# Patient Record
Sex: MALE | Race: BLACK OR AFRICAN AMERICAN | NOT HISPANIC OR LATINO | Employment: OTHER | URBAN - METROPOLITAN AREA
[De-identification: names, ages, dates, MRNs, and addresses within clinical notes are randomized per-mention and may not be internally consistent; named-entity substitution may affect disease eponyms.]

---

## 2013-09-02 LAB — HEP C VIRUS AB: 11

## 2018-04-16 ENCOUNTER — HOSPITAL ENCOUNTER (EMERGENCY)
Facility: HOSPITAL | Age: 58
Discharge: HOME | End: 2018-04-16
Attending: EMERGENCY MEDICINE
Payer: MEDICARE

## 2018-04-16 VITALS
TEMPERATURE: 99 F | RESPIRATION RATE: 14 BRPM | HEART RATE: 65 BPM | WEIGHT: 145 LBS | DIASTOLIC BLOOD PRESSURE: 105 MMHG | SYSTOLIC BLOOD PRESSURE: 152 MMHG | HEIGHT: 69 IN | OXYGEN SATURATION: 98 % | BODY MASS INDEX: 21.48 KG/M2

## 2018-04-16 DIAGNOSIS — L50.9 URTICARIA: ICD-10-CM

## 2018-04-16 DIAGNOSIS — R21 RASH AND NONSPECIFIC SKIN ERUPTION: Primary | ICD-10-CM

## 2018-04-16 PROCEDURE — 99283 EMERGENCY DEPT VISIT LOW MDM: CPT | Mod: 25

## 2018-04-16 PROCEDURE — 25000003 PHARM REV CODE 250: Performed by: NURSE PRACTITIONER

## 2018-04-16 PROCEDURE — 63600175 PHARM REV CODE 636 W HCPCS: Performed by: NURSE PRACTITIONER

## 2018-04-16 PROCEDURE — 96372 THER/PROPH/DIAG INJ SC/IM: CPT

## 2018-04-16 RX ORDER — PREDNISONE 20 MG/1
40 TABLET ORAL DAILY
Qty: 10 TABLET | Refills: 0 | Status: SHIPPED | OUTPATIENT
Start: 2018-04-16 | End: 2018-04-21

## 2018-04-16 RX ORDER — DIPHENHYDRAMINE HYDROCHLORIDE 50 MG/ML
25 INJECTION INTRAMUSCULAR; INTRAVENOUS
Status: COMPLETED | OUTPATIENT
Start: 2018-04-16 | End: 2018-04-16

## 2018-04-16 RX ORDER — BETAMETHASONE SODIUM PHOSPHATE AND BETAMETHASONE ACETATE 3; 3 MG/ML; MG/ML
12 INJECTION, SUSPENSION INTRA-ARTICULAR; INTRALESIONAL; INTRAMUSCULAR; SOFT TISSUE
Status: COMPLETED | OUTPATIENT
Start: 2018-04-16 | End: 2018-04-16

## 2018-04-16 RX ORDER — ONDANSETRON 4 MG/1
4 TABLET, ORALLY DISINTEGRATING ORAL
Status: COMPLETED | OUTPATIENT
Start: 2018-04-16 | End: 2018-04-16

## 2018-04-16 RX ADMIN — Medication 12 MG: at 05:04

## 2018-04-16 RX ADMIN — DIPHENHYDRAMINE HYDROCHLORIDE 25 MG: 50 INJECTION, SOLUTION INTRAMUSCULAR; INTRAVENOUS at 05:04

## 2018-04-16 RX ADMIN — ONDANSETRON 4 MG: 4 TABLET, ORALLY DISINTEGRATING ORAL at 05:04

## 2018-04-16 NOTE — ED TRIAGE NOTES
C/o abd pain x for past month. Pt thinks pauin related to swallowing tooth . C/o nausea and vomited x 1 today.

## 2018-04-16 NOTE — ED PROVIDER NOTES
Encounter Date: 4/16/2018       History     Chief Complaint   Patient presents with    Abdominal Pain    Rash     Patient with rash x 1 month      The history is limited by a language barrier. A  was used.   Illness    The current episode started several weeks ago. The problem occurs continuously. The problem has been gradually worsening. The symptoms are relieved by one or more OTC medications. Nothing aggravates the symptoms. Pertinent negatives include no fever, no nausea, no sore throat, no shortness of breath and no rash.     Review of patient's allergies indicates:  No Known Allergies  Past Medical History:   Diagnosis Date    Coronary artery disease     MI (myocardial infarction)     Ulcer      Past Surgical History:   Procedure Laterality Date    ABDOMINAL SURGERY      CARDIAC SURGERY      EXTENSOR TENDON OF FOREARM / WRIST REPAIR       No family history on file.  Social History   Substance Use Topics    Smoking status: Current Every Day Smoker     Packs/day: 1.00    Smokeless tobacco: Never Used    Alcohol use 0.6 oz/week     1 Cans of beer per week     Review of Systems   Constitutional: Negative for fever.   HENT: Negative for sore throat.    Respiratory: Negative for shortness of breath.    Cardiovascular: Negative for chest pain.   Gastrointestinal: Negative for nausea.   Genitourinary: Negative for dysuria.   Musculoskeletal: Negative for back pain.   Skin: Negative for rash.   Neurological: Negative for weakness.   Hematological: Does not bruise/bleed easily.       Physical Exam     Initial Vitals [04/16/18 1334]   BP Pulse Resp Temp SpO2   (!) 146/92 77 18 99 °F (37.2 °C) 98 %      MAP       110         Physical Exam    Nursing note and vitals reviewed.  Constitutional: He appears well-developed and well-nourished. He is not diaphoretic. No distress.   HENT:   Head: Normocephalic.   Eyes: Pupils are equal, round, and reactive to light.   Neck: Normal range of motion.  "  Cardiovascular: Normal rate, regular rhythm, normal heart sounds and intact distal pulses.   Pulmonary/Chest: Breath sounds normal. No respiratory distress.   Abdominal: Soft. Bowel sounds are normal. He exhibits no distension. There is no tenderness. There is no rebound and no guarding.   Musculoskeletal: Normal range of motion.   Neurological: He is alert and oriented to person, place, and time.   Skin: Skin is warm and dry. Rash (Rash to neck, chest and legs) noted.   Psychiatric: He has a normal mood and affect. His behavior is normal. Judgment and thought content normal.         ED Course   Procedures  Labs Reviewed - No data to display          Medical Decision Making:   ED Management:  Meds given  Take medications as prescribed  Benadryl as needed    Chart is signed / Attested as attending physician only as primary provider was RNP.                     ED Course as of Apr 16 1719   Mon Apr 16, 2018   1623 Awaiting   [JL]   1719 Patient denies abdominal pain. Patient states "Im just hungry"  [JL]      ED Course User Index  [JL] Selina Rothman NP     Clinical Impression:   The primary encounter diagnosis was Rash and nonspecific skin eruption. A diagnosis of Urticaria was also pertinent to this visit.                           Daren Wray MD  04/17/18 0812    "

## 2018-08-08 ENCOUNTER — HOSPITAL ENCOUNTER (EMERGENCY)
Facility: HOSPITAL | Age: 58
Discharge: HOME | End: 2018-08-08
Attending: INTERNAL MEDICINE
Payer: MEDICARE

## 2018-08-08 VITALS
OXYGEN SATURATION: 99 % | RESPIRATION RATE: 20 BRPM | BODY MASS INDEX: 21.48 KG/M2 | WEIGHT: 145 LBS | DIASTOLIC BLOOD PRESSURE: 81 MMHG | HEART RATE: 95 BPM | SYSTOLIC BLOOD PRESSURE: 119 MMHG | TEMPERATURE: 98 F | HEIGHT: 69 IN

## 2018-08-08 DIAGNOSIS — R21 RASH: Primary | ICD-10-CM

## 2018-08-08 PROCEDURE — 99283 EMERGENCY DEPT VISIT LOW MDM: CPT

## 2018-08-08 RX ORDER — CETIRIZINE HYDROCHLORIDE 10 MG/1
10 TABLET ORAL DAILY PRN
Qty: 25 TABLET | Refills: 0 | COMMUNITY
Start: 2018-08-08 | End: 2018-08-08

## 2018-08-08 RX ORDER — BETAMETHASONE VALERATE 1.2 MG/G
OINTMENT TOPICAL 2 TIMES DAILY PRN
Qty: 45 G | Refills: 0 | Status: SHIPPED | OUTPATIENT
Start: 2018-08-08 | End: 2018-08-08

## 2018-08-08 RX ORDER — CETIRIZINE HYDROCHLORIDE 10 MG/1
10 TABLET ORAL DAILY PRN
Qty: 25 TABLET | Refills: 0 | COMMUNITY
Start: 2018-08-08 | End: 2018-10-30

## 2018-08-08 RX ORDER — BETAMETHASONE VALERATE 1.2 MG/G
OINTMENT TOPICAL 2 TIMES DAILY PRN
Qty: 45 G | Refills: 0 | Status: SHIPPED | OUTPATIENT
Start: 2018-08-08 | End: 2019-04-11 | Stop reason: SDUPTHER

## 2018-08-09 NOTE — ED PROVIDER NOTES
Encounter Date: 8/8/2018       History     Chief Complaint   Patient presents with    Rash     right arm     57 y.o male with rash to right arm          Review of patient's allergies indicates:  No Known Allergies  Past Medical History:   Diagnosis Date    Coronary artery disease     MI (myocardial infarction)     Ulcer      Past Surgical History:   Procedure Laterality Date    ABDOMINAL SURGERY      CARDIAC SURGERY      EXTENSOR TENDON OF FOREARM / WRIST REPAIR       History reviewed. No pertinent family history.  Social History   Substance Use Topics    Smoking status: Current Every Day Smoker     Packs/day: 1.00    Smokeless tobacco: Never Used    Alcohol use 0.6 oz/week     1 Cans of beer per week     Review of Systems   Constitutional: Negative for fever.   HENT: Negative for sore throat.    Respiratory: Negative for shortness of breath.    Cardiovascular: Negative for chest pain.   Gastrointestinal: Negative for nausea.   Genitourinary: Negative for dysuria.   Musculoskeletal: Negative for back pain.   Skin: Positive for rash.   Neurological: Negative for weakness.   Hematological: Does not bruise/bleed easily.   All other systems reviewed and are negative.      Physical Exam     Initial Vitals [08/08/18 1921]   BP Pulse Resp Temp SpO2   119/81 95 20 98.1 °F (36.7 °C) 99 %      MAP       --         Physical Exam    Nursing note and vitals reviewed.  Constitutional: He appears well-developed and well-nourished.   HENT:   Head: Normocephalic.   Eyes: Pupils are equal, round, and reactive to light.   Neck: Normal range of motion.   Cardiovascular: Normal rate and regular rhythm.   Pulmonary/Chest: Breath sounds normal.   Neurological: He is alert and oriented to person, place, and time.   Skin: Skin is warm and dry. Rash noted. Rash is urticarial.   Psychiatric: He has a normal mood and affect. His behavior is normal. Judgment and thought content normal.         ED Course   Procedures  Labs Reviewed -  No data to display       Imaging Results    None          Medical Decision Making:   Initial Assessment:   Rash  ED Management:  Discussed physical exam findings with patient  No acute emergent medication condition identified at this time to warrant further testing/diagnostics.  Plan to discharge patient home with instructions to follow-up with PCP in 2-5 days or return to ED if symptoms worsen.  Patient verbalized agreement to discharge treatment plan.                        Clinical Impression:   The encounter diagnosis was Rash.                             Selina Rothman NP  08/08/18 2795

## 2018-10-30 ENCOUNTER — HOSPITAL ENCOUNTER (EMERGENCY)
Facility: HOSPITAL | Age: 58
Discharge: HOME | End: 2018-10-30
Payer: MEDICARE

## 2018-10-30 VITALS
HEIGHT: 69 IN | OXYGEN SATURATION: 98 % | RESPIRATION RATE: 18 BRPM | BODY MASS INDEX: 19.99 KG/M2 | HEART RATE: 90 BPM | SYSTOLIC BLOOD PRESSURE: 157 MMHG | DIASTOLIC BLOOD PRESSURE: 105 MMHG | TEMPERATURE: 98 F | WEIGHT: 135 LBS

## 2018-10-30 DIAGNOSIS — L50.9 URTICARIA: Primary | ICD-10-CM

## 2018-10-30 PROCEDURE — 63600175 PHARM REV CODE 636 W HCPCS: Performed by: EMERGENCY MEDICINE

## 2018-10-30 PROCEDURE — 99284 EMERGENCY DEPT VISIT MOD MDM: CPT | Mod: 25

## 2018-10-30 PROCEDURE — 96372 THER/PROPH/DIAG INJ SC/IM: CPT

## 2018-10-30 RX ORDER — DEXAMETHASONE SODIUM PHOSPHATE 100 MG/10ML
8 INJECTION INTRAMUSCULAR; INTRAVENOUS
Status: COMPLETED | OUTPATIENT
Start: 2018-10-30 | End: 2018-10-30

## 2018-10-30 RX ADMIN — DEXAMETHASONE SODIUM PHOSPHATE 8 MG: 10 INJECTION INTRAMUSCULAR; INTRAVENOUS at 09:10

## 2018-10-30 NOTE — ED PROVIDER NOTES
Encounter Date: 10/30/2018       History     Chief Complaint   Patient presents with    Rash     Pt reports having bites to legs and arms. Pt reports pain and itching.      Patient presents to the ER for evaluation of a rash. History is limited due to the fact that the patient is mute.  He cannot write very well.  He cannot read side language.  Patient complains of a rash and indicates that the rash is itching him.  No difficulty with cough or shortness of breath. No fever.  Review of the medical record reveals 2 prior visits for similar symptoms.          Review of patient's allergies indicates:  No Known Allergies  Past Medical History:   Diagnosis Date    Coronary artery disease     Language barrier     MI (myocardial infarction)     Ulcer      Past Surgical History:   Procedure Laterality Date    ABDOMINAL SURGERY      CARDIAC SURGERY      EXTENSOR TENDON OF FOREARM / WRIST REPAIR       History reviewed. No pertinent family history.  Social History     Tobacco Use    Smoking status: Current Every Day Smoker     Packs/day: 1.00    Smokeless tobacco: Never Used   Substance Use Topics    Alcohol use: Yes     Alcohol/week: 0.6 oz     Types: 1 Cans of beer per week    Drug use: No     Review of Systems   Constitutional: Negative for fever.   Respiratory: Negative for cough and shortness of breath.    Cardiovascular: Negative for chest pain.   Skin: Positive for rash.       Physical Exam     Initial Vitals [10/30/18 0849]   BP Pulse Resp Temp SpO2   (!) 157/105 90 18 98.3 °F (36.8 °C) 98 %      MAP       --         Physical Exam    Nursing note and vitals reviewed.  Constitutional: He appears well-developed and well-nourished.   HENT:   Head: Normocephalic and atraumatic.   Right Ear: External ear normal.   Left Ear: External ear normal.   Nose: Nose normal.   Mouth/Throat: Oropharynx is clear and moist.   Eyes: Conjunctivae and EOM are normal. Pupils are equal, round, and reactive to light.   Neck: Normal  range of motion. Neck supple.   Cardiovascular: Normal rate, regular rhythm and normal heart sounds.   Pulmonary/Chest: Breath sounds normal.   Neurological: He is alert and oriented to person, place, and time.   Skin: Skin is warm.   Patient has evidence of an urticarial rash vertically across his back.         ED Course   Procedures  Labs Reviewed - No data to display       Imaging Results    None          Medical Decision Making:   Initial Assessment:   Patient is here with recurrence rash. He is requesting a shot.  Review of the record shows that he has received steroid the past which I would expect to be effective.  I will give him an injection of steroid.  He can use Benadryl over-the-counter.  Return to the ER for worsening symptoms.                      Clinical Impression:   The encounter diagnosis was Urticaria.                             Will Cruz MD  10/30/18 6229

## 2018-10-30 NOTE — ED NOTES
Pt is unable to sign. No family at bedside. Attempt to call emergency contact x 3. No answer. Pt able to read and write on paper. Pt reporting rash/itch/pain to legs and shoulders.

## 2018-11-16 ENCOUNTER — HOSPITAL ENCOUNTER (EMERGENCY)
Facility: HOSPITAL | Age: 58
Discharge: HOME | End: 2018-11-16
Attending: FAMILY MEDICINE
Payer: MEDICARE

## 2018-11-16 VITALS
TEMPERATURE: 98 F | SYSTOLIC BLOOD PRESSURE: 113 MMHG | WEIGHT: 140 LBS | DIASTOLIC BLOOD PRESSURE: 53 MMHG | HEIGHT: 69 IN | HEART RATE: 92 BPM | BODY MASS INDEX: 20.73 KG/M2 | OXYGEN SATURATION: 98 % | RESPIRATION RATE: 18 BRPM

## 2018-11-16 DIAGNOSIS — K02.9 DENTAL CARIES: ICD-10-CM

## 2018-11-16 DIAGNOSIS — M54.31 SCIATICA OF RIGHT SIDE: Primary | ICD-10-CM

## 2018-11-16 PROCEDURE — 96372 THER/PROPH/DIAG INJ SC/IM: CPT

## 2018-11-16 PROCEDURE — 63600175 PHARM REV CODE 636 W HCPCS: Mod: JG | Performed by: FAMILY MEDICINE

## 2018-11-16 PROCEDURE — 99284 EMERGENCY DEPT VISIT MOD MDM: CPT | Mod: 25

## 2018-11-16 RX ORDER — DEXAMETHASONE SODIUM PHOSPHATE 4 MG/ML
8 INJECTION, SOLUTION INTRA-ARTICULAR; INTRALESIONAL; INTRAMUSCULAR; INTRAVENOUS; SOFT TISSUE
Status: COMPLETED | OUTPATIENT
Start: 2018-11-16 | End: 2018-11-16

## 2018-11-16 RX ORDER — KETOROLAC TROMETHAMINE 30 MG/ML
60 INJECTION, SOLUTION INTRAMUSCULAR; INTRAVENOUS
Status: COMPLETED | OUTPATIENT
Start: 2018-11-16 | End: 2018-11-16

## 2018-11-16 RX ORDER — NAPROXEN 500 MG/1
500 TABLET ORAL 2 TIMES DAILY WITH MEALS
Qty: 60 TABLET | Refills: 0 | Status: SHIPPED | OUTPATIENT
Start: 2018-11-16 | End: 2018-11-16 | Stop reason: SDUPTHER

## 2018-11-16 RX ORDER — NAPROXEN 500 MG/1
500 TABLET ORAL 2 TIMES DAILY WITH MEALS
Qty: 60 TABLET | Refills: 0 | OUTPATIENT
Start: 2018-11-16 | End: 2019-06-05

## 2018-11-16 RX ORDER — DEXAMETHASONE SODIUM PHOSPHATE 100 MG/10ML
8 INJECTION INTRAMUSCULAR; INTRAVENOUS
Status: DISCONTINUED | OUTPATIENT
Start: 2018-11-16 | End: 2018-11-16

## 2018-11-16 RX ADMIN — DEXAMETHASONE SODIUM PHOSPHATE 8 MG: 4 INJECTION, SOLUTION INTRAMUSCULAR; INTRAVENOUS at 02:11

## 2018-11-16 RX ADMIN — PENICILLIN G BENZATHINE 1.2 MILLION UNITS: 1200000 INJECTION, SUSPENSION INTRAMUSCULAR at 02:11

## 2018-11-16 RX ADMIN — KETOROLAC TROMETHAMINE 60 MG: 30 INJECTION INTRAMUSCULAR; INTRAVENOUS at 02:11

## 2018-11-16 NOTE — ED PROVIDER NOTES
Encounter Date: 11/16/2018       History     Chief Complaint   Patient presents with    Dental Pain    Leg Pain    Testicle Pain     Pt is a deaf gentleman who has a broken infected tooth and sciatica pain for the past week. He states his tooth hurts more when he eats or drinks. His hip pain is constant but is worse when he walks for sits. He has had both of these problems previously.           Review of patient's allergies indicates:  No Known Allergies  Past Medical History:   Diagnosis Date    Coronary artery disease     Language barrier     MI (myocardial infarction)     Ulcer      Past Surgical History:   Procedure Laterality Date    ABDOMINAL SURGERY      CARDIAC SURGERY      EXTENSOR TENDON OF FOREARM / WRIST REPAIR       No family history on file.  Social History     Tobacco Use    Smoking status: Current Every Day Smoker     Packs/day: 1.00    Smokeless tobacco: Never Used   Substance Use Topics    Alcohol use: Yes     Alcohol/week: 0.6 oz     Types: 1 Cans of beer per week    Drug use: No     Review of Systems   Constitutional: Negative for chills, fatigue and fever.   HENT: Positive for dental problem. Negative for congestion, ear pain, rhinorrhea, sinus pressure, sinus pain and sore throat.    Eyes: Negative for photophobia and redness.   Respiratory: Negative for cough, shortness of breath and wheezing.    Cardiovascular: Negative for chest pain, palpitations and leg swelling.   Gastrointestinal: Negative for abdominal pain, constipation, diarrhea and nausea.   Endocrine: Negative for polydipsia, polyphagia and polyuria.   Genitourinary: Negative for difficulty urinating, dysuria, flank pain, hematuria, scrotal swelling, testicular pain and urgency.   Musculoskeletal: Positive for back pain. Negative for arthralgias and joint swelling.   Skin: Negative for color change.   Neurological: Negative for dizziness, seizures, syncope, weakness and headaches.   Hematological: Negative for  adenopathy.   Psychiatric/Behavioral: Negative for sleep disturbance and suicidal ideas.   All other systems reviewed and are negative.      Physical Exam     Initial Vitals [11/16/18 1350]   BP Pulse Resp Temp SpO2   (!) 113/53 92 18 98.1 °F (36.7 °C) 98 %      MAP       --         Physical Exam    Nursing note and vitals reviewed.  Constitutional: He appears well-developed.   HENT:   Head: Normocephalic and atraumatic.   L front tooth is fractured and infected.    Eyes: Conjunctivae and EOM are normal. Pupils are equal, round, and reactive to light.   Neck: Normal range of motion. Neck supple.   Cardiovascular: Normal rate, regular rhythm, normal heart sounds and intact distal pulses.   Pulmonary/Chest: Breath sounds normal.   Abdominal: Soft. Bowel sounds are normal.   Musculoskeletal: Normal range of motion.   Neurological: He is alert and oriented to person, place, and time. He has normal strength and normal reflexes.   Skin: Skin is warm and dry. Capillary refill takes less than 2 seconds.   Psychiatric: He has a normal mood and affect. His behavior is normal.         ED Course   Procedures  Labs Reviewed - No data to display       Imaging Results    None                               Clinical Impression:   The primary encounter diagnosis was Sciatica of right side. A diagnosis of Dental caries was also pertinent to this visit.      Disposition:   Disposition: Discharged  Condition: Stable                        Joelle Mckinney MD  11/16/18 9666

## 2018-11-16 NOTE — ED NOTES
SELINA used for translation of American Sign Language at the bedside with myself and Dr. Mckinney. Assessment completed and plan of care discussed with pt. Pt denies any questions at this time.

## 2019-04-11 ENCOUNTER — HOSPITAL ENCOUNTER (EMERGENCY)
Facility: HOSPITAL | Age: 59
Discharge: HOME | End: 2019-04-11
Payer: MEDICARE

## 2019-04-11 VITALS
BODY MASS INDEX: 18.51 KG/M2 | OXYGEN SATURATION: 96 % | SYSTOLIC BLOOD PRESSURE: 149 MMHG | WEIGHT: 125 LBS | RESPIRATION RATE: 18 BRPM | HEART RATE: 89 BPM | HEIGHT: 69 IN | DIASTOLIC BLOOD PRESSURE: 94 MMHG

## 2019-04-11 DIAGNOSIS — L30.9 DERMATITIS: Primary | ICD-10-CM

## 2019-04-11 DIAGNOSIS — K02.9 DENTAL CARIES: ICD-10-CM

## 2019-04-11 PROCEDURE — 99284 EMERGENCY DEPT VISIT MOD MDM: CPT | Mod: 25

## 2019-04-11 PROCEDURE — 63600175 PHARM REV CODE 636 W HCPCS: Performed by: NURSE PRACTITIONER

## 2019-04-11 PROCEDURE — 96372 THER/PROPH/DIAG INJ SC/IM: CPT

## 2019-04-11 RX ORDER — BETAMETHASONE VALERATE 1.2 MG/G
OINTMENT TOPICAL 2 TIMES DAILY PRN
Qty: 45 G | Refills: 0 | Status: SHIPPED | OUTPATIENT
Start: 2019-04-11 | End: 2019-06-05

## 2019-04-11 RX ORDER — AMOXICILLIN 500 MG/1
500 CAPSULE ORAL 3 TIMES DAILY
Qty: 21 CAPSULE | Refills: 0 | Status: SHIPPED | OUTPATIENT
Start: 2019-04-11 | End: 2019-04-18

## 2019-04-11 RX ORDER — DEXAMETHASONE SODIUM PHOSPHATE 100 MG/10ML
8 INJECTION INTRAMUSCULAR; INTRAVENOUS
Status: COMPLETED | OUTPATIENT
Start: 2019-04-11 | End: 2019-04-11

## 2019-04-11 RX ADMIN — DEXAMETHASONE SODIUM PHOSPHATE 8 MG: 10 INJECTION INTRAMUSCULAR; INTRAVENOUS at 01:04

## 2019-04-11 NOTE — ED PROVIDER NOTES
"Encounter Date: 4/11/2019       History     Chief Complaint   Patient presents with    Rash     RASH TO BACK OF NECK, FEVER, "FEEL LIKE SOMETHING HAS BEEN BITING ME"    Dental Pain     BROKE TOOTH 1 YEAR AGO, "I JUST WANT A SHOT TO MAKE ME FEEL BETTER"     C/o itchy rash to neck on/off x 1 year. Requests an "shot" for rash and also medication for dental pain (chronic dental fracture/dental caries to left upper front tooth.         Review of patient's allergies indicates:  No Known Allergies  Past Medical History:   Diagnosis Date    Coronary artery disease     Language barrier     MI (myocardial infarction)     Ulcer      Past Surgical History:   Procedure Laterality Date    ABDOMINAL SURGERY      CARDIAC SURGERY      EXTENSOR TENDON OF FOREARM / WRIST REPAIR       No family history on file.  Social History     Tobacco Use    Smoking status: Current Every Day Smoker     Packs/day: 1.00    Smokeless tobacco: Never Used   Substance Use Topics    Alcohol use: Yes     Alcohol/week: 0.6 oz     Types: 1 Cans of beer per week    Drug use: No     Review of Systems   Constitutional: Negative for chills and fever.   HENT: Positive for dental problem. Negative for congestion.    Respiratory: Negative for cough and shortness of breath.    Gastrointestinal: Negative for diarrhea, nausea and vomiting.   Skin: Positive for rash.   All other systems reviewed and are negative.      Physical Exam     Initial Vitals   BP Pulse Resp Temp SpO2   04/11/19 1245 04/11/19 1241 04/11/19 1241 -- 04/11/19 1241   (!) 149/94 89 18  96 %      MAP       --                Physical Exam    Constitutional: He appears well-developed and well-nourished.   HENT:   Head: Normocephalic and atraumatic.   Mouth/Throat: Oropharynx is clear and moist.       Eyes: Right eye exhibits no discharge. Left eye exhibits no discharge. No scleral icterus.   Neck: Normal range of motion. Neck supple. No JVD present.   Cardiovascular: Normal rate, regular " rhythm, normal heart sounds and intact distal pulses.   No murmur heard.  Pulmonary/Chest: Breath sounds normal. No respiratory distress.   Musculoskeletal: Normal range of motion. He exhibits no edema.   Lymphadenopathy:     He has no cervical adenopathy.   Neurological: He is alert and oriented to person, place, and time. GCS score is 15. GCS eye subscore is 4. GCS verbal subscore is 5. GCS motor subscore is 6.   Skin: Skin is warm and dry. Capillary refill takes less than 2 seconds.   Hypertrophic papular skin rash to posterior neck and bilateral lower chest (chronic appearing). No redness, no evidence of cellulitis   Psychiatric: He has a normal mood and affect.         ED Course   Procedures  Labs Reviewed - No data to display       Imaging Results    None          Medical Decision Making:   Differential Diagnosis:   Contact dermatitis, eczema, scabies; dental caries                      Clinical Impression:       ICD-10-CM ICD-9-CM   1. Dermatitis L30.9 692.9   2. Dental caries K02.9 521.00         Disposition:   Disposition: Discharged  Condition: Stable                        Aparna Sánchez NP  04/11/19 7217

## 2019-06-05 ENCOUNTER — HOSPITAL ENCOUNTER (EMERGENCY)
Facility: HOSPITAL | Age: 59
Discharge: HOME | End: 2019-06-05
Payer: MEDICARE

## 2019-06-05 VITALS
DIASTOLIC BLOOD PRESSURE: 81 MMHG | BODY MASS INDEX: 21.82 KG/M2 | WEIGHT: 144 LBS | HEART RATE: 74 BPM | SYSTOLIC BLOOD PRESSURE: 112 MMHG | TEMPERATURE: 98 F | RESPIRATION RATE: 18 BRPM | HEIGHT: 68 IN | OXYGEN SATURATION: 98 %

## 2019-06-05 DIAGNOSIS — K02.9 DENTAL CARIES: Primary | ICD-10-CM

## 2019-06-05 PROCEDURE — 99284 EMERGENCY DEPT VISIT MOD MDM: CPT | Mod: 25

## 2019-06-05 PROCEDURE — 63600175 PHARM REV CODE 636 W HCPCS: Performed by: NURSE PRACTITIONER

## 2019-06-05 PROCEDURE — 96372 THER/PROPH/DIAG INJ SC/IM: CPT

## 2019-06-05 RX ORDER — KETOROLAC TROMETHAMINE 30 MG/ML
60 INJECTION, SOLUTION INTRAMUSCULAR; INTRAVENOUS
Status: COMPLETED | OUTPATIENT
Start: 2019-06-05 | End: 2019-06-05

## 2019-06-05 RX ORDER — HYDROCODONE BITARTRATE AND ACETAMINOPHEN 5; 325 MG/1; MG/1
1 TABLET ORAL EVERY 8 HOURS PRN
Qty: 10 TABLET | Refills: 0 | Status: ON HOLD | OUTPATIENT
Start: 2019-06-05 | End: 2019-10-23 | Stop reason: HOSPADM

## 2019-06-05 RX ORDER — AMOXICILLIN 500 MG/1
500 CAPSULE ORAL 3 TIMES DAILY
Qty: 21 CAPSULE | Refills: 0 | Status: SHIPPED | OUTPATIENT
Start: 2019-06-05 | End: 2019-06-12

## 2019-06-05 RX ADMIN — KETOROLAC TROMETHAMINE 60 MG: 30 INJECTION, SOLUTION INTRAMUSCULAR; INTRAVENOUS at 02:06

## 2019-06-05 NOTE — DISCHARGE INSTRUCTIONS
You must follow up with a dentist to have tooth pulled. We do not/can not pull teeth in the emergency department.

## 2019-06-05 NOTE — ED PROVIDER NOTES
Encounter Date: 6/5/2019       History   No chief complaint on file.    C/o pain to left upper incisor. Tooth has been broken for a long time and has caused him pain for several months. He was seen here in April for same complaint. He is requesting the tooth be pulled.          Review of patient's allergies indicates:  No Known Allergies  Past Medical History:   Diagnosis Date    Coronary artery disease     Language barrier     MI (myocardial infarction)     Ulcer      Past Surgical History:   Procedure Laterality Date    ABDOMINAL SURGERY      CARDIAC SURGERY      EXTENSOR TENDON OF FOREARM / WRIST REPAIR       No family history on file.  Social History     Tobacco Use    Smoking status: Current Every Day Smoker     Packs/day: 1.00    Smokeless tobacco: Never Used   Substance Use Topics    Alcohol use: Yes     Alcohol/week: 0.6 oz     Types: 1 Cans of beer per week    Drug use: No     Review of Systems   Constitutional: Negative for chills and fever.   HENT: Positive for dental problem. Negative for congestion.    Respiratory: Negative for cough.    Cardiovascular: Negative for chest pain.   Gastrointestinal: Negative for nausea and vomiting.   Neurological: Positive for headaches.   All other systems reviewed and are negative.      Physical Exam     Initial Vitals   BP Pulse Resp Temp SpO2   -- -- -- -- --      MAP       --         Physical Exam    Nursing note and vitals reviewed.  Constitutional: He appears well-developed and well-nourished. He is not diaphoretic. No distress.   Smell of etoh on breath   HENT:   Head: Normocephalic and atraumatic.   Nose: Nose normal.   Mouth/Throat: Oropharynx is clear and moist. No oropharyngeal exudate.   Eyes: Right eye exhibits no discharge. Left eye exhibits no discharge.   Neck: Normal range of motion. Neck supple.   Cardiovascular: Normal rate and regular rhythm.   Pulmonary/Chest: Breath sounds normal. No respiratory distress.   Musculoskeletal: Normal range  of motion.   Lymphadenopathy:     He has no cervical adenopathy.   Neurological: He is alert and oriented to person, place, and time. GCS score is 15. GCS eye subscore is 4. GCS verbal subscore is 5. GCS motor subscore is 6.   He is deaf, communicates with sign language and by writing on paper.    Skin: Skin is warm and dry. Capillary refill takes less than 2 seconds.   Psychiatric: He has a normal mood and affect. His behavior is normal.         ED Course   Procedures  Labs Reviewed - No data to display       Imaging Results    None          Medical Decision Making:   Differential Diagnosis:   Dental caries, dental abscess, dental fracture  ED Management:  Pt has light odor of etoh on breath. Has documented hx of PUD. Appears to be in pain. I am reluctant to give NSAID rx due to hx PUD. Will give rx norco 5mg x 10 tablets only.   Patient has been informed that he must make appointment to see a dentist.                       Clinical Impression:       ICD-10-CM ICD-9-CM   1. Dental caries K02.9 521.00                                Aparna Sánchez NP  06/05/19 2193

## 2019-10-19 ENCOUNTER — HOSPITAL ENCOUNTER (EMERGENCY)
Facility: HOSPITAL | Age: 59
Discharge: OTHER INSTITUTION | End: 2019-10-19
Attending: EMERGENCY MEDICINE
Payer: MEDICARE

## 2019-10-19 ENCOUNTER — HOSPITAL ENCOUNTER (INPATIENT)
Facility: HOSPITAL | Age: 59
LOS: 3 days | Discharge: NURSING, REHAB OR LONG-TERM CARE | DRG: 478 | End: 2019-10-23
Attending: HOSPITALIST | Admitting: HOSPITALIST
Payer: MEDICARE

## 2019-10-19 VITALS
BODY MASS INDEX: 22.6 KG/M2 | HEART RATE: 101 BPM | RESPIRATION RATE: 20 BRPM | DIASTOLIC BLOOD PRESSURE: 79 MMHG | TEMPERATURE: 98 F | SYSTOLIC BLOOD PRESSURE: 131 MMHG | OXYGEN SATURATION: 96 % | HEIGHT: 67 IN | WEIGHT: 144 LBS

## 2019-10-19 DIAGNOSIS — S42.212A CLOSED DISPLACED FRACTURE OF SURGICAL NECK OF LEFT HUMERUS, UNSPECIFIED FRACTURE MORPHOLOGY, INITIAL ENCOUNTER: Primary | ICD-10-CM

## 2019-10-19 DIAGNOSIS — S42.212A FX HUMERAL NECK, LEFT, CLOSED, INITIAL ENCOUNTER: ICD-10-CM

## 2019-10-19 DIAGNOSIS — R52 PAIN: ICD-10-CM
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
## 2019-10-19 PROBLEM — I25.709 CORONARY ARTERY DISEASE INVOLVING CORONARY BYPASS GRAFT WITH ANGINA PECTORIS: Status: ACTIVE | Noted: 2019-10-19

## 2019-10-19 PROBLEM — I10 HTN (HYPERTENSION): Status: ACTIVE | Noted: 2019-10-19

## 2019-10-19 LAB
ALBUMIN SERPL BCP-MCNC: 3.6 G/DL (ref 3.5–5.2)
ALP SERPL-CCNC: 54 U/L (ref 55–135)
ALT SERPL W/O P-5'-P-CCNC: 77 U/L (ref 10–44)
ANION GAP SERPL CALC-SCNC: 14 MMOL/L (ref 8–16)
APTT BLDCRRT: 28.9 SEC (ref 21–32)
AST SERPL-CCNC: 95 U/L (ref 10–40)
BASOPHILS # BLD AUTO: 0.07 K/UL (ref 0–0.2)
BASOPHILS NFR BLD: 1.1 % (ref 0–1.9)
BILIRUB SERPL-MCNC: 0.6 MG/DL (ref 0.1–1)
BUN SERPL-MCNC: 6 MG/DL (ref 6–20)
CALCIUM SERPL-MCNC: 9 MG/DL (ref 8.7–10.5)
CHLORIDE SERPL-SCNC: 98 MMOL/L (ref 95–110)
CO2 SERPL-SCNC: 23 MMOL/L (ref 23–29)
CREAT SERPL-MCNC: 0.7 MG/DL (ref 0.5–1.4)
DIFFERENTIAL METHOD: ABNORMAL
EOSINOPHIL # BLD AUTO: 0.1 K/UL (ref 0–0.5)
EOSINOPHIL NFR BLD: 1.1 % (ref 0–8)
ERYTHROCYTE [DISTWIDTH] IN BLOOD BY AUTOMATED COUNT: 15.2 % (ref 11.5–14.5)
EST. GFR  (AFRICAN AMERICAN): >60 ML/MIN/1.73 M^2
EST. GFR  (NON AFRICAN AMERICAN): >60 ML/MIN/1.73 M^2
GLUCOSE SERPL-MCNC: 98 MG/DL (ref 70–110)
HCT VFR BLD AUTO: 41.9 % (ref 40–54)
HGB BLD-MCNC: 13.2 G/DL (ref 14–18)
IMM GRANULOCYTES # BLD AUTO: 0.02 K/UL (ref 0–0.04)
IMM GRANULOCYTES NFR BLD AUTO: 0.3 % (ref 0–0.5)
INR PPP: 1.1 (ref 0.8–1.2)
LYMPHOCYTES # BLD AUTO: 2.5 K/UL (ref 1–4.8)
LYMPHOCYTES NFR BLD: 40.3 % (ref 18–48)
MCH RBC QN AUTO: 29.7 PG (ref 27–31)
MCHC RBC AUTO-ENTMCNC: 31.5 G/DL (ref 32–36)
MCV RBC AUTO: 94 FL (ref 82–98)
MONOCYTES # BLD AUTO: 0.8 K/UL (ref 0.3–1)
MONOCYTES NFR BLD: 12.5 % (ref 4–15)
NEUTROPHILS # BLD AUTO: 2.7 K/UL (ref 1.8–7.7)
NEUTROPHILS NFR BLD: 44.7 % (ref 38–73)
NRBC BLD-RTO: 0 /100 WBC
PLATELET # BLD AUTO: 139 K/UL (ref 150–350)
PMV BLD AUTO: 10.3 FL (ref 9.2–12.9)
POTASSIUM SERPL-SCNC: 3.8 MMOL/L (ref 3.5–5.1)
PROT SERPL-MCNC: 7.9 G/DL (ref 6–8.4)
PROTHROMBIN TIME: 12 SEC (ref 9–12.5)
RBC # BLD AUTO: 4.45 M/UL (ref 4.6–6.2)
SODIUM SERPL-SCNC: 135 MMOL/L (ref 136–145)
TROPONIN I SERPL DL<=0.01 NG/ML-MCNC: 0.04 NG/ML (ref 0.02–0.5)
WBC # BLD AUTO: 6.15 K/UL (ref 3.9–12.7)

## 2019-10-19 PROCEDURE — 73030 X-RAY EXAM OF SHOULDER: CPT | Mod: 26,LT,, | Performed by: RADIOLOGY

## 2019-10-19 PROCEDURE — 71045 XR CHEST AP PORTABLE: ICD-10-PCS | Mod: 26,,, | Performed by: RADIOLOGY

## 2019-10-19 PROCEDURE — 72125 CT CERVICAL SPINE WITHOUT CONTRAST: ICD-10-PCS | Mod: 26,,, | Performed by: RADIOLOGY

## 2019-10-19 PROCEDURE — 70450 CT HEAD WITHOUT CONTRAST: ICD-10-PCS | Mod: 26,,, | Performed by: RADIOLOGY

## 2019-10-19 PROCEDURE — 85025 COMPLETE CBC W/AUTO DIFF WBC: CPT

## 2019-10-19 PROCEDURE — G0378 HOSPITAL OBSERVATION PER HR: HCPCS

## 2019-10-19 PROCEDURE — 73030 XR SHOULDER TRAUMA 3 VIEW LEFT: ICD-10-PCS | Mod: 26,LT,, | Performed by: RADIOLOGY

## 2019-10-19 PROCEDURE — 71045 X-RAY EXAM CHEST 1 VIEW: CPT | Mod: 26,,, | Performed by: RADIOLOGY

## 2019-10-19 PROCEDURE — 84484 ASSAY OF TROPONIN QUANT: CPT

## 2019-10-19 PROCEDURE — 72125 CT NECK SPINE W/O DYE: CPT | Mod: 26,,, | Performed by: RADIOLOGY

## 2019-10-19 PROCEDURE — 73030 X-RAY EXAM OF SHOULDER: CPT | Mod: TC,FY,LT

## 2019-10-19 PROCEDURE — 70450 CT HEAD/BRAIN W/O DYE: CPT | Mod: 26,,, | Performed by: RADIOLOGY

## 2019-10-19 PROCEDURE — 80053 COMPREHEN METABOLIC PANEL: CPT

## 2019-10-19 PROCEDURE — 63600175 PHARM REV CODE 636 W HCPCS: Performed by: EMERGENCY MEDICINE

## 2019-10-19 PROCEDURE — 93005 ELECTROCARDIOGRAM TRACING: CPT

## 2019-10-19 PROCEDURE — 71045 X-RAY EXAM CHEST 1 VIEW: CPT | Mod: TC,FY

## 2019-10-19 PROCEDURE — 85730 THROMBOPLASTIN TIME PARTIAL: CPT

## 2019-10-19 PROCEDURE — 96374 THER/PROPH/DIAG INJ IV PUSH: CPT

## 2019-10-19 PROCEDURE — 25000003 PHARM REV CODE 250: Performed by: INTERNAL MEDICINE

## 2019-10-19 PROCEDURE — 85610 PROTHROMBIN TIME: CPT

## 2019-10-19 PROCEDURE — 99285 EMERGENCY DEPT VISIT HI MDM: CPT | Mod: 25

## 2019-10-19 PROCEDURE — 72125 CT NECK SPINE W/O DYE: CPT | Mod: TC

## 2019-10-19 PROCEDURE — G0379 DIRECT REFER HOSPITAL OBSERV: HCPCS

## 2019-10-19 PROCEDURE — 70450 CT HEAD/BRAIN W/O DYE: CPT | Mod: TC

## 2019-10-19 PROCEDURE — 96376 TX/PRO/DX INJ SAME DRUG ADON: CPT

## 2019-10-19 RX ORDER — ACETAMINOPHEN 325 MG/1
650 TABLET ORAL EVERY 8 HOURS PRN
Status: DISCONTINUED | OUTPATIENT
Start: 2019-10-19 | End: 2019-10-23 | Stop reason: HOSPADM

## 2019-10-19 RX ORDER — GLUCAGON 1 MG
1 KIT INJECTION
Status: DISCONTINUED | OUTPATIENT
Start: 2019-10-19 | End: 2019-10-23 | Stop reason: HOSPADM

## 2019-10-19 RX ORDER — MORPHINE SULFATE 4 MG/ML
4 INJECTION, SOLUTION INTRAMUSCULAR; INTRAVENOUS EVERY 4 HOURS PRN
Status: DISCONTINUED | OUTPATIENT
Start: 2019-10-19 | End: 2019-10-23 | Stop reason: HOSPADM

## 2019-10-19 RX ORDER — ONDANSETRON 2 MG/ML
4 INJECTION INTRAMUSCULAR; INTRAVENOUS EVERY 8 HOURS PRN
Status: DISCONTINUED | OUTPATIENT
Start: 2019-10-19 | End: 2019-10-23 | Stop reason: HOSPADM

## 2019-10-19 RX ORDER — POTASSIUM CHLORIDE 20 MEQ/15ML
40 SOLUTION ORAL
Status: DISCONTINUED | OUTPATIENT
Start: 2019-10-19 | End: 2019-10-23 | Stop reason: HOSPADM

## 2019-10-19 RX ORDER — ACETAMINOPHEN 325 MG/1
650 TABLET ORAL EVERY 4 HOURS PRN
Status: DISCONTINUED | OUTPATIENT
Start: 2019-10-19 | End: 2019-10-23 | Stop reason: HOSPADM

## 2019-10-19 RX ORDER — SODIUM CHLORIDE 0.9 % (FLUSH) 0.9 %
10 SYRINGE (ML) INJECTION
Status: DISCONTINUED | OUTPATIENT
Start: 2019-10-19 | End: 2019-10-20 | Stop reason: SDUPTHER

## 2019-10-19 RX ORDER — HYDROMORPHONE HYDROCHLORIDE 2 MG/ML
1 INJECTION, SOLUTION INTRAMUSCULAR; INTRAVENOUS; SUBCUTANEOUS
Status: COMPLETED | OUTPATIENT
Start: 2019-10-19 | End: 2019-10-19

## 2019-10-19 RX ORDER — HYDROCODONE BITARTRATE AND ACETAMINOPHEN 5; 325 MG/1; MG/1
1 TABLET ORAL EVERY 6 HOURS PRN
Status: DISCONTINUED | OUTPATIENT
Start: 2019-10-19 | End: 2019-10-20 | Stop reason: SDUPTHER

## 2019-10-19 RX ORDER — IBUPROFEN 200 MG
24 TABLET ORAL
Status: DISCONTINUED | OUTPATIENT
Start: 2019-10-19 | End: 2019-10-23 | Stop reason: HOSPADM

## 2019-10-19 RX ORDER — IBUPROFEN 200 MG
16 TABLET ORAL
Status: DISCONTINUED | OUTPATIENT
Start: 2019-10-19 | End: 2019-10-23 | Stop reason: HOSPADM

## 2019-10-19 RX ORDER — LANOLIN ALCOHOL/MO/W.PET/CERES
800 CREAM (GRAM) TOPICAL
Status: DISCONTINUED | OUTPATIENT
Start: 2019-10-19 | End: 2019-10-21

## 2019-10-19 RX ADMIN — HYDROMORPHONE HYDROCHLORIDE 1 MG: 2 INJECTION, SOLUTION INTRAMUSCULAR; INTRAVENOUS; SUBCUTANEOUS at 04:10

## 2019-10-19 RX ADMIN — HYDROCODONE BITARTRATE AND ACETAMINOPHEN 1 TABLET: 5; 325 TABLET ORAL at 09:10

## 2019-10-19 RX ADMIN — HYDROMORPHONE HYDROCHLORIDE 1 MG: 2 INJECTION, SOLUTION INTRAMUSCULAR; INTRAVENOUS; SUBCUTANEOUS at 01:10

## 2019-10-19 NOTE — PLAN OF CARE
(Physician in Lead of Transfers)   Outside Transfer Acceptance Note / [facility]    Transferring Physician: Yelena Salmeron MD (ED)    Accepting Physician: Catracho Terrazas MD    Date of Acceptance: 10/19/19    Transferring Facility: [place] [facility]    Destination Facility and Admitting Physician: [facility], Dr. Rhodes    Reason for Transfer: HLOC, needs orthopedic surgery    Report from Transferring Physician/Hospital course:     Mr. Mccabe is a 58-year-old man with CAD who presented to the [place] [facility] with a fall.     Obtaining a history from him is difficult as he is deaf and mute. Dr. Salmeron suspects he has more chronic medical issues, noting right-sided contractures on exam. From her report he suffered a mechanical fall, prompting his arrival to the ED today.    Workup there was significant for mild thrombocytopenia, normal INR 1.1, mild AST and ALT elevations at 95 and 77 respectively. CXR shows destructive/lytic process involving the left 8th rib, left shoulder 3 view radiograph showing displaced comminuted fracture of the proximal shaft/left humeral neck. CT head was non-acute, just chronic microvascular changes, and CT C-spine showing degenerative changes with no fracture. He was hypertensive to 174/113 on arrival which improved with pain control with dilaudid 1 mg IV x1, the only medication he has received so far. Dr. Salmeron is concerned about his ability to follow-up, and he has no family support.     The case was discussed with Dr. Funes with orthopedic surgery who agreed to consult with plans NPO at [facility] in preparation for surgical intervention tomorrow.    VS:     See Epic    Labs:     See Epic    Radiographs:     See Epic    To Do List: Admit to     Upon patient arrival to floor, please contact Hospital Medicine on call.     Catracho Terrazas M.D.   (Physician in Lead of Transfers)  [facility]  [phone] (pager)

## 2019-10-19 NOTE — ED PROVIDER NOTES
Encounter Date: 10/19/2019       History     Chief Complaint   Patient presents with    Shoulder Pain     Left shoulder pain.    Fall     History obtained using [employer].     59yo male with pmh CAD with MI, hearing impaired, and contracted right upper extremity presents to ED via EMS for evaluation of left shoulder pain after a mechanical trip and fall near a local [place] station. Reports severe pain to left shoulder - sharp, constant. Denies LOC but complains of generalized, aching headache and posterior bilateral neck pain.         Review of patient's allergies indicates:  No Known Allergies  Past Medical History:   Diagnosis Date    Coronary artery disease     Language barrier     MI (myocardial infarction)     Ulcer      Past Surgical History:   Procedure Laterality Date    ABDOMINAL SURGERY      CARDIAC SURGERY      EXTENSOR TENDON OF FOREARM / WRIST REPAIR       No family history on file.  Social History     Tobacco Use    Smoking status: Current Every Day Smoker     Packs/day: 1.00    Smokeless tobacco: Never Used   Substance Use Topics    Alcohol use: Yes     Alcohol/week: 1.0 standard drinks     Types: 1 Cans of beer per week    Drug use: No     Review of Systems   Constitutional: Negative for appetite change, chills, diaphoresis, fatigue and fever.   HENT: Negative for congestion, ear pain, rhinorrhea, sinus pressure, sinus pain, sore throat and tinnitus.    Eyes: Negative for photophobia and visual disturbance.   Respiratory: Negative for cough, chest tightness, shortness of breath and wheezing.    Cardiovascular: Negative for chest pain, palpitations and leg swelling.   Gastrointestinal: Negative for abdominal pain, constipation, diarrhea, nausea and vomiting.   Endocrine: Negative for cold intolerance, heat intolerance, polydipsia, polyphagia and polyuria.   Genitourinary: Negative for decreased urine volume, difficulty urinating, dysuria, flank pain, frequency, hematuria and urgency.    Musculoskeletal: Positive for arthralgias, neck pain and neck stiffness. Negative for back pain, gait problem, joint swelling and myalgias.   Skin: Negative for color change, pallor, rash and wound.   Allergic/Immunologic: Negative for immunocompromised state.   Neurological: Positive for headaches. Negative for dizziness, syncope, weakness, light-headedness and numbness.   Hematological: Negative for adenopathy. Does not bruise/bleed easily.   Psychiatric/Behavioral: Negative for decreased concentration, dysphoric mood and sleep disturbance. The patient is not nervous/anxious.    All other systems reviewed and are negative.      Physical Exam     Initial Vitals [10/19/19 1231]   BP Pulse Resp Temp SpO2   (!) 174/113 76 20 98 °F (36.7 °C) 95 %      MAP       --         Physical Exam    Nursing note and vitals reviewed.  Constitutional: He appears well-developed and well-nourished. He is not diaphoretic. No distress.   HENT:   Head: Normocephalic and atraumatic.   Right Ear: External ear normal.   Left Ear: External ear normal.   Nose: Nose normal.   Mouth/Throat: Oropharynx is clear and moist.   Eyes: Conjunctivae are normal. Pupils are equal, round, and reactive to light. No scleral icterus.   Neck: Normal range of motion. Neck supple. No JVD present.   Cardiovascular: Normal rate, regular rhythm, normal heart sounds and intact distal pulses.   Pulmonary/Chest: Breath sounds normal. No respiratory distress. He has no wheezes. He has no rhonchi. He has no rales. He exhibits no tenderness.   Abdominal: Soft. Bowel sounds are normal. He exhibits no distension. There is no tenderness. There is no rebound and no guarding.   Musculoskeletal: He exhibits tenderness. He exhibits no edema.        Left shoulder: He exhibits decreased range of motion, tenderness, deformity and pain. He exhibits normal pulse.   Lymphadenopathy:     He has no cervical adenopathy.   Neurological: He is alert and oriented to person, place, and  time. He displays normal reflexes. No cranial nerve deficit.   Skin: Skin is warm and dry. Capillary refill takes less than 2 seconds. No rash and no abscess noted. No erythema. No pallor.   Psychiatric: He has a normal mood and affect.         ED Course   Procedures  Labs Reviewed   CBC W/ AUTO DIFFERENTIAL - Abnormal; Notable for the following components:       Result Value    RBC 4.45 (*)     Hemoglobin 13.2 (*)     Mean Corpuscular Hemoglobin Conc 31.5 (*)     RDW 15.2 (*)     Platelets 139 (*)     All other components within normal limits   COMPREHENSIVE METABOLIC PANEL - Abnormal; Notable for the following components:    Sodium 135 (*)     Alkaline Phosphatase 54 (*)     AST 95 (*)     ALT 77 (*)     All other components within normal limits   APTT   PROTIME-INR   TROPONIN I     EKG Readings: (Independently Interpreted)   Initial Reading: No STEMI. Rhythm: Normal Sinus Rhythm. Heart Rate: 76. Ectopy: No Ectopy. Conduction: Normal. ST Segments: Normal ST Segments. T Waves: Normal. Axis: Normal. Clinical Impression: Normal Sinus Rhythm       Imaging Results          CT Cervical Spine Without Contrast (Final result)  Result time 10/19/19 14:55:15    Final result by Jocelynn Anthony MD (10/19/19 14:55:15)                 Impression:      Degenerative changes with no acute fracture, compression or subluxation.  Findings as detailed above.      Electronically signed by: Jocelynn Anthony MD  Date:    10/19/2019  Time:    14:55             Narrative:    EXAMINATION:  CT CERVICAL SPINE WITHOUT CONTRAST    CLINICAL HISTORY:  C-spine trauma, NEXUS/CCR positive, +risk factor(s);    TECHNIQUE:  Low dose axial images, sagittal and coronal reformations were performed though the cervical spine.  Contrast was not administered.    COMPARISON:  None    FINDINGS:  There is soft tissue density external auditory canals more so left suggesting cermun    Vertebral body heights and alignment are maintained without acute compression or  subluxation there is no acute fracture.  There is diffuse degenerative change.  There is moderate severe disc space narrowing C5-C6, C6-C7, disc desiccation C6-C7, moderate disc space narrowing C4-C5.  Evaluation of spinal canal contents is limited without intrathecal or IV contrast but with no epidural hematoma or prevertebral soft tissue swelling seen.  With the degenerative changes including uncovertebral spurring and facet arthropathy and with probable accompanying disc bulges/disc osteophyte complexes there does appear to be bony neural foraminal narrowing mild bilaterally C2-C3, moderate bilaterally C3-C4, severe bilaterally C4-C5, severe bilaterally C5-C6, severe bilaterally C6-C7 and with mild spinal canal narrowing C3-C4, C4-C5, C5-C6, C6-C7    Visualized lung apices appear expanded.  Emphysematous changes noted.                               CT Head Without Contrast (Final result)  Result time 10/19/19 14:42:36    Final result by Jocelynn Anthony MD (10/19/19 14:42:36)                 Impression:      Mild involutional changes and findings suggesting microvascular ischemic change with no acute intracranial findings.      Electronically signed by: Jocelynn Anthony MD  Date:    10/19/2019  Time:    14:42             Narrative:    EXAMINATION:  CT HEAD WITHOUT CONTRAST    CLINICAL HISTORY:  Headache, post trauma;    TECHNIQUE:  Low dose axial images were obtained through the head.  Coronal and sagittal reformations were also performed. Contrast was not administered.    COMPARISON:  None.    FINDINGS:  Mild dilatation of ventricles, sulci, fissures.  There is mild decreased density in white matter suggesting microvascular ischemic change.  No acute intracranial hemorrhage.  No intracranial mass effect.  No acute major vascular territory infarct.  Note is made that MRI is typically more sensitive than CT particular for detection of early or small nonhemorrhagic infarcts.  The calvarium appears intact, mastoids  well pneumatized, visualized paranasal sinuses essentially clear.  The involutional changes appear mildly more prominent compared to the prior study of 03/16/2014                               X-Ray Shoulder Trauma Left (Final result)  Result time 10/19/19 15:00:27    Final result by Jocelynn Anthony MD (10/19/19 15:00:27)                 Impression:      Displaced, comminuted fracture proximal shaft/lower neck left humerus      Electronically signed by: Jocelynn Anthony MD  Date:    10/19/2019  Time:    15:00             Narrative:    EXAMINATION:  XR SHOULDER TRAUMA 3 VIEW LEFT    CLINICAL HISTORY:  Pain, unspecified    TECHNIQUE:  Three views of the left shoulder were performed.    COMPARISON  05/10/2008    FINDINGS:  There is acute, mildly comminuted, displaced fracture proximal shaft of the left humerus with major distal component displaced medially by approximately 1 cm.  Both images are in AP projection.  Attempts at lateral view might aid in evaluation if indicated clinically.                                X-Ray Chest AP Portable (Final result)  Result time 10/19/19 14:58:22    Final result by Jocelynn Anthony MD (10/19/19 14:58:22)                 Impression:      No acute cardiopulmonary disease.  Findings suspicious for destructive/lytic process involving the left 8th rib posteriorly which could be further evaluated with specific rib films or CT    This report was flagged in Epic as abnormal.      Electronically signed by: Jocelynn Anthony MD  Date:    10/19/2019  Time:    14:58             Narrative:    EXAMINATION:  XR CHEST AP PORTABLE    CLINICAL HISTORY:  fall;    TECHNIQUE:  Single frontal view of the chest was performed.    COMPARISON:  9/5/2014    FINDINGS:  There are median sternotomy sutures.  The heart appears upper limits of normal in size for the projection.  Prominent left hilum appears vascular similar in appearance to the prior exam.  Minimal atelectatic left basilar stranding.  No  confluent infiltrate    Findings are suspicious or destructive process involving the left 8th rib posteriorly.    There is old fracture of a lower right rib anteriorly.                              X-Rays:   Independently Interpreted Readings:   Head CT: No hemorrhage.  No skull fracture.  No acute stroke.     Medical Decision Making:   Differential Diagnosis:   Acute humeral neck fracture, anterior dislocation  ED Management:  [facility] contacted to arrange transfer for higher level of care - Ortho.   Accepted by Dr. Butcher and Dr. Dixon to [facility].                         Clinical Impression:       ICD-10-CM ICD-9-CM   1. Closed displaced fracture of surgical neck of left humerus, unspecified fracture morphology, initial encounter S42.212A 812.01   2. Pain R52 780.96   3. Fall W19.XXXA E888.9         Disposition:   Disposition: Transferred  Condition: Stable                        Yelena Salmeron MD  10/21/19 0224

## 2019-10-19 NOTE — ED NOTES
Received report and assumed care of pt. Pt asleep. resp e/u. nad noted. Awaiting [facility] arrival. sr up x2. Will continue to monitor.

## 2019-10-20 ENCOUNTER — ANESTHESIA (OUTPATIENT)
Dept: SURGERY | Facility: HOSPITAL | Age: 59
DRG: 478 | End: 2019-10-20
Payer: MEDICARE

## 2019-10-20 ENCOUNTER — ANESTHESIA EVENT (OUTPATIENT)
Dept: SURGERY | Facility: HOSPITAL | Age: 59
DRG: 478 | End: 2019-10-20
Payer: MEDICARE

## 2019-10-20 PROBLEM — Z78.9 LANGUAGE BARRIER TO COMMUNICATION: Status: ACTIVE | Noted: 2019-10-20

## 2019-10-20 PROBLEM — Q76.6 ABNORMALITY OF RIB DETERMINED BY X-RAY: Status: ACTIVE | Noted: 2019-10-20

## 2019-10-20 PROBLEM — R47.01 MUTE: Status: ACTIVE | Noted: 2019-10-20

## 2019-10-20 PROBLEM — R74.8 ELEVATED LIVER ENZYMES: Status: ACTIVE | Noted: 2019-10-20

## 2019-10-20 PROBLEM — H91.90 DEAF: Status: ACTIVE | Noted: 2019-10-20

## 2019-10-20 PROBLEM — F17.200 NICOTINE DEPENDENCE: Status: ACTIVE | Noted: 2019-10-20

## 2019-10-20 LAB
ALBUMIN SERPL BCP-MCNC: 3.4 G/DL (ref 3.5–5.2)
ALP SERPL-CCNC: 60 U/L (ref 55–135)
ALT SERPL W/O P-5'-P-CCNC: 64 U/L (ref 10–44)
ANION GAP SERPL CALC-SCNC: 11 MMOL/L (ref 8–16)
AST SERPL-CCNC: 56 U/L (ref 10–40)
BILIRUB SERPL-MCNC: 0.9 MG/DL (ref 0.1–1)
BUN SERPL-MCNC: 6 MG/DL (ref 6–20)
CALCIUM SERPL-MCNC: 9.5 MG/DL (ref 8.7–10.5)
CHLORIDE SERPL-SCNC: 98 MMOL/L (ref 95–110)
CO2 SERPL-SCNC: 27 MMOL/L (ref 23–29)
CREAT SERPL-MCNC: 0.8 MG/DL (ref 0.5–1.4)
EST. GFR  (AFRICAN AMERICAN): >60 ML/MIN/1.73 M^2
EST. GFR  (NON AFRICAN AMERICAN): >60 ML/MIN/1.73 M^2
GLUCOSE SERPL-MCNC: 105 MG/DL (ref 70–110)
LIPASE SERPL-CCNC: 15 U/L (ref 4–60)
POTASSIUM SERPL-SCNC: 3.8 MMOL/L (ref 3.5–5.1)
PROT SERPL-MCNC: 7.9 G/DL (ref 6–8.4)
SODIUM SERPL-SCNC: 136 MMOL/L (ref 136–145)

## 2019-10-20 PROCEDURE — C1769 GUIDE WIRE: HCPCS | Performed by: ORTHOPAEDIC SURGERY

## 2019-10-20 PROCEDURE — 99223 PR INITIAL HOSPITAL CARE,LEVL III: ICD-10-PCS | Mod: 57,ICN,, | Performed by: ORTHOPAEDIC SURGERY

## 2019-10-20 PROCEDURE — 63600175 PHARM REV CODE 636 W HCPCS: Performed by: ORTHOPAEDIC SURGERY

## 2019-10-20 PROCEDURE — 37000008 HC ANESTHESIA 1ST 15 MINUTES: Performed by: ORTHOPAEDIC SURGERY

## 2019-10-20 PROCEDURE — 25000003 PHARM REV CODE 250: Performed by: INTERNAL MEDICINE

## 2019-10-20 PROCEDURE — 88342 IMHCHEM/IMCYTCHM 1ST ANTB: CPT | Mod: 26,,, | Performed by: PATHOLOGY

## 2019-10-20 PROCEDURE — D9220A PRA ANESTHESIA: ICD-10-PCS | Mod: ANES,,, | Performed by: ANESTHESIOLOGY

## 2019-10-20 PROCEDURE — 83690 ASSAY OF LIPASE: CPT

## 2019-10-20 PROCEDURE — 80053 COMPREHEN METABOLIC PANEL: CPT

## 2019-10-20 PROCEDURE — 23615 OPTX PROX HUMRL FX W/INT FIX: CPT | Mod: LT,,, | Performed by: ORTHOPAEDIC SURGERY

## 2019-10-20 PROCEDURE — 25000003 PHARM REV CODE 250: Performed by: ORTHOPAEDIC SURGERY

## 2019-10-20 PROCEDURE — 88305 TISSUE SPECIMEN TO PATHOLOGY - SURGERY: ICD-10-PCS | Mod: 26,,, | Performed by: PATHOLOGY

## 2019-10-20 PROCEDURE — 20245 BONE BIOPSY OPEN DEEP: CPT | Mod: 51,,, | Performed by: ORTHOPAEDIC SURGERY

## 2019-10-20 PROCEDURE — 88305 TISSUE EXAM BY PATHOLOGIST: CPT | Performed by: PATHOLOGY

## 2019-10-20 PROCEDURE — 96374 THER/PROPH/DIAG INJ IV PUSH: CPT | Performed by: INTERNAL MEDICINE

## 2019-10-20 PROCEDURE — 71000033 HC RECOVERY, INTIAL HOUR: Performed by: ORTHOPAEDIC SURGERY

## 2019-10-20 PROCEDURE — 36000711: Performed by: ORTHOPAEDIC SURGERY

## 2019-10-20 PROCEDURE — C9290 INJ, BUPIVACAINE LIPOSOME: HCPCS | Performed by: ANESTHESIOLOGY

## 2019-10-20 PROCEDURE — 20245 PR BIOPSY, BONE, OPEN, EXC; DEEP: ICD-10-PCS | Mod: 51,,, | Performed by: ORTHOPAEDIC SURGERY

## 2019-10-20 PROCEDURE — 99223 1ST HOSP IP/OBS HIGH 75: CPT | Mod: 57,ICN,, | Performed by: ORTHOPAEDIC SURGERY

## 2019-10-20 PROCEDURE — 23615 PR OPEN TREATMENT PROX HUMERAL FRACTURE: ICD-10-PCS | Mod: LT,,, | Performed by: ORTHOPAEDIC SURGERY

## 2019-10-20 PROCEDURE — 25000003 PHARM REV CODE 250: Performed by: NURSE PRACTITIONER

## 2019-10-20 PROCEDURE — 12000002 HC ACUTE/MED SURGE SEMI-PRIVATE ROOM

## 2019-10-20 PROCEDURE — 36415 COLL VENOUS BLD VENIPUNCTURE: CPT

## 2019-10-20 PROCEDURE — 84153 ASSAY OF PSA TOTAL: CPT

## 2019-10-20 PROCEDURE — 25000003 PHARM REV CODE 250: Performed by: NURSE ANESTHETIST, CERTIFIED REGISTERED

## 2019-10-20 PROCEDURE — 25000003 PHARM REV CODE 250: Performed by: ANESTHESIOLOGY

## 2019-10-20 PROCEDURE — D9220A PRA ANESTHESIA: Mod: ANES,,, | Performed by: ANESTHESIOLOGY

## 2019-10-20 PROCEDURE — 71000039 HC RECOVERY, EACH ADD'L HOUR: Performed by: ORTHOPAEDIC SURGERY

## 2019-10-20 PROCEDURE — 37000009 HC ANESTHESIA EA ADD 15 MINS: Performed by: ORTHOPAEDIC SURGERY

## 2019-10-20 PROCEDURE — C1713 ANCHOR/SCREW BN/BN,TIS/BN: HCPCS | Performed by: ORTHOPAEDIC SURGERY

## 2019-10-20 PROCEDURE — 63600175 PHARM REV CODE 636 W HCPCS: Performed by: ANESTHESIOLOGY

## 2019-10-20 PROCEDURE — D9220A PRA ANESTHESIA: Mod: CRNA,,, | Performed by: NURSE ANESTHETIST, CERTIFIED REGISTERED

## 2019-10-20 PROCEDURE — 88342 TISSUE SPECIMEN TO PATHOLOGY - SURGERY: ICD-10-PCS | Mod: 26,,, | Performed by: PATHOLOGY

## 2019-10-20 PROCEDURE — 36000710: Performed by: ORTHOPAEDIC SURGERY

## 2019-10-20 PROCEDURE — 63600175 PHARM REV CODE 636 W HCPCS: Performed by: INTERNAL MEDICINE

## 2019-10-20 PROCEDURE — 88305 TISSUE EXAM BY PATHOLOGIST: CPT | Mod: 26,,, | Performed by: PATHOLOGY

## 2019-10-20 PROCEDURE — 27201423 OPTIME MED/SURG SUP & DEVICES STERILE SUPPLY: Performed by: ORTHOPAEDIC SURGERY

## 2019-10-20 PROCEDURE — S5010 5% DEXTROSE AND 0.45% SALINE: HCPCS | Performed by: NURSE PRACTITIONER

## 2019-10-20 PROCEDURE — S4991 NICOTINE PATCH NONLEGEND: HCPCS | Performed by: ORTHOPAEDIC SURGERY

## 2019-10-20 PROCEDURE — 27200750 HC INSULATED NEEDLE/ STIMUPLEX: Performed by: ANESTHESIOLOGY

## 2019-10-20 PROCEDURE — 88342 IMHCHEM/IMCYTCHM 1ST ANTB: CPT | Performed by: PATHOLOGY

## 2019-10-20 PROCEDURE — 96376 TX/PRO/DX INJ SAME DRUG ADON: CPT | Performed by: INTERNAL MEDICINE

## 2019-10-20 PROCEDURE — D9220A PRA ANESTHESIA: ICD-10-PCS | Mod: CRNA,,, | Performed by: NURSE ANESTHETIST, CERTIFIED REGISTERED

## 2019-10-20 PROCEDURE — S0020 INJECTION, BUPIVICAINE HYDRO: HCPCS | Performed by: ANESTHESIOLOGY

## 2019-10-20 PROCEDURE — 64415 PR NERVE BLOCK INJ, ANES/STEROID, BRACHIAL PLEXUS, INCL IMAG GUIDANCE: ICD-10-PCS | Mod: 59,LT,, | Performed by: ANESTHESIOLOGY

## 2019-10-20 PROCEDURE — 63600175 PHARM REV CODE 636 W HCPCS: Performed by: NURSE ANESTHETIST, CERTIFIED REGISTERED

## 2019-10-20 PROCEDURE — 64415 NJX AA&/STRD BRCH PLXS IMG: CPT | Mod: 59,LT,, | Performed by: ANESTHESIOLOGY

## 2019-10-20 DEVICE — SCREW LOCKING T2 F/T 5X47.5MM: Type: IMPLANTABLE DEVICE | Site: HUMERUS | Status: FUNCTIONAL

## 2019-10-20 DEVICE — SCREW LOCKING 5.0 X 45MM: Type: IMPLANTABLE DEVICE | Site: HUMERUS | Status: FUNCTIONAL

## 2019-10-20 DEVICE — SCREW LOCKING 5.0 X 35MM: Type: IMPLANTABLE DEVICE | Site: HUMERUS | Status: FUNCTIONAL

## 2019-10-20 DEVICE — IMPLANTABLE DEVICE: Type: IMPLANTABLE DEVICE | Site: HUMERUS | Status: FUNCTIONAL

## 2019-10-20 RX ORDER — IBUPROFEN 200 MG
1 TABLET ORAL DAILY
Status: DISCONTINUED | OUTPATIENT
Start: 2019-10-20 | End: 2019-10-23 | Stop reason: HOSPADM

## 2019-10-20 RX ORDER — DEXTROSE MONOHYDRATE AND SODIUM CHLORIDE 5; .45 G/100ML; G/100ML
INJECTION, SOLUTION INTRAVENOUS CONTINUOUS
Status: DISCONTINUED | OUTPATIENT
Start: 2019-10-20 | End: 2019-10-22

## 2019-10-20 RX ORDER — FENTANYL CITRATE 50 UG/ML
INJECTION, SOLUTION INTRAMUSCULAR; INTRAVENOUS
Status: DISCONTINUED | OUTPATIENT
Start: 2019-10-20 | End: 2019-10-20

## 2019-10-20 RX ORDER — LIDOCAINE HCL/PF 100 MG/5ML
SYRINGE (ML) INTRAVENOUS
Status: DISCONTINUED | OUTPATIENT
Start: 2019-10-20 | End: 2019-10-20

## 2019-10-20 RX ORDER — CEFAZOLIN SODIUM 2 G/50ML
2 SOLUTION INTRAVENOUS
Status: COMPLETED | OUTPATIENT
Start: 2019-10-21 | End: 2019-10-21

## 2019-10-20 RX ORDER — ONDANSETRON HYDROCHLORIDE 2 MG/ML
INJECTION, SOLUTION INTRAMUSCULAR; INTRAVENOUS
Status: DISCONTINUED | OUTPATIENT
Start: 2019-10-20 | End: 2019-10-20

## 2019-10-20 RX ORDER — OXYCODONE HYDROCHLORIDE 5 MG/1
5 TABLET ORAL ONCE AS NEEDED
Status: COMPLETED | OUTPATIENT
Start: 2019-10-20 | End: 2019-10-22

## 2019-10-20 RX ORDER — AMOXICILLIN 250 MG
1 CAPSULE ORAL 2 TIMES DAILY
Status: DISCONTINUED | OUTPATIENT
Start: 2019-10-20 | End: 2019-10-21

## 2019-10-20 RX ORDER — EPHEDRINE SULFATE 50 MG/ML
INJECTION, SOLUTION INTRAVENOUS
Status: DISCONTINUED | OUTPATIENT
Start: 2019-10-20 | End: 2019-10-20

## 2019-10-20 RX ORDER — MIDAZOLAM HYDROCHLORIDE 1 MG/ML
INJECTION, SOLUTION INTRAMUSCULAR; INTRAVENOUS
Status: DISCONTINUED | OUTPATIENT
Start: 2019-10-20 | End: 2019-10-20

## 2019-10-20 RX ORDER — PROPOFOL 10 MG/ML
VIAL (ML) INTRAVENOUS
Status: DISCONTINUED | OUTPATIENT
Start: 2019-10-20 | End: 2019-10-20

## 2019-10-20 RX ORDER — DEXAMETHASONE SODIUM PHOSPHATE 4 MG/ML
INJECTION, SOLUTION INTRA-ARTICULAR; INTRALESIONAL; INTRAMUSCULAR; INTRAVENOUS; SOFT TISSUE
Status: DISCONTINUED | OUTPATIENT
Start: 2019-10-20 | End: 2019-10-20

## 2019-10-20 RX ORDER — ROCURONIUM BROMIDE 10 MG/ML
INJECTION, SOLUTION INTRAVENOUS
Status: DISCONTINUED | OUTPATIENT
Start: 2019-10-20 | End: 2019-10-20

## 2019-10-20 RX ORDER — ONDANSETRON 2 MG/ML
4 INJECTION INTRAMUSCULAR; INTRAVENOUS ONCE AS NEEDED
Status: DISCONTINUED | OUTPATIENT
Start: 2019-10-20 | End: 2019-10-23 | Stop reason: HOSPADM

## 2019-10-20 RX ORDER — BUPIVACAINE HYDROCHLORIDE 5 MG/ML
INJECTION, SOLUTION EPIDURAL; INTRACAUDAL
Status: DISCONTINUED | OUTPATIENT
Start: 2019-10-20 | End: 2019-10-20

## 2019-10-20 RX ORDER — PANTOPRAZOLE SODIUM 40 MG/1
40 TABLET, DELAYED RELEASE ORAL DAILY
Status: DISCONTINUED | OUTPATIENT
Start: 2019-10-20 | End: 2019-10-23 | Stop reason: HOSPADM

## 2019-10-20 RX ORDER — PHENYLEPHRINE HYDROCHLORIDE 10 MG/ML
INJECTION INTRAVENOUS
Status: DISCONTINUED | OUTPATIENT
Start: 2019-10-20 | End: 2019-10-20

## 2019-10-20 RX ORDER — SUCCINYLCHOLINE CHLORIDE 20 MG/ML
INJECTION INTRAMUSCULAR; INTRAVENOUS
Status: DISCONTINUED | OUTPATIENT
Start: 2019-10-20 | End: 2019-10-20

## 2019-10-20 RX ORDER — SODIUM CHLORIDE, SODIUM LACTATE, POTASSIUM CHLORIDE, CALCIUM CHLORIDE 600; 310; 30; 20 MG/100ML; MG/100ML; MG/100ML; MG/100ML
INJECTION, SOLUTION INTRAVENOUS CONTINUOUS PRN
Status: DISCONTINUED | OUTPATIENT
Start: 2019-10-20 | End: 2019-10-20

## 2019-10-20 RX ORDER — HYDROCODONE BITARTRATE AND ACETAMINOPHEN 5; 325 MG/1; MG/1
1 TABLET ORAL EVERY 4 HOURS PRN
Status: DISCONTINUED | OUTPATIENT
Start: 2019-10-20 | End: 2019-10-23 | Stop reason: HOSPADM

## 2019-10-20 RX ORDER — FENTANYL CITRATE 50 UG/ML
25 INJECTION, SOLUTION INTRAMUSCULAR; INTRAVENOUS EVERY 5 MIN PRN
Status: DISCONTINUED | OUTPATIENT
Start: 2019-10-20 | End: 2019-10-23 | Stop reason: HOSPADM

## 2019-10-20 RX ORDER — LOPERAMIDE HYDROCHLORIDE 2 MG/1
2 CAPSULE ORAL CONTINUOUS PRN
Status: DISCONTINUED | OUTPATIENT
Start: 2019-10-20 | End: 2019-10-23 | Stop reason: HOSPADM

## 2019-10-20 RX ORDER — SODIUM CHLORIDE 0.9 % (FLUSH) 0.9 %
10 SYRINGE (ML) INJECTION
Status: DISCONTINUED | OUTPATIENT
Start: 2019-10-20 | End: 2019-10-23 | Stop reason: HOSPADM

## 2019-10-20 RX ORDER — ACETAMINOPHEN 10 MG/ML
INJECTION, SOLUTION INTRAVENOUS
Status: DISCONTINUED | OUTPATIENT
Start: 2019-10-20 | End: 2019-10-20

## 2019-10-20 RX ADMIN — NICOTINE 1 PATCH: 21 PATCH TRANSDERMAL at 09:10

## 2019-10-20 RX ADMIN — DEXTROSE AND SODIUM CHLORIDE: 5; .45 INJECTION, SOLUTION INTRAVENOUS at 12:10

## 2019-10-20 RX ADMIN — SODIUM CHLORIDE, SODIUM LACTATE, POTASSIUM CHLORIDE, AND CALCIUM CHLORIDE: .6; .31; .03; .02 INJECTION, SOLUTION INTRAVENOUS at 04:10

## 2019-10-20 RX ADMIN — ROCURONIUM BROMIDE 5 MG: 10 INJECTION, SOLUTION INTRAVENOUS at 04:10

## 2019-10-20 RX ADMIN — MORPHINE SULFATE 4 MG: 4 INJECTION, SOLUTION INTRAMUSCULAR; INTRAVENOUS at 11:10

## 2019-10-20 RX ADMIN — ACETAMINOPHEN 1000 MG: 10 INJECTION, SOLUTION INTRAVENOUS at 04:10

## 2019-10-20 RX ADMIN — CEFAZOLIN 2 G: 1 INJECTION, POWDER, FOR SOLUTION INTRAVENOUS at 04:10

## 2019-10-20 RX ADMIN — SUCCINYLCHOLINE CHLORIDE 120 MG: 20 INJECTION, SOLUTION INTRAMUSCULAR; INTRAVENOUS at 04:10

## 2019-10-20 RX ADMIN — MORPHINE SULFATE 4 MG: 4 INJECTION, SOLUTION INTRAMUSCULAR; INTRAVENOUS at 12:10

## 2019-10-20 RX ADMIN — FENTANYL CITRATE 100 MCG: 50 INJECTION, SOLUTION INTRAMUSCULAR; INTRAVENOUS at 04:10

## 2019-10-20 RX ADMIN — PHENYLEPHRINE HYDROCHLORIDE 200 MCG: 10 INJECTION INTRAVENOUS at 04:10

## 2019-10-20 RX ADMIN — BUPIVACAINE 10 ML: 13.3 INJECTION, SUSPENSION, LIPOSOMAL INFILTRATION at 05:10

## 2019-10-20 RX ADMIN — MORPHINE SULFATE 4 MG: 4 INJECTION, SOLUTION INTRAMUSCULAR; INTRAVENOUS at 09:10

## 2019-10-20 RX ADMIN — MIDAZOLAM 2 MG: 1 INJECTION INTRAMUSCULAR; INTRAVENOUS at 04:10

## 2019-10-20 RX ADMIN — PANTOPRAZOLE SODIUM 40 MG: 40 TABLET, DELAYED RELEASE ORAL at 09:10

## 2019-10-20 RX ADMIN — DEXAMETHASONE SODIUM PHOSPHATE 4 MG: 4 INJECTION, SOLUTION INTRAMUSCULAR; INTRAVENOUS at 04:10

## 2019-10-20 RX ADMIN — ONDANSETRON 4 MG: 2 INJECTION, SOLUTION INTRAMUSCULAR; INTRAVENOUS at 04:10

## 2019-10-20 RX ADMIN — MORPHINE SULFATE 4 MG: 4 INJECTION, SOLUTION INTRAMUSCULAR; INTRAVENOUS at 07:10

## 2019-10-20 RX ADMIN — SODIUM CHLORIDE, SODIUM LACTATE, POTASSIUM CHLORIDE, AND CALCIUM CHLORIDE: .6; .31; .03; .02 INJECTION, SOLUTION INTRAVENOUS at 05:10

## 2019-10-20 RX ADMIN — LIDOCAINE HYDROCHLORIDE 75 MG: 20 INJECTION, SOLUTION INTRAVENOUS at 04:10

## 2019-10-20 RX ADMIN — EPHEDRINE SULFATE 25 MG: 50 INJECTION, SOLUTION INTRAMUSCULAR; INTRAVENOUS; SUBCUTANEOUS at 04:10

## 2019-10-20 RX ADMIN — PROPOFOL 150 MG: 10 INJECTION, EMULSION INTRAVENOUS at 04:10

## 2019-10-20 RX ADMIN — HYDROCODONE BITARTRATE AND ACETAMINOPHEN 1 TABLET: 5; 325 TABLET ORAL at 03:10

## 2019-10-20 RX ADMIN — SENNOSIDES AND DOCUSATE SODIUM 1 TABLET: 8.6; 5 TABLET ORAL at 09:10

## 2019-10-20 RX ADMIN — BUPIVACAINE HYDROCHLORIDE 15 ML: 5 INJECTION, SOLUTION EPIDURAL; INTRACAUDAL; PERINEURAL at 06:10

## 2019-10-20 NOTE — CONSULTS
[facility]  Orthopedics  Consult Note    Patient Name: Jesus Mccabe  MRN: [id number]  Admission Date: 10/19/2019  Hospital Length of Stay: 0 days   Attending Provider: Brooklyn Eagle MD  Primary Care Provider: Primary Doctor No    Patient information was obtained from past medical records and ER records.     Consults  Subjective:     Principal Problem:Fx humeral neck, left, closed, initial encounter    Chief Complaint: No chief complaint on file.       HPI: 58-year-old gentleman with past medical history significant for coronary artery disease/sternotomy and hypertension who presents as transfer from outside hospital for humeral fracture.  Patient is deaf/mute so history is obtained primarily from medical record as no family is available for interview.  Apparently patient suffered from a mechanical fall on day of presentation to the emergency department.  In the ED patient was found to have a displaced comminuted fracture of the proximal shaft/left humeral neck.  Incidentally was noted to have mild elevation in transaminases as well as destructive/lytic process involving his left 8th rib. The case was discussed with Dr. Funse with orthopedic surgery who agreed to consult with plans NPO at [facility] in preparation for surgical intervention tomorrow.  His pain was controlled with Dilaudid prior to transfer    Past Medical History:   Diagnosis Date    Coronary artery disease     Language barrier     MI (myocardial infarction)     Ulcer        Past Surgical History:   Procedure Laterality Date    ABDOMINAL SURGERY      CARDIAC SURGERY      EXTENSOR TENDON OF FOREARM / WRIST REPAIR         Review of patient's allergies indicates:  No Known Allergies    Current Facility-Administered Medications   Medication    acetaminophen tablet 650 mg    acetaminophen tablet 650 mg    dextrose 5 % and 0.45 % NaCl infusion    dextrose 50% injection 12.5 g    dextrose 50% injection 25 g    glucagon (human  recombinant) injection 1 mg    glucose chewable tablet 16 g    glucose chewable tablet 24 g    HYDROcodone-acetaminophen 5-325 mg per tablet 1 tablet    magnesium oxide tablet 800 mg    magnesium oxide tablet 800 mg    morphine injection 4 mg    nicotine 21 mg/24 hr 1 patch    ondansetron injection 4 mg    pantoprazole EC tablet 40 mg    potassium chloride 10% oral solution 40 mEq    potassium chloride 10% oral solution 40 mEq    promethazine (PHENERGAN) 6.25 mg in dextrose 5 % 50 mL IVPB    sodium chloride 0.9% flush 10 mL     Family History     None        Tobacco Use    Smoking status: Current Every Day Smoker     Packs/day: 1.00    Smokeless tobacco: Never Used   Substance and Sexual Activity    Alcohol use: Yes     Alcohol/week: 1.0 standard drinks     Types: 1 Cans of beer per week    Drug use: No    Sexual activity: Never     Review of Systems   Unable to perform ROS: patient nonverbal     Objective:     Vital Signs (Most Recent):  Temp: 98.5 °F (36.9 °C) (10/20/19 1226)  Pulse: 63 (10/20/19 1226)  Resp: 18 (10/20/19 1226)  BP: (!) 170/83 (10/20/19 1226)  SpO2: 98 % (10/20/19 1226) Vital Signs (24h Range):  Temp:  [98.2 °F (36.8 °C)-99.6 °F (37.6 °C)] 98.5 °F (36.9 °C)  Pulse:  [] 63  Resp:  [16-20] 18  SpO2:  [94 %-98 %] 98 %  BP: (112-170)/(74-85) 170/83           There is no height or weight on file to calculate BMI.      Intake/Output Summary (Last 24 hours) at 10/20/2019 1619  Last data filed at 10/20/2019 0600  Gross per 24 hour   Intake 0 ml   Output 525 ml   Net -525 ml       General    Nursing note and vitals reviewed.  Constitutional: He is oriented to person, place, and time. He appears well-developed and well-nourished. No distress.   HENT:   Nose: Nose normal.   Eyes: EOM are normal.   Cardiovascular: Regular rhythm.    Pulmonary/Chest: Effort normal.   Abdominal: Soft.   Neurological: He is alert and oriented to person, place, and time.   Psychiatric: His behavior is  normal.         Right Shoulder Exam     Comments:  R arm is contracted    Left Shoulder Exam     Comments:  L shoulder with pain over humerus. Wiggles fingers up and down. 2+RP  Intact LT        Significant Labs:   CBC:   Recent Labs   Lab 10/19/19  1344   WBC 6.15   HGB 13.2*   HCT 41.9   *     CMP:   Recent Labs   Lab 10/19/19  1344 10/20/19  1511   * 136   K 3.8 3.8   CL 98 98   CO2 23 27   GLU 98 105   BUN 6 6   CREATININE 0.7 0.8   CALCIUM 9.0 9.5   PROT 7.9 7.9   ALBUMIN 3.6 3.4*   BILITOT 0.6 0.9   ALKPHOS 54* 60   AST 95* 56*   ALT 77* 64*   ANIONGAP 14 11   EGFRNONAA >60.0 >60     Coagulation:   Recent Labs   Lab 10/19/19  1344   LABPROT 12.0   INR 1.1   APTT 28.9     All pertinent labs within the past 24 hours have been reviewed.    Significant Imaging: X-Ray: I have reviewed all pertinent results/findings and my personal findings are:  L proximal 1/3 humerus fracture. Possible suspicion for lytic precursor.    Assessment/Plan:     * Fx humeral neck, left, closed, initial encounter  To OR for IMN and biopsy.        Thank you for your consult. I will follow-up with patient. Please contact us if you have any additional questions.    Jose Alejandro Funes MD  Orthopedics  [facility]

## 2019-10-20 NOTE — SUBJECTIVE & OBJECTIVE
Past Medical History:   Diagnosis Date    Coronary artery disease     Language barrier     MI (myocardial infarction)     Ulcer        Past Surgical History:   Procedure Laterality Date    ABDOMINAL SURGERY      CARDIAC SURGERY      EXTENSOR TENDON OF FOREARM / WRIST REPAIR         Review of patient's allergies indicates:  No Known Allergies    Current Facility-Administered Medications   Medication    acetaminophen tablet 650 mg    acetaminophen tablet 650 mg    dextrose 5 % and 0.45 % NaCl infusion    dextrose 50% injection 12.5 g    dextrose 50% injection 25 g    glucagon (human recombinant) injection 1 mg    glucose chewable tablet 16 g    glucose chewable tablet 24 g    HYDROcodone-acetaminophen 5-325 mg per tablet 1 tablet    magnesium oxide tablet 800 mg    magnesium oxide tablet 800 mg    morphine injection 4 mg    nicotine 21 mg/24 hr 1 patch    ondansetron injection 4 mg    pantoprazole EC tablet 40 mg    potassium chloride 10% oral solution 40 mEq    potassium chloride 10% oral solution 40 mEq    promethazine (PHENERGAN) 6.25 mg in dextrose 5 % 50 mL IVPB    sodium chloride 0.9% flush 10 mL     Family History     None        Tobacco Use    Smoking status: Current Every Day Smoker     Packs/day: 1.00    Smokeless tobacco: Never Used   Substance and Sexual Activity    Alcohol use: Yes     Alcohol/week: 1.0 standard drinks     Types: 1 Cans of beer per week    Drug use: No    Sexual activity: Never     Review of Systems   Unable to perform ROS: patient nonverbal     Objective:     Vital Signs (Most Recent):  Temp: 98.5 °F (36.9 °C) (10/20/19 1226)  Pulse: 63 (10/20/19 1226)  Resp: 18 (10/20/19 1226)  BP: (!) 170/83 (10/20/19 1226)  SpO2: 98 % (10/20/19 1226) Vital Signs (24h Range):  Temp:  [98.2 °F (36.8 °C)-99.6 °F (37.6 °C)] 98.5 °F (36.9 °C)  Pulse:  [] 63  Resp:  [16-20] 18  SpO2:  [94 %-98 %] 98 %  BP: (112-170)/(74-85) 170/83           There is no height or weight  on file to calculate BMI.      Intake/Output Summary (Last 24 hours) at 10/20/2019 1619  Last data filed at 10/20/2019 0600  Gross per 24 hour   Intake 0 ml   Output 525 ml   Net -525 ml       General    Nursing note and vitals reviewed.  Constitutional: He is oriented to person, place, and time. He appears well-developed and well-nourished. No distress.   HENT:   Nose: Nose normal.   Eyes: EOM are normal.   Cardiovascular: Regular rhythm.    Pulmonary/Chest: Effort normal.   Abdominal: Soft.   Neurological: He is alert and oriented to person, place, and time.   Psychiatric: His behavior is normal.         Right Shoulder Exam     Comments:  R arm is contracted    Left Shoulder Exam     Comments:  L shoulder with pain over humerus. Wiggles fingers up and down. 2+RP  Intact LT        Significant Labs:   CBC:   Recent Labs   Lab 10/19/19  1344   WBC 6.15   HGB 13.2*   HCT 41.9   *     CMP:   Recent Labs   Lab 10/19/19  1344 10/20/19  1511   * 136   K 3.8 3.8   CL 98 98   CO2 23 27   GLU 98 105   BUN 6 6   CREATININE 0.7 0.8   CALCIUM 9.0 9.5   PROT 7.9 7.9   ALBUMIN 3.6 3.4*   BILITOT 0.6 0.9   ALKPHOS 54* 60   AST 95* 56*   ALT 77* 64*   ANIONGAP 14 11   EGFRNONAA >60.0 >60     Coagulation:   Recent Labs   Lab 10/19/19  1344   LABPROT 12.0   INR 1.1   APTT 28.9     All pertinent labs within the past 24 hours have been reviewed.    Significant Imaging: X-Ray: I have reviewed all pertinent results/findings and my personal findings are:  L proximal 1/3 humerus fracture. Possible suspicion for lytic precursor.

## 2019-10-20 NOTE — ED NOTES
Pt sitting up on stretcher, awake and alert. resp e/u. PIV to right ankle w/o s/s of IV related complications. nad noted. [facility] called and notified of pt's pending arrival

## 2019-10-20 NOTE — NURSING
Attempted again to get in touch with Sada Thomas @ [phone] with no answer. Patient is Mute/Deaf and is not able to answer admit questions.

## 2019-10-20 NOTE — SUBJECTIVE & OBJECTIVE
Interval History:  Orthopedic consult currently pending.  Patient remains NPO.  At IV fluids for hydration.       Review of Systems   Constitutional: Positive for activity change. Negative for fever.   HENT: Negative for ear discharge, ear pain and facial swelling.    Eyes: Negative for pain and redness.   Respiratory: Negative for cough and shortness of breath.    Cardiovascular: Negative for chest pain, palpitations and leg swelling.   Gastrointestinal: Negative for abdominal pain, diarrhea, nausea and vomiting.   Endocrine: Negative for polydipsia and polyphagia.   Genitourinary: Negative for difficulty urinating, flank pain and hematuria.   Musculoskeletal: Positive for arthralgias and gait problem. Negative for neck pain and neck stiffness.        Left upper extremity pain    Left upper extremity currently in sling   Skin: Negative for color change.   Allergic/Immunologic: Negative for food allergies.   Neurological: Positive for speech difficulty. Negative for seizures, facial asymmetry and weakness.        Patient is deaf and mute but able to read and communicate through writing and body language   Hematological: Does not bruise/bleed easily.   Psychiatric/Behavioral: Negative for agitation, behavioral problems, confusion, hallucinations and suicidal ideas. The patient is not nervous/anxious.      Objective:     Vital Signs (Most Recent):  Temp: 98.5 °F (36.9 °C) (10/20/19 1226)  Pulse: 63 (10/20/19 1226)  Resp: 18 (10/20/19 1226)  BP: (!) 170/83 (10/20/19 1226)  SpO2: 98 % (10/20/19 1226) Vital Signs (24h Range):  Temp:  [98.2 °F (36.8 °C)-99.6 °F (37.6 °C)] 98.5 °F (36.9 °C)  Pulse:  [] 63  Resp:  [16-20] 18  SpO2:  [94 %-98 %] 98 %  BP: (112-170)/(74-96) 170/83        There is no height or weight on file to calculate BMI.    Physical Exam   Constitutional: He is oriented to person, place, and time. He appears well-developed and well-nourished. No distress.   Thin   HENT:   Head: Normocephalic and  atraumatic.   Eyes: Pupils are equal, round, and reactive to light. Conjunctivae and EOM are normal. Right eye exhibits no discharge. Left eye exhibits no discharge.   Neck: Normal range of motion. Neck supple. No JVD present.   Cardiovascular: Normal rate, regular rhythm, normal heart sounds and intact distal pulses.   No murmur heard.  Pulmonary/Chest: Effort normal and breath sounds normal. No respiratory distress. He has no wheezes.   Abdominal: Soft. Bowel sounds are normal. He exhibits no distension. There is no tenderness. There is no guarding.   Genitourinary:   Genitourinary Comments: Not examined   Musculoskeletal: Normal range of motion.   Left upper extremity range of motion not tested and currently located in [facility]   Neurological: He is alert and oriented to person, place, and time. A cranial nerve deficit is present.   Patient is deaf and mute    Patient is awake, alert, oriented and responds appropriately to simple commands    He can read and attempts to communicate by body language    Skin: Skin is warm. Capillary refill takes less than 2 seconds. No rash noted. He is diaphoretic. No erythema.   Psychiatric: He has a normal mood and affect. His behavior is normal. Judgment and thought content normal.   Nursing note and vitals reviewed.        CRANIAL NERVES     CN III, IV, VI   Pupils are equal, round, and reactive to light.  Extraocular motions are normal.     Labs reviewed

## 2019-10-20 NOTE — OP NOTE
[facility]  Orthopedic Surgery  Operative Note    SUMMARY     Date of Procedure: 10/20/2019     Procedure: Procedure(s) (LRB):  INSERTION, INTRAMEDULLARY SISSY, HUMERUS (Left)     Biopsy of left humeral lesion    Surgeon(s) and Role:     * Jose Alejandro Funes MD - Primary    Assistant: Tin Eddy    Pre-Operative Diagnosis: Fx humeral neck, left, closed, initial encounter [S42.212A]    Post-Operative Diagnosis: Post-Op Diagnosis Codes:     * Fx humeral neck, left, closed, initial encounter [S42.212A]  ?Lytic lesion proximal humerus    Anesthesia: General      Description of the Findings of the Procedure:   fracture.  left proximal humerus fracture with possible lytic lesion.  Possible pathologic humerus      Complications: No    Estimated Blood Loss (EBL): 20 mL           Implants:   Implant Name Type Inv. Item Serial No.  Lot No. LRB No. Used   WIRE GERARD T2 [id number] SS - [id number]  WIRE GERARD T2 [id number]  [employer].  Left 1   GUIDE WIRE SMOOTH TIP 38L360PM - [id number]  GUIDE WIRE SMOOTH TIP [id number]  [employer].  Left 1   SCREW LOCKING T2 F/T 5X47.5MM - [id number]  SCREW LOCKING T2 F/T 5X47.5MM  [employer]. [id number] Left 1   4X28MM LOCKING SCREW    ERIK [id number] Left 1   SCREW LOCKING 5.0 X 35MM - [id number]  SCREW LOCKING 5.0 X 35MM  [employer]. [id number] Left 1   4X26MM LOCKING SCREW    ERIK [id number] Left 1   [id number] PROXIMAL HUMERAL NAIL, CANNULATED, LEFT    ERIK [id number] Left 1   SCREW LOCKING 5.0 X 45MM - [id number]  SCREW LOCKING 5.0 X 45MM  [employer]. [id number] Left 1       Specimens:   Specimen (12h ago, onward)     Start     Ordered    10/20/19 1716  Specimen to Pathology - Surgery  Once     Comments:  Pre-op Diagnosis: Fx humeral neck, left, closed, initial encounter [S42.212A]Post-op Diagnosis: SAME Procedure(s):ORIF, FRACTURE, HUMERUS Number of specimens: 2Name of  specimens:1)LEFT SHOULDER MASS BIOPSY 2) LEFT INTRA-HUMERUS BIOPSY      10/20/19 7851                        Condition: Good    Disposition: PACU - hemodynamically stable.    Attestation: I was present and scrubbed for the entire procedure.      Indications for the procedure:A 58 year old male with a history of Left shoulder pain after a fall.  Patient was seen at [facility] and diagnosed with a proximal humerus fracture. Patient was transferred for definitive fixation of the humerus fracture.    PROCEDURE IN DETAIL: Risks, benefits and alternatives of the procedure were   explained to the patient including, but not limited to damage to nerves,   arteries and blood vessels. Also explained risk of re-rupture, nonhealing, infection, DVT,   PE as well as anesthetic complications including seizure, stroke, heart attack   and death. They understood this, signed informed consent. The patient's Left  shoulder was marked prior to coming to the Operating Room. Once there a formal   timeout was done in which correct patient, procedure and operative site were all   correctly identified and confirmed by the entire operating team. Ancef 2 g was   given prior to surgical incision. General anesthesia was induced. He was placed in the beach chair position with his left arm free.  Fluoroscopy was brought in to make sure that we could properly image the shoulder.  Left upper extremity was prepped and draped in normal sterile fashion. The started off with our core biopsy.  A jamshidi needle was used to enter the fracture site and puncture the cortex.  We then aspirated some blood and removed some bone and bone marrow.  This was placed in a specimen cup and sent to pathology. We then proceeded with the fixation portion of the case. We then made a incision across the acromion at the anterolateral portion of the shoulder.  This went through skin and soft tissue.  Fascia of the deltoid was incised.  Deltoid was then split and the subacromial  bursa was seen.  Some bursitis was removed.  The guidewire was then used to identify our starting place. Once this was appropriate under fluoroscopy we then incised the rotator cuff longitudinally and inserted our guide pin.  We then used the awl to make our hole.  We then used the reduction tool to reduce the fracture and advance our guidewire from proximal to distal.  We then held reduction while we reamed.  We then inserted the 8 x 150 millimeter proximal humeral short nail by [employer] down until it was about 5 millimeters buried under the bony surface. Seeing good reduction and good nail position.  We then checked our rotation.  Liking or rotation we then began to fix it proximally. We used the 3 proximal locking screws.  This was done using the guide the drill and a depth gauge.  All were assured to be short of the subchondral bone.  We then placed 2 distal locking screws to complete our fixation. We then took the  off and took the arm through live fluoroscopy making sure that none of the screws entered the joint. Fracture was anatomically reduced. We then proceeded with closing.  Shoulder was irrigated with saline. Rotator cuff was repaired using 0 Ethibond deltoid fascia was closed using 0 Vicryl. Subcutaneous was closed using 2 0 Vicryl and skin closing staples.        Postoperative rehab course will be for left proximal Humerus fracture

## 2019-10-20 NOTE — PROGRESS NOTES
[facility] Medicine  Progress Note    Patient Name: Jesus Mccabe  MRN: [id number]  Patient Class: IP- Inpatient   Admission Date: 10/19/2019  Length of Stay: 0 days  Attending Physician: Brooklyn Eagle MD  Primary Care Provider: Primary Doctor No      Subjective:     Principal Problem:Fx humeral neck, left, closed, initial encounter, communication barrier    HPI:  This is 58-year-old gentleman with past medical history significant for coronary artery disease/sternotomy and hypertension who presents as transfer from outside hospital for humeral fracture.  Patient is deaf/mute so history is obtained primarily from medical record as no family is available for interview.  Apparently patient suffered from a mechanical fall on day of presentation to the emergency department.  In the ED patient was found to have a displaced comminuted fracture of the proximal shaft/left humeral neck.  Incidentally was noted to have mild elevation in transaminases as well as destructive/lytic process involving his left 8th rib. The case was discussed with Dr. Funes with orthopedic surgery who agreed to consult with plans NPO at midnight in preparation for surgical intervention tomorrow.  His pain was controlled with Dilaudid prior to transfer.  Patient has been placed in the hospital for further evaluation treatment including orthopedic consult.        Overview/Hospital Course:  The patient was monitored closely during his stay.  He was initially kept NPO.  Patient was given IV fluids for hydration.  Orthopedic Surgery was consulted.    Interval History:  Orthopedic consult currently pending.  Patient remains NPO.  At IV fluids for hydration.       Review of Systems   Constitutional: Positive for activity change. Negative for fever.   HENT: Negative for ear discharge, ear pain and facial swelling.    Eyes: Negative for pain and redness.   Respiratory: Negative for cough and shortness of breath.     Cardiovascular: Negative for chest pain, palpitations and leg swelling.   Gastrointestinal: Negative for abdominal pain, diarrhea, nausea and vomiting.   Endocrine: Negative for polydipsia and polyphagia.   Genitourinary: Negative for difficulty urinating, flank pain and hematuria.   Musculoskeletal: Positive for arthralgias and gait problem. Negative for neck pain and neck stiffness.        Left upper extremity pain    Left upper extremity currently in sling   Skin: Negative for color change.   Allergic/Immunologic: Negative for food allergies.   Neurological: Positive for speech difficulty. Negative for seizures, facial asymmetry and weakness.        Patient is deaf and mute but able to read and communicate through writing and body language   Hematological: Does not bruise/bleed easily.   Psychiatric/Behavioral: Negative for agitation, behavioral problems, confusion, hallucinations and suicidal ideas. The patient is not nervous/anxious.      Objective:     Vital Signs (Most Recent):  Temp: 98.5 °F (36.9 °C) (10/20/19 1226)  Pulse: 63 (10/20/19 1226)  Resp: 18 (10/20/19 1226)  BP: (!) 170/83 (10/20/19 1226)  SpO2: 98 % (10/20/19 1226) Vital Signs (24h Range):  Temp:  [98.2 °F (36.8 °C)-99.6 °F (37.6 °C)] 98.5 °F (36.9 °C)  Pulse:  [] 63  Resp:  [16-20] 18  SpO2:  [94 %-98 %] 98 %  BP: (112-170)/(74-96) 170/83        There is no height or weight on file to calculate BMI.    Physical Exam   Constitutional: He is oriented to person, place, and time. He appears well-developed and well-nourished. No distress.   Thin   HENT:   Head: Normocephalic and atraumatic.   Eyes: Pupils are equal, round, and reactive to light. Conjunctivae and EOM are normal. Right eye exhibits no discharge. Left eye exhibits no discharge.   Neck: Normal range of motion. Neck supple. No JVD present.   Cardiovascular: Normal rate, regular rhythm, normal heart sounds and intact distal pulses.   No murmur heard.  Pulmonary/Chest: Effort  normal and breath sounds normal. No respiratory distress. He has no wheezes.   Abdominal: Soft. Bowel sounds are normal. He exhibits no distension. There is no tenderness. There is no guarding.   Genitourinary:   Genitourinary Comments: Not examined   Musculoskeletal: Normal range of motion.   Left upper extremity range of motion not tested and currently located in [facility]   Neurological: He is alert and oriented to person, place, and time. A cranial nerve deficit is present.   Patient is deaf and mute    Patient is awake, alert, oriented and responds appropriately to simple commands    He can read and attempts to communicate by body language    Skin: Skin is warm. Capillary refill takes less than 2 seconds. No rash noted. He is diaphoretic. No erythema.   Psychiatric: He has a normal mood and affect. His behavior is normal. Judgment and thought content normal.   Nursing note and vitals reviewed.        CRANIAL NERVES     CN III, IV, VI   Pupils are equal, round, and reactive to light.  Extraocular motions are normal.     Labs reviewed      Assessment/Plan:      * Fx humeral neck, left, closed, initial encounter  Suspected secondary to mechanical fall  Orthopedic consult pending  Patient currently remains NPO  Continue IV fluids  Pain control  Patient will need physical therapy and occupational therapy after surgery      Nicotine dependence  Smoking cessation education  Add nicotine patch      Elevated liver enzymes  Monitor  Check lipase level      Abnormality of rib determined by X-ray  Suspicious for destructive/lytic process involving the left 8th rib posteriorly  Patient will need outpatient workup      HTN (hypertension)  Monitor  Home medication list currently not available  Uncertain if patient with history of hypertension versus elevated blood pressure secondary to pain      Coronary artery disease involving coronary bypass graft with angina pectoris  Telemetry  No signs of acute cardiac issues noted at this  time  Home medication list currently pending- no visitors currently available.  Will have staff see if they verify list through patient's pharmacy  Aspirin was not added at this time due to reported history of peptic ulcer disease        VTE Risk Mitigation (From admission, onward)         Ordered     Place KATHIE hose  Until discontinued      10/20/19 1435     Place sequential compression device  Until discontinued      10/19/19 2027     IP VTE LOW RISK PATIENT  Once      10/19/19 2027                Time spent seeing patient( greater than 1/2 spent in direct contact) : 36 minutes        SANDRA Thompson  Department of Hospital Medicine   [facility]

## 2019-10-20 NOTE — SUBJECTIVE & OBJECTIVE
Past Medical History:   Diagnosis Date    Coronary artery disease     Language barrier     MI (myocardial infarction)     Ulcer        Past Surgical History:   Procedure Laterality Date    ABDOMINAL SURGERY      CARDIAC SURGERY      EXTENSOR TENDON OF FOREARM / WRIST REPAIR         Review of patient's allergies indicates:  No Known Allergies    Current Facility-Administered Medications on File Prior to Encounter   Medication    [COMPLETED] hydromorphone (PF) injection 1 mg    [COMPLETED] hydromorphone (PF) injection 1 mg     Current Outpatient Medications on File Prior to Encounter   Medication Sig    HYDROcodone-acetaminophen (NORCO) 5-325 mg per tablet Take 1 tablet by mouth every 8 (eight) hours as needed for Pain.     Family History     None        Tobacco Use    Smoking status: Current Every Day Smoker     Packs/day: 1.00    Smokeless tobacco: Never Used   Substance and Sexual Activity    Alcohol use: Yes     Alcohol/week: 1.0 standard drinks     Types: 1 Cans of beer per week    Drug use: No    Sexual activity: Never     Review of Systems   Unable to perform ROS: Other     Objective:     Vital Signs (Most Recent):  Temp: 98.3 °F (36.8 °C) (10/19/19 2029)  Pulse: 101 (10/19/19 2029)  Resp: 16 (10/19/19 2029)  BP: (!) 153/79 (10/19/19 2029)  SpO2: 95 % (10/19/19 2029) Vital Signs (24h Range):  Temp:  [98 °F (36.7 °C)-98.3 °F (36.8 °C)] 98.3 °F (36.8 °C)  Pulse:  [] 101  Resp:  [16-20] 16  SpO2:  [94 %-98 %] 95 %  BP: (112-174)/() 153/79        There is no height or weight on file to calculate BMI.    Physical Exam   Constitutional: He is oriented to person, place, and time. He appears well-developed and well-nourished.   HENT:   Head: Normocephalic and atraumatic.   Eyes: Pupils are equal, round, and reactive to light. EOM are normal. No scleral icterus.   Neck: Normal range of motion. Neck supple.   Cardiovascular: Normal rate, regular rhythm, normal heart sounds and intact distal  pulses.   Pulmonary/Chest: Effort normal and breath sounds normal. No respiratory distress.   Abdominal: Soft. Bowel sounds are normal. He exhibits no distension. There is no tenderness.   Musculoskeletal: Normal range of motion. He exhibits no edema.   A contracted right upper extremity.  Left upper extremity in sling   Neurological: He is alert and oriented to person, place, and time.   Skin: Skin is warm and dry. No rash noted. No erythema.   Psychiatric: He has a normal mood and affect. His behavior is normal.   Nursing note and vitals reviewed.        CRANIAL NERVES     CN III, IV, VI   Pupils are equal, round, and reactive to light.  Extraocular motions are normal.        Significant Labs: All pertinent labs within the past 24 hours have been reviewed.    Significant Imaging: I have reviewed and interpreted all pertinent imaging results/findings within the past 24 hours.

## 2019-10-20 NOTE — HPI
This is 58-year-old gentleman with past medical history significant for coronary artery disease/sternotomy and hypertension who presents as transfer from outside hospital for humeral fracture.  Patient is deaf/mute so history is obtained primarily from medical record as no family is available for interview.  Apparently patient suffered from a mechanical fall on day of presentation to the emergency department.  In the ED patient was found to have a displaced comminuted fracture of the proximal shaft/left humeral neck.  Incidentally was noted to have mild elevation in transaminases as well as destructive/lytic process involving his left 8th rib. The case was discussed with Dr. Funes with orthopedic surgery who agreed to consult with plans NPO at midnight in preparation for surgical intervention tomorrow.  His pain was controlled with Dilaudid prior to transfer.  Patient has been placed in the hospital for further evaluation treatment including orthopedic consult.

## 2019-10-20 NOTE — H&P
[facility] Medicine  History & Physical    Patient Name: Jesus Mccabe  MRN: [id number]  Admission Date: 10/19/2019  Attending Physician: Carlos Broderick MD   Primary Care Provider: Primary Doctor No         Patient information was obtained from EMS personnel, past medical records and ER records.     Subjective:     Principal Problem:Fx humeral neck, left, closed, initial encounter    Chief Complaint: No chief complaint on file.       HPI: 58-year-old gentleman with past medical history significant for coronary artery disease/sternotomy and hypertension who presents as transfer from outside hospital for humeral fracture.  Patient is deaf/mute so history is obtained primarily from medical record as no family is available for interview.  Apparently patient suffered from a mechanical fall on day of presentation to the emergency department.  In the ED patient was found to have a displaced comminuted fracture of the proximal shaft/left humeral neck.  Incidentally was noted to have mild elevation in transaminases as well as destructive/lytic process involving his left 8th rib. The case was discussed with Dr. Funes with orthopedic surgery who agreed to consult with plans NPO at [facility] in preparation for surgical intervention tomorrow.  His pain was controlled with Dilaudid prior to transfer        Past Medical History:   Diagnosis Date    Coronary artery disease     Language barrier     MI (myocardial infarction)     Ulcer        Past Surgical History:   Procedure Laterality Date    ABDOMINAL SURGERY      CARDIAC SURGERY      EXTENSOR TENDON OF FOREARM / WRIST REPAIR         Review of patient's allergies indicates:  No Known Allergies    Current Facility-Administered Medications on File Prior to Encounter   Medication    [COMPLETED] hydromorphone (PF) injection 1 mg    [COMPLETED] hydromorphone (PF) injection 1 mg     Current Outpatient Medications on File Prior to Encounter   Medication  Sig    HYDROcodone-acetaminophen (NORCO) 5-325 mg per tablet Take 1 tablet by mouth every 8 (eight) hours as needed for Pain.     Family History     None        Tobacco Use    Smoking status: Current Every Day Smoker     Packs/day: 1.00    Smokeless tobacco: Never Used   Substance and Sexual Activity    Alcohol use: Yes     Alcohol/week: 1.0 standard drinks     Types: 1 Cans of beer per week    Drug use: No    Sexual activity: Never     Review of Systems   Unable to perform ROS: Other     Objective:     Vital Signs (Most Recent):  Temp: 98.3 °F (36.8 °C) (10/19/19 2029)  Pulse: 101 (10/19/19 2029)  Resp: 16 (10/19/19 2029)  BP: (!) 153/79 (10/19/19 2029)  SpO2: 95 % (10/19/19 2029) Vital Signs (24h Range):  Temp:  [98 °F (36.7 °C)-98.3 °F (36.8 °C)] 98.3 °F (36.8 °C)  Pulse:  [] 101  Resp:  [16-20] 16  SpO2:  [94 %-98 %] 95 %  BP: (112-174)/() 153/79        There is no height or weight on file to calculate BMI.    Physical Exam   Constitutional: He is oriented to person, place, and time. He appears well-developed and well-nourished.   HENT:   Head: Normocephalic and atraumatic.   Eyes: Pupils are equal, round, and reactive to light. EOM are normal. No scleral icterus.   Neck: Normal range of motion. Neck supple.   Cardiovascular: Normal rate, regular rhythm, normal heart sounds and intact distal pulses.   Pulmonary/Chest: Effort normal and breath sounds normal. No respiratory distress.   Abdominal: Soft. Bowel sounds are normal. He exhibits no distension. There is no tenderness.   Musculoskeletal: Normal range of motion. He exhibits no edema.   A contracted right upper extremity.  Left upper extremity in sling   Neurological: He is alert and oriented to person, place, and time.   Skin: Skin is warm and dry. No rash noted. No erythema.   Psychiatric: He has a normal mood and affect. His behavior is normal.   Nursing note and vitals reviewed.        CRANIAL NERVES     CN III, IV, VI   Pupils are  equal, round, and reactive to light.  Extraocular motions are normal.        Significant Labs: All pertinent labs within the past 24 hours have been reviewed.    Significant Imaging: I have reviewed and interpreted all pertinent imaging results/findings within the past 24 hours.    Assessment/Plan:     * Fx humeral neck, left, closed, initial encounter  Likely secondary to mechanical fall  Dr. Funes has plan for surgical intervention tomorrow  NPO at midnight  Pain control      HTN (hypertension)  Patient found to be hypertensive in the emergency department put  Uncertain if this secondary to pain versus chronic illness  Patient uncertain of home meds      Coronary artery disease involving coronary bypass graft with angina pectoris  Specifics of disease uncertain  He does have sternotomy wires on x-ray and incision on exam however given communication difficulties his history is uncertain  When asked if patient was on blood thinners like Plavix he reported being uncertain  We attempted to reach his emergency contact by phone however she did not answer.  Will attempt again        VTE Risk Mitigation (From admission, onward)         Ordered     Place sequential compression device  Until discontinued      10/19/19 2027     IP VTE LOW RISK PATIENT  Once      10/19/19 2027                   Brody Carlson MD  Department of Hospital Medicine   [facility]

## 2019-10-20 NOTE — ASSESSMENT & PLAN NOTE
Suspected secondary to mechanical fall  Orthopedic consult pending  Patient currently remains NPO  Continue IV fluids  Pain control  Patient will need physical therapy and occupational therapy after surgery

## 2019-10-20 NOTE — HOSPITAL COURSE
L proximal humeral fracture s/p IM nailing with biopsy of fracture.    Work up of suspicious rib fractures appears to be negative. Rib fractures appear to be just that, rib fractures without actual lytic changes.  CT of chest abdomen and pelvis showed no signs of cancer.

## 2019-10-20 NOTE — PLAN OF CARE
Pt deaf and mute, able to make needs understand by sign language. NPO. Right elbow in sling.  Pain was manageable by PRN pian medication.Hourly and Q2h rounds completed on this pt throughout shift. Call light kept within reach, bed in locked and lowest position, side rails up x2.

## 2019-10-20 NOTE — UM SECONDARY REVIEW
Physician Advisor External    Level of Care Issue    Approved Inpatient for admit 10/19/19 per ehr md review.

## 2019-10-20 NOTE — HOSPITAL COURSE
The patient was monitored closely during his stay.  He was initially kept NPO.  Patient was given IV fluids for hydration.  Orthopedic Surgery was consulted.  On 10/20/2019, patient underwent a left humeral ORIF.  Patient tolerated procedure well.  Patient was however noted to have a possible lytic lesion of the posterior humerus.  A biopsy was taken.  Patient was also noted to have an area suspicious for destructive/lytic process involving the left 8th rib posteriorly.  Oncology was consulted.  Physical therapy and occupational therapy were consulted for debility.  Case management was consulted to assist with discharge planning needs. SNF was consulted. Patient accepted at SNF and is stable for DC.     Patient examined. Surgical dressing to left shoulder in place. He states pain controlled with meds. Used  during evaluation- American sign language.

## 2019-10-20 NOTE — ASSESSMENT & PLAN NOTE
Monitor  Home medication list currently not available  Uncertain if patient with history of hypertension versus elevated blood pressure secondary to pain

## 2019-10-20 NOTE — ASSESSMENT & PLAN NOTE
Telemetry  No signs of acute cardiac issues noted at this time  Home medication list currently pending- no visitors currently available.  Will have staff see if they verify list through patient's pharmacy  Aspirin was not added at this time due to reported history of peptic ulcer disease

## 2019-10-20 NOTE — UM SECONDARY REVIEW
Physician Advisor External    Level of Care Issue    Approved Inpatient for 10/19/19 per ehr md review.

## 2019-10-20 NOTE — ASSESSMENT & PLAN NOTE
Suspicious for destructive/lytic process involving the left 8th rib posteriorly  Patient will need outpatient workup

## 2019-10-20 NOTE — ANESTHESIA PREPROCEDURE EVALUATION
10/20/2019  Jeuss Mccabe is a 58 y.o., male.    Anesthesia Evaluation    I have reviewed the Patient Summary Reports.    I have reviewed the Nursing Notes.   I have reviewed the Medications.     Review of Systems  Anesthesia Hx:  No problems with previous Anesthesia    Social:  Smoker    Hematology/Oncology:  Hematology Normal   Oncology Normal     EENT/Dental:   Deaf and Mute   Cardiovascular:   Hypertension Past MI CAD  CABG/stent  Angina    Pulmonary:  Pulmonary Normal    Renal/:  Renal/ Normal     Hepatic/GI:  Hepatic/GI Normal    Musculoskeletal:   Left humerus fracture    Neurological:  Neurology Normal    Dermatological:  Skin Normal    Psych:  Psychiatric Normal           Physical Exam  General:  Well nourished    Airway/Jaw/Neck:  Airway Findings: Mouth Opening: Normal Tongue: Normal  General Airway Assessment: Adult  Improves to II with phonation.        Eyes/Ears/Nose:  EYES/EARS/NOSE FINDINGS: Normal   Dental:  Dental Findings: Periodontal disease, Severe    Chest/Lungs:  Chest/Lungs Findings: Clear to auscultation, Normal Respiratory Rate     Heart/Vascular:  Heart Findings: Rate: Normal  Rhythm: Regular Rhythm        Mental Status:  Mental Status Findings:  Cooperative, Alert and Oriented         Anesthesia Plan  Type of Anesthesia, risks & benefits discussed:  Anesthesia Type:  general  Patient's Preference:   Intra-op Monitoring Plan: standard ASA monitors  Intra-op Monitoring Plan Comments:   Post Op Pain Control Plan: IV/PO Opioids PRN, multimodal analgesia and peripheral nerve block  Post Op Pain Control Plan Comments:   Induction:   IV  Beta Blocker:  Patient is not currently on a Beta-Blocker (No further documentation required).       Informed Consent: Patient understands risks and agrees with Anesthesia plan.  Questions answered. Anesthesia consent signed with patient.  ASA Score: 3      Day of Surgery Review of History & Physical:    H&P update referred to the surgeon.         Ready For Surgery From Anesthesia Perspective.

## 2019-10-20 NOTE — HPI
58-year-old gentleman with past medical history significant for coronary artery disease/sternotomy and hypertension who presents as transfer from outside hospital for humeral fracture.  Patient is deaf/mute so history is obtained primarily from medical record as no family is available for interview.  Apparently patient suffered from a mechanical fall on day of presentation to the emergency department.  In the ED patient was found to have a displaced comminuted fracture of the proximal shaft/left humeral neck.  Incidentally was noted to have mild elevation in transaminases as well as destructive/lytic process involving his left 8th rib. The case was discussed with Dr. Funes with orthopedic surgery who agreed to consult with plans NPO at [facility] in preparation for surgical intervention tomorrow.  His pain was controlled with Dilaudid prior to transfer

## 2019-10-20 NOTE — ANESTHESIA PROCEDURE NOTES
Peripheral Block    Patient location during procedure: OR   Block not for primary anesthetic.  Reason for block: at surgeon's request and post-op pain management   Post-op Pain Location: left arm   Start time: 10/20/2019 5:45 PM  Timeout: 10/20/2019 5:45 PM   End time: 10/20/2019 5:50 PM    Staffing  Authorizing Provider: Louis Dietz MD  Performing Provider: Louis Dietz MD    Preanesthetic Checklist  Completed: patient identified, site marked, surgical consent, pre-op evaluation, timeout performed, IV checked, risks and benefits discussed and monitors and equipment checked  Peripheral Block  Patient position: sitting  Prep: ChloraPrep  Patient monitoring: heart rate, cardiac monitor, continuous pulse ox, continuous capnometry and frequent blood pressure checks  Block type: supraclavicular  Laterality: left  Injection technique: single shot  Needle  Needle type: Stimuplex   Needle gauge: 22 G  Needle length: 2 in  Needle localization: anatomical landmarks and ultrasound guidance   -ultrasound image captured on disc.  Assessment  Injection assessment: negative aspiration, negative parasthesia and local visualized surrounding nerve  Paresthesia pain: none  Heart rate change: no  Slow fractionated injection: yes  Additional Notes  VSS.  +Exparel 1.3% 10cc.  [facility] RN monitoring vitals throughout procedure.  Patient tolerated procedure well.

## 2019-10-20 NOTE — PLAN OF CARE
S/P ORIF Left Shoulder with Bx. Bulky dsg to Left shoulder with arm sling in place. NAD noted. VSS. Denies Pain. Okay to transfer to floor.per anesthesia.

## 2019-10-20 NOTE — TRANSFER OF CARE
Anesthesia Transfer of Care Note    Patient: Jesus Mccabe    Procedure(s) Performed: Procedure(s) (LRB):  INSERTION, INTRAMEDULLARY SISSY, HUMERUS (Left)    Patient location: PACU    Anesthesia Type: general    Transport from OR: Transported from OR on 2-3 L/min O2 by NC with adequate spontaneous ventilation    Post pain: adequate analgesia    Post assessment: no apparent anesthetic complications and tolerated procedure well    Post vital signs: stable    Level of consciousness: awake, alert and oriented    Nausea/Vomiting: no nausea/vomiting    Complications: none    Transfer of care protocol was followed      Last vitals:   Visit Vitals  BP (!) 170/83   Pulse 63   Temp 36.9 °C (98.5 °F)   Resp 18   SpO2 98%

## 2019-10-20 NOTE — ASSESSMENT & PLAN NOTE
Specifics of disease uncertain  He does have sternotomy wires on x-ray and incision on exam however given communication difficulties his history is uncertain  When asked if patient was on blood thinners like Plavix he reported being uncertain  We attempted to reach his emergency contact by phone however she did not answer.  Will attempt again

## 2019-10-20 NOTE — ASSESSMENT & PLAN NOTE
Likely secondary to mechanical fall  Dr. Funes has plan for surgical intervention tomorrow  NPO at midnight  Pain control

## 2019-10-20 NOTE — PLAN OF CARE
Pt not in room for assessment.  In surgery.       10/20/19 2218   Discharge Assessment   Assessment Type Discharge Planning Assessment

## 2019-10-21 PROBLEM — D69.6 THROMBOCYTOPENIA: Status: ACTIVE | Noted: 2019-10-21

## 2019-10-21 PROBLEM — K59.00 CONSTIPATION: Status: ACTIVE | Noted: 2019-10-21

## 2019-10-21 PROBLEM — E44.0 MALNUTRITION OF MODERATE DEGREE: Status: ACTIVE | Noted: 2019-10-21

## 2019-10-21 PROBLEM — E83.42 HYPOMAGNESEMIA: Status: ACTIVE | Noted: 2019-10-21

## 2019-10-21 PROBLEM — D64.9 POSTOPERATIVE ANEMIA: Status: ACTIVE | Noted: 2019-10-21

## 2019-10-21 LAB
ALBUMIN SERPL BCP-MCNC: 2.9 G/DL (ref 3.5–5.2)
ALP SERPL-CCNC: 45 U/L (ref 55–135)
ALT SERPL W/O P-5'-P-CCNC: 46 U/L (ref 10–44)
ANION GAP SERPL CALC-SCNC: 10 MMOL/L (ref 8–16)
ANION GAP SERPL CALC-SCNC: 10 MMOL/L (ref 8–16)
AST SERPL-CCNC: 37 U/L (ref 10–40)
BASOPHILS # BLD AUTO: 0.01 K/UL (ref 0–0.2)
BASOPHILS # BLD AUTO: 0.01 K/UL (ref 0–0.2)
BASOPHILS NFR BLD: 0.1 % (ref 0–1.9)
BASOPHILS NFR BLD: 0.1 % (ref 0–1.9)
BILIRUB SERPL-MCNC: 0.5 MG/DL (ref 0.1–1)
BUN SERPL-MCNC: 8 MG/DL (ref 6–20)
BUN SERPL-MCNC: 8 MG/DL (ref 6–20)
CALCIUM SERPL-MCNC: 8.6 MG/DL (ref 8.7–10.5)
CALCIUM SERPL-MCNC: 8.6 MG/DL (ref 8.7–10.5)
CANCER AG19-9 SERPL-ACNC: <2 U/ML (ref 2–40)
CEA SERPL-MCNC: 25.7 NG/ML (ref 0–5)
CHLORIDE SERPL-SCNC: 98 MMOL/L (ref 95–110)
CHLORIDE SERPL-SCNC: 98 MMOL/L (ref 95–110)
CO2 SERPL-SCNC: 26 MMOL/L (ref 23–29)
CO2 SERPL-SCNC: 26 MMOL/L (ref 23–29)
COMPLEXED PSA SERPL-MCNC: 0.6 NG/ML (ref 0–4)
CREAT SERPL-MCNC: 0.8 MG/DL (ref 0.5–1.4)
CREAT SERPL-MCNC: 0.8 MG/DL (ref 0.5–1.4)
DIFFERENTIAL METHOD: ABNORMAL
DIFFERENTIAL METHOD: ABNORMAL
EOSINOPHIL # BLD AUTO: 0 K/UL (ref 0–0.5)
EOSINOPHIL # BLD AUTO: 0 K/UL (ref 0–0.5)
EOSINOPHIL NFR BLD: 0 % (ref 0–8)
EOSINOPHIL NFR BLD: 0 % (ref 0–8)
ERYTHROCYTE [DISTWIDTH] IN BLOOD BY AUTOMATED COUNT: 15 % (ref 11.5–14.5)
ERYTHROCYTE [DISTWIDTH] IN BLOOD BY AUTOMATED COUNT: 15 % (ref 11.5–14.5)
EST. GFR  (AFRICAN AMERICAN): >60 ML/MIN/1.73 M^2
EST. GFR  (AFRICAN AMERICAN): >60 ML/MIN/1.73 M^2
EST. GFR  (NON AFRICAN AMERICAN): >60 ML/MIN/1.73 M^2
EST. GFR  (NON AFRICAN AMERICAN): >60 ML/MIN/1.73 M^2
GLUCOSE SERPL-MCNC: 139 MG/DL (ref 70–110)
GLUCOSE SERPL-MCNC: 139 MG/DL (ref 70–110)
HCT VFR BLD AUTO: 35.5 % (ref 40–54)
HCT VFR BLD AUTO: 35.5 % (ref 40–54)
HGB BLD-MCNC: 11.1 G/DL (ref 14–18)
HGB BLD-MCNC: 11.1 G/DL (ref 14–18)
IMM GRANULOCYTES # BLD AUTO: 0.03 K/UL (ref 0–0.04)
IMM GRANULOCYTES # BLD AUTO: 0.03 K/UL (ref 0–0.04)
LYMPHOCYTES # BLD AUTO: 1 K/UL (ref 1–4.8)
LYMPHOCYTES # BLD AUTO: 1 K/UL (ref 1–4.8)
LYMPHOCYTES NFR BLD: 13 % (ref 18–48)
LYMPHOCYTES NFR BLD: 13 % (ref 18–48)
MAGNESIUM SERPL-MCNC: 1.4 MG/DL (ref 1.6–2.6)
MCH RBC QN AUTO: 29.7 PG (ref 27–31)
MCH RBC QN AUTO: 29.7 PG (ref 27–31)
MCHC RBC AUTO-ENTMCNC: 31.3 G/DL (ref 32–36)
MCHC RBC AUTO-ENTMCNC: 31.3 G/DL (ref 32–36)
MCV RBC AUTO: 95 FL (ref 82–98)
MCV RBC AUTO: 95 FL (ref 82–98)
MONOCYTES # BLD AUTO: 0.9 K/UL (ref 0.3–1)
MONOCYTES # BLD AUTO: 0.9 K/UL (ref 0.3–1)
MONOCYTES NFR BLD: 11.8 % (ref 4–15)
MONOCYTES NFR BLD: 11.8 % (ref 4–15)
NEUTROPHILS # BLD AUTO: 5.8 K/UL (ref 1.8–7.7)
NEUTROPHILS # BLD AUTO: 5.8 K/UL (ref 1.8–7.7)
NEUTROPHILS NFR BLD: 74.7 % (ref 38–73)
NEUTROPHILS NFR BLD: 74.7 % (ref 38–73)
NRBC BLD-RTO: 0 /100 WBC
NRBC BLD-RTO: 0 /100 WBC
PLATELET # BLD AUTO: 121 K/UL (ref 150–350)
PLATELET # BLD AUTO: 121 K/UL (ref 150–350)
PMV BLD AUTO: 10.7 FL (ref 9.2–12.9)
PMV BLD AUTO: 10.7 FL (ref 9.2–12.9)
POTASSIUM SERPL-SCNC: 4.2 MMOL/L (ref 3.5–5.1)
POTASSIUM SERPL-SCNC: 4.2 MMOL/L (ref 3.5–5.1)
PROT SERPL-MCNC: 6.9 G/DL (ref 6–8.4)
RBC # BLD AUTO: 3.74 M/UL (ref 4.6–6.2)
RBC # BLD AUTO: 3.74 M/UL (ref 4.6–6.2)
SODIUM SERPL-SCNC: 134 MMOL/L (ref 136–145)
SODIUM SERPL-SCNC: 134 MMOL/L (ref 136–145)
WBC # BLD AUTO: 7.7 K/UL (ref 3.9–12.7)
WBC # BLD AUTO: 7.7 K/UL (ref 3.9–12.7)

## 2019-10-21 PROCEDURE — G8987 SELF CARE CURRENT STATUS: HCPCS | Mod: CK

## 2019-10-21 PROCEDURE — 82378 CARCINOEMBRYONIC ANTIGEN: CPT

## 2019-10-21 PROCEDURE — S5010 5% DEXTROSE AND 0.45% SALINE: HCPCS | Performed by: ORTHOPAEDIC SURGERY

## 2019-10-21 PROCEDURE — 63600175 PHARM REV CODE 636 W HCPCS: Performed by: ORTHOPAEDIC SURGERY

## 2019-10-21 PROCEDURE — 86301 IMMUNOASSAY TUMOR CA 19-9: CPT

## 2019-10-21 PROCEDURE — 63600175 PHARM REV CODE 636 W HCPCS: Performed by: HOSPITALIST

## 2019-10-21 PROCEDURE — 25000003 PHARM REV CODE 250: Performed by: ORTHOPAEDIC SURGERY

## 2019-10-21 PROCEDURE — 97535 SELF CARE MNGMENT TRAINING: CPT

## 2019-10-21 PROCEDURE — 85025 COMPLETE CBC W/AUTO DIFF WBC: CPT

## 2019-10-21 PROCEDURE — 97161 PT EVAL LOW COMPLEX 20 MIN: CPT

## 2019-10-21 PROCEDURE — 80053 COMPREHEN METABOLIC PANEL: CPT

## 2019-10-21 PROCEDURE — 97166 OT EVAL MOD COMPLEX 45 MIN: CPT

## 2019-10-21 PROCEDURE — 83735 ASSAY OF MAGNESIUM: CPT

## 2019-10-21 PROCEDURE — G8988 SELF CARE GOAL STATUS: HCPCS | Mod: CK

## 2019-10-21 PROCEDURE — 25000003 PHARM REV CODE 250: Performed by: NURSE PRACTITIONER

## 2019-10-21 PROCEDURE — 12000002 HC ACUTE/MED SURGE SEMI-PRIVATE ROOM

## 2019-10-21 PROCEDURE — 36415 COLL VENOUS BLD VENIPUNCTURE: CPT

## 2019-10-21 PROCEDURE — S4991 NICOTINE PATCH NONLEGEND: HCPCS | Performed by: ORTHOPAEDIC SURGERY

## 2019-10-21 PROCEDURE — 99223 PR INITIAL HOSPITAL CARE,LEVL III: ICD-10-PCS | Mod: ,,, | Performed by: INTERNAL MEDICINE

## 2019-10-21 PROCEDURE — 99223 1ST HOSP IP/OBS HIGH 75: CPT | Mod: ,,, | Performed by: INTERNAL MEDICINE

## 2019-10-21 PROCEDURE — 97110 THERAPEUTIC EXERCISES: CPT

## 2019-10-21 PROCEDURE — 94761 N-INVAS EAR/PLS OXIMETRY MLT: CPT

## 2019-10-21 RX ORDER — LANOLIN ALCOHOL/MO/W.PET/CERES
400 CREAM (GRAM) TOPICAL 2 TIMES DAILY
Status: DISCONTINUED | OUTPATIENT
Start: 2019-10-21 | End: 2019-10-23 | Stop reason: HOSPADM

## 2019-10-21 RX ORDER — DOCUSATE SODIUM 100 MG/1
100 CAPSULE, LIQUID FILLED ORAL 2 TIMES DAILY
Status: DISCONTINUED | OUTPATIENT
Start: 2019-10-21 | End: 2019-10-23 | Stop reason: HOSPADM

## 2019-10-21 RX ORDER — MAGNESIUM SULFATE HEPTAHYDRATE 40 MG/ML
2 INJECTION, SOLUTION INTRAVENOUS ONCE
Status: COMPLETED | OUTPATIENT
Start: 2019-10-21 | End: 2019-10-21

## 2019-10-21 RX ORDER — ADHESIVE BANDAGE
30 BANDAGE TOPICAL ONCE
Status: COMPLETED | OUTPATIENT
Start: 2019-10-21 | End: 2019-10-21

## 2019-10-21 RX ORDER — MAGNESIUM SULFATE 1 G/100ML
1 INJECTION INTRAVENOUS ONCE
Status: COMPLETED | OUTPATIENT
Start: 2019-10-21 | End: 2019-10-21

## 2019-10-21 RX ADMIN — MORPHINE SULFATE 4 MG: 4 INJECTION, SOLUTION INTRAMUSCULAR; INTRAVENOUS at 05:10

## 2019-10-21 RX ADMIN — MAGNESIUM OXIDE 400 MG (241.3 MG MAGNESIUM) TABLET 400 MG: TABLET at 11:10

## 2019-10-21 RX ADMIN — HYDROCODONE BITARTRATE AND ACETAMINOPHEN 1 TABLET: 5; 325 TABLET ORAL at 03:10

## 2019-10-21 RX ADMIN — NICOTINE 1 PATCH: 21 PATCH TRANSDERMAL at 09:10

## 2019-10-21 RX ADMIN — DEXTROSE AND SODIUM CHLORIDE: 5; .45 INJECTION, SOLUTION INTRAVENOUS at 08:10

## 2019-10-21 RX ADMIN — MAGNESIUM SULFATE 2 G: 2 INJECTION INTRAVENOUS at 11:10

## 2019-10-21 RX ADMIN — SENNOSIDES AND DOCUSATE SODIUM 1 TABLET: 8.6; 5 TABLET ORAL at 09:10

## 2019-10-21 RX ADMIN — HYDROCODONE BITARTRATE AND ACETAMINOPHEN 1 TABLET: 5; 325 TABLET ORAL at 12:10

## 2019-10-21 RX ADMIN — MAGNESIUM HYDROXIDE 2400 MG: 400 SUSPENSION ORAL at 02:10

## 2019-10-21 RX ADMIN — PANTOPRAZOLE SODIUM 40 MG: 40 TABLET, DELAYED RELEASE ORAL at 09:10

## 2019-10-21 RX ADMIN — THERA TABS 1 TABLET: TAB at 11:10

## 2019-10-21 RX ADMIN — CEFAZOLIN SODIUM 2 G: 2 SOLUTION INTRAVENOUS at 12:10

## 2019-10-21 RX ADMIN — CEFAZOLIN SODIUM 2 G: 2 SOLUTION INTRAVENOUS at 09:10

## 2019-10-21 RX ADMIN — HYDROCODONE BITARTRATE AND ACETAMINOPHEN 1 TABLET: 5; 325 TABLET ORAL at 08:10

## 2019-10-21 RX ADMIN — MORPHINE SULFATE 4 MG: 4 INJECTION, SOLUTION INTRAMUSCULAR; INTRAVENOUS at 10:10

## 2019-10-21 RX ADMIN — MAGNESIUM SULFATE HEPTAHYDRATE 1 G: 1 INJECTION, SOLUTION INTRAVENOUS at 02:10

## 2019-10-21 RX ADMIN — HYDROCODONE BITARTRATE AND ACETAMINOPHEN 1 TABLET: 5; 325 TABLET ORAL at 09:10

## 2019-10-21 RX ADMIN — MAGNESIUM OXIDE 400 MG (241.3 MG MAGNESIUM) TABLET 400 MG: TABLET at 08:10

## 2019-10-21 RX ADMIN — DOCUSATE SODIUM 100 MG: 100 CAPSULE, LIQUID FILLED ORAL at 08:10

## 2019-10-21 RX ADMIN — DEXTROSE AND SODIUM CHLORIDE: 5; .45 INJECTION, SOLUTION INTRAVENOUS at 01:10

## 2019-10-21 NOTE — PLAN OF CARE
Patient educated on smoking cessation program patient not interested at this time information left with patient about program

## 2019-10-21 NOTE — CARE UPDATE
10/21/19 0745   Patient Assessment/Suction   Level of Consciousness (AVPU) alert   Respiratory Effort Normal;Unlabored   PRE-TX-O2   O2 Device (Oxygen Therapy) room air   SpO2 98 %   Pulse Oximetry Type Intermittent   $ Pulse Oximetry - Multiple Charge Pulse Oximetry - Multiple   Pulse 61   Resp 18

## 2019-10-21 NOTE — SUBJECTIVE & OBJECTIVE
Principal Problem:Fx humeral neck, left, closed, initial encounter      Interval History: Comfortable. Eating lunch    Review of patient's allergies indicates:  No Known Allergies    Current Facility-Administered Medications   Medication    acetaminophen tablet 650 mg    acetaminophen tablet 650 mg    dextrose 5 % and 0.45 % NaCl infusion    dextrose 50% injection 12.5 g    dextrose 50% injection 25 g    docusate sodium capsule 100 mg    fentaNYL injection 25 mcg    glucagon (human recombinant) injection 1 mg    glucose chewable tablet 16 g    glucose chewable tablet 24 g    HYDROcodone-acetaminophen 5-325 mg per tablet 1 tablet    loperamide capsule 2 mg    magnesium hydroxide 400 mg/5 ml suspension 2,400 mg    magnesium oxide tablet 400 mg    magnesium sulfate 2g in water 50mL IVPB (premix)    Followed by    magnesium sulfate in dextrose IVPB (premix) 1 g    morphine injection 4 mg    multivitamin tablet    nicotine 21 mg/24 hr 1 patch    ondansetron injection 4 mg    ondansetron injection 4 mg    oxyCODONE immediate release tablet 5 mg    pantoprazole EC tablet 40 mg    potassium chloride 10% oral solution 40 mEq    potassium chloride 10% oral solution 40 mEq    promethazine (PHENERGAN) 6.25 mg in dextrose 5 % 50 mL IVPB    sodium chloride 0.9% flush 10 mL     Objective:     Vital Signs (Most Recent):  Temp: 98.6 °F (37 °C) (10/21/19 1150)  Pulse: 72 (10/21/19 1150)  Resp: 20 (10/21/19 1150)  BP: 130/67 (10/21/19 1150)  SpO2: 100 % (10/21/19 1150) Vital Signs (24h Range):  Temp:  [96.8 °F (36 °C)-98.6 °F (37 °C)] 98.6 °F (37 °C)  Pulse:  [61-84] 72  Resp:  [14-20] 20  SpO2:  [97 %-100 %] 100 %  BP: (101-153)/(53-86) 130/67           There is no height or weight on file to calculate BMI.      Intake/Output Summary (Last 24 hours) at 10/21/2019 1252  Last data filed at 10/21/2019 0600  Gross per 24 hour   Intake 1821.67 ml   Output 1495 ml   Net 326.67 ml       General    Nursing note and  vitals reviewed.  Constitutional: He is oriented to person, place, and time. He appears well-developed and well-nourished. No distress.   HENT:   Nose: Nose normal.   Eyes: EOM are normal.   Cardiovascular: Regular rhythm.    Pulmonary/Chest: Effort normal.   Abdominal: Soft.   Neurological: He is alert and oriented to person, place, and time.   Psychiatric: His behavior is normal.             Left Shoulder Exam     Comments:  Dsg c/d/i  In sling  NVI        Significant Labs:   CBC:   Recent Labs   Lab 10/19/19  1344 10/21/19  0457   WBC 6.15 7.70  7.70   HGB 13.2* 11.1*  11.1*   HCT 41.9 35.5*  35.5*   * 121*  121*     CMP:   Recent Labs   Lab 10/19/19  1344 10/20/19  1511 10/21/19  0457   * 136 134*  134*   K 3.8 3.8 4.2  4.2   CL 98 98 98  98   CO2 23 27 26  26   GLU 98 105 139*  139*   BUN 6 6 8  8   CREATININE 0.7 0.8 0.8  0.8   CALCIUM 9.0 9.5 8.6*  8.6*   PROT 7.9 7.9 6.9   ALBUMIN 3.6 3.4* 2.9*   BILITOT 0.6 0.9 0.5   ALKPHOS 54* 60 45*   AST 95* 56* 37   ALT 77* 64* 46*   ANIONGAP 14 11 10  10   EGFRNONAA >60.0 >60 >60  >60     All pertinent labs within the past 24 hours have been reviewed.    Significant Imaging: None

## 2019-10-21 NOTE — PT/OT/SLP EVAL
Occupational Therapy   Evaluation    Name: Jesus Mccabe  MRN: [id number]  Admitting Diagnosis:  Fx humeral neck, left, closed, initial encounter 1 Day Post-Op    Recommendations:     Discharge Recommendations: home health OT, home with home health(Patient will need assistance with bathing and dressing.)  Discharge Equipment Recommendations:  none  Barriers to discharge:  Decreased caregiver support    Assessment:     Jesus Mccabe is a 58 y.o. male with a medical diagnosis of Fx humeral neck, left, closed, initial encounter.  Qeexo was used to complete this evaluation and treatment session, using  ID #[id number]. Paper and pen was also used to communicate. Due to right hand arthritic deformity and pt unable to use L hand at this time,  stated it was difficult to understand everything the pt was signing to her.   He presents with impaired L UE function due to humeral fracture and ORIF surgery on 10/20/19. He indicated that he was in 4/10 L: shoulder pain.   Shoulder immobilizer was positioned properly although pt displayed moderate edema in L hand and wrist. He performed bed mobility with standby assistance and can use the urinal bottle with modified independence seated at EOB.   He has had a decline in functional status due to the listed impairments, impacting ADLs and functional mobility. He indicated that he lives alone and was independent with IADL's and has some family in the area.   Recommend OT treatment to maximize endurance, safety & independence with ADL's & functional mobility.  Performance deficits affecting function: weakness, impaired joint extensibility, impaired self care skills, impaired functional mobilty, impaired endurance, gait instability, pain, decreased upper extremity function, orthopedic precautions, impaired fine motor, other (comment), decreased coordination, edema, decreased ROM(impaired communication).    Pt will benefit from strong family support and a home  health aid to assist pt with bathing and dressing and HH OT to increase ADL independence and safety in pt's home environment. If possible, recommend that pt stay with family while he recovers.      Rehab Prognosis: Good; patient would benefit from acute skilled OT services to address these deficits and reach maximum level of function.       Plan:     Patient to be seen 4 x/week to address the above listed problems via self-care/home management, therapeutic activities, therapeutic exercises  · Plan of Care Expires: 11/04/19  · Plan of Care Reviewed with: patient    Subjective     Chief Complaint: L shoulder pain  Patient/Family Comments/goals: I plan to stay in bed and watch TV.    Occupational Profile:  Living Environment: Pt lives alone in a [facility] and has family who live nearby.  Previous level of function: Pt reported that he was independent with IADL's, mostly eating micro meals. He walks to the grocery store and does not drive.   Roles and Routines: Disabled  Equipment Used at Home:  none  Assistance upon Discharge: Recommend strong family support. He will need assistance for self care.    Pain/Comfort:  · Pain Rating 1: 4/10  · Location - Side 1: Left  · Location 1: shoulder  · Pain Addressed 1: Nurse notified, Reposition, Cessation of Activity  · Pain Rating Post-Intervention 1: 4/10    Patients cultural, spiritual, Confucianist conflicts given the current situation:      Objective:     Communicated with: nurse Karlo prior to session.  Patient found HOB elevated with peripheral IV, SCD upon OT entry to room.    General Precautions: Standard, fall, deaf(mute)   Orthopedic Precautions:LUE non weight bearing   Braces: Shoulder abduction brace     Occupational Performance:    Bed Mobility:    · Patient completed Scooting/Bridging with stand by assistance  · Patient completed Supine to Sit with stand by assistance  · Patient completed Sit to Supine with stand by assistance    Functional Mobility/Transfers:  · Patient  completed Sit <> Stand Transfer with contact guard assistance  with  no assistive device   · Functional Mobility: A few sidesteps at bedside with CGA    Activities of Daily Living:  · Feeding:  set-up to open containers and cut up food with HOB raised  · Toileting: modified independence to use urinal seated EOB    Cognitive/Visual Perceptual:  Cognitive/Psychosocial Skills:     -       Follows Commands/attention:Follows one-step commands  -       Communication: deaf and mute; uses sign language and writing and reading  -       Mood/Affect/Coping skills/emotional control: Appropriate to situation, Cooperative and Pleasant  Visual/Perceptual:      -Intact  acuity      Physical Exam:  Postural examination/scapula alignment:    -       Rounded shoulders  -       Forward head  -       L UE in shoulder abduction brace  Skin integrity: Visible skin intact and clean, dry dressing on L shoulder incision  Edema:  Moderate L hand  Dominant hand:    -       right  Upper Extremity Range of Motion:     -       Right Upper Extremity: WFL  -       Left Upper Extremity: wrist and fingers mildly impaired by edema  Upper Extremity Strength:    -       Right Upper Extremity: WFL   Strength:    -       Right Upper Extremity: 4/5 with arthritic deformity noted in hand at all joints  -       Left Upper Extremity: unable to form full grasp due to edema  Fine Motor Coordination:    -       Impaired  Left hand, finger to nose  , Left hand thumb/finger opposition skills  , Left hand, manipulation of objects   and Left hand, graphomotor skills    Gross motor coordination:   WFL    AMPAC 6 Click ADL:  AMPAC Total Score: 15    Treatment & Education:  All education provided through .  OT ed pt on OT role & discharge recommendations.  OT educated pt on L hand & wrist AROM exercises in pain free ROM including fist pumps, tip to tip opposition, wrist flexion and extension and ulnar & radial deviation x 3 sets of 5 reps each  movement, stressing to complete exercises in pain free range only and not to move the upper arm. Handout provided and pt completed all exercises & verbalized understanding. OT educated pt on post-op activity and NWB restrictions.   OT ed pt on use of R hand only for an assist with very lightweight tasks such as holding toothbrush to apply toothpaste with other hand. OT ed pt on adapted UB bathing and dressing techniques with assistance from caregiver.  OT ed pt on proper technique for shoulder brace removal and replacement and on UE dressing and bathing/hygiene techniques while keeping shoulder immobilized. OT educated pt on keeping hand at level of elbow or higher to minimize edema. Pt verbalized understanding.    Education:    Patient left HOB elevated with all lines intact, call button in reach and Karlo, nurse notified    GOALS:   Multidisciplinary Problems     Occupational Therapy Goals        Problem: Occupational Therapy Goal    Goal Priority Disciplines Outcome Interventions   Occupational Therapy Goal     OT, PT/OT Ongoing, Progressing    Description:  Goals to be met by: 11/3/19     Patient will increase functional independence with ADLs by performing:    Feeding with Set-up Assistance and Assistive Devices as needed.  Grooming while standing at sink with Stand-by Assistance and Assistive Devices as needed.  Toileting from toilet with Stand-by Assistance and Assistive Devices as needed for hygiene and clothing management.   Toilet transfer to toilet with Stand-by Assistance.  Upper extremity exercise program x3 sets of 5 reps per handout, with supervision.                      History:     Past Medical History:   Diagnosis Date    Coronary artery disease     Language barrier     MI (myocardial infarction)     Ulcer        Past Surgical History:   Procedure Laterality Date    ABDOMINAL SURGERY      CARDIAC SURGERY      EXTENSOR TENDON OF FOREARM / WRIST REPAIR         Time Tracking:     OT Date of  Treatment: 10/21/19  OT Start Time: 1406  OT Stop Time: 1505  OT Total Time (min): 59 min    Billable Minutes:Evaluation 25  Self Care/Home Management 20  Therapeutic Exercise 14    JOLYNN Albert  10/21/2019

## 2019-10-21 NOTE — CONSULTS
[facility]  Hematology/Oncology  Consult Note    Patient Name: Jesus Mccabe  MRN: [id number]  Admission Date: 10/19/2019  Hospital Length of Stay: 1 days  Code Status: Full Code   Attending Provider: Brooklyn Eagle MD  Consulting Provider: Nanette Adams MD  Primary Care Physician: Primary Doctor No  Principal Problem:Fx humeral neck, left, closed, initial encounter    Consults  Subjective:     HPI:     This is a 58 year old gentleman who is homeless according to the nurse and deaf. I was able to communicate via a . He is post fall and Fracture of legy humeral neck . Imaging revealed a possible lytic precursor and heme onc was consulted for further recs.  Pt was taken to the OR yesterday and the left shoulder mass was biopsied with an intra humeral biopsy and ORIF was performed . Pathology is pending    Pt does have a sister in [facility] phone number [phone]    Medications:  Continuous Infusions:   dextrose 5 % and 0.45 % NaCl 100 mL/hr at 10/21/19 0135    loperamide       Scheduled Meds:   ceFAZolin (ANCEF) IVPB  2 g Intravenous Q8H    nicotine  1 patch Transdermal Daily    pantoprazole  40 mg Oral Daily    senna-docusate 8.6-50 mg  1 tablet Oral BID     PRN Meds:acetaminophen, acetaminophen, dextrose 50%, dextrose 50%, fentaNYL, glucagon (human recombinant), glucose, glucose, HYDROcodone-acetaminophen, loperamide, magnesium oxide, magnesium oxide, morphine, ondansetron, ondansetron, oxyCODONE, potassium chloride 10%, potassium chloride 10%, promethazine (PHENERGAN) IVPB, sodium chloride 0.9%     Review of patient's allergies indicates:  No Known Allergies     Past Medical History:   Diagnosis Date    Coronary artery disease     Language barrier     MI (myocardial infarction)     Ulcer      Past Surgical History:   Procedure Laterality Date    ABDOMINAL SURGERY      CARDIAC SURGERY      EXTENSOR TENDON OF FOREARM / WRIST REPAIR       Family History     None         Tobacco Use    Smoking status: Current Every Day Smoker     Packs/day: 1.00    Smokeless tobacco: Never Used   Substance and Sexual Activity    Alcohol use: Yes     Alcohol/week: 1.0 standard drinks     Types: 1 Cans of beer per week    Drug use: No    Sexual activity: Never       Review of Systems   Constitutional: Positive for activity change and fatigue. Negative for fever and unexpected weight change.   HENT: Negative for rhinorrhea and sore throat.    Eyes: Negative for photophobia.   Respiratory: Negative for cough and shortness of breath.    Cardiovascular: Negative for chest pain and leg swelling.   Gastrointestinal: Negative for abdominal pain, constipation, diarrhea, nausea and vomiting.   Endocrine: Negative for cold intolerance and heat intolerance.   Genitourinary: Negative for dysuria, frequency, hematuria and urgency.   Musculoskeletal: Positive for arthralgias and joint swelling. Negative for back pain and neck pain.   Skin: Negative for pallor and rash.   Neurological: Negative for weakness, numbness and headaches.   Hematological: Negative for adenopathy. Does not bruise/bleed easily.   Psychiatric/Behavioral: Negative for agitation.   All other systems reviewed and are negative.    Objective:     Vital Signs (Most Recent):  Temp: 96.8 °F (36 °C) (10/21/19 0535)  Pulse: 61 (10/21/19 0535)  Resp: 18 (10/21/19 0535)  BP: (!) 153/81 (10/21/19 0535)  SpO2: 100 % (10/21/19 0535) Vital Signs (24h Range):  Temp:  [96.8 °F (36 °C)-99.3 °F (37.4 °C)] 96.8 °F (36 °C)  Pulse:  [61-84] 61  Resp:  [14-18] 18  SpO2:  [94 %-100 %] 100 %  BP: (101-170)/(53-86) 153/81        There is no height or weight on file to calculate BMI.  There is no height or weight on file to calculate BSA.      Intake/Output Summary (Last 24 hours) at 10/21/2019 0734  Last data filed at 10/21/2019 0600  Gross per 24 hour   Intake 1821.67 ml   Output 1495 ml   Net 326.67 ml       Physical Exam   Constitutional: He is oriented to  person, place, and time.   HENT:   Head: Normocephalic and atraumatic.   Mouth/Throat: Oropharynx is clear and moist. No oropharyngeal exudate.   Eyes: Conjunctivae and EOM are normal. No scleral icterus.   Neck: Normal range of motion. Neck supple. No JVD present. No tracheal deviation present.   Cardiovascular: Normal rate, regular rhythm and normal heart sounds.   No murmur (2= capillary refill) heard.  Pulmonary/Chest: Effort normal and breath sounds normal. No respiratory distress. He has no wheezes.   Abdominal: Soft. Bowel sounds are normal. He exhibits no distension.   Musculoskeletal: He exhibits deformity. He exhibits no edema.   Neurological: He is alert and oriented to person, place, and time. No cranial nerve deficit (  .lastcbc).   Skin: Skin is warm and dry. No rash noted. No erythema.   Psychiatric: He has a normal mood and affect. Thought content normal.   Nursing note and vitals reviewed.    Right hand with severe arthritic changes   Significant Labs:     Lab Results   Component Value Date    WBC 7.70 10/21/2019    WBC 7.70 10/21/2019    RBC 3.74 (L) 10/21/2019    RBC 3.74 (L) 10/21/2019    HGB 11.1 (L) 10/21/2019    HGB 11.1 (L) 10/21/2019    HCT 35.5 (L) 10/21/2019    HCT 35.5 (L) 10/21/2019    MCV 95 10/21/2019    MCV 95 10/21/2019    MCH 29.7 10/21/2019    MCH 29.7 10/21/2019    MCHC 31.3 (L) 10/21/2019    MCHC 31.3 (L) 10/21/2019    RDW 15.0 (H) 10/21/2019    RDW 15.0 (H) 10/21/2019     (L) 10/21/2019     (L) 10/21/2019    MPV 10.7 10/21/2019    MPV 10.7 10/21/2019    GRAN 5.8 10/21/2019    GRAN 74.7 (H) 10/21/2019    GRAN 5.8 10/21/2019    GRAN 74.7 (H) 10/21/2019    LYMPH 1.0 10/21/2019    LYMPH 13.0 (L) 10/21/2019    LYMPH 1.0 10/21/2019    LYMPH 13.0 (L) 10/21/2019    MONO 0.9 10/21/2019    MONO 11.8 10/21/2019    MONO 0.9 10/21/2019    MONO 11.8 10/21/2019    EOS 0.0 10/21/2019    EOS 0.0 10/21/2019    BASO 0.01 10/21/2019    BASO 0.01 10/21/2019    EOSINOPHIL 0.0 10/21/2019     EOSINOPHIL 0.0 10/21/2019    BASOPHIL 0.1 10/21/2019    BASOPHIL 0.1 10/21/2019     CMP  Sodium   Date Value Ref Range Status   10/21/2019 134 (L) 136 - 145 mmol/L Final   10/21/2019 134 (L) 136 - 145 mmol/L Final     Potassium   Date Value Ref Range Status   10/21/2019 4.2 3.5 - 5.1 mmol/L Final   10/21/2019 4.2 3.5 - 5.1 mmol/L Final     Chloride   Date Value Ref Range Status   10/21/2019 98 95 - 110 mmol/L Final   10/21/2019 98 95 - 110 mmol/L Final     CO2   Date Value Ref Range Status   10/21/2019 26 23 - 29 mmol/L Final   10/21/2019 26 23 - 29 mmol/L Final     Glucose   Date Value Ref Range Status   10/21/2019 139 (H) 70 - 110 mg/dL Final   10/21/2019 139 (H) 70 - 110 mg/dL Final     BUN, Bld   Date Value Ref Range Status   10/21/2019 8 6 - 20 mg/dL Final   10/21/2019 8 6 - 20 mg/dL Final     Creatinine   Date Value Ref Range Status   10/21/2019 0.8 0.5 - 1.4 mg/dL Final   10/21/2019 0.8 0.5 - 1.4 mg/dL Final     Calcium   Date Value Ref Range Status   10/21/2019 8.6 (L) 8.7 - 10.5 mg/dL Final   10/21/2019 8.6 (L) 8.7 - 10.5 mg/dL Final     Total Protein   Date Value Ref Range Status   10/21/2019 6.9 6.0 - 8.4 g/dL Final     Albumin   Date Value Ref Range Status   10/21/2019 2.9 (L) 3.5 - 5.2 g/dL Final     Total Bilirubin   Date Value Ref Range Status   10/21/2019 0.5 0.1 - 1.0 mg/dL Final     Comment:     For infants and newborns, interpretation of results should be based  on gestational age, weight and in agreement with clinical  observations.  Premature Infant recommended reference ranges:  Up to 24 hours.............<8.0 mg/dL  Up to 48 hours............<12.0 mg/dL  3-5 days..................<15.0 mg/dL  6-29 days.................<15.0 mg/dL       Alkaline Phosphatase   Date Value Ref Range Status   10/21/2019 45 (L) 55 - 135 U/L Final     AST   Date Value Ref Range Status   10/21/2019 37 10 - 40 U/L Final     ALT   Date Value Ref Range Status   10/21/2019 46 (H) 10 - 44 U/L Final     Anion Gap   Date  Value Ref Range Status   10/21/2019 10 8 - 16 mmol/L Final   10/21/2019 10 8 - 16 mmol/L Final     eGFR if    Date Value Ref Range Status   10/21/2019 >60 >60 mL/min/1.73 m^2 Final   10/21/2019 >60 >60 mL/min/1.73 m^2 Final     eGFR if non    Date Value Ref Range Status   10/21/2019 >60 >60 mL/min/1.73 m^2 Final     Comment:     Calculation used to obtain the estimated glomerular filtration  rate (eGFR) is the CKD-EPI equation.      10/21/2019 >60 >60 mL/min/1.73 m^2 Final     Comment:     Calculation used to obtain the estimated glomerular filtration  rate (eGFR) is the CKD-EPI equation.        Reading Physician Reading Date Result Priority   Jocelynn Anthony MD 10/19/2019 STAT      Narrative     EXAMINATION:  XR CHEST AP PORTABLE    CLINICAL HISTORY:  fall;    TECHNIQUE:  Single frontal view of the chest was performed.    COMPARISON:  9/5/2014    FINDINGS:  There are median sternotomy sutures.  The heart appears upper limits of normal in size for the projection.  Prominent left hilum appears vascular similar in appearance to the prior exam.  Minimal atelectatic left basilar stranding.  No confluent infiltrate    Findings are suspicious or destructive process involving the left 8th rib posteriorly.    There is old fracture of a lower right rib anteriorly.      Impression       No acute cardiopulmonary disease.  Findings suspicious for destructive/lytic process involving the left 8th rib posteriorly which could be further evaluated with specific rib films or CT    This report was flagged in Epic as abnormal.      Electronically signed by: Jocelynn Anthony MD  Date: 10/19/2019  Time: 14:58         Assessment/Plan:     Active Diagnoses:    Diagnosis Date Noted POA    PRINCIPAL PROBLEM:  Fx humeral neck, left, closed, initial encounter [S42.212A] 10/19/2019 Yes    Abnormality of rib determined by X-ray [Q76.6] 10/20/2019 Not Applicable    Language barrier to communication [Z78.9]  10/20/2019 Yes    Deaf [H91.90] 10/20/2019 Yes    Mute [R47.01] 10/20/2019 Yes    Elevated liver enzymes [R74.8] 10/20/2019 Yes    Nicotine dependence [F17.200] 10/20/2019 Yes    Coronary artery disease involving coronary bypass graft with angina pectoris [I25.709] 10/19/2019 Yes    HTN (hypertension) [I10] 10/19/2019 Yes      Problems Resolved During this Admission:     Fracture of lower right rib yet suspicious destructive process involving the left 8th rib  To better evaluate , radiology recommends rib series imaging or a CT  Will start with plain xrays  He has anemia with ? Lytic lesions  I will go ahead and screen him with tumor markers and evaluate for underlying myelomatous process although his calcium is normal   to help him with living arrangements once discharged    Nanette Adams MD  Hematology/Oncology  [facility]

## 2019-10-21 NOTE — PLAN OF CARE
Pt remains free from injury. Ambulated with therapy. Repositions self. Pain controlled. Dsg cdi with sling in place. Right hand contracted. Deaf/mute but able to read/write + Martii was used for sign language. Tolerating meals. Scd in place. Xray of ribs done this shift. Mag replaced. IVF infusing. Abx given. Afebrile through shift. Bed locked and low. Pt aware how to use call light. Nurse hourly rounding to ensure pt safety.

## 2019-10-21 NOTE — NURSING
Pt concerned with heaviness in L arm and tingling/numbness. Educated pt on exparel block. Pt with +2 pulses, warm to touch, < 30 sec cap refill. Unable to wiggle fingers at this time. Pt still concerned and requested . Noy  provided, pt stated he was worried about the numbness and a ride home. Reassured pt and updated primary nurse.

## 2019-10-21 NOTE — PROGRESS NOTES
[facility] Medicine  Progress Note    Patient Name: Jesus Mccabe  MRN: [id number]  Patient Class: IP- Inpatient   Admission Date: 10/19/2019  Length of Stay: 1 days  Attending Physician: Carlos Broderick MD  Primary Care Provider: Primary Doctor No      Subjective:     Principal Problem:Fx humeral neck, left, closed, initial encounter, status post ORIF, suspicious lesion noted on the posterioral humerus and also on the left 8 rib    HPI:  This is 58-year-old gentleman with past medical history significant for coronary artery disease/sternotomy and hypertension who presents as transfer from outside hospital for humeral fracture.  Patient is deaf/mute so history is obtained primarily from medical record as no family is available for interview.  Apparently patient suffered from a mechanical fall on day of presentation to the emergency department.  In the ED patient was found to have a displaced comminuted fracture of the proximal shaft/left humeral neck.  Incidentally was noted to have mild elevation in transaminases as well as destructive/lytic process involving his left 8th rib. The case was discussed with Dr. Funes with orthopedic surgery who agreed to consult with plans NPO at midnight in preparation for surgical intervention tomorrow.  His pain was controlled with Dilaudid prior to transfer.  Patient has been placed in the hospital for further evaluation treatment including orthopedic consult.      Overview/Hospital Course:  The patient was monitored closely during his stay.  He was initially kept NPO.  Patient was given IV fluids for hydration.  Orthopedic Surgery was consulted.  On 10/20/2019, patient underwent a left humeral ORIF.  Patient tolerated procedure well.  Patient was however noted to have a possible lytic lesion of the posterior humerus.  A biopsy was taken.  Patient was also noted to have an area suspicious for destructive/lytic process involving the left 8th rib  posteriorly.  Oncology was consulted.  Physical therapy and occupational therapy were consulted for debility.  Case management was consulted to assist with discharge planning needs.    Interval History:  Physical therapy and Occupational therapy were consulted.  Oncology consulted for suspicious area of possible lytic lesion on the posterior old humerus.  Patient also noted to have suspicious area on the left 8th rib.       Review of Systems   Constitutional: Positive for activity change. Negative for fever.   HENT: Negative for ear discharge, ear pain and facial swelling.    Eyes: Negative for pain and redness.   Respiratory: Negative for cough and shortness of breath.    Cardiovascular: Negative for chest pain, palpitations and leg swelling.   Gastrointestinal: Positive for constipation. Negative for abdominal pain, diarrhea, nausea and vomiting.   Endocrine: Negative for polydipsia and polyphagia.   Genitourinary: Negative for difficulty urinating, flank pain and hematuria.   Musculoskeletal: Positive for arthralgias and gait problem. Negative for neck pain and neck stiffness.        Left upper extremity pain    Left upper extremity currently in sling   Skin: Negative for color change.   Allergic/Immunologic: Negative for food allergies.   Neurological: Positive for speech difficulty. Negative for seizures, facial asymmetry and weakness.        Patient is deaf and mute but able to read and communicate through writing and body language   Hematological: Does not bruise/bleed easily.   Psychiatric/Behavioral: Negative for agitation, behavioral problems, confusion, hallucinations and suicidal ideas. The patient is not nervous/anxious.      Objective:     Vital Signs (Most Recent):  Temp: 98.6 °F (37 °C) (10/21/19 1150)  Pulse: 72 (10/21/19 1150)  Resp: 20 (10/21/19 1150)  BP: 130/67 (10/21/19 1150)  SpO2: 100 % (10/21/19 1150) Vital Signs (24h Range):  Temp:  [96.8 °F (36 °C)-98.6 °F (37 °C)] 98.6 °F (37 °C)  Pulse:   [61-84] 72  Resp:  [14-20] 20  SpO2:  [97 %-100 %] 100 %  BP: (101-153)/(53-86) 130/67        There is no height or weight on file to calculate BMI.    Physical Exam   Constitutional: He is oriented to person, place, and time. He appears well-developed and well-nourished. No distress.   Thin   HENT:   Head: Normocephalic and atraumatic.   Eyes: Pupils are equal, round, and reactive to light. Conjunctivae and EOM are normal. Right eye exhibits no discharge. Left eye exhibits no discharge.   Neck: Normal range of motion. Neck supple. No JVD present.   Cardiovascular: Normal rate, regular rhythm, normal heart sounds and intact distal pulses.   No murmur heard.  Pulmonary/Chest: Effort normal and breath sounds normal. No respiratory distress. He has no wheezes.   Abdominal: Soft. Bowel sounds are normal. He exhibits no distension. There is no tenderness. There is no guarding.   Genitourinary:   Genitourinary Comments: Not examined   Musculoskeletal: Normal range of motion. He exhibits edema.   Left upper extremity range of motion not tested and currently located in ing    Patient has edema of the left hand   Neurological: He is alert and oriented to person, place, and time. A cranial nerve deficit is present.   Patient is deaf and mute    Patient is awake, alert, oriented and responds appropriately to simple commands    He can read and attempts to communicate by body language    Skin: Skin is warm. Capillary refill takes less than 2 seconds. No rash noted. He is not diaphoretic. No erythema.   Psychiatric: He has a normal mood and affect. His behavior is normal. Judgment and thought content normal.   Nursing note and vitals reviewed.        CRANIAL NERVES     CN III, IV, VI   Pupils are equal, round, and reactive to light.  Extraocular motions are normal.     Labs reviewed      Assessment/Plan:      * Fx humeral neck, left, closed, initial encounter  Suspected secondary to mechanical fall/ 10/20/2019 status post ORIF  of the left humeral neck with questionable Lytic lesion of the proximal humerus/ status post biopsy 10/20/2019  Orthopedic consult- Dr. Butcher  Continue IV fluids  Pain control  Physical therapy and occupational therapy consulted    Oncology was consulted to evaluate patient for possible lytic lesion of the proximal humerus.  Patient also with lesion on the left eight rib.          Constipation  Add milk own bag x1 dose  Discontinue Senokot and try Colace b.i.d.      Thrombocytopenia  Monitor  Hematology/oncology following  Anticoagulant as per Hematology and surgery if needed  Continue KATHIE hose and SCDs      Hypomagnesemia  Monitor  Supplement as needed      Malnutrition of moderate degree  Continue multivitamin and p.o. supplement      Postoperative anemia  Continue multivitamin    Nicotine dependence  Smoking cessation education  Continue nicotine patch      Elevated liver enzymes  Monitor- trending downward  Lipase level within normal limits      Deaf  And mute      Language barrier to communication  Patient able to understand sign language and also can read and write  Also use Riddhi as communication aide      Abnormality of rib determined by X-ray  Suspicious for destructive/lytic process involving the left 8th rib posteriorly  Oncology consulted-Dr. Adams      HTN (hypertension)  Monitor  Home medication list currently not available  Uncertain if patient with history of hypertension versus elevated blood pressure secondary to pain      Coronary artery disease involving coronary bypass graft with angina pectoris  Telemetry  No signs of acute cardiac issues noted at this time  Home medication list currently pending- no visitors currently available.  Will have staff see if they verify list through patient's pharmacy  Aspirin was not added at this time due to reported history of peptic ulcer disease        VTE Risk Mitigation (From admission, onward)         Ordered     Place KATHIE hose  Until discontinued       10/20/19 1435     Place sequential compression device  Until discontinued      10/19/19 2027     IP VTE LOW RISK PATIENT  Once      10/19/19 2027                Time spent seeing patient( greater than 1/2 spent in direct contact) : 34 minutes      SANDRA Thompson  Department of Hospital Medicine   [facility]

## 2019-10-21 NOTE — PLAN OF CARE
Problem: Physical Therapy Goal  Goal: Physical Therapy Goal  Description  Goals to be met by: 19    Patient will increase functional independence with mobility by performin. Supine to sit with Stand-by Assistance  2. Sit to supine with Stand-by Assistance  3. Sit to stand transfer with Stand-by Assistance  4. Bed to chair transfer with Stand-by Assistance using no AD.  5. Gait  x 250 feet with Stand-by Assistance using no AD.      Outcome: Ongoing, Progressing

## 2019-10-21 NOTE — ANESTHESIA POSTPROCEDURE EVALUATION
Anesthesia Post Evaluation    Patient: Jesus Mccabe    Procedure(s) Performed: Procedure(s) (LRB):  INSERTION, INTRAMEDULLARY SISSY, HUMERUS (Left)    Final Anesthesia Type: general  Patient location during evaluation: PACU  Patient participation: Yes- Able to Participate  Level of consciousness: awake and alert  Post-procedure vital signs: reviewed and stable  Pain management: adequate  Airway patency: patent  PONV status at discharge: No PONV  Anesthetic complications: no      Cardiovascular status: hemodynamically stable  Respiratory status: unassisted and room air  Hydration status: euvolemic  Follow-up not needed.          Vitals Value Taken Time   /75 10/20/2019  7:07 PM   Temp 36.5 °C (97.7 °F) 10/20/2019  7:07 PM   Pulse 67 10/20/2019  7:07 PM   Resp 15 10/20/2019  7:07 PM   SpO2 99 % 10/20/2019  7:07 PM         Event Time     Out of Recovery 10/20/2019 19:14:00          Pain/Natalie Score: Pain Rating Prior to Med Admin: 8 (L shoulder) (10/20/2019 11:09 AM)  Pain Rating Post Med Admin: 4 (10/20/2019 12:00 PM)  Natalie Score: 10 (10/20/2019  6:50 PM)

## 2019-10-21 NOTE — ASSESSMENT & PLAN NOTE
Monitor  Hematology/oncology following  Anticoagulant as per Hematology and surgery if needed  Continue KATHIE hose and MELLYs

## 2019-10-21 NOTE — SUBJECTIVE & OBJECTIVE
Interval History:  Physical therapy Occupational therapy were consulted.  Oncology consulted for suspicious area of possible lytic lesion on the posterior old humerus.  Patient also noted to have suspicious area on the left 8th rib.       Review of Systems   Constitutional: Positive for activity change. Negative for fever.   HENT: Negative for ear discharge, ear pain and facial swelling.    Eyes: Negative for pain and redness.   Respiratory: Negative for cough and shortness of breath.    Cardiovascular: Negative for chest pain, palpitations and leg swelling.   Gastrointestinal: Positive for constipation. Negative for abdominal pain, diarrhea, nausea and vomiting.   Endocrine: Negative for polydipsia and polyphagia.   Genitourinary: Negative for difficulty urinating, flank pain and hematuria.   Musculoskeletal: Positive for arthralgias and gait problem. Negative for neck pain and neck stiffness.        Left upper extremity pain    Left upper extremity currently in sling   Skin: Negative for color change.   Allergic/Immunologic: Negative for food allergies.   Neurological: Positive for speech difficulty. Negative for seizures, facial asymmetry and weakness.        Patient is deaf and mute but able to read and communicate through writing and body language   Hematological: Does not bruise/bleed easily.   Psychiatric/Behavioral: Negative for agitation, behavioral problems, confusion, hallucinations and suicidal ideas. The patient is not nervous/anxious.      Objective:     Vital Signs (Most Recent):  Temp: 98.6 °F (37 °C) (10/21/19 1150)  Pulse: 72 (10/21/19 1150)  Resp: 20 (10/21/19 1150)  BP: 130/67 (10/21/19 1150)  SpO2: 100 % (10/21/19 1150) Vital Signs (24h Range):  Temp:  [96.8 °F (36 °C)-98.6 °F (37 °C)] 98.6 °F (37 °C)  Pulse:  [61-84] 72  Resp:  [14-20] 20  SpO2:  [97 %-100 %] 100 %  BP: (101-153)/(53-86) 130/67        There is no height or weight on file to calculate BMI.    Physical Exam   Constitutional: He  is oriented to person, place, and time. He appears well-developed and well-nourished. No distress.   Thin   HENT:   Head: Normocephalic and atraumatic.   Eyes: Pupils are equal, round, and reactive to light. Conjunctivae and EOM are normal. Right eye exhibits no discharge. Left eye exhibits no discharge.   Neck: Normal range of motion. Neck supple. No JVD present.   Cardiovascular: Normal rate, regular rhythm, normal heart sounds and intact distal pulses.   No murmur heard.  Pulmonary/Chest: Effort normal and breath sounds normal. No respiratory distress. He has no wheezes.   Abdominal: Soft. Bowel sounds are normal. He exhibits no distension. There is no tenderness. There is no guarding.   Genitourinary:   Genitourinary Comments: Not examined   Musculoskeletal: Normal range of motion. He exhibits edema.   Left upper extremity range of motion not tested and currently located in sling    Patient has edema of the left hand   Neurological: He is alert and oriented to person, place, and time. A cranial nerve deficit is present.   Patient is deaf and mute    Patient is awake, alert, oriented and responds appropriately to simple commands    He can read and attempts to communicate by body language    Skin: Skin is warm. Capillary refill takes less than 2 seconds. No rash noted. He is not diaphoretic. No erythema.   Psychiatric: He has a normal mood and affect. His behavior is normal. Judgment and thought content normal.   Nursing note and vitals reviewed.        CRANIAL NERVES     CN III, IV, VI   Pupils are equal, round, and reactive to light.  Extraocular motions are normal.     Labs reviewed

## 2019-10-21 NOTE — ASSESSMENT & PLAN NOTE
Suspicious for destructive/lytic process involving the left 8th rib posteriorly  Oncology consulted-Dr. Adams

## 2019-10-21 NOTE — PROGRESS NOTES
[facility]  Orthopedics  Progress Note    Patient Name: Jesus Mccabe  MRN: [id number]  Admission Date: 10/19/2019  Hospital Length of Stay: 1 days  Attending Provider: Carlos Broderick MD  Primary Care Provider: Primary Doctor No  Follow-up For: Procedure(s) (LRB):  INSERTION, INTRAMEDULLARY SISSY, HUMERUS (Left)    Post-Operative Day: 1 Day Post-Op  Subjective:     Principal Problem:Fx humeral neck, left, closed, initial encounter      Interval History: Comfortable. Eating lunch    Review of patient's allergies indicates:  No Known Allergies    Current Facility-Administered Medications   Medication    acetaminophen tablet 650 mg    acetaminophen tablet 650 mg    dextrose 5 % and 0.45 % NaCl infusion    dextrose 50% injection 12.5 g    dextrose 50% injection 25 g    docusate sodium capsule 100 mg    fentaNYL injection 25 mcg    glucagon (human recombinant) injection 1 mg    glucose chewable tablet 16 g    glucose chewable tablet 24 g    HYDROcodone-acetaminophen 5-325 mg per tablet 1 tablet    loperamide capsule 2 mg    magnesium hydroxide 400 mg/5 ml suspension 2,400 mg    magnesium oxide tablet 400 mg    magnesium sulfate 2g in water 50mL IVPB (premix)    Followed by    magnesium sulfate in dextrose IVPB (premix) 1 g    morphine injection 4 mg    multivitamin tablet    nicotine 21 mg/24 hr 1 patch    ondansetron injection 4 mg    ondansetron injection 4 mg    oxyCODONE immediate release tablet 5 mg    pantoprazole EC tablet 40 mg    potassium chloride 10% oral solution 40 mEq    potassium chloride 10% oral solution 40 mEq    promethazine (PHENERGAN) 6.25 mg in dextrose 5 % 50 mL IVPB    sodium chloride 0.9% flush 10 mL     Objective:     Vital Signs (Most Recent):  Temp: 98.6 °F (37 °C) (10/21/19 1150)  Pulse: 72 (10/21/19 1150)  Resp: 20 (10/21/19 1150)  BP: 130/67 (10/21/19 1150)  SpO2: 100 % (10/21/19 1150) Vital Signs (24h Range):  Temp:  [96.8 °F (36 °C)-98.6 °F (37  °C)] 98.6 °F (37 °C)  Pulse:  [61-84] 72  Resp:  [14-20] 20  SpO2:  [97 %-100 %] 100 %  BP: (101-153)/(53-86) 130/67           There is no height or weight on file to calculate BMI.      Intake/Output Summary (Last 24 hours) at 10/21/2019 1252  Last data filed at 10/21/2019 0600  Gross per 24 hour   Intake 1821.67 ml   Output 1495 ml   Net 326.67 ml       General    Nursing note and vitals reviewed.  Constitutional: He is oriented to person, place, and time. He appears well-developed and well-nourished. No distress.   HENT:   Nose: Nose normal.   Eyes: EOM are normal.   Cardiovascular: Regular rhythm.    Pulmonary/Chest: Effort normal.   Abdominal: Soft.   Neurological: He is alert and oriented to person, place, and time.   Psychiatric: His behavior is normal.             Left Shoulder Exam     Comments:  Dsg c/d/i  In sling  NVI        Significant Labs:   CBC:   Recent Labs   Lab 10/19/19  1344 10/21/19  0457   WBC 6.15 7.70  7.70   HGB 13.2* 11.1*  11.1*   HCT 41.9 35.5*  35.5*   * 121*  121*     CMP:   Recent Labs   Lab 10/19/19  1344 10/20/19  1511 10/21/19  0457   * 136 134*  134*   K 3.8 3.8 4.2  4.2   CL 98 98 98  98   CO2 23 27 26  26   GLU 98 105 139*  139*   BUN 6 6 8  8   CREATININE 0.7 0.8 0.8  0.8   CALCIUM 9.0 9.5 8.6*  8.6*   PROT 7.9 7.9 6.9   ALBUMIN 3.6 3.4* 2.9*   BILITOT 0.6 0.9 0.5   ALKPHOS 54* 60 45*   AST 95* 56* 37   ALT 77* 64* 46*   ANIONGAP 14 11 10  10   EGFRNONAA >60.0 >60 >60  >60     All pertinent labs within the past 24 hours have been reviewed.    Significant Imaging: None    Assessment/Plan:     * Fx humeral neck, left, closed, initial encounter  Pain control.  Will change dressing tomorrow          Jose Alejandro Funes MD  Orthopedics  [facility]

## 2019-10-21 NOTE — ASSESSMENT & PLAN NOTE
Patient able to understand sign language and also can read and write  Also use Riddhi as communication aide

## 2019-10-21 NOTE — PLAN OF CARE
Plan of care reviewed with pt, verbalized understanding. IV antibiotic administered, no adverse reaction was noted. Left hand still swollen, radial pulse normal. Left arm in sling. Pain was manageable by PRN pain medications.  Call light kept within reach, bed in locked and lowest position, side rails up x2.  Needs attended to, will continue to monitor and update as needed.

## 2019-10-21 NOTE — PT/OT/SLP EVAL
Physical Therapy Evaluation    Patient Name:  Jesus Mccabe   MRN:  [id number]    Recommendations:     Discharge Recommendations:  home   Discharge Equipment Recommendations: none   Barriers to discharge: Unclear because of communication difficulty even using Martii because patient has left UE in sling and right hand deformity.    Assessment:     Jesus Mccabe is a 58 y.o. male admitted with a medical diagnosis of Fx humeral neck, left, closed, initial encounter.  He presents with the following impairments/functional limitations:  weakness, impaired endurance, impaired balance, impaired functional mobilty, gait instability, pain .  Patient appeared agreeable to PT eval and gait. Patient was evaluated using Martii for communication but it was still difficult due to patient having left UE in sling and right hand deformity.  Patient was able to transfer out of bed with min assist and ambulated 150 feet with out AD with CGA for safety.  Patient indicated the only problems he had was he felt a little unsteady walking.    Rehab Prognosis: Good; patient would benefit from acute skilled PT services to address these deficits and reach maximum level of function.    Recent Surgery: Procedure(s) (LRB):  INSERTION, INTRAMEDULLARY SISSY, HUMERUS (Left) 1 Day Post-Op    Plan:     During this hospitalization, patient to be seen daily to address the identified rehab impairments via gait training, therapeutic activities, therapeutic exercises and progress toward the following goals:    · Plan of Care Expires:  11/18/19    Subjective     Chief Complaint: none given  Patient/Family Comments/goals: go home  Pain/Comfort:  · Pain Rating 1: 6/10  · Location - Side 1: Left  · Location - Orientation 1: upper  · Location 1: shoulder  · Pain Addressed 1: Cessation of Activity, Reposition  · Pain Rating Post-Intervention 1: 6/10    Patients cultural, spiritual, Yarsani conflicts given the current situation:      Living Environment:  Currently  unclear.  Prior to admission, patients level of function was modified independent.  Equipment used at home: none.  DME owned (not currently used): .  Upon discharge, patient will have assistance from family.    Objective:     Communicated with nurse Dietrich prior to session.  Patient found supine with peripheral IV, SCD  upon PT entry to room.    General Precautions: Standard, fall, deaf   Orthopedic Precautions:    Braces:       Exams:  · RLE ROM: WFL  · RLE Strength: Deficits: 4/5 overall  · LLE ROM: WFL  · LLE Strength: Deficits: 4/5 overall    Functional Mobility:  · Bed Mobility:     · Supine to Sit: minimum assistance  · Sit to Supine: minimum assistance  · Transfers:     · Sit to Stand:  contact guard assistance with no AD  · Gait: 150 feet with no AD with CGA      Therapeutic Activities and Exercises:   none given today    AM-PAC 6 CLICK MOBILITY  Total Score:18     Patient left supine with call button in reach.    GOALS:   Multidisciplinary Problems     Physical Therapy Goals        Problem: Physical Therapy Goal    Goal Priority Disciplines Outcome Goal Variances Interventions   Physical Therapy Goal     PT, PT/OT Ongoing, Progressing     Description:  Goals to be met by: 19    Patient will increase functional independence with mobility by performin. Supine to sit with Stand-by Assistance  2. Sit to supine with Stand-by Assistance  3. Sit to stand transfer with Stand-by Assistance  4. Bed to chair transfer with Stand-by Assistance using no AD.  5. Gait  x 250 feet with Stand-by Assistance using no AD.                       History:     Past Medical History:   Diagnosis Date    Coronary artery disease     Language barrier     MI (myocardial infarction)     Ulcer        Past Surgical History:   Procedure Laterality Date    ABDOMINAL SURGERY      CARDIAC SURGERY      EXTENSOR TENDON OF FOREARM / WRIST REPAIR         Time Tracking:     PT Received On: 10/21/19  PT Start Time: 1300     PT  Stop Time: 1326  PT Total Time (min): 26 min     Billable Minutes: Evaluation 26      Chris Megilligan, PT  10/21/2019

## 2019-10-21 NOTE — ASSESSMENT & PLAN NOTE
Suspected secondary to mechanical fall/ 10/20/2019 status post ORIF of the left humeral neck with questionable Lytic lesion of the proximal humerus/ status post biopsy 10/20/2019  Orthopedic consult- Dr. Butcher  Continue IV fluids  Pain control  Physical therapy and occupational therapy consulted    Oncology was consulted to evaluate patient for possible lytic lesion of the proximal humerus.  Patient also with lesion on the left eight rib.

## 2019-10-21 NOTE — PT/OT/SLP PROGRESS
Occupational Therapy      Patient Name:  Jesus Mccabe   MRN:  [id number]    Attempted OT eval this AM. Pt going for X-ray per pt's nurse. [place] up when pt is back in room.    Jose Miller, OT  10/21/2019

## 2019-10-22 PROBLEM — S22.39XA RIB FRACTURE: Status: ACTIVE | Noted: 2019-10-22

## 2019-10-22 PROBLEM — M87.00 AVASCULAR NECROSIS: Status: ACTIVE | Noted: 2019-10-22

## 2019-10-22 PROBLEM — K80.20 GALLSTONES: Status: ACTIVE | Noted: 2019-10-22

## 2019-10-22 LAB
ALBUMIN SERPL BCP-MCNC: 2.9 G/DL (ref 3.5–5.2)
ALP SERPL-CCNC: 45 U/L (ref 55–135)
ALT SERPL W/O P-5'-P-CCNC: 33 U/L (ref 10–44)
ANION GAP SERPL CALC-SCNC: 9 MMOL/L (ref 8–16)
AST SERPL-CCNC: 32 U/L (ref 10–40)
BASOPHILS # BLD AUTO: 0.03 K/UL (ref 0–0.2)
BASOPHILS NFR BLD: 0.5 % (ref 0–1.9)
BILIRUB SERPL-MCNC: 0.8 MG/DL (ref 0.1–1)
BUN SERPL-MCNC: 7 MG/DL (ref 6–20)
CALCIUM SERPL-MCNC: 8.5 MG/DL (ref 8.7–10.5)
CHLORIDE SERPL-SCNC: 99 MMOL/L (ref 95–110)
CO2 SERPL-SCNC: 28 MMOL/L (ref 23–29)
CREAT SERPL-MCNC: 0.7 MG/DL (ref 0.5–1.4)
DIFFERENTIAL METHOD: ABNORMAL
EOSINOPHIL # BLD AUTO: 0 K/UL (ref 0–0.5)
EOSINOPHIL NFR BLD: 0.7 % (ref 0–8)
ERYTHROCYTE [DISTWIDTH] IN BLOOD BY AUTOMATED COUNT: 14.9 % (ref 11.5–14.5)
EST. GFR  (AFRICAN AMERICAN): >60 ML/MIN/1.73 M^2
EST. GFR  (NON AFRICAN AMERICAN): >60 ML/MIN/1.73 M^2
GIANT PLATELETS BLD QL SMEAR: PRESENT
GLUCOSE SERPL-MCNC: 137 MG/DL (ref 70–110)
HCT VFR BLD AUTO: 34.8 % (ref 40–54)
HGB BLD-MCNC: 11 G/DL (ref 14–18)
IMM GRANULOCYTES # BLD AUTO: 0.04 K/UL (ref 0–0.04)
LYMPHOCYTES # BLD AUTO: 1.5 K/UL (ref 1–4.8)
LYMPHOCYTES NFR BLD: 25.7 % (ref 18–48)
MAGNESIUM SERPL-MCNC: 2 MG/DL (ref 1.6–2.6)
MCH RBC QN AUTO: 30 PG (ref 27–31)
MCHC RBC AUTO-ENTMCNC: 31.6 G/DL (ref 32–36)
MCV RBC AUTO: 95 FL (ref 82–98)
MONOCYTES # BLD AUTO: 0.7 K/UL (ref 0.3–1)
MONOCYTES NFR BLD: 11.7 % (ref 4–15)
NEUTROPHILS # BLD AUTO: 3.5 K/UL (ref 1.8–7.7)
NEUTROPHILS NFR BLD: 60.7 % (ref 38–73)
NRBC BLD-RTO: 0 /100 WBC
PLATELET # BLD AUTO: 103 K/UL (ref 150–350)
PLATELET BLD QL SMEAR: ABNORMAL
PMV BLD AUTO: 11 FL (ref 9.2–12.9)
POTASSIUM SERPL-SCNC: 4 MMOL/L (ref 3.5–5.1)
PROT SERPL-MCNC: 6.9 G/DL (ref 6–8.4)
RBC # BLD AUTO: 3.67 M/UL (ref 4.6–6.2)
SODIUM SERPL-SCNC: 136 MMOL/L (ref 136–145)
WBC # BLD AUTO: 5.8 K/UL (ref 3.9–12.7)

## 2019-10-22 PROCEDURE — S5010 5% DEXTROSE AND 0.45% SALINE: HCPCS | Performed by: ORTHOPAEDIC SURGERY

## 2019-10-22 PROCEDURE — 25000003 PHARM REV CODE 250: Performed by: ANESTHESIOLOGY

## 2019-10-22 PROCEDURE — 97530 THERAPEUTIC ACTIVITIES: CPT

## 2019-10-22 PROCEDURE — 12000002 HC ACUTE/MED SURGE SEMI-PRIVATE ROOM

## 2019-10-22 PROCEDURE — 63600175 PHARM REV CODE 636 W HCPCS: Performed by: ORTHOPAEDIC SURGERY

## 2019-10-22 PROCEDURE — 99231 PR SUBSEQUENT HOSPITAL CARE,LEVL I: ICD-10-PCS | Mod: ,,, | Performed by: INTERNAL MEDICINE

## 2019-10-22 PROCEDURE — 99231 SBSQ HOSP IP/OBS SF/LOW 25: CPT | Mod: ,,, | Performed by: INTERNAL MEDICINE

## 2019-10-22 PROCEDURE — 25000003 PHARM REV CODE 250: Performed by: NURSE PRACTITIONER

## 2019-10-22 PROCEDURE — 80053 COMPREHEN METABOLIC PANEL: CPT

## 2019-10-22 PROCEDURE — 25500020 PHARM REV CODE 255: Performed by: HOSPITALIST

## 2019-10-22 PROCEDURE — 94761 N-INVAS EAR/PLS OXIMETRY MLT: CPT

## 2019-10-22 PROCEDURE — 85025 COMPLETE CBC W/AUTO DIFF WBC: CPT

## 2019-10-22 PROCEDURE — 25000003 PHARM REV CODE 250: Performed by: ORTHOPAEDIC SURGERY

## 2019-10-22 PROCEDURE — 97116 GAIT TRAINING THERAPY: CPT

## 2019-10-22 PROCEDURE — S4991 NICOTINE PATCH NONLEGEND: HCPCS | Performed by: ORTHOPAEDIC SURGERY

## 2019-10-22 PROCEDURE — 36415 COLL VENOUS BLD VENIPUNCTURE: CPT

## 2019-10-22 PROCEDURE — 83735 ASSAY OF MAGNESIUM: CPT

## 2019-10-22 RX ORDER — LISINOPRIL 2.5 MG/1
5 TABLET ORAL DAILY
Status: DISCONTINUED | OUTPATIENT
Start: 2019-10-22 | End: 2019-10-23 | Stop reason: HOSPADM

## 2019-10-22 RX ORDER — SYRING-NEEDL,DISP,INSUL,0.3 ML 29 G X1/2"
296 SYRINGE, EMPTY DISPOSABLE MISCELLANEOUS ONCE
Status: COMPLETED | OUTPATIENT
Start: 2019-10-22 | End: 2019-10-22

## 2019-10-22 RX ORDER — BISACODYL 10 MG
10 SUPPOSITORY, RECTAL RECTAL DAILY PRN
Status: DISCONTINUED | OUTPATIENT
Start: 2019-10-22 | End: 2019-10-23 | Stop reason: HOSPADM

## 2019-10-22 RX ADMIN — THERA TABS 1 TABLET: TAB at 09:10

## 2019-10-22 RX ADMIN — DEXTROSE AND SODIUM CHLORIDE: 5; .45 INJECTION, SOLUTION INTRAVENOUS at 06:10

## 2019-10-22 RX ADMIN — IOHEXOL 30 ML: 350 INJECTION, SOLUTION INTRAVENOUS at 10:10

## 2019-10-22 RX ADMIN — MORPHINE SULFATE 4 MG: 4 INJECTION, SOLUTION INTRAMUSCULAR; INTRAVENOUS at 06:10

## 2019-10-22 RX ADMIN — HYDROCODONE BITARTRATE AND ACETAMINOPHEN 1 TABLET: 5; 325 TABLET ORAL at 08:10

## 2019-10-22 RX ADMIN — DOCUSATE SODIUM 100 MG: 100 CAPSULE, LIQUID FILLED ORAL at 09:10

## 2019-10-22 RX ADMIN — OXYCODONE HYDROCHLORIDE 5 MG: 5 TABLET ORAL at 04:10

## 2019-10-22 RX ADMIN — MORPHINE SULFATE 4 MG: 4 INJECTION, SOLUTION INTRAMUSCULAR; INTRAVENOUS at 02:10

## 2019-10-22 RX ADMIN — MAGNESIUM CITRATE 296 ML: 1.75 LIQUID ORAL at 03:10

## 2019-10-22 RX ADMIN — MAGNESIUM OXIDE 400 MG (241.3 MG MAGNESIUM) TABLET 400 MG: TABLET at 08:10

## 2019-10-22 RX ADMIN — LISINOPRIL 5 MG: 2.5 TABLET ORAL at 04:10

## 2019-10-22 RX ADMIN — HYDROCODONE BITARTRATE AND ACETAMINOPHEN 1 TABLET: 5; 325 TABLET ORAL at 09:10

## 2019-10-22 RX ADMIN — MAGNESIUM OXIDE 400 MG (241.3 MG MAGNESIUM) TABLET 400 MG: TABLET at 09:10

## 2019-10-22 RX ADMIN — HYDROCODONE BITARTRATE AND ACETAMINOPHEN 1 TABLET: 5; 325 TABLET ORAL at 04:10

## 2019-10-22 RX ADMIN — PANTOPRAZOLE SODIUM 40 MG: 40 TABLET, DELAYED RELEASE ORAL at 09:10

## 2019-10-22 RX ADMIN — NICOTINE 1 PATCH: 21 PATCH TRANSDERMAL at 09:10

## 2019-10-22 RX ADMIN — IOHEXOL 100 ML: 350 INJECTION, SOLUTION INTRAVENOUS at 10:10

## 2019-10-22 NOTE — SUBJECTIVE & OBJECTIVE
Interval History:  Continue current treatment.  Patient would benefit from further PT and OT.  Skilled has been consulted.       Review of Systems   Constitutional: Positive for activity change. Negative for fever.   HENT: Negative for ear discharge, ear pain and facial swelling.    Eyes: Negative for pain and redness.   Respiratory: Negative for cough and shortness of breath.    Cardiovascular: Negative for chest pain, palpitations and leg swelling.   Gastrointestinal: Positive for constipation. Negative for abdominal pain, diarrhea, nausea and vomiting.   Endocrine: Negative for polydipsia and polyphagia.   Genitourinary: Negative for difficulty urinating, flank pain and hematuria.   Musculoskeletal: Positive for arthralgias and gait problem. Negative for neck pain and neck stiffness.        Left upper extremity pain    Left upper extremity currently in sling    Arthritic hand   Skin: Negative for color change.   Allergic/Immunologic: Negative for food allergies.   Neurological: Positive for speech difficulty. Negative for seizures, facial asymmetry and weakness.        Patient is deaf and mute but able to read and communicate through writing and body language   Hematological: Does not bruise/bleed easily.   Psychiatric/Behavioral: Negative for agitation, behavioral problems, confusion, hallucinations and suicidal ideas. The patient is not nervous/anxious.      Objective:     Vital Signs (Most Recent):  Temp: 98 °F (36.7 °C) (10/22/19 1156)  Pulse: 90 (10/22/19 1156)  Resp: 20 (10/22/19 1156)  BP: 134/71 (10/22/19 1156)  SpO2: 98 % (10/22/19 1156) Vital Signs (24h Range):  Temp:  [98 °F (36.7 °C)-98.9 °F (37.2 °C)] 98 °F (36.7 °C)  Pulse:  [] 90  Resp:  [18-20] 20  SpO2:  [97 %-98 %] 98 %  BP: (134-174)/(71-96) 134/71        There is no height or weight on file to calculate BMI.    Physical Exam   Constitutional: He is oriented to person, place, and time. He appears well-developed and well-nourished. No  distress.   Thin   HENT:   Head: Normocephalic and atraumatic.   Eyes: Pupils are equal, round, and reactive to light. Conjunctivae and EOM are normal. Right eye exhibits no discharge. Left eye exhibits no discharge.   Neck: Normal range of motion. Neck supple. No JVD present.   Cardiovascular: Normal rate, regular rhythm, normal heart sounds and intact distal pulses.   No murmur heard.  Pulmonary/Chest: Effort normal and breath sounds normal. No respiratory distress. He has no wheezes.   Abdominal: Soft. Bowel sounds are normal. He exhibits no distension. There is no tenderness. There is no guarding.   Genitourinary:   Genitourinary Comments: Not examined   Musculoskeletal: Normal range of motion. He exhibits edema.   Left upper extremity range of motion not tested and currently located in ing    Patient has edema of the left hand   Neurological: He is alert and oriented to person, place, and time. A cranial nerve deficit is present.   Patient is deaf and mute    Patient is awake, alert, oriented and responds appropriately to simple commands    He can read and attempts to communicate by body language    Skin: Skin is warm. Capillary refill takes less than 2 seconds. No rash noted. He is not diaphoretic. No erythema.   Psychiatric: He has a normal mood and affect. His behavior is normal. Judgment and thought content normal.   Nursing note and vitals reviewed.        CRANIAL NERVES     CN III, IV, VI   Pupils are equal, round, and reactive to light.  Extraocular motions are normal.     Labs reviewed

## 2019-10-22 NOTE — PROGRESS NOTES
Jesus Mccabe is a 58 y.o. male patient.    Active Hospital Problems    Diagnosis  POA    *Fx humeral neck, left, closed, initial encounter [S42.212A]  Yes     Displaced, comminuted fracture proximal shaft/lower neck left humerus      Postoperative anemia [D64.9]  No    Malnutrition of moderate degree [E44.0]  Yes    Hypomagnesemia [E83.42]  Yes    Thrombocytopenia [D69.6]  Yes    Constipation [K59.00]  No    Abnormality of rib determined by X-ray [Q76.6]  Not Applicable     Suspicious for destructive/lytic process involving the left 8th rib posteriorly      Language barrier to communication [Z78.9]  Yes     Patient deaf and mute      Deaf [H91.90]  Yes    Mute [R47.01]  Yes    Elevated liver enzymes [R74.8]  Yes    Nicotine dependence [F17.200]  Yes    Coronary artery disease involving coronary bypass graft with angina pectoris [I25.709]  Yes    HTN (hypertension) [I10]  Yes      Resolved Hospital Problems   No resolved problems to display.       No complications ON  Temp: 98.6 °F (37 °C) (10/22/19 0405)  Pulse: 78 (10/22/19 0405)  Resp: 18 (10/22/19 0405)  BP: (!) 162/85(nurse notified) (10/22/19 0405)  SpO2: 98 % (10/22/19 0405)      Lab Results   Component Value Date    CEA 25.7 (H) 10/21/2019     PSA, SCREEN (ng/mL)   Date Value   10/20/2019 0.60     CMP  Sodium   Date Value Ref Range Status   10/22/2019 136 136 - 145 mmol/L Final     Potassium   Date Value Ref Range Status   10/22/2019 4.0 3.5 - 5.1 mmol/L Final     Chloride   Date Value Ref Range Status   10/22/2019 99 95 - 110 mmol/L Final     CO2   Date Value Ref Range Status   10/22/2019 28 23 - 29 mmol/L Final     Glucose   Date Value Ref Range Status   10/22/2019 137 (H) 70 - 110 mg/dL Final     BUN, Bld   Date Value Ref Range Status   10/22/2019 7 6 - 20 mg/dL Final     Creatinine   Date Value Ref Range Status   10/22/2019 0.7 0.5 - 1.4 mg/dL Final     Calcium   Date Value Ref Range Status   10/22/2019 8.5 (L) 8.7 - 10.5 mg/dL Final      Total Protein   Date Value Ref Range Status   10/22/2019 6.9 6.0 - 8.4 g/dL Final     Albumin   Date Value Ref Range Status   10/22/2019 2.9 (L) 3.5 - 5.2 g/dL Final     Total Bilirubin   Date Value Ref Range Status   10/22/2019 0.8 0.1 - 1.0 mg/dL Final     Comment:     For infants and newborns, interpretation of results should be based  on gestational age, weight and in agreement with clinical  observations.  Premature Infant recommended reference ranges:  Up to 24 hours.............<8.0 mg/dL  Up to 48 hours............<12.0 mg/dL  3-5 days..................<15.0 mg/dL  6-29 days.................<15.0 mg/dL       Alkaline Phosphatase   Date Value Ref Range Status   10/22/2019 45 (L) 55 - 135 U/L Final     AST   Date Value Ref Range Status   10/22/2019 32 10 - 40 U/L Final     ALT   Date Value Ref Range Status   10/22/2019 33 10 - 44 U/L Final     Anion Gap   Date Value Ref Range Status   10/22/2019 9 8 - 16 mmol/L Final     eGFR if    Date Value Ref Range Status   10/22/2019 >60 >60 mL/min/1.73 m^2 Final     eGFR if non    Date Value Ref Range Status   10/22/2019 >60 >60 mL/min/1.73 m^2 Final     Comment:     Calculation used to obtain the estimated glomerular filtration  rate (eGFR) is the CKD-EPI equation.          CA 19-9 2.0 - 40.0 U/mL <2.0Abnormal      FINDINGS:  Right rib; fractures right 4th and 5th ribs anteriorly, and what is thought to be the 10th, 9th ribs laterally, 8th rib anterior laterally at least some of which are old and others indeterminate in age although most likely old    Left rib; fracture left 8th rib posteriorly.  Fracture is most likely old.  No lytic lesion is seen as was suggested on the chest x-ray in the appearance of the chest x-ray likely due to overlapping shadows.      Impression       Bilateral rib fractures.  No lytic lesions seen.      Electronically signed by: Jocelynn Anthony MD  Date: 10/21/2019  Time: 16:18             Last Resulted:  10/21/19 16:18 Order Details View Encounter Lab and Collection Details Routing Result History            External Result Report     External Result Report   Narrative     EXAMINATION:  XR RIBS 3 VIEWS BILATERAL    CLINICAL HISTORY:  ? lytic lesion in rib;    TECHNIQUE:  Two views of right ribs and two views left ribs    COMPARISON:  Chest x-ray of 10/19/2019    FINDINGS:  Right rib; fractures right 4th and 5th ribs anteriorly, and what is thought to be the 10th, 9th ribs laterally, 8th rib anterior laterally at least some of which are old and others indeterminate in age although most likely old    Left rib; fracture left 8th rib posteriorly.  Fracture is most likely old.  No lytic lesion is seen as was suggested on the chest x-ray in the appearance of the chest x-ray likely due to overlapping shadows.   Impression       Bilateral rib fractures.  No lytic lesions seen.      Electronically signed by: oJcelynn Beltre MD  Date: 10/21/2019  Time: 16:18    Encounter     View Encounter             Signed by     Signed Credentials Date/Time  Phone Pager   JOCELYNN BELTRE MD 10/21/2019 16:18 [phone]    Exam Details     Performed Procedure Technologist Supporting Staff Performing Physician   X-Ray Ribs 3 Views Bilateral Naz House, RT RT Liliana       Appointment Date/Status Modality Department    10/21/2019     Arrived [facility] XR2 [facility] XRAY       Begin Exam End Exam     10/21/2019 11:15 AM 10/21/2019 11:15 AM          No lytic lesion noted on Rib series  Path pending from October 20 surgery  Due to elevated CEA it is recommended to proceed with Ct Chest abdomen and pelvis to evaluate for a malignant process.  Pt can see heme onc as an outpatient. He needs help applying for financial assistance. I tried to reac his sister at the number he gave me yesterday multiple times. No answer and no voicemail        Nanette Adams MD  10/22/2019

## 2019-10-22 NOTE — PLAN OF CARE
A&Ox3, pt deaf and mute.Riddhi and written communication used. Pt educated to call for assistance and demonstrated how to use call bell when asked. C/o pain addressed with prn medication. Comfort level established. Pt remains free from fall/injury throughout shift. IV fluids infusing per orders, pt tolerating well. Telemetry on and being monitored. Pt in NSR. Drsg clean, dry and intact to (L) shoulder. Non-skid socks in place when pt out of bed. Bed in lowest position, Bed alarm set and working, wheels locked, side rails up x2, and call light within reach. No distress noted at this time. Will Continue to observe.

## 2019-10-22 NOTE — PROGRESS NOTES
[facility] Hospital Medicine  Progress Note    Patient Name: Jesus Mccabe  MRN: [id number]  Patient Class: IP- Inpatient   Admission Date: 10/19/2019  Length of Stay: 2 days  Attending Physician: Carlos Broderick MD  Primary Care Provider: Primary Doctor No      Subjective:     Principal Problem:Fx humeral neck, left, closed, initial encounter    HPI:  This is 58-year-old gentleman with past medical history significant for coronary artery disease/sternotomy and hypertension who presents as transfer from outside hospital for humeral fracture.  Patient is deaf/mute so history is obtained primarily from medical record as no family is available for interview.  Apparently patient suffered from a mechanical fall on day of presentation to the emergency department.  In the ED patient was found to have a displaced comminuted fracture of the proximal shaft/left humeral neck.  Incidentally was noted to have mild elevation in transaminases as well as destructive/lytic process involving his left 8th rib. The case was discussed with Dr. Funes with orthopedic surgery who agreed to consult with plans NPO at midnight in preparation for surgical intervention tomorrow.  His pain was controlled with Dilaudid prior to transfer.  Patient has been placed in the hospital for further evaluation treatment including orthopedic consult.        Overview/Hospital Course:  The patient was monitored closely during his stay.  He was initially kept NPO.  Patient was given IV fluids for hydration.  Orthopedic Surgery was consulted.  On 10/20/2019, patient underwent a left humeral ORIF.  Patient tolerated procedure well.  Patient was however noted to have a possible lytic lesion of the posterior humerus.  A biopsy was taken.  Patient was also noted to have an area suspicious for destructive/lytic process involving the left 8th rib posteriorly.  Oncology was consulted.  Physical therapy and occupational therapy were consulted for  debility.  Case management was consulted to assist with discharge planning needs. SNF was consulted.     Interval History:  Continue current treatment.  Patient would benefit from further PT and OT.  Skilled has been consulted.       Review of Systems   Constitutional: Positive for activity change. Negative for fever.   HENT: Negative for ear discharge, ear pain and facial swelling.    Eyes: Negative for pain and redness.   Respiratory: Negative for cough and shortness of breath.    Cardiovascular: Negative for chest pain, palpitations and leg swelling.   Gastrointestinal: Positive for constipation. Negative for abdominal pain, diarrhea, nausea and vomiting.   Endocrine: Negative for polydipsia and polyphagia.   Genitourinary: Negative for difficulty urinating, flank pain and hematuria.   Musculoskeletal: Positive for arthralgias and gait problem. Negative for neck pain and neck stiffness.        Left upper extremity pain    Left upper extremity currently in sling    Arthritic hand   Skin: Negative for color change.   Allergic/Immunologic: Negative for food allergies.   Neurological: Positive for speech difficulty. Negative for seizures, facial asymmetry and weakness.        Patient is deaf and mute but able to read and communicate through writing and body language   Hematological: Does not bruise/bleed easily.   Psychiatric/Behavioral: Negative for agitation, behavioral problems, confusion, hallucinations and suicidal ideas. The patient is not nervous/anxious.      Objective:     Vital Signs (Most Recent):  Temp: 98 °F (36.7 °C) (10/22/19 1156)  Pulse: 90 (10/22/19 1156)  Resp: 20 (10/22/19 1156)  BP: 134/71 (10/22/19 1156)  SpO2: 98 % (10/22/19 1156) Vital Signs (24h Range):  Temp:  [98 °F (36.7 °C)-98.9 °F (37.2 °C)] 98 °F (36.7 °C)  Pulse:  [] 90  Resp:  [18-20] 20  SpO2:  [97 %-98 %] 98 %  BP: (134-174)/(71-96) 134/71        There is no height or weight on file to calculate BMI.    Physical Exam    Constitutional: He is oriented to person, place, and time. He appears well-developed and well-nourished. No distress.   Thin   HENT:   Head: Normocephalic and atraumatic.   Eyes: Pupils are equal, round, and reactive to light. Conjunctivae and EOM are normal. Right eye exhibits no discharge. Left eye exhibits no discharge.   Neck: Normal range of motion. Neck supple. No JVD present.   Cardiovascular: Normal rate, regular rhythm, normal heart sounds and intact distal pulses.   No murmur heard.  Pulmonary/Chest: Effort normal and breath sounds normal. No respiratory distress. He has no wheezes.   Abdominal: Soft. Bowel sounds are normal. He exhibits no distension. There is no tenderness. There is no guarding.   Genitourinary:   Genitourinary Comments: Not examined   Musculoskeletal: Normal range of motion. He exhibits edema.   Left upper extremity range of motion not tested and currently located in sling    Patient has edema of the left hand   Neurological: He is alert and oriented to person, place, and time. A cranial nerve deficit is present.   Patient is deaf and mute    Patient is awake, alert, oriented and responds appropriately to simple commands    He can read and attempts to communicate by body language    Skin: Skin is warm. Capillary refill takes less than 2 seconds. No rash noted. He is not diaphoretic. No erythema.   Psychiatric: He has a normal mood and affect. His behavior is normal. Judgment and thought content normal.   Nursing note and vitals reviewed.        CRANIAL NERVES     CN III, IV, VI   Pupils are equal, round, and reactive to light.  Extraocular motions are normal.     Labs reviewed      Assessment/Plan:      * Fx humeral neck, left, closed, initial encounter  Suspected secondary to mechanical fall/ 10/20/2019 status post ORIF of the left humeral neck with questionable Lytic lesion of the proximal humerus/ status post biopsy 10/20/2019-  Orthopedic consult- Dr. Butcher  Continue IV  fluids  Pain control  Physical therapy and occupational therapy consulted    Oncology was consulted to evaluate patient for possible lytic lesion of the proximal humerus.  Patient also with lesion on the left eight rib.    SNF consulted          Gallstones  No signs of acute cholecystitis at this time      Constipation  Still no bowel movement yet   Continue Colace b.i.d.  Add 1 bottle of Mag citrate p.o. x1 dose  Add p.r.n. Dulcolax suppositories      Thrombocytopenia  Monitor  Hematology/oncology following  Anticoagulant as per Hematology and surgery if needed  Continue KATHIE hose and SCDs      Hypomagnesemia  Monitor  Supplement as needed      Malnutrition of moderate degree  Continue multivitamin and p.o. supplement      Postoperative anemia  Continue multivitamin    Nicotine dependence  Smoking cessation education  Continue nicotine patch      Elevated liver enzymes  Now resolved  Lipase level within normal limits      Deaf  And mute      Language barrier to communication  Patient able to understand sign language and also can read and write  Also use Riddhi as communication aide      Abnormality of rib determined by X-ray  Suspicious for destructive/lytic process involving the left 8th rib posteriorly  Oncology consulted-Dr. Adams  X-rays dedicated to ribs showed bilateral rib fractures.  No lytic lesions seen.      HTN (hypertension)  Monitor  Home medication list currently not available  Add low-dose lisinopril      Coronary artery disease involving coronary bypass graft with angina pectoris  Telemetry  No signs of acute cardiac issues noted at this time  Home medication list currently pending- no visitors currently available.  Will have staff see if they verify list through patient's pharmacy  Aspirin was not added at this time due to reported history of peptic ulcer disease        VTE Risk Mitigation (From admission, onward)         Ordered     Place KATHIE hose  Until discontinued      10/20/19 1435     Place  sequential compression device  Until discontinued      10/19/19 2027     IP VTE LOW RISK PATIENT  Once      10/19/19 2027                Time spent seeing patient( greater than 1/2 spent in direct contact) : 52 minutes      SANDRA Thompson  Department of Hospital Medicine   [facility]

## 2019-10-22 NOTE — PLAN OF CARE
Cm tried calling sister again for discharge, no answer.  Unable to leave a message. Cm will continue to try to reach sister.       10/22/19 7780   Discharge Reassessment   Assessment Type Discharge Planning Reassessment   Provided patient/caregiver education on the expected discharge date and the discharge plan Yes   Do you have any problems affording any of your prescribed medications? No

## 2019-10-22 NOTE — ASSESSMENT & PLAN NOTE
Still no bowel movement yet   Continue Colace b.i.d.  Add 1 bottle of Mag citrate p.o. x1 dose  Add p.r.n. Dulcolax suppositories

## 2019-10-22 NOTE — PT/OT/SLP PROGRESS
Physical Therapy Treatment    Patient Name:  Jesus Mccabe   MRN:  [id number]    Recommendations:     Discharge Recommendations:  home   Discharge Equipment Recommendations: none   Barriers to discharge: None    Assessment:     Jesus Mccabe is a 58 y.o. male admitted with a medical diagnosis of Fx humeral neck, left, closed, initial encounter.  He presents with the following impairments/functional limitations:  weakness, impaired endurance, impaired balance, pain, gait instability .  Patient indicated he was agreeable to gait training this afternoon.  Patient transferred supine to sit with supervision and then sit to stand with CGA.  Patient then ambulated x 150 feet without AD with CGA for safety.  Patient returned to bed and was made comfortable.    Rehab Prognosis: Good; patient would benefit from acute skilled PT services to address these deficits and reach maximum level of function.    Recent Surgery: Procedure(s) (LRB):  INSERTION, INTRAMEDULLARY SISSY, HUMERUS (Left) 2 Days Post-Op    Plan:     During this hospitalization, patient to be seen daily to address the identified rehab impairments via gait training, therapeutic activities, therapeutic exercises and progress toward the following goals:    · Plan of Care Expires:  11/18/19    Subjective     Chief Complaint: none given  Patient/Family Comments/goals: go home  Pain/Comfort:  ·        Objective:     Communicated with nurse Stephens prior to session.  Patient found supine with peripheral IV upon PT entry to room.     General Precautions: Standard, fall   Orthopedic Precautions:    Braces: Sling and swathe     Functional Mobility:  · Bed Mobility:     · Rolling Right: supervision  · Supine to Sit: supervision  · Sit to Supine: supervision  · Transfers:     · Sit to Stand:  contact guard assistance with no AD  · Gait: 150 feet with no AD with CGA      AM-PAC 6 CLICK MOBILITY  Turning over in bed (including adjusting bedclothes, sheets and blankets)?: 4  Sitting  down on and standing up from a chair with arms (e.g., wheelchair, bedside commode, etc.): 3  Moving from lying on back to sitting on the side of the bed?: 3  Moving to and from a bed to a chair (including a wheelchair)?: 3  Need to walk in hospital room?: 3  Climbing 3-5 steps with a railing?: 3  Basic Mobility Total Score: 19       Therapeutic Activities and Exercises:   transfer training supine to sit with supervision, sit to stand with CGA.  Standing tolerance/balance to don gait belt and tighten sling on left shoulder  Gait training x 150 feet with no AD with CGA    Patient left supine with call button in reach and nurse Kat notified..    GOALS:   Multidisciplinary Problems     Physical Therapy Goals        Problem: Physical Therapy Goal    Goal Priority Disciplines Outcome Goal Variances Interventions   Physical Therapy Goal     PT, PT/OT Ongoing, Progressing     Description:  Goals to be met by: 19    Patient will increase functional independence with mobility by performin. Supine to sit with Stand-by Assistance  2. Sit to supine with Stand-by Assistance  3. Sit to stand transfer with Stand-by Assistance  4. Bed to chair transfer with Stand-by Assistance using no AD.  5. Gait  x 250 feet with Stand-by Assistance using no AD.                       Time Tracking:     PT Received On: 10/22/19  PT Start Time: 1352     PT Stop Time: 1417  PT Total Time (min): 25 min     Billable Minutes: Gait Training 15 and Therapeutic Activity 10    Treatment Type: Treatment  PT/PTA: PT     PTA Visit Number: 0     Chris Megilligan, PT  10/22/2019

## 2019-10-22 NOTE — DISCHARGE INSTRUCTIONS
[facility] Facility Transfer Orders        Admit to: [facility]    Diagnoses:   Active Hospital Problems    Diagnosis  POA    *Fx humeral neck, left, closed, initial encounter [S42.212A]  Yes     Displaced, comminuted fracture proximal shaft/lower neck left humerus      Gallstones [K80.20]  Yes    Avascular necrosis [M87.00]  Yes     Right hip      Rib fracture [S22.39XA]  Yes     Bilateral      Postoperative anemia [D64.9]  No    Malnutrition of moderate degree [E44.0]  Yes    Hypomagnesemia [E83.42]  Yes    Thrombocytopenia [D69.6]  Yes    Constipation [K59.00]  No    Abnormality of rib determined by X-ray [Q76.6]  Not Applicable     Suspicious for destructive/lytic process involving the left 8th rib posteriorly      Language barrier to communication [Z78.9]  Yes     Patient deaf and mute      Deaf [H91.90]  Yes    Mute [R47.01]  Yes    Elevated liver enzymes [R74.8]  Yes    Nicotine dependence [F17.200]  Yes    Coronary artery disease involving coronary bypass graft with angina pectoris [I25.709]  Yes    HTN (hypertension) [I10]  Yes      Resolved Hospital Problems   No resolved problems to display.     Allergies: Review of patient's allergies indicates:  No Known Allergies    Code Status:  Full    Vitals: Routine       Diet: cardiac diet    Activity: Activity as tolerated    Nursing Precautions: Fall    Bed/Surface: Low Air Loss    Consults: PT to evaluate and treat- 5 times a week and OT to evaluate and treat- 5 times a week    Oxygen: room air    Dialysis: Patient is not on dialysis.     Labs:    Pending Diagnostic Studies:     None        Imaging: NA  Miscellaneous Care:       IV Access: NA     Medications: Discontinue all previous medication orders, if any. See new list below.  Current Discharge Medication List      START taking these medications    Details   lisinopril 10 MG tablet Take 1 tablet (10 mg total) by mouth once daily.  Qty: 30 tablet, Refills: 0       magnesium oxide (MAG-OX) 400 mg (241.3 mg magnesium) tablet Take 1 tablet (400 mg total) by mouth once daily.  Qty: 30 tablet, Refills: 0         CONTINUE these medications which have NOT CHANGED    Details   HYDROcodone-acetaminophen (NORCO) 5-325 mg per tablet Take 1 tablet by mouth every 8 (eight) hours as needed for Pain.  Qty: 10 tablet, Refills: 0           Follow up:   Follow-up Information     Nanette Adams MD In 2 weeks.    Specialty:  Hematology and Oncology  Contact information:  [address] [facility]  [address]  [place] [address]  [phone]             Jose Alejandro Funes MD In 2 weeks.    Specialties:  Sports Medicine, Orthopedic Surgery  Why:  For suture removal, For wound re-check  Contact information:  [address] [place] [address] [address]  [place] [address] [phone]

## 2019-10-22 NOTE — PLAN OF CARE
10/22/19 0806   PRE-TX-O2   O2 Device (Oxygen Therapy) room air   SpO2 98 %   Pulse Oximetry Type Intermittent   $ Pulse Oximetry - Multiple Charge Pulse Oximetry - Multiple

## 2019-10-22 NOTE — ASSESSMENT & PLAN NOTE
Suspicious for destructive/lytic process involving the left 8th rib posteriorly  Oncology consulted-Dr. Adams  X-rays dedicated to ribs showed bilateral rib fractures.  No lytic lesions seen.

## 2019-10-22 NOTE — ASSESSMENT & PLAN NOTE
Suspected secondary to mechanical fall/ 10/20/2019 status post ORIF of the left humeral neck with questionable Lytic lesion of the proximal humerus/ status post biopsy 10/20/2019-  Orthopedic consult- Dr. Butcher  Continue IV fluids  Pain control  Physical therapy and occupational therapy consulted    Oncology was consulted to evaluate patient for possible lytic lesion of the proximal humerus.  Patient also with lesion on the left eight rib.    SNF consulted

## 2019-10-22 NOTE — PLAN OF CARE
Cm completed the assessment with pt and FELICE: # [id number], representative Fany assisted me.  Pt lives with sister Sada.  Pt PCP is unknown.  Cm will follow-up with sister.  Insurance verified as Medicare and [place] Medicaid.  Pt uses [facility].  DME-cane.  Denies diabetes, dialysis and coumadin.   Disposition:  Pt will discharge to home.  Pt can benefit with HH on discharge.  Cm tried calling sister to assist pt on discharge, but now answer and unable to leave a message.       10/22/19 4937   Discharge Assessment   Assessment Type Discharge Planning Assessment   Confirmed/corrected address and phone number on facesheet? Yes   Assessment information obtained from? Other;Patient  (FELICE- CHERYL # [id number])   Communicated expected length of stay with patient/caregiver yes   Prior to hospitilization cognitive status: Alert/Oriented   Prior to hospitalization functional status: Independent   Current cognitive status: Alert/Oriented   Current Functional Status: Independent;Needs Assistance   Facility Arrived From: home   Lives With sibling(s)   Able to Return to Prior Arrangements yes   Is patient able to care for self after discharge? Yes   Who are your caregiver(s) and their phone number(s)? chin Tomlin- [phone]   Patient's perception of discharge disposition home or selfcare   Readmission Within the Last 30 Days no previous admission in last 30 days   Patient currently being followed by outpatient case management? No   Patient currently receives any other outside agency services? No   Equipment Currently Used at Home cane, straight   Do you have any problems affording any of your prescribed medications? No   Is the patient taking medications as prescribed? yes  ([facility])   Does the patient have transportation home?   (Pt said, no, then said, we can call his sister.  Cm tried calling sister twice. no answer and unable to leave message.)   Dialysis Name and Scheduled days na   Does the patient receive  services at the [facility]? No   Discharge Plan A Home Health;Home with family   DME Needed Upon Discharge  none   Patient/Family in Agreement with Plan yes

## 2019-10-22 NOTE — PLAN OF CARE
Pt aaox4 he is deaf and unable to communicate other than writing or the ALS interrupter Pt had ct scan and katharine transport well. Pt has had no visitors today. He has surgical dressing to left shoulder md changed dressing today telemetry intact with sinus rhythm. Scd's on right leg Iv in left foot due to pt pulling out of arms. Iv fluids dc today and pt is eating without difficulty. Pain managed with oral meds. Hourly rounds maintained and pt able to communicate with pen and paper at bedside.

## 2019-10-22 NOTE — PROGRESS NOTES
[facility]  Orthopedics  Progress Note    Patient Name: Jesus Mccabe  MRN: [id number]  Admission Date: 10/19/2019  Hospital Length of Stay: 2 days  Attending Provider: Carlos Broderick MD  Primary Care Provider: Primary Doctor No  Follow-up For: Procedure(s) (LRB):  INSERTION, INTRAMEDULLARY SISSY, HUMERUS (Left)    Post-Operative Day: 2 Days Post-Op  Subjective:     Principal Problem:Fx humeral neck, left, closed, initial encounter      Interval History: Comfortable. Eating lunch    Review of patient's allergies indicates:  No Known Allergies    Current Facility-Administered Medications   Medication    acetaminophen tablet 650 mg    acetaminophen tablet 650 mg    dextrose 5 % and 0.45 % NaCl infusion    dextrose 50% injection 12.5 g    dextrose 50% injection 25 g    docusate sodium capsule 100 mg    fentaNYL injection 25 mcg    glucagon (human recombinant) injection 1 mg    glucose chewable tablet 16 g    glucose chewable tablet 24 g    HYDROcodone-acetaminophen 5-325 mg per tablet 1 tablet    loperamide capsule 2 mg    magnesium oxide tablet 400 mg    morphine injection 4 mg    multivitamin tablet    nicotine 21 mg/24 hr 1 patch    ondansetron injection 4 mg    ondansetron injection 4 mg    pantoprazole EC tablet 40 mg    potassium chloride 10% oral solution 40 mEq    potassium chloride 10% oral solution 40 mEq    promethazine (PHENERGAN) 6.25 mg in dextrose 5 % 50 mL IVPB    sodium chloride 0.9% flush 10 mL     Objective:     Vital Signs (Most Recent):  Temp: 98.1 °F (36.7 °C) (10/22/19 0809)  Pulse: 80 (10/22/19 0809)  Resp: 18 (10/22/19 0809)  BP: (!) 155/84 (10/22/19 0809)  SpO2: 98 % (10/22/19 0809) Vital Signs (24h Range):  Temp:  [98.1 °F (36.7 °C)-98.9 °F (37.2 °C)] 98.1 °F (36.7 °C)  Pulse:  [] 80  Resp:  [18-20] 18  SpO2:  [97 %-100 %] 98 %  BP: (130-174)/(67-96) 155/84           There is no height or weight on file to calculate BMI.      Intake/Output Summary  (Last 24 hours) at 10/22/2019 1149  Last data filed at 10/22/2019 0700  Gross per 24 hour   Intake 4625 ml   Output 1900 ml   Net 2725 ml       General    Nursing note and vitals reviewed.  Constitutional: He is oriented to person, place, and time. He appears well-developed and well-nourished. No distress.   HENT:   Nose: Nose normal.   Eyes: EOM are normal.   Cardiovascular: Regular rhythm.    Pulmonary/Chest: Effort normal.   Abdominal: Soft.   Neurological: He is alert and oriented to person, place, and time.   Psychiatric: His behavior is normal.             Left Shoulder Exam     Comments:  Inc c/d/i  In sling  NVI        Significant Labs:   CBC:   Recent Labs   Lab 10/21/19  0457 10/22/19  0522   WBC 7.70  7.70 5.80   HGB 11.1*  11.1* 11.0*   HCT 35.5*  35.5* 34.8*   *  121* 103*     CMP:   Recent Labs   Lab 10/20/19  1511 10/21/19  0457 10/22/19  0522    134*  134* 136   K 3.8 4.2  4.2 4.0   CL 98 98  98 99   CO2 27 26  26 28    139*  139* 137*   BUN 6 8  8 7   CREATININE 0.8 0.8  0.8 0.7   CALCIUM 9.5 8.6*  8.6* 8.5*   PROT 7.9 6.9 6.9   ALBUMIN 3.4* 2.9* 2.9*   BILITOT 0.9 0.5 0.8   ALKPHOS 60 45* 45*   AST 56* 37 32   ALT 64* 46* 33   ANIONGAP 11 10  10 9   EGFRNONAA >60 >60  >60 >60     All pertinent labs within the past 24 hours have been reviewed.    Significant Imaging: None    Assessment/Plan:     * Fx humeral neck, left, closed, initial encounter  Pain control.  OK for dc home from Ortho standpoint          Jose Alejandro Funes MD  Orthopedics  [facility]

## 2019-10-22 NOTE — SUBJECTIVE & OBJECTIVE
Principal Problem:Fx humeral neck, left, closed, initial encounter      Interval History: Comfortable. Eating lunch    Review of patient's allergies indicates:  No Known Allergies    Current Facility-Administered Medications   Medication    acetaminophen tablet 650 mg    acetaminophen tablet 650 mg    dextrose 5 % and 0.45 % NaCl infusion    dextrose 50% injection 12.5 g    dextrose 50% injection 25 g    docusate sodium capsule 100 mg    fentaNYL injection 25 mcg    glucagon (human recombinant) injection 1 mg    glucose chewable tablet 16 g    glucose chewable tablet 24 g    HYDROcodone-acetaminophen 5-325 mg per tablet 1 tablet    loperamide capsule 2 mg    magnesium oxide tablet 400 mg    morphine injection 4 mg    multivitamin tablet    nicotine 21 mg/24 hr 1 patch    ondansetron injection 4 mg    ondansetron injection 4 mg    pantoprazole EC tablet 40 mg    potassium chloride 10% oral solution 40 mEq    potassium chloride 10% oral solution 40 mEq    promethazine (PHENERGAN) 6.25 mg in dextrose 5 % 50 mL IVPB    sodium chloride 0.9% flush 10 mL     Objective:     Vital Signs (Most Recent):  Temp: 98.1 °F (36.7 °C) (10/22/19 0809)  Pulse: 80 (10/22/19 0809)  Resp: 18 (10/22/19 0809)  BP: (!) 155/84 (10/22/19 0809)  SpO2: 98 % (10/22/19 0809) Vital Signs (24h Range):  Temp:  [98.1 °F (36.7 °C)-98.9 °F (37.2 °C)] 98.1 °F (36.7 °C)  Pulse:  [] 80  Resp:  [18-20] 18  SpO2:  [97 %-100 %] 98 %  BP: (130-174)/(67-96) 155/84           There is no height or weight on file to calculate BMI.      Intake/Output Summary (Last 24 hours) at 10/22/2019 1149  Last data filed at 10/22/2019 0700  Gross per 24 hour   Intake 4625 ml   Output 1900 ml   Net 2725 ml       General    Nursing note and vitals reviewed.  Constitutional: He is oriented to person, place, and time. He appears well-developed and well-nourished. No distress.   HENT:   Nose: Nose normal.   Eyes: EOM are normal.   Cardiovascular:  Regular rhythm.    Pulmonary/Chest: Effort normal.   Abdominal: Soft.   Neurological: He is alert and oriented to person, place, and time.   Psychiatric: His behavior is normal.             Left Shoulder Exam     Comments:  Inc c/d/i  In sling  NVI        Significant Labs:   CBC:   Recent Labs   Lab 10/21/19  0457 10/22/19  0522   WBC 7.70  7.70 5.80   HGB 11.1*  11.1* 11.0*   HCT 35.5*  35.5* 34.8*   *  121* 103*     CMP:   Recent Labs   Lab 10/20/19  1511 10/21/19  0457 10/22/19  0522    134*  134* 136   K 3.8 4.2  4.2 4.0   CL 98 98  98 99   CO2 27 26  26 28    139*  139* 137*   BUN 6 8  8 7   CREATININE 0.8 0.8  0.8 0.7   CALCIUM 9.5 8.6*  8.6* 8.5*   PROT 7.9 6.9 6.9   ALBUMIN 3.4* 2.9* 2.9*   BILITOT 0.9 0.5 0.8   ALKPHOS 60 45* 45*   AST 56* 37 32   ALT 64* 46* 33   ANIONGAP 11 10  10 9   EGFRNONAA >60 >60  >60 >60     All pertinent labs within the past 24 hours have been reviewed.    Significant Imaging: None

## 2019-10-23 VITALS
DIASTOLIC BLOOD PRESSURE: 73 MMHG | TEMPERATURE: 98 F | HEART RATE: 78 BPM | SYSTOLIC BLOOD PRESSURE: 133 MMHG | OXYGEN SATURATION: 100 % | RESPIRATION RATE: 18 BRPM

## 2019-10-23 PROCEDURE — G0009 ADMIN PNEUMOCOCCAL VACCINE: HCPCS | Performed by: HOSPITALIST

## 2019-10-23 PROCEDURE — 97116 GAIT TRAINING THERAPY: CPT

## 2019-10-23 PROCEDURE — 90471 IMMUNIZATION ADMIN: CPT | Performed by: HOSPITALIST

## 2019-10-23 PROCEDURE — 25000003 PHARM REV CODE 250: Performed by: NURSE PRACTITIONER

## 2019-10-23 PROCEDURE — 25000003 PHARM REV CODE 250: Performed by: ORTHOPAEDIC SURGERY

## 2019-10-23 PROCEDURE — 30200315 PPD INTRADERMAL TEST REV CODE 302: Performed by: HOSPITALIST

## 2019-10-23 PROCEDURE — 94761 N-INVAS EAR/PLS OXIMETRY MLT: CPT

## 2019-10-23 PROCEDURE — S4991 NICOTINE PATCH NONLEGEND: HCPCS | Performed by: ORTHOPAEDIC SURGERY

## 2019-10-23 PROCEDURE — 63600175 PHARM REV CODE 636 W HCPCS: Performed by: HOSPITALIST

## 2019-10-23 PROCEDURE — 86580 TB INTRADERMAL TEST: CPT | Performed by: HOSPITALIST

## 2019-10-23 PROCEDURE — 90670 PCV13 VACCINE IM: CPT | Performed by: HOSPITALIST

## 2019-10-23 PROCEDURE — 90686 IIV4 VACC NO PRSV 0.5 ML IM: CPT | Performed by: HOSPITALIST

## 2019-10-23 PROCEDURE — 90472 IMMUNIZATION ADMIN EACH ADD: CPT | Performed by: HOSPITALIST

## 2019-10-23 PROCEDURE — G0008 ADMIN INFLUENZA VIRUS VAC: HCPCS | Performed by: HOSPITALIST

## 2019-10-23 RX ORDER — HYDROCODONE BITARTRATE AND ACETAMINOPHEN 5; 325 MG/1; MG/1
1 TABLET ORAL EVERY 4 HOURS PRN
Qty: 20 TABLET | Refills: 0 | Status: SHIPPED | OUTPATIENT
Start: 2019-10-23 | End: 2019-11-02

## 2019-10-23 RX ORDER — LANOLIN ALCOHOL/MO/W.PET/CERES
400 CREAM (GRAM) TOPICAL DAILY
Qty: 30 TABLET | Refills: 0 | COMMUNITY
Start: 2019-10-23 | End: 2019-11-22

## 2019-10-23 RX ORDER — LISINOPRIL 10 MG/1
10 TABLET ORAL DAILY
Qty: 30 TABLET | Refills: 0 | Status: SHIPPED | OUTPATIENT
Start: 2019-10-24 | End: 2019-11-20 | Stop reason: SDUPTHER

## 2019-10-23 RX ADMIN — TUBERCULIN PURIFIED PROTEIN DERIVATIVE 5 UNITS: 5 INJECTION, SOLUTION INTRADERMAL at 03:10

## 2019-10-23 RX ADMIN — INFLUENZA VIRUS VACCINE 0.5 ML: 15; 15; 15; 15 SUSPENSION INTRAMUSCULAR at 03:10

## 2019-10-23 RX ADMIN — PNEUMOCOCCAL 13-VALENT CONJUGATE VACCINE 0.5 ML: 2.2; 2.2; 2.2; 2.2; 2.2; 4.4; 2.2; 2.2; 2.2; 2.2; 2.2; 2.2; 2.2 INJECTION, SUSPENSION INTRAMUSCULAR at 03:10

## 2019-10-23 RX ADMIN — THERA TABS 1 TABLET: TAB at 09:10

## 2019-10-23 RX ADMIN — NICOTINE 1 PATCH: 21 PATCH TRANSDERMAL at 09:10

## 2019-10-23 RX ADMIN — LISINOPRIL 5 MG: 2.5 TABLET ORAL at 09:10

## 2019-10-23 RX ADMIN — DOCUSATE SODIUM 100 MG: 100 CAPSULE, LIQUID FILLED ORAL at 09:10

## 2019-10-23 RX ADMIN — PANTOPRAZOLE SODIUM 40 MG: 40 TABLET, DELAYED RELEASE ORAL at 09:10

## 2019-10-23 RX ADMIN — ACETAMINOPHEN 650 MG: 325 TABLET ORAL at 09:10

## 2019-10-23 RX ADMIN — MAGNESIUM OXIDE 400 MG (241.3 MG MAGNESIUM) TABLET 400 MG: TABLET at 09:10

## 2019-10-23 NOTE — PLAN OF CARE
Brien [facility] with [facility]  The nurse can call report to the nurse at station 2- [phone]..  Waiting on PPD.  [place] will  patient.  Brien notified LINSEY Aguilar, she will inform LAMINE Dietrich.       10/23/19 0885   Final Note   Assessment Type Final Discharge Note   Anticipated Discharge Disposition SNF  ([facility]

## 2019-10-23 NOTE — PT/OT/SLP PROGRESS
Physical Therapy Treatment    Patient Name:  Jesus Mccabe   MRN:  [id number]    Recommendations:     Discharge Recommendations:  home   Discharge Equipment Recommendations: none   Barriers to discharge: patient to be discharged to SNF for rehab prior to DC home    Assessment:     Jesus Mccabe is a 58 y.o. male admitted with a medical diagnosis of Fx humeral neck, left, closed, initial encounter.  He presents with the following impairments/functional limitations:  weakness, impaired endurance, impaired balance, impaired functional mobilty, gait instability, decreased lower extremity function .  Patient agreeable to PT treatment this afternoon.  Transferred out of bed with CGA and was able to ambulate x 500 feet without AD with CGA and slightly unsteady gait pattern.    Rehab Prognosis: Good; patient would benefit from acute skilled PT services to address these deficits and reach maximum level of function.    Recent Surgery: Procedure(s) (LRB):  INSERTION, INTRAMEDULLARY SISSY, HUMERUS (Left) 3 Days Post-Op    Plan:     During this hospitalization, patient to be seen daily to address the identified rehab impairments via gait training, therapeutic activities, therapeutic exercises and progress toward the following goals:    · Plan of Care Expires:  11/18/19    Subjective     Chief Complaint: none given  Patient/Family Comments/goals: none given  Pain/Comfort:  ·        Objective:     Communicated with nurse prior to session.  Patient found supine with peripheral IV upon PT entry to room.     General Precautions: Standard, deaf, fall   Orthopedic Precautions:    Braces:       Functional Mobility:  · Bed Mobility:     · Rolling Right: supervision  · Supine to Sit: supervision  · Sit to Supine: supervision  · Transfers:     · Sit to Stand:  contact guard assistance with no AD  · Gait: x 500 feet with no AD with CGA      AM-PAC 6 CLICK MOBILITY  Turning over in bed (including adjusting bedclothes, sheets and blankets)?:  3  Sitting down on and standing up from a chair with arms (e.g., wheelchair, bedside commode, etc.): 3  Moving from lying on back to sitting on the side of the bed?: 3  Moving to and from a bed to a chair (including a wheelchair)?: 3  Need to walk in hospital room?: 3  Climbing 3-5 steps with a railing?: 3  Basic Mobility Total Score: 18       Therapeutic Activities and Exercises:   gait training x 500 feet with no AD with CGA with slow and slightly unsteady gait pattern    Patient left supine with call button in reach and nurse notified..    GOALS:   Multidisciplinary Problems     Physical Therapy Goals        Problem: Physical Therapy Goal    Goal Priority Disciplines Outcome Goal Variances Interventions   Physical Therapy Goal     PT, PT/OT Ongoing, Progressing     Description:  Goals to be met by: 19    Patient will increase functional independence with mobility by performin. Supine to sit with Stand-by Assistance  2. Sit to supine with Stand-by Assistance  3. Sit to stand transfer with Stand-by Assistance  4. Bed to chair transfer with Stand-by Assistance using no AD.  5. Gait  x 250 feet with Stand-by Assistance using no AD.                       Time Tracking:     PT Received On: 10/23/19  PT Start Time: 1318     PT Stop Time: 1339  PT Total Time (min): 21 min     Billable Minutes: Gait Training 21    Treatment Type: Treatment  PT/PTA: PT     PTA Visit Number: 0     Chris Megilligan, PT  10/23/2019

## 2019-10-23 NOTE — PHYSICIAN QUERY
"PT Name: Jesus Mccabe  MR #: [id number]    Physician Query Form - Hematology Clarification      CDS: Yael Broderick RN  Contact information: [email or web address]    This form is a permanent document in the medical record.      Query Date: October 23, 2019    By submitting this query, we are merely seeking further clarification of documentation. Please utilize your independent clinical judgment when addressing the question(s) below.    The Medical record contains the following:   Indicators  Supporting Clinical Findings Location in Medical Record   x "Anemia" documented He has anemia with ? Lytic lesions      Postoperative anemia Heme/onc consult 10/21    HM PN 10/21   x H & H =  10/19/2019 13:44 10/21/2019 04:57   Hemoglobin 13.2  11.1   Hematocrit 41.9 35.5     Labs     BP =                     HR=      "GI bleeding" documented     x Acute bleeding (Non GI site) Date of Procedure: 10/20/2019   Procedure: Procedure:  INSERTION, INTRAMEDULLARY SISSY, HUMERUS (Left)     Biopsy of left humeral lesion  Estimated Blood Loss (EBL): 20 mL Op note 10/20    Transfusion(s)     x Treatment: Continue multivitamin HM PN 10/21   x Other:  Fx humeral neck, left, closed, initial encounter  Likely secondary to mechanical fall    Thrombocytopenia    Scans reviewed,  No evidence of malignancy  No evidence of lytic process H&P 10/20      HM PN 10/21    Heme/Onc PN 10/23     Provider, please further specify "postoperative anemia" associated with above clinical findings.    [  ] Acute blood loss anemia expected post-operatively   [x  ] Acute blood loss anemia     [  ] Anemia of chronic disease ( Specify chronic disease)       [  ] Other (Specify):   [  ] Clinically Undetermined     [  ] Other Hematological Diagnosis (please specify):     [  ] Clinically Undetermined       Please document in your progress notes daily for the duration of treatment, until resolved, and include in your discharge summary.                                         "

## 2019-10-23 NOTE — PLAN OF CARE
Brien sent referral to [facility] via [facility].  Brien spoke with Eliz, she will assess pt later today.       10/23/19 0805   Post-Acute Status   Post-Acute Authorization Placement   Post-Acute Placement Status Referrals Sent

## 2019-10-23 NOTE — PLAN OF CARE
10/22/19 1956   Patient Assessment/Suction   Level of Consciousness (AVPU) alert   Respiratory Effort Unlabored   Expansion/Accessory Muscles/Retractions no use of accessory muscles   PRE-TX-O2   O2 Device (Oxygen Therapy) room air   SpO2 96 %   Pulse Oximetry Type Intermittent   $ Pulse Oximetry - Multiple Charge Pulse Oximetry - Multiple   Pulse 100   Resp 18

## 2019-10-23 NOTE — PLAN OF CARE
Patient AAO, VSS, afebrile this shift. Patient is deaf and communicates via communication board/pad. Tele # [id number] monitored; NSR. PRN pain medication administered as ordered. Patient resting quietly throughout majority of shift. Patient free from falls and injury during shift.  Bed in lowest position, brakes locked, call light within reach.

## 2019-10-23 NOTE — PROGRESS NOTES
Scans reviewed,  No evidence of malignancy  No evidence of lytic process  May D/C from heme onc standpoint

## 2019-10-23 NOTE — PLAN OF CARE
Faxed pt's PPD reading to [facility] through [facility].  Left message to update Eliz, [phone] on her voice mail.      10/23/19 1613   Discharge Reassessment   Assessment Type Discharge Planning Reassessment   Discharge Plan A Skilled Nursing Facility

## 2019-10-23 NOTE — PLAN OF CARE
Brien spoke with pt about going to [facility].  Pt used the [facility] and had - Jordin at [id number].  [facility]  Pt signed the Pt's Choice Disclosure Form.   Brien informed Juanita Rowe NP for discharge orders.    1:45pm  Eliz is assessing pt now for admission.  Riddhi in process with Jordin # [id number].       10/23/19 1310   Discharge Reassessment   Assessment Type Discharge Planning Reassessment   Provided patient/caregiver education on the expected discharge date and the discharge plan Yes   Do you have any problems affording any of your prescribed medications? No   Discharge Plan A Skilled Nursing Facility

## 2019-10-24 ENCOUNTER — PATIENT OUTREACH (OUTPATIENT)
Dept: ADMINISTRATIVE | Facility: CLINIC | Age: 59
End: 2019-10-24

## 2019-10-24 ENCOUNTER — TELEPHONE (OUTPATIENT)
Dept: HEMATOLOGY/ONCOLOGY | Facility: CLINIC | Age: 59
End: 2019-10-24

## 2019-10-24 NOTE — TELEPHONE ENCOUNTER
----- Message from José Mckay sent at 10/24/2019  1:32 PM CDT -----  Contact: Jaleesa  Type: Needs Medical Advice    Who Called:  Jaleesa (nurse) with [facility]  Symptoms (please be specific):    How long has patient had these symptoms:    Pharmacy name and phone #:    Best Call Back Number: [phone]  Additional Information: patient was seen at [facility] was advise to follow up with  within  Two weeks. Requesting a call back

## 2019-10-25 NOTE — DISCHARGE SUMMARY
[facility] Hospital Medicine  Discharge Summary      Patient Name: Jesus Mccabe  MRN: [id number]  Admission Date: 10/19/2019  Hospital Length of Stay: 3 days  Discharge Date and Time: 10/23/2019  7:47 PM  Attending Physician: Pamella att. providers found   Discharging Provider: Juanita Rowe NP  Primary Care Provider: Primary Doctor Pamella      HPI:   This is 58-year-old gentleman with past medical history significant for coronary artery disease/sternotomy and hypertension who presents as transfer from outside hospital for humeral fracture.  Patient is deaf/mute so history is obtained primarily from medical record as no family is available for interview.  Apparently patient suffered from a mechanical fall on day of presentation to the emergency department.  In the ED patient was found to have a displaced comminuted fracture of the proximal shaft/left humeral neck.  Incidentally was noted to have mild elevation in transaminases as well as destructive/lytic process involving his left 8th rib. The case was discussed with Dr. Funes with orthopedic surgery who agreed to consult with plans NPO at midnight in preparation for surgical intervention tomorrow.  His pain was controlled with Dilaudid prior to transfer.  Patient has been placed in the hospital for further evaluation treatment including orthopedic consult.        Procedure(s) (LRB):  INSERTION, INTRAMEDULLARY SISSY, HUMERUS (Left)      Hospital Course:   The patient was monitored closely during his stay.  He was initially kept NPO.  Patient was given IV fluids for hydration.  Orthopedic Surgery was consulted.  On 10/20/2019, patient underwent a left humeral ORIF.  Patient tolerated procedure well.  Patient was however noted to have a possible lytic lesion of the posterior humerus.  A biopsy was taken.  Patient was also noted to have an area suspicious for destructive/lytic process involving the left 8th rib posteriorly.  Oncology was consulted.   Physical therapy and occupational therapy were consulted for debility.  Case management was consulted to assist with discharge planning needs. SNF was consulted. Patient accepted at SNF and is stable for DC.     Patient examined. Surgical dressing to left shoulder in place. He states pain controlled with meds. Used  during evaluation- American sign language.      Consults:   Consults (From admission, onward)        Status Ordering Provider     Inpatient consult to Oncology  Once     Provider:  Nanette Adams MD    Completed LENORA SALES     Inpatient consult to Social Work  Once     Provider:  (Not yet assigned)    Completed NICOLE LEONARD     Inpatient consult to Social Work/Case Management  Once     Provider:  (Not yet assigned)    Completed NICOLE LEONARD          No new Assessment & Plan notes have been filed under this hospital service since the last note was generated.  Service: Hospital Medicine    Final Active Diagnoses:    Diagnosis Date Noted POA    PRINCIPAL PROBLEM:  Fx humeral neck, left, closed, initial encounter [S42.212A] 10/19/2019 Yes    Gallstones [K80.20] 10/22/2019 Yes    Avascular necrosis [M87.00] 10/22/2019 Yes    Rib fracture [S22.39XA] 10/22/2019 Yes    Postoperative anemia [D64.9] 10/21/2019 No    Malnutrition of moderate degree [E44.0] 10/21/2019 Yes    Hypomagnesemia [E83.42] 10/21/2019 Yes    Thrombocytopenia [D69.6] 10/21/2019 Yes    Constipation [K59.00] 10/21/2019 No    Abnormality of rib determined by X-ray [Q76.6] 10/20/2019 Not Applicable    Language barrier to communication [Z78.9] 10/20/2019 Yes    Deaf [H91.90] 10/20/2019 Yes    Mute [R47.01] 10/20/2019 Yes    Elevated liver enzymes [R74.8] 10/20/2019 Yes    Nicotine dependence [F17.200] 10/20/2019 Yes    Coronary artery disease involving coronary bypass graft with angina pectoris [I25.709] 10/19/2019 Yes    HTN (hypertension) [I10] 10/19/2019 Yes      Problems Resolved During this  Admission:       Discharged Condition: stable    Disposition: Skilled Nursing Facility    Follow Up:  Follow-up Information     Nanette Adams MD In 2 weeks.    Specialty:  Hematology and Oncology  Contact information:  [address]  [address]  [place] [address]  [phone]             Jose Alejandro Funes MD In 2 weeks.    Specialties:  Sports Medicine, Orthopedic Surgery  Why:  For suture removal, For wound re-check  Contact information:  [address] [place] [address] [address]  [place] [address] [phone]                 Patient Instructions:   No discharge procedures on file.    Significant Diagnostic Studies: Labs: BMP: No results for input(s): GLU, NA, K, CL, CO2, BUN, CREATININE, CALCIUM, MG in the last 48 hours. and CMP No results for input(s): NA, K, CL, CO2, GLU, BUN, CREATININE, CALCIUM, PROT, ALBUMIN, BILITOT, ALKPHOS, AST, ALT, ANIONGAP, ESTGFRAFRICA, EGFRNONAA in the last 48 hours.    Pending Diagnostic Studies:     None         Medications:  Transfer Medications (for Discharge Readmit only):   No current facility-administered medications for this encounter.      Current Outpatient Medications   Medication Sig Dispense Refill    HYDROcodone-acetaminophen (NORCO) 5-325 mg per tablet Take 1 tablet by mouth every 4 (four) hours as needed for Pain (may use 2 for severe pain). 20 tablet 0    lisinopril 10 MG tablet Take 1 tablet (10 mg total) by mouth once daily. 30 tablet 0    magnesium oxide (MAG-OX) 400 mg (241.3 mg magnesium) tablet Take 1 tablet (400 mg total) by mouth once daily. 30 tablet 0       Indwelling Lines/Drains at time of discharge:   Lines/Drains/Airways     None                 Time spent on the discharge of patient: 65 minutes  Patient was seen and examined on the date of discharge and determined to be suitable for discharge.         Juanita Rowe NP  Department of Hospital Medicine  [facility]

## 2019-10-30 ENCOUNTER — PATIENT OUTREACH (OUTPATIENT)
Dept: ADMINISTRATIVE | Facility: CLINIC | Age: 59
End: 2019-10-30

## 2019-10-30 NOTE — TELEPHONE ENCOUNTER
Patient update received from Radha Carver, Rehab Director at [facility].    Clinical status:  Requires  a. Injections  b. IV  c. Nebulizers, 02 evaluation sats  d. Enteral feedings new or recent change  e. Care of new colostomy or teaching  f. Frequent suctioning, trach and /or vent needs  g. Frequent irrigation, replacement of urinary cath, complex suprapubic  h. Treatment stg 3/ or 4 pressure ulcers, complex wound care  i. Nursing evaluation due to unstable and complex medical condition  j. Nursing rehab teaching e.g. bowel and bladder training, adaptive care    PT/OT/ST    *Transfer mobility (eg, from bed to chair)   Independent (patient can perform an activity safely and independently without any devices, physical assistance, or supervision)  Modified independent (patient requires the use of an assistive device or extra time to complete an activity, but is otherwise independent)  Contact guard assistance (patient can perform an activity independently, but needs constant physical touch to steady or guide to ensure safe performance)  X   Supervision (patient can perform an activity, but supervision is provided to address safety concerns)  Minimum assistance (patient can perform most of an activity (ie, approximately 75%), but requires limited assistance from one person for safe completion)  Moderate assistance (patient can perform about half the effort of an activity, but requires extensive assistance from one or more persons for safe performance of the other half)  Maximum assistance (patient has difficulty performing an activity, but can offer some assistance (eg, approximately 25% of effort) and is dependent on one or more people to assist for safe completion)  Dependent or unable (patient is unable to perform an activity or depends on 2 or more people to complete it)    *Ambulation inside (eg, within room, hallways, to toilet) __150___Ft  Independent  Modified independent  Contact guard assistance  X     "Supervision  Minimum assistance  Moderate assistance  Maximum assistance  Dependent or unable    *Ambulation outside (eg, getting in or out of buildings, walking on uneven surfaces) ______Ft  "NOT APPLICABLE"  Independent  Modified independent  Contact guard assistance  Supervision  Minimum assistance  Moderate assistance  Maximum assistance  Dependent or unable    *Toileting self-management:   Independent  Modified independent  Contact guard assistance  X    Supervision  Minimum assistance  Moderate assistance  Maximum assistance  Dependent or unable    *Nourishment self-management (ie, ability to feed self)   Independent  Modified independent  X    Supervision  Minimum assistance  Moderate assistance  Maximum assistance  Dependent or unable    *Personal care self-management (eg, dressing, bathing, brushing teeth, washing hair)   Independent  Modified independent  X    Contact guard assistance  Supervision  Minimum assistance  Moderate assistance  Maximum assistance  Dependent or unable    Medication support (ie, preparing/taking all prescribed and over-the-counter medications reliably and safely, including correct dosage at correct times)   Not applicable  Independent  Modified independent  Supervision  Minimum assistance  Moderate assistance  X    Maximum assistance    ADL adaptive devices self-management (ie, ability to manage putting on and/or removing braces, splints, and other adaptive devices)   X    Not applicable  Independent  Modified independent  Supervision  Minimum assistance  Moderate assistance  Maximum assistance  Dependent or unable          For Extensions  - Unanticipated functional problems impacting safe completion of therapy  - Multiple comorbidities or frailty impairing functional improvement  - Cognitive impairment requiring additional intervention and education  - Communication impairment requiring additional intervention and education  - Assessment or care unmanageable at lower level of " care  - Expect brief to moderate stay extension.    IQ completed for extension    Criteria met    Criteria not met    SNF notified of IQ results      Barriers to progress:        New treatments:        Projected length of stay/ expected discharge:        Discharge Disposition:  Patient left AMA with family to home.        Recommendations:

## 2019-10-31 ENCOUNTER — TELEPHONE (OUTPATIENT)
Dept: HEMATOLOGY/ONCOLOGY | Facility: CLINIC | Age: 59
End: 2019-10-31

## 2019-10-31 ENCOUNTER — TELEPHONE (OUTPATIENT)
Dept: ORTHOPEDICS | Facility: CLINIC | Age: 59
End: 2019-10-31

## 2019-10-31 NOTE — TELEPHONE ENCOUNTER
Returned call and left message advising Dr. Funes does not travel to [facility]. Advised to return call with any questions.

## 2019-10-31 NOTE — TELEPHONE ENCOUNTER
Spoke with Sada and rescheduled appointment to the [facility] office on 11/6/19 as requested      ----- Message from Yenny Nelson sent at 10/31/2019 12:17 PM CDT -----  Type: Needs Medical Advice    Who Called: patients sister Marisa Tomlin     Best Call Back Number:[phone]  Additional Information:  Please contact pt directly she needs to change the location however my first date available is 2020

## 2019-10-31 NOTE — TELEPHONE ENCOUNTER
Spoke with Sada and rescheduled appointment for the [facility] office on 11/6/19 as requested.       ----- Message from Yenny Nelson sent at 10/31/2019 12:08 PM CDT -----  Type: Needs Medical Advice    Who Called: patients wife Sada     Best Call Back Number: [phone]  Additional Information:pt is currently schedule for 11/4 Monday however she needs to change locations to something else

## 2019-10-31 NOTE — TELEPHONE ENCOUNTER
----- Message from Yenny Nelson sent at 10/31/2019 12:21 PM CDT -----  Type: Needs Medical Advice    Who Called:patients sister pam  Best Call Back Number: [phone]   Additional Information: pt needs to change location of the appt 11/4 to [facility]

## 2019-11-06 ENCOUNTER — OFFICE VISIT (OUTPATIENT)
Dept: HEMATOLOGY/ONCOLOGY | Facility: CLINIC | Age: 59
End: 2019-11-06
Payer: MEDICARE

## 2019-11-06 ENCOUNTER — LAB VISIT (OUTPATIENT)
Dept: LAB | Facility: HOSPITAL | Age: 59
End: 2019-11-06
Attending: INTERNAL MEDICINE
Payer: MEDICARE

## 2019-11-06 VITALS
OXYGEN SATURATION: 98 % | DIASTOLIC BLOOD PRESSURE: 91 MMHG | BODY MASS INDEX: 22.22 KG/M2 | SYSTOLIC BLOOD PRESSURE: 171 MMHG | HEIGHT: 69 IN | RESPIRATION RATE: 16 BRPM | WEIGHT: 150 LBS | TEMPERATURE: 98 F | HEART RATE: 96 BPM

## 2019-11-06 DIAGNOSIS — D64.9 ANEMIA, UNSPECIFIED TYPE: ICD-10-CM

## 2019-11-06 DIAGNOSIS — H91.90 DEAFNESS, UNSPECIFIED LATERALITY: ICD-10-CM

## 2019-11-06 DIAGNOSIS — D69.6 THROMBOCYTOPENIA: Primary | ICD-10-CM

## 2019-11-06 DIAGNOSIS — I10 HYPERTENSION, UNSPECIFIED TYPE: ICD-10-CM

## 2019-11-06 DIAGNOSIS — S42.212A FX HUMERAL NECK, LEFT, CLOSED, INITIAL ENCOUNTER: ICD-10-CM

## 2019-11-06 LAB
BASOPHILS # BLD AUTO: 0.06 K/UL (ref 0–0.2)
BASOPHILS NFR BLD: 0.8 % (ref 0–1.9)
DIFFERENTIAL METHOD: ABNORMAL
EOSINOPHIL # BLD AUTO: 0.1 K/UL (ref 0–0.5)
EOSINOPHIL NFR BLD: 1.7 % (ref 0–8)
ERYTHROCYTE [DISTWIDTH] IN BLOOD BY AUTOMATED COUNT: 16.3 % (ref 11.5–14.5)
FIBRINOGEN PPP-MCNC: 385 MG/DL (ref 182–366)
HCT VFR BLD AUTO: 37.5 % (ref 40–54)
HGB BLD-MCNC: 11.6 G/DL (ref 14–18)
IMM GRANULOCYTES # BLD AUTO: 0.04 K/UL (ref 0–0.04)
IMM GRANULOCYTES NFR BLD AUTO: 0.5 % (ref 0–0.5)
LDH SERPL L TO P-CCNC: 147 U/L (ref 110–260)
LYMPHOCYTES # BLD AUTO: 2.6 K/UL (ref 1–4.8)
LYMPHOCYTES NFR BLD: 33.9 % (ref 18–48)
MCH RBC QN AUTO: 29.8 PG (ref 27–31)
MCHC RBC AUTO-ENTMCNC: 30.9 G/DL (ref 32–36)
MCV RBC AUTO: 96 FL (ref 82–98)
MONOCYTES # BLD AUTO: 0.8 K/UL (ref 0.3–1)
MONOCYTES NFR BLD: 9.8 % (ref 4–15)
NEUTROPHILS # BLD AUTO: 4.1 K/UL (ref 1.8–7.7)
NEUTROPHILS NFR BLD: 53.3 % (ref 38–73)
NRBC BLD-RTO: 0 /100 WBC
PLATELET # BLD AUTO: 311 K/UL (ref 150–350)
PMV BLD AUTO: 10.5 FL (ref 9.2–12.9)
RBC # BLD AUTO: 3.89 M/UL (ref 4.6–6.2)
RETICS/RBC NFR AUTO: 3.8 % (ref 0.4–2)
TSH SERPL DL<=0.005 MIU/L-ACNC: 4.19 UIU/ML (ref 0.34–5.6)
WBC # BLD AUTO: 7.69 K/UL (ref 3.9–12.7)

## 2019-11-06 PROCEDURE — 83921 ORGANIC ACID SINGLE QUANT: CPT

## 2019-11-06 PROCEDURE — 82784 ASSAY IGA/IGD/IGG/IGM EACH: CPT | Mod: 59

## 2019-11-06 PROCEDURE — 84443 ASSAY THYROID STIM HORMONE: CPT

## 2019-11-06 PROCEDURE — 83520 IMMUNOASSAY QUANT NOS NONAB: CPT | Mod: 59

## 2019-11-06 PROCEDURE — 99214 OFFICE O/P EST MOD 30 MIN: CPT | Mod: PBBFAC | Performed by: INTERNAL MEDICINE

## 2019-11-06 PROCEDURE — 86334 IMMUNOFIX E-PHORESIS SERUM: CPT

## 2019-11-06 PROCEDURE — 83615 LACTATE (LD) (LDH) ENZYME: CPT

## 2019-11-06 PROCEDURE — 99214 OFFICE O/P EST MOD 30 MIN: CPT | Mod: S$PBB,,, | Performed by: INTERNAL MEDICINE

## 2019-11-06 PROCEDURE — 85384 FIBRINOGEN ACTIVITY: CPT

## 2019-11-06 PROCEDURE — 85045 AUTOMATED RETICULOCYTE COUNT: CPT

## 2019-11-06 PROCEDURE — 1125F PR PAIN SEVERITY QUANTIFIED, PAIN PRESENT: ICD-10-PCS | Mod: ,,, | Performed by: INTERNAL MEDICINE

## 2019-11-06 PROCEDURE — 1125F AMNT PAIN NOTED PAIN PRSNT: CPT | Mod: ,,, | Performed by: INTERNAL MEDICINE

## 2019-11-06 PROCEDURE — G0444 DEPRESSION SCREEN ANNUAL: HCPCS | Mod: PBBFAC | Performed by: INTERNAL MEDICINE

## 2019-11-06 PROCEDURE — 85025 COMPLETE CBC W/AUTO DIFF WBC: CPT

## 2019-11-06 PROCEDURE — 36415 COLL VENOUS BLD VENIPUNCTURE: CPT

## 2019-11-06 PROCEDURE — 86334 IMMUNOFIX E-PHORESIS SERUM: CPT | Mod: 26,,, | Performed by: PATHOLOGY

## 2019-11-06 PROCEDURE — 82728 ASSAY OF FERRITIN: CPT

## 2019-11-06 PROCEDURE — 99999 PR PBB SHADOW E&M-EST. PATIENT-LVL IV: ICD-10-PCS | Mod: PBBFAC,,, | Performed by: INTERNAL MEDICINE

## 2019-11-06 PROCEDURE — 99999 PR PBB SHADOW E&M-EST. PATIENT-LVL IV: CPT | Mod: PBBFAC,,, | Performed by: INTERNAL MEDICINE

## 2019-11-06 PROCEDURE — 99214 PR OFFICE/OUTPT VISIT, EST, LEVL IV, 30-39 MIN: ICD-10-PCS | Mod: S$PBB,,, | Performed by: INTERNAL MEDICINE

## 2019-11-06 PROCEDURE — 86334 PATHOLOGIST INTERPRETATION IFE: ICD-10-PCS | Mod: 26,,, | Performed by: PATHOLOGY

## 2019-11-06 PROCEDURE — 83010 ASSAY OF HAPTOGLOBIN QUANT: CPT

## 2019-11-06 PROCEDURE — 82955 ASSAY OF G6PD ENZYME: CPT

## 2019-11-06 NOTE — PROGRESS NOTES
Consult (Hosp. follow up, Anemia)      Jesus Mccabe is a 58 y.o.  This is a 58-year-old gentleman who underwent surgery of his shoulder after having a fracture with the fall.  He has procedure done at [facility] in [place].  He is here today for evaluation of cytopenias.  He was brought in today by his sister who lives in [place].  The patient was homeless but is now living with her.  He is having some pain in his shoulder, he is deaf and is able to use the  on the computer to communicate with me.     Past Medical History:   Diagnosis Date    Coronary artery disease     Language barrier     MI (myocardial infarction)     Ulcer      Past Surgical History:   Procedure Laterality Date    ABDOMINAL SURGERY      CARDIAC SURGERY      EXTENSOR TENDON OF FOREARM / WRIST REPAIR      INTRAMEDULLARY RODDING OF HUMERUS Left 10/20/2019    Procedure: INSERTION, INTRAMEDULLARY SISSY, HUMERUS;  Surgeon: Jose Alejandro Funes MD;  Location: [facility];  Service: Orthopedics;  Laterality: Left;    He is tolerating lisinopril for hypertension    Current Outpatient Medications:     lisinopril 10 MG tablet, Take 1 tablet (10 mg total) by mouth once daily., Disp: 30 tablet, Rfl: 0    magnesium oxide (MAG-OX) 400 mg (241.3 mg magnesium) tablet, Take 1 tablet (400 mg total) by mouth once daily., Disp: 30 tablet, Rfl: 0  Review of patient's allergies indicates:  No Known Allergies  Social History     Tobacco Use    Smoking status: Current Every Day Smoker     Packs/day: 1.00    Smokeless tobacco: Never Used   Substance Use Topics    Alcohol use: Yes     Alcohol/week: 1.0 standard drinks     Types: 1 Cans of beer per week    Drug use: No     History reviewed. No pertinent family history.    CONSTITUTIONAL: No fevers, chills, night sweats, wt. loss, appetite changes  SKIN: no rashes or itching  ENT: No headaches, head trauma, vision changes, or eye pain  LYMPH NODES: None noticed   CV: No chest pain,  "palpitations.   RESP: No dyspnea on exertion, cough, wheezing   GI: No nausea, emesis, diarrhea, constipation, melena   : No dysuria, hematuria, urgency, or frequency   HEME: No easy bruising, bleeding problems  PSYCHIATRIC: No depression, anxiety, psychosis, hallucinations.  NEURO: No seizures, memory loss, dizziness or difficulty sleeping  MSK:  Positive left shoulder and arm arthralgia         BP (!) 171/91   Pulse 96   Temp 97.8 °F (36.6 °C) (Oral)   Resp 16   Ht 5' 9" (1.753 m)   Wt 68 kg (150 lb)   SpO2 98%   BMI 22.15 kg/m²   Gen: NAD, A and O x3,   Psych: pleasant affect, normal thought process smiling Canelo conversation  Eyes: Pupils round and non dilated, EOM intact  Nose: Nares patent  OP clear, mucosa patent  Neck: suppple, no JVD, trachea midline, no palpable mas   Lungs: CTAB, no wheezes, no use of accessory muscles  CV: S1S2 with RRR, No mrg  Abd: soft, NTND, + BS, No HSM, no ascites  Extr: No CC   good capillary refill  Neuro: steady gait, CNs grossly intact  Skin: intact, no lesions noted  Rheum:  Left shoulder in a s L ing    Lab Results   Component Value Date    WBC 5.80 10/22/2019    HGB 11.0 (L) 10/22/2019    HCT 34.8 (L) 10/22/2019    MCV 95 10/22/2019     (L) 10/22/2019         CMP  Sodium   Date Value Ref Range Status   10/22/2019 136 136 - 145 mmol/L Final     Potassium   Date Value Ref Range Status   10/22/2019 4.0 3.5 - 5.1 mmol/L Final     Chloride   Date Value Ref Range Status   10/22/2019 99 95 - 110 mmol/L Final     CO2   Date Value Ref Range Status   10/22/2019 28 23 - 29 mmol/L Final     Glucose   Date Value Ref Range Status   10/22/2019 137 (H) 70 - 110 mg/dL Final     BUN, Bld   Date Value Ref Range Status   10/22/2019 7 6 - 20 mg/dL Final     Creatinine   Date Value Ref Range Status   10/22/2019 0.7 0.5 - 1.4 mg/dL Final     Calcium   Date Value Ref Range Status   10/22/2019 8.5 (L) 8.7 - 10.5 mg/dL Final     Total Protein   Date Value Ref Range Status "   10/22/2019 6.9 6.0 - 8.4 g/dL Final     Albumin   Date Value Ref Range Status   10/22/2019 2.9 (L) 3.5 - 5.2 g/dL Final     Total Bilirubin   Date Value Ref Range Status   10/22/2019 0.8 0.1 - 1.0 mg/dL Final     Comment:     For infants and newborns, interpretation of results should be based  on gestational age, weight and in agreement with clinical  observations.  Premature Infant recommended reference ranges:  Up to 24 hours.............<8.0 mg/dL  Up to 48 hours............<12.0 mg/dL  3-5 days..................<15.0 mg/dL  6-29 days.................<15.0 mg/dL       Alkaline Phosphatase   Date Value Ref Range Status   10/22/2019 45 (L) 55 - 135 U/L Final     AST   Date Value Ref Range Status   10/22/2019 32 10 - 40 U/L Final     ALT   Date Value Ref Range Status   10/22/2019 33 10 - 44 U/L Final     Anion Gap   Date Value Ref Range Status   10/22/2019 9 8 - 16 mmol/L Final     eGFR if    Date Value Ref Range Status   10/22/2019 >60 >60 mL/min/1.73 m^2 Final     eGFR if non    Date Value Ref Range Status   10/22/2019 >60 >60 mL/min/1.73 m^2 Final     Comment:     Calculation used to obtain the estimated glomerular filtration  rate (eGFR) is the CKD-EPI equation.          Thrombocytopenia  -     Ambulatory referral to Family Practice    Anemia, unspecified type  -     CBC auto differential; Future; Expected date: 11/06/2019  -     Ferritin; Future; Expected date: 11/06/2019  -     Fibrinogen; Future; Expected date: 11/06/2019  -     G6PD,Quantitative; Future; Expected date: 11/06/2019  -     Haptoglobin; Future; Expected date: 11/06/2019  -     Immunofixation electrophoresis; Future; Expected date: 11/06/2019  -     Immunoglobulin free LT chains blood; Future; Expected date: 11/06/2019  -     Immunoglobulins (IgG, IgA, IgM) Quantitative; Future; Expected date: 11/06/2019  -     Lactate dehydrogenase; Future; Expected date: 11/06/2019  -     Methylmalonic acid, serum; Future;  Expected date: 11/06/2019  -     Pyruvate kinase; Future; Expected date: 11/06/2019  -     Reticulocytes; Future; Expected date: 11/06/2019  -     TSH; Future; Expected date: 11/06/2019  -     Ambulatory referral to [facility]    Fx humeral neck, left, closed, initial encounter  -     Ambulatory referral/consult to Orthopedics; Future; Expected date: 11/06/2019    Hypertension, unspecified type  -     Ambulatory referral to [facility]          I have explained the lengthy differential diagnosis for anemia.Labs will be ordered to evaluate for bone marrow suppression, peripheral destruction as well as nutritional deficits. An ultrasound of the abdomen may be indicated to evaluate for hepatosplenomegaly which can lead to sequestration of cells. Ultimately a bone marrow biopsy and aspirate may be in order. SPEP and UPEP will also be included to look for a possible paraproteinemia.   He will discussed his pain with the orthopedist.  Refer him to primary care to help control his blood pressure.  I will see him back in a few weeks to discuss his lab results make further recs accordingly  Thank you for allowing me to evaluate and participate in the care of this pleasant patient. Please fell free to call me with any questions or concerns.    Nanette Angulo MD    This note was dictated with Dragon and briefly proofread. Please excuse any sentences that may be unclear or words misspelled

## 2019-11-06 NOTE — LETTER
November 6, 2019      Carlos Broderick MD  [address] [place] [address] [address] Stephanie [place] [address]           [facility]  [address] [place] [address]  Phone: [phone]          Patient: Jesus Mccabe   MR Number: [id number]   YOB: 1960   Date of Visit: 11/6/2019       Dear Dr. Carlos Broderick:    Thank you for referring Jesus Mccabe to me for evaluation. Attached you will find relevant portions of my assessment and plan of care.    If you have questions, please do not hesitate to call me. I look forward to following Jesus Mccabe along with you.    Sincerely,    Nanette Adams MD    Enclosure  CC:  No Recipients    If you would like to receive this communication electronically, please contact [email or web address] or [phone] to request more information on [employer] Link access.    For providers and/or their staff who would like to refer a patient to [facility], please contact us through our one-stop-shop provider referral line, [facility], at [phone].    If you feel you have received this communication in error or would no longer like to receive these types of communications, please e-mail [email or web address]

## 2019-11-07 DIAGNOSIS — M79.602 LEFT ARM PAIN: Primary | ICD-10-CM

## 2019-11-07 LAB
FERRITIN SERPL-MCNC: 274 NG/ML (ref 20–300)
HAPTOGLOB SERPL-MCNC: 54 MG/DL (ref 30–250)
IGA SERPL-MCNC: 884 MG/DL (ref 40–350)
IGG SERPL-MCNC: 2045 MG/DL (ref 650–1600)
IGM SERPL-MCNC: 89 MG/DL (ref 50–300)
INTERPRETATION SERPL IFE-IMP: NORMAL
KAPPA LC SER QL IA: 5.09 MG/DL (ref 0.33–1.94)
KAPPA LC/LAMBDA SER IA: 1.42 (ref 0.26–1.65)
LAMBDA LC SER QL IA: 3.59 MG/DL (ref 0.57–2.63)
PATHOLOGIST INTERPRETATION IFE: NORMAL

## 2019-11-08 LAB
G6PD RBC-CCNT: 13.4 U/G HGB (ref 7–20.5)
PK RBC-CCNT: 12.3 U/G HB (ref 6.7–14.3)

## 2019-11-09 LAB — METHYLMALONATE SERPL-SCNC: 0.56 UMOL/L

## 2019-11-15 ENCOUNTER — OFFICE VISIT (OUTPATIENT)
Dept: ORTHOPEDICS | Facility: CLINIC | Age: 59
End: 2019-11-15
Payer: MEDICARE

## 2019-11-15 ENCOUNTER — HOSPITAL ENCOUNTER (OUTPATIENT)
Dept: RADIOLOGY | Facility: HOSPITAL | Age: 59
Discharge: HOME | End: 2019-11-15
Attending: ORTHOPAEDIC SURGERY
Payer: MEDICARE

## 2019-11-15 VITALS
DIASTOLIC BLOOD PRESSURE: 103 MMHG | HEIGHT: 69 IN | HEART RATE: 89 BPM | WEIGHT: 150 LBS | BODY MASS INDEX: 22.22 KG/M2 | SYSTOLIC BLOOD PRESSURE: 151 MMHG

## 2019-11-15 DIAGNOSIS — S42.212A FX HUMERAL NECK, LEFT, CLOSED, INITIAL ENCOUNTER: ICD-10-CM

## 2019-11-15 DIAGNOSIS — Z48.02: Primary | ICD-10-CM

## 2019-11-15 DIAGNOSIS — S42.202D: ICD-10-CM

## 2019-11-15 DIAGNOSIS — Z98.890 S/P SHOULDER SURGERY: Primary | ICD-10-CM

## 2019-11-15 DIAGNOSIS — M79.602 LEFT ARM PAIN: ICD-10-CM

## 2019-11-15 DIAGNOSIS — S42.292D OTHER CLOSED DISPLACED FRACTURE OF PROXIMAL END OF LEFT HUMERUS WITH ROUTINE HEALING, SUBSEQUENT ENCOUNTER: ICD-10-CM

## 2019-11-15 PROCEDURE — 99999 PR PBB SHADOW E&M-EST. PATIENT-LVL III: CPT | Mod: PBBFAC,,, | Performed by: ORTHOPAEDIC SURGERY

## 2019-11-15 PROCEDURE — 73060 X-RAY EXAM OF HUMERUS: CPT | Mod: TC,PN,LT

## 2019-11-15 PROCEDURE — 99213 OFFICE O/P EST LOW 20 MIN: CPT | Mod: PBBFAC,25,PN | Performed by: ORTHOPAEDIC SURGERY

## 2019-11-15 PROCEDURE — 73030 X-RAY EXAM OF SHOULDER: CPT | Mod: 26,LT,, | Performed by: RADIOLOGY

## 2019-11-15 PROCEDURE — 73030 XR SHOULDER COMPLETE 2 OR MORE VIEWS LEFT: ICD-10-PCS | Mod: 26,LT,, | Performed by: RADIOLOGY

## 2019-11-15 PROCEDURE — 73060 XR HUMERUS 2 VIEW LEFT: ICD-10-PCS | Mod: 26,LT,, | Performed by: RADIOLOGY

## 2019-11-15 PROCEDURE — 73060 X-RAY EXAM OF HUMERUS: CPT | Mod: 26,LT,, | Performed by: RADIOLOGY

## 2019-11-15 PROCEDURE — 73030 X-RAY EXAM OF SHOULDER: CPT | Mod: TC,PN,LT

## 2019-11-15 PROCEDURE — 99999 PR PBB SHADOW E&M-EST. PATIENT-LVL III: ICD-10-PCS | Mod: PBBFAC,,, | Performed by: ORTHOPAEDIC SURGERY

## 2019-11-15 PROCEDURE — 99203 PR OFFICE/OUTPT VISIT, NEW, LEVL III, 30-44 MIN: ICD-10-PCS | Mod: S$PBB,,, | Performed by: ORTHOPAEDIC SURGERY

## 2019-11-15 PROCEDURE — 99203 OFFICE O/P NEW LOW 30 MIN: CPT | Mod: S$PBB,,, | Performed by: ORTHOPAEDIC SURGERY

## 2019-11-15 NOTE — PROGRESS NOTES
Subjective:      Patient ID: Jesus Mccabe is a 58 y.o. male.    Chief Complaint: Post-op Evaluation (left humerus sx 10/20)    Referring Provider: Nanette Adams Md  [address] [address]    HPI:  Mr. Mccabe is a 58-year-old right-hand-dominant male who presented today for evaluation of his left shoulder.  Apparently on 10/19/2019 he presented to the [facility] emergency room by EMS with a proximal humerus fracture after he fell in a parking lot at a gas station.  He was transferred to [place] where an intramedullary sissy fixation of his humerus was completed by an orthopedic surgeon in [place].  He did not go for follow-up.  His surgery was completed on 10/20/2019.  He stated he still had some pain in his shoulder.  He denied numbness or tingling.    Past Medical History:   Diagnosis Date    Coronary artery disease     Language barrier     MI (myocardial infarction)      *  *  *  *  *  *  * Ulcer  Hypertension  Depression  Anxiety  Mitral stenosis as child treated with surgery  Deafness  GERD  Headaches      Past Surgical History:   Procedure Laterality Date    ABDOMINAL SURGERY      CARDIAC SURGERY mitral heart valve repair as child      EXTENSOR TENDON OF FOREARM / WRIST REPAIR      INTRAMEDULLARY RODDING OF HUMERUS Left 10/20/2019    Procedure: INSERTION, INTRAMEDULLARY SISSY, HUMERUS;  Surgeon: Jose Alejandro Funes MD;  Location: [facility];  Service: Orthopedics;  Laterality: Left;       Review of patient's allergies indicates:  No Known Allergies    Social History     Occupational History    Disabled   Tobacco Use    Smoking status: Current Every Day Smoker     Packs/day: 1.00    Smokeless tobacco: Never Used   Substance and Sexual Activity    Alcohol use: Yes     Alcohol/week: 1.0 standard drinks     Types: 1 Cans of beer per week    Drug use: Marijuana    Sexual activity: Never      Family history:  Father:  , heart failure.  Mother:  , MI.  Brother:   3, 1 , drowning  Sister:  2, 1 , leukemia.    Previous Hospitalizations:  Heart valve repair    ROS:   Review of Systems   Constitution: Negative for chills and fever.   HENT: Positive for hearing loss. Negative for congestion.    Eyes: Negative for blurred vision.   Cardiovascular: Negative for chest pain.   Respiratory: Negative for cough.    Endocrine: Negative for polydipsia.   Hematologic/Lymphatic: Negative for adenopathy.   Skin: Negative for flushing and itching.   Musculoskeletal: Negative for gout.   Gastrointestinal: Positive for heartburn. Negative for constipation and diarrhea.   Genitourinary: Negative for nocturia.   Neurological: Positive for headaches. Negative for seizures.   Psychiatric/Behavioral: Positive for depression and substance abuse. The patient is nervous/anxious.    Allergic/Immunologic: Negative for environmental allergies.           Objective:      Physical Exam:   General:  Patient communicates through his sister.  AAOx3.  No acute distress  HEENT: Normocephalic, PEARLA EOMI.  Poor dentition with Mrs. teeth.  Neck: Supple, No JVD  Chest: Symetric, equal excursion on inspiration  Abdomen: Soft NTND  Vascular:  Pulses intact and equal bilaterally.  Capillary refill less than 3 seconds and equal bilaterally  Neurologic:  Pinprick and soft touch over both deltoids intact and equal bilaterally  Integment:  No ecchymosis, no errythema.  Incisions well approximated with staples in place.  Extremity:  Shoulder:  Forward flexion/abduction left shoulder 0/30 degrees. Tender with motion left shoulder.  Tender with palpation left shoulder.  No swelling either shoulder.  No crepitus felt with motion shoulder.  Nontender over the AC joint.  No swelling over the AC joint.  Radiography:  Personally reviewed x-rays of the left humerus showed a proximal humerus shaft fracture which is in near anatomic alignment with an intramedullary zaid in place with mild comminution.       Assessment:       Impression:      1. Traumatic closed nondisplaced fracture of proximal end of humerus with routine healing, left    2. IM zaid left humerus              Plan:       1.  Discussed physical examination and radiographic findings with the patient. Jesus understands that he had a humerus fracture of which an intramedullary zaid was placed.  His fracture is now stable.  He is now almost 1 month postop and at this point he can start motion exercises to gain motion in his shoulder.  2.  Refer to occupational therapy.  3.  Remove staples and place Steri-Strips.  4.  Home exercises to include wall walking exercises, Codman exercises, pulley exercises, cane exercises, and towel exercises were shown discussed with the patient.  5.  Any pain can be treated with over-the-counter medications dosed per box instructions.  6.  [facility] portal was discussed with the patient and information was given.  Patient was encouraged to use the portal for future encounters.  7.  Follow up in approximately 1 month with an x-ray of the left shoulder

## 2019-11-15 NOTE — LETTER
November 15, 2019      Nanette Adams MD  [address] [facility] [address]  [facility] [address]           [facility]  [address]  [place] [address]  Phone: [phone]  Fax: [phone]          Patient: Jesus Mccabe   MR Number: [id number]   YOB: 1960   Date of Visit: 11/15/2019       Dear Dr. Nanette Adams:    Thank you for referring Jesus Mccabe to me for evaluation. Attached you will find relevant portions of my assessment and plan of care.    If you have questions, please do not hesitate to call me. I look forward to following Jesus Mccabe along with you.    Sincerely,    Lakhwinder Frank, DO    Enclosure  CC:  No Recipients    If you would like to receive this communication electronically, please contact [email or web address] or [phone] to request more information on [employer] Link access.    For providers and/or their staff who would like to refer a patient to [facility], please contact us through our one-stop-shop provider referral line, [facility] , at [phone].    If you feel you have received this communication in error or would no longer like to receive these types of communications, please e-mail [email or web address]

## 2019-11-20 ENCOUNTER — OFFICE VISIT (OUTPATIENT)
Dept: FAMILY MEDICINE | Facility: CLINIC | Age: 59
End: 2019-11-20
Payer: MEDICARE

## 2019-11-20 VITALS
HEART RATE: 74 BPM | DIASTOLIC BLOOD PRESSURE: 89 MMHG | RESPIRATION RATE: 15 BRPM | TEMPERATURE: 98 F | WEIGHT: 142.81 LBS | SYSTOLIC BLOOD PRESSURE: 145 MMHG | OXYGEN SATURATION: 99 % | BODY MASS INDEX: 21.15 KG/M2 | HEIGHT: 69 IN

## 2019-11-20 DIAGNOSIS — D64.9 ANEMIA, UNSPECIFIED TYPE: ICD-10-CM

## 2019-11-20 DIAGNOSIS — M25.512 LEFT SHOULDER PAIN, UNSPECIFIED CHRONICITY: ICD-10-CM

## 2019-11-20 DIAGNOSIS — I10 PRIMARY HYPERTENSION: Primary | ICD-10-CM

## 2019-11-20 DIAGNOSIS — D69.6 THROMBOCYTOPENIA: ICD-10-CM

## 2019-11-20 PROCEDURE — 99204 PR OFFICE/OUTPT VISIT, NEW, LEVL IV, 45-59 MIN: ICD-10-PCS | Mod: S$GLB,,, | Performed by: FAMILY MEDICINE

## 2019-11-20 PROCEDURE — 99204 OFFICE O/P NEW MOD 45 MIN: CPT | Mod: S$GLB,,, | Performed by: FAMILY MEDICINE

## 2019-11-20 RX ORDER — NAPROXEN 500 MG/1
500 TABLET ORAL 2 TIMES DAILY PRN
Qty: 60 TABLET | Refills: 1 | Status: SHIPPED | OUTPATIENT
Start: 2019-11-20 | End: 2020-01-08 | Stop reason: SDUPTHER

## 2019-11-20 RX ORDER — LISINOPRIL 10 MG/1
10 TABLET ORAL DAILY
Qty: 30 TABLET | Refills: 11 | Status: SHIPPED | OUTPATIENT
Start: 2019-11-20 | End: 2019-12-18

## 2019-11-20 NOTE — PROGRESS NOTES
"  [facility] - Clinic Note    Subjective      Mr. Mccabe is a 58 y.o. male who presents to clinic for establish care.      provided through [facility] system for the duration of the visit.   Patient was referred by Dr. Adams to establish a primary care doctor. He has anemia and thrombocytopenia. Last labs reviewed with patient showing a mild anemia without thrombocytopenia. Cause is thus far undetermined though Dr. Adams is in the process of full workup. Has been having unexplained weight loss. Sister is present in the room and helps provide history. She reports that patient is "picky" and does not always eat what she cooks. Patient reports good appetite.   Blood pressure above goal today and in prior visits. No chest pain or shortness of breath. Has been prescribed blood pressure medicine in the past but has not taken it because he did not know it was prescribed. Recent lab work reviewed with the patient and his sister.  Additionally patient has left shoulder pain after a surgery for humerus fracture. He is having residual pain and has not been using any medicine for relief. Was seen by Ortho who recommended PT and ROM exercises.     PMH Jesus has a past medical history of Coronary artery disease, Language barrier, MI (myocardial infarction), and Ulcer.   PSXH Jesus has a past surgical history that includes Abdominal surgery; Extensor tendon of forearm / wrist repair; Cardiac surgery; and Intramedullary rodding of humerus (Left, 10/20/2019).    Jesus's family history is not on file.   CAMILO Lee reports that he has been smoking. He has been smoking about 1.00 pack per day. He has never used smokeless tobacco. He reports that he drinks about 1.0 standard drinks of alcohol per week. He reports that he does not use drugs.   CAIT Lee has No Known Allergies.   ALVIN Lee has a current medication list which includes the following prescription(s): lisinopril, magnesium oxide, and naproxen.     Review " "of Systems   Constitutional: Positive for unexpected weight change. Negative for appetite change.   HENT:        Hearing impaired   Eyes: Negative.    Respiratory: Negative for shortness of breath.    Cardiovascular: Negative for chest pain.   Gastrointestinal: Negative for abdominal pain, anal bleeding, blood in stool, constipation, diarrhea, nausea, rectal pain and vomiting.   Endocrine: Negative.    Genitourinary: Negative.    Musculoskeletal:        Left shoulder pain   Skin: Negative.    Neurological: Negative.    Hematological: Negative.    Psychiatric/Behavioral: Negative.      Objective     BP (!) 145/89 (BP Location: Right arm, Patient Position: Sitting, BP Method: Medium (Automatic))   Pulse 74   Temp 98.3 °F (36.8 °C) (Oral)   Resp 15   Ht 5' 9" (1.753 m)   Wt 64.8 kg (142 lb 12.8 oz)   SpO2 99%   BMI 21.09 kg/m²     Physical Exam   Constitutional: He is well-developed, well-nourished, and in no distress. No distress.   HENT:   Head: Normocephalic and atraumatic.   Right Ear: External ear normal.   Left Ear: External ear normal.   Hearing impaired   Eyes: Conjunctivae are normal.   Cardiovascular: Normal rate, regular rhythm, normal heart sounds and intact distal pulses.   No murmur heard.  Pulmonary/Chest: Effort normal and breath sounds normal. He has no rales.   Abdominal: Soft. Bowel sounds are normal.   Musculoskeletal:   Right hand in permanent contracture, stiff ROM with the left shoulder, unable to tolerate full exam d/t pain   Lymphadenopathy:     He has no cervical adenopathy.   Neurological: He is alert.   Skin: Skin is warm and dry.   Psychiatric: Affect normal.   Vitals reviewed.    Assessment/Plan     1. Primary hypertension  lisinopril 10 MG tablet   2. Left shoulder pain, unspecified chronicity  naproxen (NAPROSYN) 500 MG tablet   3. Anemia, unspecified type     4. Thrombocytopenia         Start lisinopril 10 mg daily for hypertension.  Continue to follow closely.   Naproxen prn " shoulder pain. Will need to start PT.  Work up for anemia/thrombocytopenia in process by Dr. Adams. Will need screening colonoscopy in the future.   Monitor weight loss. As with any unintentional weight loss, malignancy work up is considered in the differential. Recommend boost/ensure supplements after each meal.        Future Appointments   Date Time Provider Department Center   12/11/2019  1:20 PM Nanette Adams MD [facility] HEMON[facility]   12/18/2019  9:00 AM Selina Denney MD [facility]       Selina Denney MD  Family Medicine  [facility]  [phone]

## 2019-12-03 ENCOUNTER — PATIENT OUTREACH (OUTPATIENT)
Dept: ADMINISTRATIVE | Facility: HOSPITAL | Age: 59
End: 2019-12-03

## 2019-12-03 NOTE — LETTER
December 3, 2019    Jesus Mccabe  [address] [place] [address]             [facility] [address]  [place] [address]  Phone: [phone] Dear Willie [facility] is committed to your overall health and would like to ensure that you are up to date on your recommended test and/or procedures.   Selina Denney MD  has found that your chart shows you may be due for the following:    Health Maintenance Due   Topic Date Due    Lipid Panel  1960    TETANUS VACCINE  12/04/1977    Aspirin/Antiplatelet Therapy  12/04/1977    Pneumococcal Vaccine (Medium Risk) (1 of 1 - PPSV23) 12/04/1978    High Dose Statin  12/04/1980    Shingles Vaccine (1 of 2) 12/04/2009    Colonoscopy  12/04/2009     If you have had any of the above done at another facility, please let us know so that we may obtain copies from that facility.  If you have a copy of these records, please provide a copy for us to scan into your chart.  You are welcome to request that the report be faxed to us at  ([phone]).     Otherwise, please schedule these appointments at your earliest convenience by calling [phone] or going to [email or web address].    If you have an upcoming scheduled appointment for the item above, please disregard this letter.    Sincerely,  Your [facility]  MD Mary Loza L.P.N. Clinical Care Coordinator  [address] [place] [address], [place] [address] [phone]

## 2019-12-04 ENCOUNTER — CLINICAL SUPPORT (OUTPATIENT)
Dept: REHABILITATION | Facility: HOSPITAL | Age: 59
End: 2019-12-04
Payer: MEDICARE

## 2019-12-04 DIAGNOSIS — S42.202D: Primary | ICD-10-CM

## 2019-12-04 PROCEDURE — 97167 OT EVAL HIGH COMPLEX 60 MIN: CPT

## 2019-12-05 DIAGNOSIS — Z12.11 COLON CANCER SCREENING: ICD-10-CM

## 2019-12-10 NOTE — PROGRESS NOTES
[facility]  Occupational Therapy Initial Evaluation    Name: Jesus Mccabe  Clinic Number: [id number]    Therapy Diagnosis:   Encounter Diagnosis   Name Primary?    Traumatic closed nondisplaced fracture of proximal end of humerus with routine healing, left Yes     Physician: Lakhwinder Frank DO    Physician Orders: OT Eval and Treat   Medical Diagnosis from Referral: closed displaced fx of proximal end L humerus  Evaluation Date: 12/4/2019  Authorization Period Expiration:   Plan of Care Expiration: 02/21/2020  Visit # / Visits authorized: 1    Time In: 810  Time Out: 900  Total Billable Time: 50 minutes    Precautions: deaf    Subjective   Date of onset: 10/19/19 suffered af all and on 10/20/2019, underwent surgery  History of current condition - Jesus reports: per chart review, patient fell at a gas station on 10/19/2019. He went to [place] and underwent surgery on 10/20/19, with IM zaid placement. Patient presents to OT with limited ROM L shoulder and increased pain. Patient requires modifications due to deafness. Martti system offered and implemented during assessment. However, when the representative came online and asked about ability to see and use BUE's from patient, he was limited due to contracture of R hand and impaired use of L shoulder. She stated that she will not be able to help due to these restrictions, making it difficult to engage in sign language. OT implemented use of handwritten notes and utilized some sign language as I am familiar with the alphabet in sign language. Continued with evaluation using handwritten communication in questions and answers. Uploaded notes from this encounter are available in patient file.      Medical History:   Past Medical History:   Diagnosis Date    Coronary artery disease     Language barrier     MI (myocardial infarction)     Ulcer        Surgical History:   Jesus Mccabe  has a past surgical history that includes Abdominal  "surgery; Extensor tendon of forearm / wrist repair; Cardiac surgery; and Intramedullary rodding of humerus (Left, 10/20/2019).    Medications:   Jesus has a current medication list which includes the following prescription(s): lisinopril and naproxen.    Allergies:   Review of patient's allergies indicates:  No Known Allergies     Imaging, bone scan films: Interval open reduction and internal fixation of the comminuted fracture proximal left humerus bridged by intramedullary zaid and screws with position and alignment appearing improved and good and no complicating factors.    Prior Therapy: NA  Social History:  lives alone  Occupation: Disability  Prior Level of Function: Modified Independent   Current Level of Function: MIN Assist due to LUE restriction    Pain:  Current 10/10  Location: left shoulder   Description: Variable  Aggravating Factors: Flexing and Lifting  Easing Factors: rest    Pts goals: "Please I need feel better."     Objective     AROM WFL L hand flex/ext  AAROM L elbow flex/ext WFL  Impaired AROM L shoulder/unable to actively lift/perform  Tolerated PROM to 75 degrees    Home Exercises and Patient Education Provided    Education provided:   - POC  -HEP    Written Home Exercises Provided: yes.  Exercises were reviewed and Jesus was able to demonstrate them prior to the end of the session.  Jesus demonstrated good  understanding of the education provided.     See EMR under Media for exercises provided 12/4/2019.    Assessment   Jesus is a 59 y.o. male referred to outpatient Occupational Therapy with a medical diagnosis of L humerus fx s/p IM zaid placement.  Pt presents with limitations in L UE ROM, strength, and function. In addition, patient is deaf.     Pt prognosis is Good.   Pt will benefit from skilled outpatient Occupational Therapy to address the deficits stated above and in the chart below, provide pt/family education, and to maximize pt's level of independence.     Plan of care " discussed with patient: Yes  Pt's spiritual, cultural and educational needs considered and patient is agreeable to the plan of care and goals as stated below:     Anticipated Barriers for therapy: deaf; Martti system attempted, but due to contracture of R hand and limited use of L shoulder, patient unable to reciprocate effective sign language with system, per representative contacted during evaluation. Patient can sign and can write answers and questions on pen/paper.       Goals:  Patient will tolerate progressive PROM L shoulder to improve joint mobility and flexibility.  Patient will demonstrate AAROM L shoulder ROM for improved tolerance and ROM in use of LUE.  Patient will perform AROM L shoulder to 60 degrees within 6 weeks.  Patient will tolerate AAROM to L shoulder 90 degrees within 6 weeks.  Patient will perform return demo in HEP exercises.    Plan   Plan of care Certification: 12/4/2019 to 02/21/2020    Outpatient Occupational Therapy 2 times weekly for 6 weeks to include the following interventions: Electrical Stimulation IFC, Manual Therapy, Moist Heat/ Ice, Neuromuscular Re-ed, Patient Education, Self Care, Therapeutic Activites, Therapeutic Exercise and scar massage.     Kita Pagan OT

## 2019-12-10 NOTE — PLAN OF CARE
[facility]  Occupational Therapy Initial Evaluation    Name: Jesus Mccabe  Clinic Number: [id number]    Therapy Diagnosis:   Encounter Diagnosis   Name Primary?    Traumatic closed nondisplaced fracture of proximal end of humerus with routine healing, left Yes     Physician: Lakhwinder Frank DO    Physician Orders: OT Eval and Treat   Medical Diagnosis from Referral: closed displaced fx of proximal end L humerus  Evaluation Date: 12/4/2019  Authorization Period Expiration:   Plan of Care Expiration: 02/21/2020  Visit # / Visits authorized: 1    Time In: 810  Time Out: 900  Total Billable Time: 50 minutes    Precautions: deaf    Subjective   Date of onset: 10/19/19 suffered af all and on 10/20/2019, underwent surgery  History of current condition - Jesus reports: per chart review, patient fell at a gas station on 10/19/2019. He went to [place] and underwent surgery on 10/20/19, with IM zaid placement. Patient presents to OT with limited ROM L shoulder and increased pain. Patient requires modifications due to deafness. Martti system offered and implemented during assessment. However, when the representative came online and asked about ability to see and use BUE's from patient, he was limited due to contracture of R hand and impaired use of L shoulder. She stated that she will not be able to help due to these restrictions, making it difficult to engage in sign language. OT implemented use of handwritten notes and utilized some sign language as I am familiar with the alphabet in sign language. Continued with evaluation using handwritten communication in questions and answers. Uploaded notes from this encounter are available in patient file.      Medical History:   Past Medical History:   Diagnosis Date    Coronary artery disease     Language barrier     MI (myocardial infarction)     Ulcer        Surgical History:   Jesus Mccabe  has a past surgical history that includes Abdominal  "surgery; Extensor tendon of forearm / wrist repair; Cardiac surgery; and Intramedullary rodding of humerus (Left, 10/20/2019).    Medications:   Jesus has a current medication list which includes the following prescription(s): lisinopril and naproxen.    Allergies:   Review of patient's allergies indicates:  No Known Allergies     Imaging, bone scan films: Interval open reduction and internal fixation of the comminuted fracture proximal left humerus bridged by intramedullary zaid and screws with position and alignment appearing improved and good and no complicating factors.    Prior Therapy: NA  Social History:  lives alone  Occupation: Disability  Prior Level of Function: Modified Independent   Current Level of Function: MIN Assist due to LUE restriction    Pain:  Current 10/10  Location: left shoulder   Description: Variable  Aggravating Factors: Flexing and Lifting  Easing Factors: rest    Pts goals: "Please I need feel better."     Objective     AROM WFL L hand flex/ext  AAROM L elbow flex/ext WFL  Impaired AROM L shoulder/unable to actively lift/perform  Tolerated PROM to 75 degrees    Home Exercises and Patient Education Provided    Education provided:   - POC  -HEP    Written Home Exercises Provided: yes.  Exercises were reviewed and Jesus was able to demonstrate them prior to the end of the session.  Jesus demonstrated good  understanding of the education provided.     See EMR under Media for exercises provided 12/4/2019.    Assessment   Jesus is a 59 y.o. male referred to outpatient Occupational Therapy with a medical diagnosis of L humerus fx s/p IM zaid placement.  Pt presents with limitations in L UE ROM, strength, and function. In addition, patient is deaf.     Pt prognosis is Good.   Pt will benefit from skilled outpatient Occupational Therapy to address the deficits stated above and in the chart below, provide pt/family education, and to maximize pt's level of independence.     Plan of care " discussed with patient: Yes  Pt's spiritual, cultural and educational needs considered and patient is agreeable to the plan of care and goals as stated below:     Anticipated Barriers for therapy: deaf; Martti system attempted, but due to contracture of R hand and limited use of L shoulder, patient unable to reciprocate effective sign language with system, per representative contacted during evaluation. Patient can sign and can write answers and questions on pen/paper.       Goals:  Patient will tolerate progressive PROM L shoulder to improve joint mobility and flexibility.  Patient will demonstrate AAROM L shoulder ROM for improved tolerance and ROM in use of LUE.  Patient will perform AROM L shoulder to 60 degrees within 6 weeks.  Patient will tolerate AAROM to L shoulder 90 degrees within 6 weeks.  Patient will perform return demo in HEP exercises.    Plan   Plan of care Certification: 12/4/2019 to 02/21/2020    Outpatient Occupational Therapy 2 times weekly for 6 weeks to include the following interventions: Electrical Stimulation IFC, Manual Therapy, Moist Heat/ Ice, Neuromuscular Re-ed, Patient Education, Self Care, Therapeutic Activites, Therapeutic Exercise and scar massage.     Kita Pagan OT

## 2019-12-11 ENCOUNTER — CLINICAL SUPPORT (OUTPATIENT)
Dept: REHABILITATION | Facility: HOSPITAL | Age: 59
End: 2019-12-11
Payer: MEDICARE

## 2019-12-11 ENCOUNTER — OFFICE VISIT (OUTPATIENT)
Dept: HEMATOLOGY/ONCOLOGY | Facility: CLINIC | Age: 59
End: 2019-12-11
Payer: MEDICARE

## 2019-12-11 VITALS
BODY MASS INDEX: 22.07 KG/M2 | HEIGHT: 69 IN | OXYGEN SATURATION: 100 % | WEIGHT: 149 LBS | RESPIRATION RATE: 16 BRPM | TEMPERATURE: 99 F | SYSTOLIC BLOOD PRESSURE: 121 MMHG | HEART RATE: 73 BPM | DIASTOLIC BLOOD PRESSURE: 61 MMHG

## 2019-12-11 DIAGNOSIS — D69.6 THROMBOCYTOPENIA: ICD-10-CM

## 2019-12-11 DIAGNOSIS — S42.202D: Primary | ICD-10-CM

## 2019-12-11 DIAGNOSIS — E53.8 B12 DEFICIENCY: Primary | ICD-10-CM

## 2019-12-11 DIAGNOSIS — D89.0 POLYCLONAL GAMMOPATHY: ICD-10-CM

## 2019-12-11 PROCEDURE — 99214 PR OFFICE/OUTPT VISIT, EST, LEVL IV, 30-39 MIN: ICD-10-PCS | Mod: S$PBB,,, | Performed by: INTERNAL MEDICINE

## 2019-12-11 PROCEDURE — 97010 HOT OR COLD PACKS THERAPY: CPT

## 2019-12-11 PROCEDURE — 99214 OFFICE O/P EST MOD 30 MIN: CPT | Mod: S$PBB,,, | Performed by: INTERNAL MEDICINE

## 2019-12-11 PROCEDURE — 99214 OFFICE O/P EST MOD 30 MIN: CPT | Mod: PBBFAC | Performed by: INTERNAL MEDICINE

## 2019-12-11 PROCEDURE — 99999 PR PBB SHADOW E&M-EST. PATIENT-LVL IV: CPT | Mod: PBBFAC,,, | Performed by: INTERNAL MEDICINE

## 2019-12-11 PROCEDURE — 99999 PR PBB SHADOW E&M-EST. PATIENT-LVL IV: ICD-10-PCS | Mod: PBBFAC,,, | Performed by: INTERNAL MEDICINE

## 2019-12-11 PROCEDURE — 97110 THERAPEUTIC EXERCISES: CPT

## 2019-12-11 NOTE — LETTER
December 11, 2019      Carlos Broderick MD  [address] [place] [address] [address] [place] [id number]           [facility]  [address] [place] [address]  Phone: [phone]          Patient: Jeuss Mccabe   MR Number: [id number]   YOB: 1960   Date of Visit: 12/11/2019       Dear Dr. Cralos Broderick:    Thank you for referring Jesus Mccabe to me for evaluation. Attached you will find relevant portions of my assessment and plan of care.    If you have questions, please do not hesitate to call me. I look forward to following Jesus Mccabe along with you.    Sincerely,    Nanette Adams MD    Enclosure  CC:  No Recipients    If you would like to receive this communication electronically, please contact [email or web address] or [phone] to request more information on [employer] access.    For providers and/or their staff who would like to refer a patient to [facility], please contact us through our one-stop-shop provider referral line, [facility], at [phone].    If you feel you have received this communication in error or would no longer like to receive these types of communications, please e-mail [email or web address]

## 2019-12-11 NOTE — PROGRESS NOTES
"  Occupational Therapy Daily Treatment Note     Name: Jesus Mccabe  Clinic Number: [id number]    Therapy Diagnosis:   Encounter Diagnosis   Name Primary?    Traumatic closed nondisplaced fracture of proximal end of humerus with routine healing, left Yes     Physician: Lakhwinder Frank DO    Visit Date: 12/11/2019    Physician Orders: EMEKA CAMILO  Medical Diagnosis: L humerus fx   Evaluation Date: 12/04/2019  Authorization Period Expiration:   Plan of Care Certification Period: 02/21/2020  Visit #/Visits authorized: 2    Time In: 800  Time Out: 900  Total Billable Time: 50  minutes    Precautions: Standard and no heavy lifting LUE; Deaf     Subjective     Pt reports: soreness LUE through written underlining of 9 and 10 pain   unknown if compliant with home exercise program.  Response to previous treatment: tolerance to ROM fair  Functional change: NA    Pain: 10/10  Location: left shoulder      Objective   Jesus received hot pack for 15 minutes to L shoulder.    Jesus received therapeutic exercises to develop ROM and flexibility including:  AAROM L elbow flex/ext  AROM L hand flex/ext  PROM L shoulder FF/ABD/ADD as tolerated    Home Exercises Provided and Patient Education Provided     Education provided:   - Re: steri strips in place on L shoulder. He inquired about taking them off and changing them in written inquiry: "I need change clean...I need get alcohol" as he pointed to steri strips. Discussed through written language to allow to fall off and he may use soap and water in normal bathing routine. Also advised him to use anti-bacterial soap such as Dial.    Written Home Exercises Provided: previously.  Exercises were reviewed and Jesus was able to demonstrate them prior to the end of the session.  Jesus demonstrated good  understanding of the education provided.     See EMR under Media for exercises provided prior visit.    Assessment     Jesus is progressing well towards his goals.   Pt prognosis is Fair "     Pt will continue to benefit from skilled outpatient occupational therapy to address the deficits listed in the problem list box on initial evaluation, provide pt/family education and to maximize pt's level of independence in the home and community environment.     Pt's spiritual, cultural and educational needs considered and pt agreeable to plan of care and goals.     Anticipated barriers to occupational therapy: language barrier but he is currently doing well with written language. Sonali implemented in first visit, but due to report he needed to utilize both hands in signing with representative, preferred method is via handwrittent.        Plan     Continue OT per established POC.    Kita Pagan, OT

## 2019-12-11 NOTE — PROGRESS NOTES
Jesus Mccabe is a 59 y.o.  This is a 59-year-old gentleman who underwent surgery of his shoulder after having a fracture with the fall.  He has procedure done at [facility] in [place].  He is here today for evaluation of cytopenias.  He was brought in today by his sister who lives in [place].  The patient was homeless but is now living with her.  He is having some pain in his shoulder, he is deaf and is able to use the  on the computer to communicate with me.     Past Medical History:   Diagnosis Date    Coronary artery disease     Language barrier     MI (myocardial infarction)     Ulcer      Past Surgical History:   Procedure Laterality Date    ABDOMINAL SURGERY      CARDIAC SURGERY      EXTENSOR TENDON OF FOREARM / WRIST REPAIR      INTRAMEDULLARY RODDING OF HUMERUS Left 10/20/2019    Procedure: INSERTION, INTRAMEDULLARY SISSY, HUMERUS;  Surgeon: Jose Alejandro Funes MD;  [facility];  Service: Orthopedics;  Laterality: Left;    He is tolerating lisinopril for hypertension    Current Outpatient Medications:     lisinopril 10 MG tablet, Take 1 tablet (10 mg total) by mouth once daily., Disp: 30 tablet, Rfl: 11    naproxen (NAPROSYN) 500 MG tablet, Take 1 tablet (500 mg total) by mouth 2 (two) times daily as needed., Disp: 60 tablet, Rfl: 1  Review of patient's allergies indicates:  No Known Allergies  Social History     Tobacco Use    Smoking status: Current Every Day Smoker     Packs/day: 1.00    Smokeless tobacco: Never Used   Substance Use Topics    Alcohol use: Yes     Alcohol/week: 1.0 standard drinks     Types: 1 Cans of beer per week    Drug use: No     No family history on file.    CONSTITUTIONAL: No fevers, chills, night sweats, wt. loss, appetite changes  SKIN: no rashes or itching  ENT: No headaches, head trauma, vision changes, or eye pain  LYMPH NODES: None noticed   CV: No chest pain, palpitations.   RESP: No dyspnea on exertion, cough, wheezing  "  GI: No nausea, emesis, diarrhea, constipation, melena   : No dysuria, hematuria, urgency, or frequency   HEME: No easy bruising, bleeding problems  PSYCHIATRIC: No depression, anxiety, psychosis, hallucinations.  NEURO: No seizures, memory loss, dizziness or difficulty sleeping  MSK:  Positive left shoulder and arm arthralgia         /61   Pulse 73   Temp 98.6 °F (37 °C) (Oral)   Resp 16   Ht 5' 9" (1.753 m)   Wt 67.6 kg (149 lb)   SpO2 100%   BMI 22.00 kg/m²   Gen: NAD, A and O x3,   Psych: pleasant affect, normal thought process smiling Canelo conversation  Eyes: Pupils round and non dilated, EOM intact  Nose: Nares patent  OP clear, mucosa patent  Neck: suppple, no JVD, trachea midline, no palpable mas   Lungs: CTAB, no wheezes, no use of accessory muscles  CV: S1S2 with RRR, No mrg  Abd: soft, NTND, + BS, No HSM, no ascites  Extr: No CC   good capillary refill  Neuro: steady gait, CNs grossly intact  Skin: intact, no lesions noted  Rheum:  Left shoulder in a s L ing    Lab Results   Component Value Date    WBC 5.80 10/22/2019    HGB 11.0 (L) 10/22/2019    HCT 34.8 (L) 10/22/2019    MCV 95 10/22/2019     (L) 10/22/2019         CMP  Sodium   Date Value Ref Range Status   10/22/2019 136 136 - 145 mmol/L Final     Potassium   Date Value Ref Range Status   10/22/2019 4.0 3.5 - 5.1 mmol/L Final     Chloride   Date Value Ref Range Status   10/22/2019 99 95 - 110 mmol/L Final     CO2   Date Value Ref Range Status   10/22/2019 28 23 - 29 mmol/L Final     Glucose   Date Value Ref Range Status   10/22/2019 137 (H) 70 - 110 mg/dL Final     BUN, Bld   Date Value Ref Range Status   10/22/2019 7 6 - 20 mg/dL Final     Creatinine   Date Value Ref Range Status   10/22/2019 0.7 0.5 - 1.4 mg/dL Final     Calcium   Date Value Ref Range Status   10/22/2019 8.5 (L) 8.7 - 10.5 mg/dL Final     Total Protein   Date Value Ref Range Status   10/22/2019 6.9 6.0 - 8.4 g/dL Final     Albumin   Date Value Ref Range " Status   10/22/2019 2.9 (L) 3.5 - 5.2 g/dL Final     Total Bilirubin   Date Value Ref Range Status   10/22/2019 0.8 0.1 - 1.0 mg/dL Final     Comment:     For infants and newborns, interpretation of results should be based  on gestational age, weight and in agreement with clinical  observations.  Premature Infant recommended reference ranges:  Up to 24 hours.............<8.0 mg/dL  Up to 48 hours............<12.0 mg/dL  3-5 days..................<15.0 mg/dL  6-29 days.................<15.0 mg/dL       Alkaline Phosphatase   Date Value Ref Range Status   10/22/2019 45 (L) 55 - 135 U/L Final     AST   Date Value Ref Range Status   10/22/2019 32 10 - 40 U/L Final     ALT   Date Value Ref Range Status   10/22/2019 33 10 - 44 U/L Final     Anion Gap   Date Value Ref Range Status   10/22/2019 9 8 - 16 mmol/L Final     eGFR if    Date Value Ref Range Status   10/22/2019 >60 >60 mL/min/1.73 m^2 Final     eGFR if non    Date Value Ref Range Status   10/22/2019 >60 >60 mL/min/1.73 m^2 Final     Comment:     Calculation used to obtain the estimated glomerular filtration  rate (eGFR) is the CKD-EPI equation.          B12 deficiency  -     FREE LT CHAIN ANAL; Future; Expected date: 12/11/2019  -     CBC auto differential; Future; Expected date: 12/11/2019  -     CMP; Future; Expected date: 12/11/2019  -     Ambulatory Referral to General Surgery    Thrombocytopenia  -     FREE LT CHAIN ANAL; Future; Expected date: 12/11/2019  -     CBC auto differential; Future; Expected date: 12/11/2019  -     CMP; Future; Expected date: 12/11/2019  -     Ambulatory Referral to General Surgery    Polyclonal gammopathy  -     FREE LT CHAIN ANAL; Future; Expected date: 12/11/2019  -     CBC auto differential; Future; Expected date: 12/11/2019  -     CMP; Future; Expected date: 12/11/2019          Given the elevation in CEA he should have a colonoscopy for further evaluation  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  Refer him to primary care to help control his blood pressure.  I will see him back in a few weeks to discuss his lab results make further recs accordingly  RTC after   Thank you for allowing me to evaluate and participate in the care of this pleasant patient. Please fell free to call me with any questions or concerns.    Warmly,  Nanette Adams MD    This note was dictated with Dragon and briefly proofread. Please excuse any sentences that may be unclear or words misspelled

## 2019-12-18 ENCOUNTER — LAB VISIT (OUTPATIENT)
Dept: LAB | Facility: HOSPITAL | Age: 59
End: 2019-12-18
Attending: FAMILY MEDICINE
Payer: MEDICARE

## 2019-12-18 ENCOUNTER — OFFICE VISIT (OUTPATIENT)
Dept: FAMILY MEDICINE | Facility: CLINIC | Age: 59
End: 2019-12-18
Payer: MEDICARE

## 2019-12-18 VITALS
TEMPERATURE: 99 F | HEIGHT: 69 IN | BODY MASS INDEX: 21.38 KG/M2 | DIASTOLIC BLOOD PRESSURE: 98 MMHG | RESPIRATION RATE: 15 BRPM | SYSTOLIC BLOOD PRESSURE: 173 MMHG | WEIGHT: 144.38 LBS | OXYGEN SATURATION: 99 % | HEART RATE: 92 BPM

## 2019-12-18 DIAGNOSIS — B20 HUMAN IMMUNODEFICIENCY VIRUS (HIV) DISEASE: ICD-10-CM

## 2019-12-18 DIAGNOSIS — R76.8 HEPATITIS C ANTIBODY TEST POSITIVE: ICD-10-CM

## 2019-12-18 DIAGNOSIS — I10 PRIMARY HYPERTENSION: ICD-10-CM

## 2019-12-18 DIAGNOSIS — Z20.6 HIV EXPOSURE: ICD-10-CM

## 2019-12-18 DIAGNOSIS — R76.8 HEPATITIS C ANTIBODY TEST POSITIVE: Primary | ICD-10-CM

## 2019-12-18 DIAGNOSIS — L28.2 PRURITIC RASH: ICD-10-CM

## 2019-12-18 PROCEDURE — 36415 COLL VENOUS BLD VENIPUNCTURE: CPT

## 2019-12-18 PROCEDURE — 99215 PR OFFICE/OUTPT VISIT, EST, LEVL V, 40-54 MIN: ICD-10-PCS | Mod: S$GLB,,, | Performed by: FAMILY MEDICINE

## 2019-12-18 PROCEDURE — 99215 OFFICE O/P EST HI 40 MIN: CPT | Mod: S$GLB,,, | Performed by: FAMILY MEDICINE

## 2019-12-18 PROCEDURE — 87536 HIV-1 QUANT&REVRSE TRNSCRPJ: CPT

## 2019-12-18 PROCEDURE — 87522 HEPATITIS C REVRS TRNSCRPJ: CPT

## 2019-12-18 RX ORDER — LISINOPRIL 40 MG/1
40 TABLET ORAL DAILY
Qty: 30 TABLET | Refills: 11 | Status: SHIPPED | OUTPATIENT
Start: 2019-12-18 | End: 2021-06-17 | Stop reason: SDUPTHER
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
## 2019-12-20 ENCOUNTER — CLINICAL SUPPORT (OUTPATIENT)
Dept: REHABILITATION | Facility: HOSPITAL | Age: 59
End: 2019-12-20
Payer: MEDICARE

## 2019-12-20 DIAGNOSIS — S42.202D: Primary | ICD-10-CM

## 2019-12-20 LAB
HCV RNA SERPL NAA+PROBE-LOG IU: 6.12 LOG (10) IU/ML
HCV RNA SERPL QL NAA+PROBE: DETECTED IU/ML
HCV RNA SPEC NAA+PROBE-ACNC: ABNORMAL IU/ML
HIV UQ DATE RECEIVED: NORMAL
HIV UQ DATE REPORTED: NORMAL
HIV1 RNA # SERPL NAA+PROBE: <40 COPIES/ML
HIV1 RNA SERPL NAA+PROBE-LOG#: <1.6 LOG (10) COPIES/ML
HIV1 RNA SERPL QL NAA+PROBE: NOT DETECTED

## 2019-12-20 PROCEDURE — 97140 MANUAL THERAPY 1/> REGIONS: CPT

## 2019-12-20 PROCEDURE — 97010 HOT OR COLD PACKS THERAPY: CPT

## 2019-12-20 NOTE — PROGRESS NOTES
Occupational Therapy Daily Treatment Note     Name: Jesus Mccabe  Clinic Number: [id number]    Therapy Diagnosis:   Encounter Diagnosis   Name Primary?    Traumatic closed nondisplaced fracture of proximal end of humerus with routine healing, left Yes     Physician: Lakhwinder Frank DO    Visit Date: 12/20/2019    Physician Orders: EMEKA CAMILO  Medical Diagnosis: L humerus fx   Evaluation Date: 12/04/2019  Authorization Period Expiration:   Plan of Care Certification Period: 02/21/2020  Visit #/Visits authorized: 3    Time In: 900  Time Out: 1000  Total Billable Time: 50  minutes    Precautions: Standard and no heavy lifting LUE; Deaf     Subjective     Pt reports: soreness LUE with patient holding 2 fingers up for pain level when asked in writing.  Response to previous treatment: tolerance to ROM fair   Functional change: tolerance in tx slightly increased today      Pain: 10/10  Location: left shoulder      Objective   Jesus received hot pack for 20 minutes to L shoulder.    Jesus received therapeutic exercises to develop ROM and flexibility including:  AAROM L elbow flex/ext  AROM L hand flex/ext    Jesus received manual therapy technique to improve joint mobility and flexibility of L shoulder supine on mat receiving manual stretches in FF and ABD x 30 miins.    Home Exercises Provided and Patient Education Provided     Education provided:   -HEP     Written Home Exercises Provided: previously.  Exercises were reviewed and Jesus was able to demonstrate them prior to the end of the session.  Jesus demonstrated good  understanding of the education provided.     See EMR under Media for exercises provided prior visit.    Assessment     Jesus is progressing well towards his goals.   Pt prognosis is Fair     Pt will continue to benefit from skilled outpatient occupational therapy to address the deficits listed in the problem list box on initial evaluation, provide pt/family education and to maximize pt's level of  independence in the home and community environment.     Pt's spiritual, cultural and educational needs considered and pt agreeable to plan of care and goals.     Anticipated barriers to occupational therapy: language barrier but he is currently doing well with written language. Sonali implemented in first visit, but due to report he needed to utilize both hands in signing with representative, preferred method is via handwrittent.      Plan     Continue OT per established POC.    Kita Pagan, OT

## 2019-12-26 ENCOUNTER — PATIENT OUTREACH (OUTPATIENT)
Dept: ADMINISTRATIVE | Facility: HOSPITAL | Age: 59
End: 2019-12-26

## 2019-12-26 NOTE — LETTER
"December 26, 2019    Jesus Mccabe  [address] [place] [address]             [facility]  [address]  [place] [place] [address]  Phone: [phone] Dear [facility] is committed to your overall health and would like to ensure that you are up to date on your recommended test and/or procedures.   Selina Denney MD  has found that your chart shows you may be due for the following:    Health Maintenance Due   Topic Date Due    Lipid Panel  1960    TETANUS VACCINE  12/04/1977    Aspirin/Antiplatelet Therapy  12/04/1977    High Dose Statin  12/04/1980    Shingles Vaccine (1 of 2) 12/04/2009    Colonoscopy  12/04/2009    Pneumococcal Vaccine (Highest Risk) (2 of 3 - PPSV23) 12/18/2019     If you haven't signed up for a colorectal screening please accept this invitation to be screened.    According to the American Cancer Society, colon cancer is the third most common cancer for people in the [place].  A simple screening test "Fit Kit" - done in your own home - can help find colon cancer at an early stage when it can be treated, even before any signs or symptoms develop. THIS IS A YEARLY TEST.    Testing for blood in your stool (feces or bowel movement) is the first step. If you have an upcoming visit with your Primary Care Physician you can  a Fit Kit during your visit or you can  a Fit Kit at your Primary Care Clinic prior to your visit.    The Fit Kit includes:    · Instructions on how to perform the test  · (1) Sheet of tissue paper  · (1) Small Absorption pad  · (1) Bottle to hold the sample and a small probe to help you take the sample  · (1) Small plastic bio-hazard bag  · (1) Postage-paid return envelope    Please do your test (the instructions will tell you how) and then return your sample in the postage-paid return envelope within 24 hours of collection.     If your test results are negative, you won't need testing again for another year.  " "If results show you need more testing, we will call you with next steps.    Regular colorectal cancer screening is one of the most powerful weapons for preventing colon cancer.  The website https://www.cancer.org/cancer/colon-rectal-cancer.html can answer many of your questions about this cancer and its prevention.  Just search for "colorectal cancer".  If you have any questions please call [facility] [employer] [phone] for assistance.  If you have had any of the above done at another facility, please let us know so that we may obtain copies from that facility.  If you have a copy of these records, please provide a copy for us to scan into your chart.  You are welcome to request that the report be faxed to us at  ([phone]).     Otherwise, please schedule these appointments at your earliest convenience by calling [phone] or going to [email or web address].    If you have an upcoming scheduled appointment for the item above, please disregard this letter.    Sincerely,  Your [facility]  MD Mary Loza L.P.N. Clinical Care Coordinator  [address] [place] [address], [place] [address] [phone]         "

## 2020-01-08 ENCOUNTER — OFFICE VISIT (OUTPATIENT)
Dept: FAMILY MEDICINE | Facility: CLINIC | Age: 60
End: 2020-01-08
Payer: MEDICARE

## 2020-01-08 VITALS
WEIGHT: 149 LBS | OXYGEN SATURATION: 99 % | RESPIRATION RATE: 15 BRPM | HEIGHT: 69 IN | HEART RATE: 78 BPM | BODY MASS INDEX: 22.07 KG/M2 | DIASTOLIC BLOOD PRESSURE: 83 MMHG | TEMPERATURE: 98 F | SYSTOLIC BLOOD PRESSURE: 142 MMHG

## 2020-01-08 DIAGNOSIS — K76.0 FATTY LIVER: ICD-10-CM

## 2020-01-08 DIAGNOSIS — B18.2 CHRONIC HEPATITIS C WITHOUT HEPATIC COMA: ICD-10-CM

## 2020-01-08 DIAGNOSIS — M25.512 LEFT SHOULDER PAIN, UNSPECIFIED CHRONICITY: ICD-10-CM

## 2020-01-08 DIAGNOSIS — I10 HYPERTENSION, UNSPECIFIED TYPE: Primary | ICD-10-CM

## 2020-01-08 PROCEDURE — 99215 OFFICE O/P EST HI 40 MIN: CPT | Mod: S$GLB,,, | Performed by: FAMILY MEDICINE

## 2020-01-08 PROCEDURE — 99215 PR OFFICE/OUTPT VISIT, EST, LEVL V, 40-54 MIN: ICD-10-PCS | Mod: S$GLB,,, | Performed by: FAMILY MEDICINE

## 2020-01-08 RX ORDER — NAPROXEN 500 MG/1
500 TABLET ORAL 2 TIMES DAILY PRN
Qty: 60 TABLET | Refills: 1 | Status: SHIPPED | OUTPATIENT
Start: 2020-01-08 | End: 2020-07-10

## 2020-01-08 RX ORDER — METHOCARBAMOL 750 MG/1
750 TABLET, FILM COATED ORAL DAILY PRN
Qty: 30 TABLET | Refills: 1 | Status: SHIPPED | OUTPATIENT
Start: 2020-01-08 | End: 2020-01-18

## 2020-01-08 NOTE — PROGRESS NOTES
[facility] - Clinic Note    Subjective      Mr. Mccabe is a 59 y.o. male who presents to clinic for follow-up    Riddhi used to interpret during the entirety of the exam.  Sister is also present in the room and provides some of the history.  Patient presents for follow-up.  At last visit we discussed his hypertension and increased his lisinopril to 40 mg daily.  He has been taking this as prescribed and has noticed a decrease in his blood pressure.  He does not have any problems or side effects from this medication.  Patient continues to complain about his left shoulder.  Says that it is bothering him all the time.  Does not worried about any of his other medical problems except for a shoulder.  Is not willing to participate in physical therapy is recommended and will not take his arm out of the sling because of the pain. He was at risk for adhesive capsulitis but a 2nd surgery is questionable because of the difficulty adhering to recommendations postoperatively.  We also discussed his hepatitis C diagnosis.  He reports that he is not worried about this.  He drinks several beers a day and says that he would think about quitting but is not going to quit now.    PMH Jesus has a past medical history of Coronary artery disease, Language barrier, MI (myocardial infarction), and Ulcer.   PSXH Jesus has a past surgical history that includes Abdominal surgery; Extensor tendon of forearm / wrist repair; Cardiac surgery; and Intramedullary rodding of humerus (Left, 10/20/2019).    Jesus's family history is not on file.   CAMILO Lee reports that he has been smoking. He has been smoking about 1.00 pack per day. He has never used smokeless tobacco. He reports that he drinks about 1.0 standard drinks of alcohol per week. He reports that he does not use drugs.   CAIT Lee has No Known Allergies.   MED Jesus has a current medication list which includes the following prescription(s): lisinopril, naproxen, and  "methocarbamol.     Review of Systems   Respiratory: Negative for shortness of breath.    Cardiovascular: Negative for chest pain.   Gastrointestinal: Negative for abdominal pain, nausea and vomiting.   Musculoskeletal: Positive for arthralgias (Left shoulder pain).     Objective     BP (!) 142/83 (BP Location: Right arm, Patient Position: Sitting, BP Method: Medium (Automatic))   Pulse 78   Temp 98.2 °F (36.8 °C) (Oral)   Resp 15   Ht 5' 9" (1.753 m)   Wt 67.6 kg (149 lb)   SpO2 99%   BMI 22.00 kg/m²     Physical Exam   Constitutional:   Thin male, no acute distress, continues to wear shoulder sling   HENT:   Head: Normocephalic and atraumatic.   Right Ear: External ear normal.   Left Ear: External ear normal.   Hearing impaired.  Poor dentition   Eyes: Conjunctivae are normal.   Cardiovascular: Normal rate, regular rhythm, normal heart sounds and intact distal pulses.   No murmur heard.  Pulmonary/Chest: Effort normal and breath sounds normal. He has no wheezes. He has no rales.   Lymphadenopathy:     He has no cervical adenopathy.   Skin: Skin is warm and dry.   Psychiatric: Mood and affect normal.   He appears competent to make decisions however his judgment is not necessarily normal.  He does not seem to understand the serious nature of his condition in regards to hepatitis-C.  He states that he is not worried about it and is not going to die.  No SI or HI   Vitals reviewed.    Assessment/Plan     1. Hypertension, unspecified type  Lipid panel   2. Left shoulder pain, unspecified chronicity  naproxen (NAPROSYN) 500 MG tablet   3. Fatty liver  US Abdomen Limited   4. Chronic hepatitis C without hepatic coma  US Abdomen Limited     No changes to medication.  For left shoulder pain, will add a muscle relaxer.  Advised as I have in the previous visits that he stopped wearing his shoulder sling.  Advised to take medication prior to going to physical therapy so that he can better participate.  He has been " evaluated by Orthopedics but is not consistent with prescribed exercises and recommendations.  Attempted discuss with the patient the severity of his hepatitis-C and the need for intervention.  He is not willing to give up drinking at this time.  He is very concerned with his left shoulder pain and continues to refer back to this as initiate discussions about hepatitis-C.  He appears to have his mental faculties intact and is competent to make decisions.  His sister is present in the room and reports that he has no desire to quit drinking.  Will discuss with hepatology and make further decisions from there.  Monitor liver enzymes.  UPDATE: Discussed case with Hepatology Clinic. Referral placed. They plan to provide counseling/offer advice on abstaining from alcohol use.     Spent greater than 45 minutes in the care and coordination of this patient.    Follow up in about 7 weeks (around 2/27/2020).    Future Appointments   Date Time Provider Department Center   1/23/2020  2:30 PM Lloyd Hidalgo MD [facility] G[facility]   2/27/2020 11:00 AM Selina Denney MD [facility] FAMMED [facility]   4/6/2020  8:00 AM [facility], LABORATORY [facility] LAB [facility]   4/8/2020  1:00 PM Nanette Adams MD [facility] HEMON1 [facility]       Selina Denney MD  Family Medicine  [facility]  [phone]

## 2020-01-09 ENCOUNTER — TELEPHONE (OUTPATIENT)
Dept: HEPATOLOGY | Facility: CLINIC | Age: 60
End: 2020-01-09

## 2020-01-09 DIAGNOSIS — B18.2 CHRONIC HEPATITIS C WITHOUT HEPATIC COMA: Primary | ICD-10-CM

## 2020-01-09 NOTE — TELEPHONE ENCOUNTER
Dr. Selina Denney called to setup hep c consult appt for patient.  I spoke with his sister and patient scheduled to see PA Scheuermann on 1/13/20 with a fibroscan.

## 2020-01-12 ENCOUNTER — PATIENT OUTREACH (OUTPATIENT)
Dept: ADMINISTRATIVE | Facility: OTHER | Age: 60
End: 2020-01-12

## 2020-01-13 ENCOUNTER — OFFICE VISIT (OUTPATIENT)
Dept: HEPATOLOGY | Facility: CLINIC | Age: 60
End: 2020-01-13
Payer: MEDICARE

## 2020-01-13 ENCOUNTER — LAB VISIT (OUTPATIENT)
Dept: LAB | Facility: HOSPITAL | Age: 60
End: 2020-01-13
Attending: PHYSICIAN ASSISTANT
Payer: MEDICARE

## 2020-01-13 ENCOUNTER — PROCEDURE VISIT (OUTPATIENT)
Dept: HEPATOLOGY | Facility: CLINIC | Age: 60
End: 2020-01-13
Payer: MEDICARE

## 2020-01-13 VITALS
WEIGHT: 143.94 LBS | HEIGHT: 69 IN | RESPIRATION RATE: 16 BRPM | SYSTOLIC BLOOD PRESSURE: 129 MMHG | DIASTOLIC BLOOD PRESSURE: 83 MMHG | HEART RATE: 72 BPM | TEMPERATURE: 98 F | BODY MASS INDEX: 21.32 KG/M2

## 2020-01-13 DIAGNOSIS — B18.2 CHRONIC HEPATITIS C WITHOUT HEPATIC COMA: ICD-10-CM

## 2020-01-13 DIAGNOSIS — K74.00 LIVER FIBROSIS: ICD-10-CM

## 2020-01-13 DIAGNOSIS — B18.2 CHRONIC HEPATITIS C WITHOUT HEPATIC COMA: Primary | ICD-10-CM

## 2020-01-13 LAB
AFP SERPL-MCNC: 10 NG/ML (ref 0–8.4)
ALBUMIN SERPL BCP-MCNC: 3.9 G/DL (ref 3.5–5.2)
ALP SERPL-CCNC: 101 U/L (ref 55–135)
ALT SERPL W/O P-5'-P-CCNC: 202 U/L (ref 10–44)
ANION GAP SERPL CALC-SCNC: 12 MMOL/L (ref 8–16)
AST SERPL-CCNC: 274 U/L (ref 10–40)
BASOPHILS # BLD AUTO: 0.04 K/UL (ref 0–0.2)
BASOPHILS NFR BLD: 0.9 % (ref 0–1.9)
BILIRUB SERPL-MCNC: 0.5 MG/DL (ref 0.1–1)
BUN SERPL-MCNC: 11 MG/DL (ref 6–20)
CALCIUM SERPL-MCNC: 9.5 MG/DL (ref 8.7–10.5)
CHLORIDE SERPL-SCNC: 104 MMOL/L (ref 95–110)
CO2 SERPL-SCNC: 24 MMOL/L (ref 23–29)
CREAT SERPL-MCNC: 0.8 MG/DL (ref 0.5–1.4)
DIFFERENTIAL METHOD: ABNORMAL
EOSINOPHIL # BLD AUTO: 0.1 K/UL (ref 0–0.5)
EOSINOPHIL NFR BLD: 2.2 % (ref 0–8)
ERYTHROCYTE [DISTWIDTH] IN BLOOD BY AUTOMATED COUNT: 15.9 % (ref 11.5–14.5)
EST. GFR  (AFRICAN AMERICAN): >60 ML/MIN/1.73 M^2
EST. GFR  (NON AFRICAN AMERICAN): >60 ML/MIN/1.73 M^2
GLUCOSE SERPL-MCNC: 82 MG/DL (ref 70–110)
HBV CORE AB SERPL QL IA: NEGATIVE
HBV SURFACE AB SER-ACNC: NEGATIVE M[IU]/ML
HBV SURFACE AG SERPL QL IA: NEGATIVE
HCT VFR BLD AUTO: 53.2 % (ref 40–54)
HEPATITIS A ANTIBODY, IGG: POSITIVE
HGB BLD-MCNC: 16.5 G/DL (ref 14–18)
IMM GRANULOCYTES # BLD AUTO: 0.01 K/UL (ref 0–0.04)
IMM GRANULOCYTES NFR BLD AUTO: 0.2 % (ref 0–0.5)
INR PPP: 1.1 (ref 0.8–1.2)
LYMPHOCYTES # BLD AUTO: 2.5 K/UL (ref 1–4.8)
LYMPHOCYTES NFR BLD: 55.5 % (ref 18–48)
MCH RBC QN AUTO: 28.8 PG (ref 27–31)
MCHC RBC AUTO-ENTMCNC: 31 G/DL (ref 32–36)
MCV RBC AUTO: 93 FL (ref 82–98)
MONOCYTES # BLD AUTO: 0.3 K/UL (ref 0.3–1)
MONOCYTES NFR BLD: 6 % (ref 4–15)
NEUTROPHILS # BLD AUTO: 1.6 K/UL (ref 1.8–7.7)
NEUTROPHILS NFR BLD: 35.2 % (ref 38–73)
NRBC BLD-RTO: 0 /100 WBC
PLATELET # BLD AUTO: 176 K/UL (ref 150–350)
PMV BLD AUTO: 12.4 FL (ref 9.2–12.9)
POTASSIUM SERPL-SCNC: 4.7 MMOL/L (ref 3.5–5.1)
PROT SERPL-MCNC: 8.9 G/DL (ref 6–8.4)
PROTHROMBIN TIME: 11 SEC (ref 9–12.5)
RBC # BLD AUTO: 5.73 M/UL (ref 4.6–6.2)
SODIUM SERPL-SCNC: 140 MMOL/L (ref 136–145)
WBC # BLD AUTO: 4.52 K/UL (ref 3.9–12.7)

## 2020-01-13 PROCEDURE — 36415 COLL VENOUS BLD VENIPUNCTURE: CPT

## 2020-01-13 PROCEDURE — 87522 HEPATITIS C REVRS TRNSCRPJ: CPT

## 2020-01-13 PROCEDURE — 80053 COMPREHEN METABOLIC PANEL: CPT

## 2020-01-13 PROCEDURE — 87340 HEPATITIS B SURFACE AG IA: CPT

## 2020-01-13 PROCEDURE — 99999 PR PBB SHADOW E&M-EST. PATIENT-LVL IV: ICD-10-PCS | Mod: PBBFAC,,, | Performed by: PHYSICIAN ASSISTANT

## 2020-01-13 PROCEDURE — 82105 ALPHA-FETOPROTEIN SERUM: CPT

## 2020-01-13 PROCEDURE — 99203 PR OFFICE/OUTPT VISIT, NEW, LEVL III, 30-44 MIN: ICD-10-PCS | Mod: S$PBB,,, | Performed by: PHYSICIAN ASSISTANT

## 2020-01-13 PROCEDURE — 86706 HEP B SURFACE ANTIBODY: CPT

## 2020-01-13 PROCEDURE — 99203 OFFICE O/P NEW LOW 30 MIN: CPT | Mod: S$PBB,,, | Performed by: PHYSICIAN ASSISTANT

## 2020-01-13 PROCEDURE — 86704 HEP B CORE ANTIBODY TOTAL: CPT

## 2020-01-13 PROCEDURE — 99214 OFFICE O/P EST MOD 30 MIN: CPT | Mod: PBBFAC,25 | Performed by: PHYSICIAN ASSISTANT

## 2020-01-13 PROCEDURE — 91200 LIVER ELASTOGRAPHY: CPT | Mod: PBBFAC | Performed by: PHYSICIAN ASSISTANT

## 2020-01-13 PROCEDURE — 87902 NFCT AGT GNTYP ALYS HEP C: CPT

## 2020-01-13 PROCEDURE — 85610 PROTHROMBIN TIME: CPT

## 2020-01-13 PROCEDURE — 85025 COMPLETE CBC W/AUTO DIFF WBC: CPT

## 2020-01-13 PROCEDURE — 91200 FIBROSCAN (VIBRATION CONTROLLED TRANSIENT ELASTOGRAPHY): ICD-10-PCS | Mod: 26,S$PBB,, | Performed by: PHYSICIAN ASSISTANT

## 2020-01-13 PROCEDURE — 99999 PR PBB SHADOW E&M-EST. PATIENT-LVL IV: CPT | Mod: PBBFAC,,, | Performed by: PHYSICIAN ASSISTANT

## 2020-01-13 PROCEDURE — 86790 VIRUS ANTIBODY NOS: CPT

## 2020-01-13 PROCEDURE — 91200 LIVER ELASTOGRAPHY: CPT | Mod: 26,S$PBB,, | Performed by: PHYSICIAN ASSISTANT

## 2020-01-13 NOTE — PROCEDURES
FibroScan (Vibration Controlled Transient Elastography)  Date/Time: 1/13/2020 11:00 AM  Performed by: Jennifer B. Scheuermann, PA  Authorized by: Jennifer B. Scheuermann, PA     Diagnosis:  HCV    Probe:  M    Universal Protocol: Patient's identity, procedure and site were verified, confirmatory pause was performed.  Discussed procedure including risks and potential complications.  Questions answered.  Patient verbalizes understanding and wishes to proceed with VCTE.     Procedure: After providing explanations of the procedure, patient was placed in the supine position with right arm in maximum abduction to allow optimal exposure of right lateral abdomen.  Patient was briefly assessed, Testing was performed in the mid-axillary location, 50Hz Shear Wave pulses were applied and the resulting Shear Wave and Propagation Speed detected with a 3.5 MHz ultrasonic signal, using the FibroScan probe, Skin to liver capsule distance and liver parenchyma were accessed during the entire examination with the FibroScan probe, Patient was instructed to breathe normally and to abstain from sudden movements during the procedure, allowing for random measurements of liver stiffness. At least 10 Shear Waves were produced, Individual measurements of each Shear Wave were calculated.  Patient tolerated the procedure well with no complications.  Meets discharge criteria as was dismissed.  Rates pain 0 out of 10.  Patient will follow up with ordering provider to review results.      Findings  Median liver stiffness score:  9.6  CAP Reading: dB/m:  322    IQR/med %:  6  Interpretation  Fibrosis interpretation is based on medial liver stiffness - Kilopascal (kPa).    Fibrosis Stage:  F3  Steatosis interpretation is based on controlled attenuation parameter - (dB/m).    Steatosis Grade:  S3
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
## 2020-01-13 NOTE — LETTER
January 13, 2020      Selina Denney MD  [address] [facility] [place] [address]           Manny Aviles - Hepatitis C  [address]  [facility] [address]  Phone: [phone]  Fax: [phone]          Patient: Jesus Mccabe   MR Number: [id number]   YOB: 1960   Date of Visit: 1/13/2020       Dear Dr. Selina Denney:    Thank you for referring Jesus Mccabe to me for evaluation. Attached you will find relevant portions of my assessment and plan of care.    If you have questions, please do not hesitate to call me. I look forward to following Jesus Mccabe along with you.    Sincerely,    Jennifer B. Scheuermann, PA    Enclosure  CC:  No Recipients    If you would like to receive this communication electronically, please contact [email or web address] or [phone] to request more information on [facility] Link access.    For providers and/or their staff who would like to refer a patient to [facility], please contact us through our one-stop-shop provider referral line, [facility], at [phone].    If you feel you have received this communication in error or would no longer like to receive these types of communications, please e-mail [email or web address]

## 2020-01-16 ENCOUNTER — HOSPITAL ENCOUNTER (OUTPATIENT)
Dept: RADIOLOGY | Facility: HOSPITAL | Age: 60
Discharge: HOME | End: 2020-01-16
Attending: PHYSICIAN ASSISTANT
Payer: MEDICARE

## 2020-01-16 DIAGNOSIS — B18.2 CHRONIC HEPATITIS C WITHOUT HEPATIC COMA: ICD-10-CM

## 2020-01-16 PROCEDURE — 76700 US EXAM ABDOM COMPLETE: CPT | Mod: TC

## 2020-01-16 PROCEDURE — 76700 US ABDOMEN COMPLETE: ICD-10-PCS | Mod: 26,,, | Performed by: RADIOLOGY

## 2020-01-16 PROCEDURE — 76700 US EXAM ABDOM COMPLETE: CPT | Mod: 26,,, | Performed by: RADIOLOGY

## 2020-01-18 LAB
HCV GENTYP SERPL NAA+PROBE: ABNORMAL
HCV RNA SERPL NAA+PROBE-LOG IU: 6.04 LOG (10) IU/ML
HCV RNA SERPL QL NAA+PROBE: DETECTED
HCV RNA SPEC NAA+PROBE-ACNC: ABNORMAL IU/ML

## 2020-01-23 ENCOUNTER — OFFICE VISIT (OUTPATIENT)
Dept: SURGERY | Facility: CLINIC | Age: 60
End: 2020-01-23
Payer: MEDICARE

## 2020-01-23 ENCOUNTER — PATIENT OUTREACH (OUTPATIENT)
Dept: ADMINISTRATIVE | Facility: OTHER | Age: 60
End: 2020-01-23

## 2020-01-23 DIAGNOSIS — R97.0 ELEVATED CEA: Primary | ICD-10-CM

## 2020-01-23 DIAGNOSIS — D62 ACUTE BLOOD LOSS ANEMIA: ICD-10-CM

## 2020-01-23 PROCEDURE — 99204 OFFICE O/P NEW MOD 45 MIN: CPT | Mod: S$GLB,,, | Performed by: SURGERY

## 2020-01-23 PROCEDURE — 99204 PR OFFICE/OUTPT VISIT, NEW, LEVL IV, 45-59 MIN: ICD-10-PCS | Mod: S$GLB,,, | Performed by: SURGERY

## 2020-01-23 RX ORDER — POLYETHYLENE GLYCOL 3350, SODIUM SULFATE ANHYDROUS, SODIUM BICARBONATE, SODIUM CHLORIDE, POTASSIUM CHLORIDE 236; 22.74; 6.74; 5.86; 2.97 G/4L; G/4L; G/4L; G/4L; G/4L
POWDER, FOR SOLUTION ORAL ONCE
Status: ON HOLD | COMMUNITY
End: 2023-11-21 | Stop reason: HOSPADM

## 2020-01-23 RX ORDER — SODIUM CHLORIDE 9 MG/ML
INJECTION, SOLUTION INTRAVENOUS CONTINUOUS
Status: CANCELLED | OUTPATIENT
Start: 2020-01-23

## 2020-01-23 RX ORDER — LIDOCAINE HYDROCHLORIDE 10 MG/ML
1 INJECTION, SOLUTION EPIDURAL; INFILTRATION; INTRACAUDAL; PERINEURAL ONCE
Status: DISCONTINUED | OUTPATIENT
Start: 2020-01-23 | End: 2023-11-21 | Stop reason: HOSPADM

## 2020-01-23 NOTE — LETTER
January 24, 2020      Nanette Adams MD  [address] [facility] [address]  [facility] [address]           [facility] - General Surgery  [address] [facility] [place] [address]  Phone: [phone]  Fax: [phone]          Patient: Jesus Mccabe   MR Number: [id number]   YOB: 1960   Date of Visit: 1/23/2020       Dear Dr. Nanette Adams:    Thank you for referring Jesus Mccabe to me for evaluation. Attached you will find relevant portions of my assessment and plan of care.    If you have questions, please do not hesitate to call me. I look forward to following Jesus Mccabe along with you.    Sincerely,    Lloyd Hidalgo MD    Enclosure  CC:  No Recipients    If you would like to receive this communication electronically, please contact [email or web address] or [phone] to request more information on [facility] Link access.    For providers and/or their staff who would like to refer a patient to [facility], please contact us through our one-stop-shop provider referral line, [facility], at [phone].    If you feel you have received this communication in error or would no longer like to receive these types of communications, please e-mail [email or web address]

## 2020-01-23 NOTE — PATIENT INSTRUCTIONS
Colonoscopy     A camera attached to a flexible tube with a viewing lens is used to take video pictures.     Colonoscopy is a test to view the inside of your lower digestive tract (colon and rectum). Sometimes it can show the last part of the small intestine (ileum). During the test, small pieces of tissue may be removed for testing. This is called a biopsy. Small growths, such as polyps, may also be removed.   Why is colonoscopy done?  The test is done to help look for colon cancer. And it can help find the source of abdominal pain, bleeding, and changes in bowel habits. It may be needed once a year, depending on factors such as your:  · Age  · Health history  · Family health history  · Symptoms  · Results from any prior colonoscopy  Risks and possible complications  These include:  · Bleeding               · A puncture or tear in the colon   · Risks of anesthesia  · A cancer lesion not being seen  Getting ready   To prepare for the test:  · Talk with your healthcare provider about the risks of the test (see below). Also ask your healthcare provider about alternatives to the test.  · Tell your healthcare provider about any medicines you take. Also tell him or her about any health conditions you may have.  · Make sure your rectum and colon are empty for the test. Follow the diet and bowel prep instructions exactly. If you dont, the test may need to be rescheduled.  · Plan for a friend or family member to drive you home after the test.     Colonoscopy provides an inside view of the entire colon.     You may discuss the results with your doctor right away or at a future visit.  During the test   The test is usually done in the hospital on an outpatient basis. This means you go home the same day. The procedure takes about 30 minutes. During that time:  · You are given relaxing (sedating) medicine through an IV line. You may be drowsy, or fully asleep.  · The healthcare provider will first give you a physical exam to  check for anal and rectal problems.  · Then the anus is lubricated and the scope inserted.  · If you are awake, you may have a feeling similar to needing to have a bowel movement. You may also feel pressure as air is pumped into the colon. Its OK to pass gas during the procedure.  · Biopsy, polyp removal, or other treatments may be done during the test.  After the test   You may have gas right after the test. It can help to try to pass it to help prevent later bloating. Your healthcare provider may discuss the results with you right away. Or you may need to schedule a follow-up visit to talk about the results. After the test, you can go back to your normal eating and other activities. You may be tired from the sedation and need to rest for a few hours.  Date Last Reviewed: 11/1/2016 © 2000-2017 [employer]. [address] [place] [address], [place] [address]. All rights reserved. This information is not intended as a substitute for professional medical care. Always follow your healthcare professional's instructions.

## 2020-01-24 ENCOUNTER — CLINICAL SUPPORT (OUTPATIENT)
Dept: INFECTIOUS DISEASES | Facility: CLINIC | Age: 60
End: 2020-01-24
Payer: MEDICARE

## 2020-01-24 ENCOUNTER — OFFICE VISIT (OUTPATIENT)
Dept: HEPATOLOGY | Facility: CLINIC | Age: 60
End: 2020-01-24
Payer: MEDICARE

## 2020-01-24 ENCOUNTER — LAB VISIT (OUTPATIENT)
Dept: LAB | Facility: HOSPITAL | Age: 60
End: 2020-01-24
Attending: PHYSICIAN ASSISTANT
Payer: MEDICARE

## 2020-01-24 VITALS
WEIGHT: 143.94 LBS | DIASTOLIC BLOOD PRESSURE: 76 MMHG | TEMPERATURE: 99 F | SYSTOLIC BLOOD PRESSURE: 132 MMHG | BODY MASS INDEX: 21.26 KG/M2 | HEART RATE: 70 BPM

## 2020-01-24 DIAGNOSIS — K74.00 LIVER FIBROSIS: ICD-10-CM

## 2020-01-24 DIAGNOSIS — B18.2 CHRONIC HEPATITIS C WITHOUT HEPATIC COMA: Primary | ICD-10-CM

## 2020-01-24 DIAGNOSIS — R74.8 ABNORMAL TRANSAMINASES: ICD-10-CM

## 2020-01-24 DIAGNOSIS — R73.01 ELEVATED FASTING BLOOD SUGAR: ICD-10-CM

## 2020-01-24 DIAGNOSIS — Z23 VACCINE FOR VIRAL HEPATITIS: ICD-10-CM

## 2020-01-24 DIAGNOSIS — Z00.00 PREVENTATIVE HEALTH CARE: ICD-10-CM

## 2020-01-24 DIAGNOSIS — B18.2 CHRONIC HEPATITIS C WITHOUT HEPATIC COMA: ICD-10-CM

## 2020-01-24 LAB
ALBUMIN SERPL BCP-MCNC: 3.7 G/DL (ref 3.5–5.2)
ALP SERPL-CCNC: 80 U/L (ref 55–135)
ALT SERPL W/O P-5'-P-CCNC: 74 U/L (ref 10–44)
ANION GAP SERPL CALC-SCNC: 11 MMOL/L (ref 8–16)
AST SERPL-CCNC: 56 U/L (ref 10–40)
BILIRUB SERPL-MCNC: 0.3 MG/DL (ref 0.1–1)
BUN SERPL-MCNC: 5 MG/DL (ref 6–20)
CALCIUM SERPL-MCNC: 9.5 MG/DL (ref 8.7–10.5)
CERULOPLASMIN SERPL-MCNC: 26 MG/DL (ref 15–45)
CHLORIDE SERPL-SCNC: 108 MMOL/L (ref 95–110)
CO2 SERPL-SCNC: 22 MMOL/L (ref 23–29)
CREAT SERPL-MCNC: 0.8 MG/DL (ref 0.5–1.4)
EST. GFR  (AFRICAN AMERICAN): >60 ML/MIN/1.73 M^2
EST. GFR  (NON AFRICAN AMERICAN): >60 ML/MIN/1.73 M^2
ESTIMATED AVG GLUCOSE: 114 MG/DL (ref 68–131)
FERRITIN SERPL-MCNC: 260 NG/ML (ref 20–300)
GLUCOSE SERPL-MCNC: 104 MG/DL (ref 70–110)
HBA1C MFR BLD HPLC: 5.6 % (ref 4–5.6)
IGG SERPL-MCNC: 1704 MG/DL (ref 650–1600)
IRON SERPL-MCNC: 104 UG/DL (ref 45–160)
POTASSIUM SERPL-SCNC: 3.9 MMOL/L (ref 3.5–5.1)
PROT SERPL-MCNC: 8.3 G/DL (ref 6–8.4)
SATURATED IRON: 23 % (ref 20–50)
SODIUM SERPL-SCNC: 141 MMOL/L (ref 136–145)
TOTAL IRON BINDING CAPACITY: 450 UG/DL (ref 250–450)
TRANSFERRIN SERPL-MCNC: 304 MG/DL (ref 200–375)

## 2020-01-24 PROCEDURE — G0010 ADMIN HEPATITIS B VACCINE: HCPCS | Mod: PBBFAC

## 2020-01-24 PROCEDURE — 99999 PR PBB SHADOW E&M-EST. PATIENT-LVL IV: ICD-10-PCS | Mod: PBBFAC,,, | Performed by: PHYSICIAN ASSISTANT

## 2020-01-24 PROCEDURE — 86038 ANTINUCLEAR ANTIBODIES: CPT

## 2020-01-24 PROCEDURE — 99214 OFFICE O/P EST MOD 30 MIN: CPT | Mod: S$PBB,,, | Performed by: PHYSICIAN ASSISTANT

## 2020-01-24 PROCEDURE — 83540 ASSAY OF IRON: CPT

## 2020-01-24 PROCEDURE — 82390 ASSAY OF CERULOPLASMIN: CPT

## 2020-01-24 PROCEDURE — 86256 FLUORESCENT ANTIBODY TITER: CPT

## 2020-01-24 PROCEDURE — 83036 HEMOGLOBIN GLYCOSYLATED A1C: CPT

## 2020-01-24 PROCEDURE — 36415 COLL VENOUS BLD VENIPUNCTURE: CPT

## 2020-01-24 PROCEDURE — 80053 COMPREHEN METABOLIC PANEL: CPT

## 2020-01-24 PROCEDURE — 99214 OFFICE O/P EST MOD 30 MIN: CPT | Mod: PBBFAC,25 | Performed by: PHYSICIAN ASSISTANT

## 2020-01-24 PROCEDURE — 99214 PR OFFICE/OUTPT VISIT, EST, LEVL IV, 30-39 MIN: ICD-10-PCS | Mod: S$PBB,,, | Performed by: PHYSICIAN ASSISTANT

## 2020-01-24 PROCEDURE — 82728 ASSAY OF FERRITIN: CPT

## 2020-01-24 PROCEDURE — 86235 NUCLEAR ANTIGEN ANTIBODY: CPT | Mod: 91

## 2020-01-24 PROCEDURE — 82784 ASSAY IGA/IGD/IGG/IGM EACH: CPT

## 2020-01-24 PROCEDURE — 80321 ALCOHOLS BIOMARKERS 1OR 2: CPT

## 2020-01-24 PROCEDURE — 99999 PR PBB SHADOW E&M-EST. PATIENT-LVL IV: CPT | Mod: PBBFAC,,, | Performed by: PHYSICIAN ASSISTANT

## 2020-01-24 RX ORDER — METHOCARBAMOL 750 MG/1
750 TABLET, FILM COATED ORAL 4 TIMES DAILY
COMMUNITY
End: 2020-10-22 | Stop reason: SDUPTHER

## 2020-01-24 RX ORDER — VELPATASVIR AND SOFOSBUVIR 100; 400 MG/1; MG/1
1 TABLET, FILM COATED ORAL DAILY
Qty: 28 TABLET | Refills: 2 | Status: SHIPPED | OUTPATIENT
Start: 2020-01-24 | End: 2020-07-10

## 2020-01-24 NOTE — H&P
[facility] H&P Note    Subjective:       Patient ID: Jesus Mccabe is a 59 y.o. male.    Chief Complaint: Consult (Referral- Bryan- Colonoscopy )    HPI:  Jesus Mccabe is a 59 y.o. male history of hepatitis-C, coronary artery disease, previous heart attack, communicates via sign language (for which the  was used today in clinic) presents today as a new patient referral from Dr. Adams for evaluation of anemia with an elevated CEA level. Patient was referred to Dr. Charles after abnormal blood workup.  He underwent left shoulder surgery which led to the workup.  Previous history of open heart surgery. Previous history of laparotomy.  Current daily beer drinker.  Patient has also some to weigh 156 lb, now weighs 140.  Has a history EGD and colonoscopy 2013 for gastrointestinal bleed which showed evidence a Lyndsey-Fernandez tear without active bleeding at the EGD and colonoscopy.  Colonoscopy unremarkable evidence.  Patient since that time has seen no blood in his stool.  No family history of colon or rectal cancers.  No bowel habit changes.  He now presents today as a new patient referral for evaluation of mild anemia, weight loss, and elevated CEA level.    Past Medical History:   Diagnosis Date    Chronic hepatitis C     Coronary artery disease     Language barrier     MI (myocardial infarction)     Ulcer      Past Surgical History:   Procedure Laterality Date    ABDOMINAL SURGERY      CARDIAC SURGERY      COLONOSCOPY  09/02/2013    ESOPHAGOGASTRODUODENOSCOPY  08/31/2013    [facility]    EXTENSOR TENDON OF FOREARM / WRIST REPAIR      INTRAMEDULLARY RODDING OF HUMERUS Left 10/20/2019    Procedure: INSERTION, INTRAMEDULLARY SISSY, HUMERUS;  Surgeon: Jose Alejandro Funes MD;  Location: [facility];  Service: Orthopedics;  Laterality: Left;     History reviewed. No pertinent family history.  Social History     Socioeconomic History    Marital status:  Single     Spouse name: Not on file    Number of children: Not on file    Years of education: Not on file    Highest education level: Not on file   Occupational History    Not on file   Social Needs    Financial resource strain: Not on file    Food insecurity:     Worry: Not on file     Inability: Not on file    Transportation needs:     Medical: Not on file     Non-medical: Not on file   Tobacco Use    Smoking status: Current Every Day Smoker     Packs/day: 1.00    Smokeless tobacco: Never Used   Substance and Sexual Activity    Alcohol use: Yes     Alcohol/week: 1.0 standard drinks     Types: 1 Cans of beer per week    Drug use: No    Sexual activity: Never   Lifestyle    Physical activity:     Days per week: Not on file     Minutes per session: Not on file    Stress: Not on file   Relationships    Social connections:     Talks on phone: Not on file     Gets together: Not on file     Attends [employer] service: Not on file     Active member of club or organization: Not on file     Attends meetings of clubs or organizations: Not on file     Relationship status: Not on file   Other Topics Concern    Not on file   Social History Narrative    Not on file       Current Outpatient Medications   Medication Sig Dispense Refill    lisinopril (PRINIVIL,ZESTRIL) 40 MG tablet Take 1 tablet (40 mg total) by mouth once daily. 30 tablet 11    naproxen (NAPROSYN) 500 MG tablet Take 1 tablet (500 mg total) by mouth 2 (two) times daily as needed. 60 tablet 1    polyethylene glycol (GOLYTELY,NULYTELY) 236-22.74-6.74 -5.86 gram suspension Take by mouth once.       Current Facility-Administered Medications   Medication Dose Route Frequency Provider Last Rate Last Dose    lidocaine (PF) 10 mg/ml (1%) injection 10 mg  1 mL Intradermal Once Lloyd Hidalgo MD         Review of patient's allergies indicates:  No Known Allergies    Review of Systems   Constitutional: Negative for appetite change, chills and  fever.   HENT: Negative for congestion, dental problem and drooling.    Eyes: Negative for photophobia, discharge and itching.   Respiratory: Negative for apnea and chest tightness.    Cardiovascular: Negative for chest pain, palpitations and leg swelling.   Gastrointestinal: Negative for abdominal distention and abdominal pain.   Endocrine: Negative for cold intolerance and heat intolerance.   Genitourinary: Negative for difficulty urinating and dysuria.   Musculoskeletal: Negative for arthralgias and back pain.   Skin: Negative for color change and pallor.   Neurological: Negative for dizziness, facial asymmetry and headaches.   Hematological: Negative for adenopathy. Does not bruise/bleed easily.   Psychiatric/Behavioral: Negative for agitation, behavioral problems and confusion.       Objective:       VS: T 97.9, P 78, RR 14, /88, SpO2 99%    Physical Exam   Constitutional: He is oriented to person, place, and time. He appears well-developed and well-nourished.   HENT:   Head: Normocephalic and atraumatic.   Eyes: Pupils are equal, round, and reactive to light. EOM are normal.   Neck: Normal range of motion. Neck supple. No thyromegaly present.   Cardiovascular: Normal rate and regular rhythm.   No murmur heard.  Pulmonary/Chest: Effort normal and breath sounds normal. No respiratory distress.       Abdominal: Soft. Bowel sounds are normal. He exhibits no distension. There is no tenderness.   Musculoskeletal: Normal range of motion. He exhibits no edema.        Arms:  Neurological: He is alert and oriented to person, place, and time. No cranial nerve deficit.   Skin: Skin is warm. Capillary refill takes less than 2 seconds. No rash noted. He is not diaphoretic. No erythema.   Psychiatric: He has a normal mood and affect.       Lab Review: CEA Elevated to 25.  H&H most recently within normal limits previously back in the fall with anemia.       Assessment:       1. Elevated CEA    2. Acute blood loss anemia         Plan:   Elevated CEA  -     Case Request Operating Room: COLONOSCOPY, ESOPHAGOGASTRODUODENOSCOPY (EGD)    Acute blood loss anemia  -     Case Request Operating Room: COLONOSCOPY, ESOPHAGOGASTRODUODENOSCOPY (EGD)    Other orders  -     Progressive Mobility Protocol (mobilize patient to their highest level of functioning at least twice daily); Standing  -     Insert peripheral IV; Standing  -     lidocaine (PF) 10 mg/ml (1%) injection 10 mg  -     Full code; Standing        Medical Decision Making/Counseling:  Patient with multiple issues.  Elevated CEA.  Weight loss.  Previous anemia.  Patient has not undergone GI endoscopy in over 7 years.  He is due for repeat EGD and colonoscopy for further delineation of the above issues.  Risk and benefits of EGD and colonoscopy were discussed in detail.  Patient signed understanding risk EGD and colonoscopy wished to proceed the near future.     was used during the office visit today.    Will obtain preoperative lab test to include CBC, CMP and EKG for review by the Anesthesiologist the day of the procedure prior to induction of the anesthetic agent of choice.    Risk and benefits of EGD were discussed in clinic in depth.  Risk of EGD were discussed to include bleeding as well as perforation.  Patient understands that if the above procedural risks were to occur, he could need further intervention to include but not exclude a blood transfusion, repeat procedure, admission to the hospital, or even surgery which would likely require transfer to a higher level of care.     Risks and benefits of Colonoscopy were discussed in depth in clinic as well.  From a procedural standpoint, we discuss the benefits of colonoscopy to be finding colon cancers at early stages, including polyps which can be endoscopically resectable, to finding early stage colon cancers which can be better treated with current medical and surgical therapies in order to give patients  a longer survival, if found in these early stages.  From a standpoint of risks, the risk of bleeding and perforation of the colon were discussed.  I personally discussed that if complications of bleeding or perforation were to occur, the patient could need as little as a blood transfusion and as much as possible hospital admission, repeat procedure, or even surgery.  During today's discussion of the procedure of colonoscopy with the patient, I personally ama the patient a picture to assist with counseling.  Total clinic time spent today 45 minutes face-to-face, with greater than half of the time spent in face to face counseling.    Patient instructed that best way to communicate with my office staff is for patient to get on the [facility] patient portal to expedite communication and communication issues that may occur.  Patient was given instructions on how to get on the portal.  I encouraged patient to obtain portal access as well.  Ultimately it is up to the patient to obtain access.  Patient voiced understanding.

## 2020-01-24 NOTE — PATIENT INSTRUCTIONS
1. Due to liver scarring you will need liver cancer screening with blood work and ultrasound every 6 months. This is due again in 7/2020.  2. Start Hepatitis B vaccine today. You need to see primary doctor for shot #2 (due 2/20/20) and shot #3 (due 7/20/20)  3. Wait for [facility] to call you about Hepatitis C medicine called epclusa.   4. Blood work today to look for other liver problems  5. Visit with me in 2 months  6. Stop all alcohol, beer, wine, liquor!!

## 2020-01-24 NOTE — PROGRESS NOTES
"HEPATOLOGY CLINIC VISIT NOTE - HCV clinic    CHIEF COMPLAINT: Hepatitis C   (here w/ sister)    HISTORY: This is a 59 y.o. Black or  male who is deaf. Here for HCV. CHIQUIS used for interpretation.     Here for f/u w/ additional labs, imaging   Lacking HBV immunity   (+) immunity to HAV   Notable transaminase elevation    Feels well  Denies jaundice, dark urine, hematemesis, melena, slowed mentation, abdominal distention.       HCV history:  Prior icteric illnesses: None  Risks for HCV:  Blood transfusion - none    IVDA / intranasal drug - yes, in distant past. Went to half[place]way and "none since then"    Tattoos - none    Prior incarceration - yes    Prior MSM relationships - yes  - Treatment naive  - Genotype 2b  - HCV RNA >1.1 million IU/mL    Advanced liver fibrosis:  FibroScan 1/13/20 - kPa 9.6, F3  Liver disease is well compensated  MELD 1/2020 - 7    HCC screening - U/S 1/2020 no liver lesions. AFP 10    Fatty liver:  FibroScan 1/13/20 - , S3  BMI - 21       DM - not known. Documented hyperglycemia in epic  Hyperlipidemia - not known               Past Medical History:   Diagnosis Date    Advanced liver fibrosis     FibroScan 1/2020 - F3    Chronic hepatitis C     Coronary artery disease     Language barrier     MI (myocardial infarction)     Ulcer        Past Surgical History:   Procedure Laterality Date    ABDOMINAL SURGERY      CARDIAC SURGERY      COLONOSCOPY  09/02/2013    ESOPHAGOGASTRODUODENOSCOPY  08/31/2013    Dr [facility]jelica [facility]    EXTENSOR TENDON OF FOREARM / WRIST REPAIR      INTRAMEDULLARY RODDING OF HUMERUS Left 10/20/2019    Procedure: INSERTION, INTRAMEDULLARY SISSY, HUMERUS;  Surgeon: Jose Alejandro Funes MD;  [facility];  Service: Orthopedics;  Laterality: Left;       FAMILY HISTORY:  Brother had HCV, s/p HCV rx     SOCIAL HISTORY:   Resides alone in [facility]  Social History     Tobacco Use   Smoking Status Current Every Day Smoker "    Packs/day: 1.00   Smokeless Tobacco Never Used     Alcohol - Several days per week per pt. Sister thinks he drinks daily. Quantity unclear.  Drugs - remote history. None now      ROS:   No fever, chills, weight loss, fatigue  No chest pain, palpitations, dyspnea, cough  (+) deaf  No abdominal pain, nausea, vomiting, GERD  No skin rashes   No headaches  No lower extremity edema  No depression or anxiety      PHYSICAL EXAM:  Friendly Black or  male, in no acute distress; alert and oriented to person, place and time  VITALS: reviewed  HEENT: Sclerae anicteric.   NECK: Supple  LUNGS: Normal respiratory effort.   ABDOMEN: Flat, soft, nontender.   SKIN: Warm and dry. No jaundice, No obvious rashes.   EXTREMITIES: No lower extremity edema  NEURO/PSYCH: Normal gate. Memory intact. Thought and speech pattern appropriate. Behavior normal. No depression or anxiety noted.    RECENT LABS:  Lab Results   Component Value Date    WBC 4.52 01/13/2020    HGB 16.5 01/13/2020     01/13/2020     Lab Results   Component Value Date    INR 1.1 01/13/2020     Lab Results   Component Value Date     (H) 01/13/2020     (H) 01/13/2020    BILITOT 0.5 01/13/2020    ALBUMIN 3.9 01/13/2020    ALKPHOS 101 01/13/2020    CREATININE 0.8 01/13/2020    BUN 11 01/13/2020     01/13/2020    K 4.7 01/13/2020    AFP 10 (H) 01/13/2020       RECENT IMAGING:  Results for orders placed during the hospital encounter of 01/16/20   US Abdomen Complete    Narrative EXAMINATION:  US ABDOMEN COMPLETE    CLINICAL HISTORY:  HCV, eval for cirrhosis and portal HTN; Chronic viral hepatitis C    TECHNIQUE:  Complete abdominal ultrasound (including pancreas, aorta, liver, gallbladder, common bile duct, IVC, kidneys, and spleen) was performed.    COMPARISON:  CT 10/22/2019.  Ultrasound 03/12/2012.    FINDINGS:  The liver is normal in size measuring 16.0 cm.  Parenchyma demonstrates a heterogeneous pattern of increased echogenicity  consistent with extensive fatty infiltration.  The portal vein is identified and patent demonstrating normal flow by color and power Doppler.  No perihepatic fluid.    Gallbladder is distended with multiple small dependent echogenic gallstones.  No gallbladder wall thickening or pericholecystic fluid.  Negative sonographic Varner's sign.  Common bile duct is normal measuring 0.42 cm.    Pancreas is obscured by overlying bowel gas.    Kidneys are normal in size and echogenicity measuring 8.9 cm on the right and 9.0 cm on left.  Both kidneys demonstrate normal flow by color Doppler.  No changes of hydronephrosis.    The spleen is normal in size and echogenicity measuring 7.6 cm.    Abdominal aorta and IVC identified.    No ascites identified.      Impression 1. Hepatic steatosis.  2. Cholelithiasis without sonographic evidence to suggest acute cholecystitis.  3. Nonvisualization of the pancreas.      Electronically signed by: Colton Mon  Date:    01/16/2020  Time:    10:22         ASSESSMENT:  59 y.o. Black or  male with:  1. CHRONIC HEPATITIS C, GENOTYPE 2b - treatment naive  -- (+) Immunity to HAV   -- lacking immunity to HBV    2. ADVANCED FIBROSIS  -- FibroScan 1/13/20 - kPa 9.6, F3  -- HCC screening - up to date 1/2020    3. FATTY LIVER - not discussed at today's visit  -- FibroScan 1/13/20 - , S3  -- BMI - 21       -- DM - not known. Documented hyperglycemia.  -- Hyperlipidemia - not known, not fasting. Can't check lipid panel today.         4. NOTABLE TRANSAMINASE ELEVATION  -- will r/o other causes w/ serologic eval    EDUCATION:  LIVER FIBROSIS  Discussed need for life long HCC screening due advanced fibrosis  Discussed importance COMPLETE ALCOHOL CESSATION    HCV  Discussed goal of HCV eradication to prevent progression of liver disease.  Discussed use of Epclusa daily x 12 weeks w/ potential side effects of fatigue and headache.     Reviewed limitations on acid suppressant  medications due to DDI w/ Epclusa:  -- Antacids, H2 Receptor Antagonist, PPI - Pt not taking  Patient instructed to contact me if experiencing acid related symptoms so further recommendations can be made regarding acid suppression therapy.      Herbal / alternative therapies must be discontinued  Discussed importance of medication adherence and risk of treatment failure / viral resistance if not adherent. Pt has verbalized understanding.        PLAN:  1. Obtain authorization to treat HCV with Epclusa x 12 weeks  -- Rx will be routed to [facility]  -- Patient will notify me of exact treatment start date so appropriate lab f/u can be scheduled.  2. D/C all alcohol  3. Labs today:  Orders Placed This Encounter   Procedures    Comprehensive Metabolic Panel    Ferritin    Iron and TIBC    LAZARO Screen w/Reflex    Antimitochondrial antibody    Anti-smooth muscle antibody    IgG    Ceruloplasmin    Alpha 1 Antitrypsin Phenotype    Hemoglobin A1c     4. HBV vaccine - first dose today. Get rest w/ PCP  5. F/u visit in 2 months  6. HCC screening due 7/2020. Will schedule at next visit

## 2020-01-25 LAB — ANA SER QL IF: NORMAL

## 2020-01-28 LAB
MITOCHONDRIA AB TITR SER IF: NORMAL {TITER}
SMOOTH MUSCLE AB TITR SER IF: NORMAL {TITER}

## 2020-01-29 ENCOUNTER — TELEPHONE (OUTPATIENT)
Dept: PHARMACY | Facility: CLINIC | Age: 60
End: 2020-01-29

## 2020-01-29 ENCOUNTER — TELEPHONE (OUTPATIENT)
Dept: HEPATOLOGY | Facility: CLINIC | Age: 60
End: 2020-01-29

## 2020-01-29 LAB
A1AT PHENOTYP SERPL-IMP: NORMAL BANDS
A1AT SERPL NEPH-MCNC: 166 MG/DL (ref 100–190)

## 2020-01-29 NOTE — TELEPHONE ENCOUNTER
1/24/20 labs reviewed  A1-AT normal  LAZARO, AMA, SMA normal  IgG 1704  Fe studies negative  HbA1c 5.6  Peth - pending still    Pls notify pt - may need to speak to sister  · Labs to look for other conditions causing damage in liver were negative.  · Screen for diabetes was negative  · Still very important that he stop drinking ALL alcohol  · We will treat Hepatitis C as planned  · Keep follow up visit with me

## 2020-02-04 LAB — PHOSPHATIDYLETHANOL (PETH): 1224 NG/ML

## 2020-02-05 NOTE — TELEPHONE ENCOUNTER
DOCUMENTATION ONLY:  Prior authorization for Epclusa approved from 2/4/2020 to 4/28/2020 x 12 weeks of treatment.   Case ID# [id number]    Co-pay: $8.40    Patient Assistance IS NOT required.     Forward to clinical pharmacist for consult & shipment.

## 2020-02-06 ENCOUNTER — TELEPHONE (OUTPATIENT)
Dept: PHARMACY | Facility: CLINIC | Age: 60
End: 2020-02-06

## 2020-02-06 NOTE — TELEPHONE ENCOUNTER
Initial Epclusa Consultation attempted (attempt 1).   OS[facility] contacted patient's sister/caretaker, Sada. Initial consultation was originally schedule today at 3:00 pm. However, sister requested to reschedule initial to Tuesday, 2/11, at 10:00 am due to wanting to get money from patient for copay ($8.40) to be able to put on her prepaid card. Will f/u on Tuesday at 10:00 am.

## 2020-02-11 ENCOUNTER — EPISODE CHANGES (OUTPATIENT)
Dept: HEPATOLOGY | Facility: CLINIC | Age: 60
End: 2020-02-11

## 2020-02-11 ENCOUNTER — TELEPHONE (OUTPATIENT)
Dept: HEPATOLOGY | Facility: CLINIC | Age: 60
End: 2020-02-11

## 2020-02-11 DIAGNOSIS — B18.2 CHRONIC HEPATITIS C WITHOUT HEPATIC COMA: Primary | ICD-10-CM

## 2020-02-11 NOTE — TELEPHONE ENCOUNTER
Initial Epclusa Consultation:  Initial Epclusa consult completed on  with patient's caregiver/sister, Sada Thomas. Epclusa will be shipped on  to arrive at patient's home on  via FedEx. $8.40 copay. Patient will start Epclusa on . Address confirmed, CC on file. Confirmed 2 patient identifiers - name and . Therapy Appropriate.     Epclusa 400/100mg- Take one tablet by mouth daily x 12 weeks   Counseling was reviewed:    1. Patient MUST take Epclusa at the SAME time every day.    2. Patient MUST avoid acid reducers without consulting with myself or provider first. Antacids are to be spaced out at least 4 hours apart from Epclusa.    3. Potential Side effects include: headaches and fatigue.    Headache: Patient may treat with OTC remedies. If Tylenol is used, dose should not exceed 2000mg per day.    4. Medication list reviewed. No DDIs or allergies noted. Patient MUST contact myself or provider prior to starting any new OTC, herbal, or prescription drugs to avoid potential DDIs.     DDI: Medication list reviewed and potential DDIs addressed.     Discussed the importance of staying well hydrated while on therapy. Compliance stressed - patient to take missed doses as soon as remembered, but NOT to take 2 doses in one day. Patient will report questions or concerns to myself or practitioner. Patient verbalizes understanding. Patient plans to start Epclusa on .  Consultation included: indication; goals of treatment; administration; storage and handling; side effects; how to handle side effects; the importance of compliance; how to handle missed doses; the importance of laboratory monitoring; the importance of keeping all follow up appointments.  Patient understands to report any medication changes to [facility] and provider. All questions answered and addressed to patients satisfaction. I will f/u with her in 1 week from start, [facility] to contact patient in 3 weeks for refills.     Lucia Wood,  Pharm D  [facility]   [phone]

## 2020-02-11 NOTE — TELEPHONE ENCOUNTER
Pt beginning 12 weeks of Epclusa on 2/14/20  Calista 2, Tx naive  F3    (pt is deaf, will speak to sister)    Pls schedule & update spreadsheet:  - HCV RNA at week 6  - keep 3/26/20 ofc visit - need sign language translation assistance  - CMP, HCV RNA - SVR12

## 2020-02-12 ENCOUNTER — EPISODE CHANGES (OUTPATIENT)
Dept: HEPATOLOGY | Facility: CLINIC | Age: 60
End: 2020-02-12

## 2020-02-12 NOTE — TELEPHONE ENCOUNTER
I spoke with Ms. Tomlin and msg from PA Scheuermann relayed and msg also mailed to patient.  Lab draw setup on 3/26; appt notice mailed.  Unable to scheduled svr 12 lab because calender not open at [facility].

## 2020-02-13 ENCOUNTER — PATIENT OUTREACH (OUTPATIENT)
Dept: ADMINISTRATIVE | Facility: HOSPITAL | Age: 60
End: 2020-02-13

## 2020-02-13 NOTE — LETTER
February 13, 2020    Jesus Mccabe  [address] [place] [address]             [facility]  [address]  [facility] [address]  Phone: [phone] Dear [facility] is committed to your overall health and would like to ensure that you are up to date on your recommended test and/or procedures.   Selina Denney MD  has found that your chart shows you may be due for the following:    Health Maintenance Due   Topic Date Due    Lipid Panel  1960    TETANUS VACCINE  12/04/1977    Aspirin/Antiplatelet Therapy  12/04/1977    High Dose Statin  12/04/1980    Shingles Vaccine (1 of 2) 12/04/2009    Pneumococcal Vaccine (Highest Risk) (2 of 3 - PPSV23) 12/18/2019     If you have had any of the above done at another facility, please let us know so that we may obtain copies from that facility.  If you have a copy of these records, please provide a copy for us to scan into your chart.  You are welcome to request that the report be faxed to us at  ([phone]).     Otherwise, please schedule these appointments at your earliest convenience by calling [phone] or going to [email or web address].    If you have an upcoming scheduled appointment for the item above, please disregard this letter.    Sincerely,  Your [facility]  MD Mary Loza L.P.N. Clinical Care Coordinator  [address] [place] [address], [place] [id number] [phone]

## 2020-02-21 ENCOUNTER — TELEPHONE (OUTPATIENT)
Dept: PHARMACY | Facility: CLINIC | Age: 60
End: 2020-02-21

## 2020-02-21 NOTE — TELEPHONE ENCOUNTER
"Initial touch base conducted for Epclusa - Name/ confirmed with patient's sister, Sada. Confirmed patient started medication as instructed on .  Ms. Thomas confirms that he is taking Epclusa every day at 9 am. She wrote it on his wall calendar in big letters "Take Epclusa" so he can see it every morning when he wakes up since he lives by himself. His sister mentioned that he hasn't had beer to drink in so long and is frustrated by that and "feels sick". Advised her that it was best for his liver for him to avoid alcoholic beverages. Patient denies skipping or missing doses.  Patient reports experiencing no side effects since beginning therapy. Patient reports no new medications, otc remedies, or allergies. Patient reminded of lab appointments. Patient counseled on importance of maintaining adherence and keeping lab appointments which were scheduled.  Advised to call [facility] and provider if any issues arise.  No questions or concerns. Aware [facility] will call for refills when patient has 7 days of medication on hand.    Karl Muro, Pharm.D.  Pharmacy Resident, PGY-1   [facility]    "

## 2020-03-06 ENCOUNTER — TELEPHONE (OUTPATIENT)
Dept: PHARMACY | Facility: CLINIC | Age: 60
End: 2020-03-06

## 2020-03-06 NOTE — TELEPHONE ENCOUNTER
Epclusa refill (2 of 3) confirmed and reassessment complete with patient's caregiver, Sada Thomas. We will ship Epclusa  refill on 3/9 via fedex to arrive on 3/10. $0.00 copay- 004. Confirmed 2 patient identifiers - name and . Therapy appropriate.     Patient has 8 doses of Epclusa remaining and takes it around 9:00 am daily.  Pt reports they are not having any side effects so far. No missed doses, no new medications, no new allergies or health conditions reported at this time. Allergies reviewed and medication reconciliation complete (reviewed and documented in [facility]).  Disease education reviewed (including transmission and prevention). Patient counseled on importance of maintaining adherence and keeping lab appointments which were scheduled. All questions answered and addressed to patients satisfaction. Advised to call [facility] and provider if any issues arise.  Pt verbalized understanding.

## 2020-03-10 PROBLEM — R97.0 ELEVATED CEA: Status: ACTIVE | Noted: 2020-03-10

## 2020-03-27 ENCOUNTER — EPISODE CHANGES (OUTPATIENT)
Dept: HEPATOLOGY | Facility: CLINIC | Age: 60
End: 2020-03-27

## 2020-04-03 ENCOUNTER — TELEPHONE (OUTPATIENT)
Dept: PHARMACY | Facility: CLINIC | Age: 60
End: 2020-04-03

## 2020-04-03 NOTE — TELEPHONE ENCOUNTER
Epclusa refill (3 of 3) and reassessment attempted (attempt 1). NA, LVM for call back. Will f/u if no call back. $0 copay.

## 2020-04-06 ENCOUNTER — TELEPHONE (OUTPATIENT)
Dept: HEMATOLOGY/ONCOLOGY | Facility: CLINIC | Age: 60
End: 2020-04-06

## 2020-04-06 NOTE — TELEPHONE ENCOUNTER
Epclusa refill (3 of 3) confirmed and reassessment complete with patient's caregiver/sister, Sada Thomas. We will ship Epclusa refill on  via fedex to arrive on . $0.00 copay- 004. Confirmed 2 patient identifiers - name and . Therapy appropriate.     Patient has 6 doses of Epclusa remaining and takes it around 9:00 am daily. Pt reports they are not having any side effects so far. No missed doses, no new medications, no new allergies or health conditions reported at this time. Allergies reviewed and medication reconciliation complete (reviewed and documented in [facility]). Disease education reviewed (including transmission and prevention). Patient counseled on importance of maintaining adherence and keeping lab appointments which were scheduled. All questions answered and addressed to patients satisfaction. Advised to call [facility] and provider if any issues arise. Pt verbalized understanding.

## 2020-04-06 NOTE — TELEPHONE ENCOUNTER
Left message on VM informing that Dr. Adams will not be at [facility] Wednesday.  Instructed patient to call office to r/s labs and f/u appt.

## 2020-04-08 ENCOUNTER — OFFICE VISIT (OUTPATIENT)
Dept: HEMATOLOGY/ONCOLOGY | Facility: CLINIC | Age: 60
End: 2020-04-08
Payer: MEDICARE

## 2020-04-08 DIAGNOSIS — D69.6 THROMBOCYTOPENIA: Primary | ICD-10-CM

## 2020-04-08 DIAGNOSIS — R97.0 ELEVATED CEA: ICD-10-CM

## 2020-04-08 PROCEDURE — 99499 NO LOS: ICD-10-PCS | Mod: 95,,, | Performed by: INTERNAL MEDICINE

## 2020-04-08 PROCEDURE — 99499 UNLISTED E&M SERVICE: CPT | Mod: 95,,, | Performed by: INTERNAL MEDICINE

## 2020-04-09 ENCOUNTER — TELEPHONE (OUTPATIENT)
Dept: HEMATOLOGY/ONCOLOGY | Facility: CLINIC | Age: 60
End: 2020-04-09

## 2020-04-09 DIAGNOSIS — D89.0 POLYCLONAL GAMMOPATHY: Primary | ICD-10-CM

## 2020-04-09 DIAGNOSIS — E53.8 B12 DEFICIENCY: ICD-10-CM

## 2020-04-09 NOTE — TELEPHONE ENCOUNTER
Spoke to pt sister re: r/s missed appt emerita Murdock. Pt sister states it would be easier for him to be seen in the office, and does not want to go out r/s to COVID, so requesting to have OV c  r/s to after he has labs for another provider at the end of May. Pt scheduled for 6/3 in [place]. Pt sister confirmed appts.     ----- Message from Nanette Adams MD sent at 4/8/2020  9:09 PM CDT -----  Reschedule pt I believe is handicapped deaf , needs help on phone

## 2020-04-15 ENCOUNTER — EPISODE CHANGES (OUTPATIENT)
Dept: HEPATOLOGY | Facility: CLINIC | Age: 60
End: 2020-04-15

## 2020-04-22 ENCOUNTER — TELEPHONE (OUTPATIENT)
Dept: HEPATOLOGY | Facility: CLINIC | Age: 60
End: 2020-04-22

## 2020-04-22 NOTE — TELEPHONE ENCOUNTER
PA Scheuermann not in clinic on 5/22/20.  I spoke with patient's sister Sada and this info relayed.  She asked that appts scheduled be moved to 6/18/20; done and reminder notice mailed with this note.

## 2020-05-26 ENCOUNTER — EPISODE CHANGES (OUTPATIENT)
Dept: HEPATOLOGY | Facility: CLINIC | Age: 60
End: 2020-05-26

## 2020-06-24 ENCOUNTER — EPISODE CHANGES (OUTPATIENT)
Dept: HEPATOLOGY | Facility: CLINIC | Age: 60
End: 2020-06-24

## 2020-07-09 ENCOUNTER — PATIENT OUTREACH (OUTPATIENT)
Dept: ADMINISTRATIVE | Facility: OTHER | Age: 60
End: 2020-07-09

## 2020-07-09 ENCOUNTER — TELEPHONE (OUTPATIENT)
Dept: HEPATOLOGY | Facility: CLINIC | Age: 60
End: 2020-07-09

## 2020-07-09 NOTE — TELEPHONE ENCOUNTER
Attempt made to reach pt, unsuccessful. Unable to leave voicemail for pt upcoming 7/10 appointment with PA Scheuermann.

## 2020-07-10 ENCOUNTER — TELEPHONE (OUTPATIENT)
Dept: HEMATOLOGY/ONCOLOGY | Facility: CLINIC | Age: 60
End: 2020-07-10

## 2020-07-10 ENCOUNTER — OFFICE VISIT (OUTPATIENT)
Dept: HEPATOLOGY | Facility: CLINIC | Age: 60
End: 2020-07-10
Attending: PHYSICIAN ASSISTANT
Payer: MEDICARE

## 2020-07-10 VITALS
HEART RATE: 70 BPM | SYSTOLIC BLOOD PRESSURE: 153 MMHG | HEIGHT: 69 IN | WEIGHT: 143.06 LBS | RESPIRATION RATE: 16 BRPM | BODY MASS INDEX: 21.19 KG/M2 | DIASTOLIC BLOOD PRESSURE: 77 MMHG

## 2020-07-10 DIAGNOSIS — B18.2 CHRONIC HEPATITIS C WITHOUT HEPATIC COMA: Primary | ICD-10-CM

## 2020-07-10 PROCEDURE — 99213 OFFICE O/P EST LOW 20 MIN: CPT | Mod: PBBFAC | Performed by: PHYSICIAN ASSISTANT

## 2020-07-10 PROCEDURE — 99214 OFFICE O/P EST MOD 30 MIN: CPT | Mod: S$PBB,,, | Performed by: PHYSICIAN ASSISTANT

## 2020-07-10 PROCEDURE — 99214 PR OFFICE/OUTPT VISIT, EST, LEVL IV, 30-39 MIN: ICD-10-PCS | Mod: S$PBB,,, | Performed by: PHYSICIAN ASSISTANT

## 2020-07-10 PROCEDURE — 99999 PR PBB SHADOW E&M-EST. PATIENT-LVL III: ICD-10-PCS | Mod: PBBFAC,,, | Performed by: PHYSICIAN ASSISTANT

## 2020-07-10 PROCEDURE — 99999 PR PBB SHADOW E&M-EST. PATIENT-LVL III: CPT | Mod: PBBFAC,,, | Performed by: PHYSICIAN ASSISTANT

## 2020-07-10 NOTE — TELEPHONE ENCOUNTER
Spoke to pt sister, Sada. Pt is leaving Dr acosta at [facility], and they are on their way to the ER at [facility] per MD recommendations because of low Hgb. Pt sister confirmed f/up appt scheduled emerita CARBAJAL for 7/15.     ----- Message from David Carlos PA-C sent at 7/10/2020  2:06 PM CDT -----  Regarding: Pt needs appt  Patient was supposed to be scheduled for in office appt. This was never done. His labs have just come back. He needs to be seen in office. He can be put on my schedule if needed.

## 2020-07-10 NOTE — PROGRESS NOTES
Requested updates within Care Everywhere.  Patient's chart was reviewed for overdue CARL topics.  Immunizations reconciled.

## 2020-07-10 NOTE — Clinical Note
Pls schedule: HCV RNA (to check for SVR12), AFP, INR, U/S - 7/30/20 at [facility]. Pt requests you just mail appts to him. Sister will bring him. thanks

## 2020-07-14 ENCOUNTER — TELEPHONE (OUTPATIENT)
Dept: HEPATOLOGY | Facility: CLINIC | Age: 60
End: 2020-07-14

## 2020-07-14 NOTE — TELEPHONE ENCOUNTER
7/10/20 labs reviewed  CBC - Hgb down to 10.5 --> pt already sent to ER  CMP - AST/ALT still elevated, Peth ordered w/ next labs  HCV - neg - SVR10    Already scheduled for:  HCV RNA (to check for SVR12), AFP, INR, U/S - 7/30/20 - [facility].

## 2020-07-14 NOTE — TELEPHONE ENCOUNTER
----- Message from Jennifer B. Scheuermann, PA sent at 7/10/2020  3:28 PM CDT -----  Pls schedule: HCV RNA (to check for SVR12), AFP, INR, U/S - 7/30/20 at [facility]. Pt requests you just mail appts to him. Sister will bring him. thanks

## 2020-08-07 ENCOUNTER — EPISODE CHANGES (OUTPATIENT)
Dept: HEPATOLOGY | Facility: CLINIC | Age: 60
End: 2020-08-07

## 2020-08-19 ENCOUNTER — EPISODE CHANGES (OUTPATIENT)
Dept: HEPATOLOGY | Facility: CLINIC | Age: 60
End: 2020-08-19

## 2020-08-25 ENCOUNTER — HOSPITAL ENCOUNTER (OUTPATIENT)
Dept: RADIOLOGY | Facility: HOSPITAL | Age: 60
Discharge: HOME | End: 2020-08-25
Attending: PHYSICIAN ASSISTANT
Payer: MEDICARE

## 2020-08-25 DIAGNOSIS — B18.2 CHRONIC HEPATITIS C WITHOUT HEPATIC COMA: ICD-10-CM

## 2020-08-25 PROCEDURE — 76705 ECHO EXAM OF ABDOMEN: CPT | Mod: 26,,, | Performed by: RADIOLOGY

## 2020-08-25 PROCEDURE — 76705 US ABDOMEN LIMITED: ICD-10-PCS | Mod: 26,,, | Performed by: RADIOLOGY

## 2020-08-25 PROCEDURE — 76705 ECHO EXAM OF ABDOMEN: CPT | Mod: TC

## 2020-09-03 ENCOUNTER — TELEPHONE (OUTPATIENT)
Dept: HEPATOLOGY | Facility: CLINIC | Age: 60
End: 2020-09-03

## 2020-09-03 DIAGNOSIS — Z86.19 HISTORY OF HEPATITIS C: ICD-10-CM

## 2020-09-03 DIAGNOSIS — K74.00 LIVER FIBROSIS: Primary | ICD-10-CM

## 2020-09-04 NOTE — TELEPHONE ENCOUNTER
Attempt made to contact pt's sister, unsuccessful. Unable to leave voicemail on pt's sister phone. SMS notification was sent with MA's number for pt to call. Will attempt to contact pt at a later time.

## 2020-09-09 NOTE — TELEPHONE ENCOUNTER
I spoke with Ms. Tomlin and msg from PA Scheuermann relayed and mailed to patient.  She states that she will see if patient can have Hep B vaccine series completed locally at PCP's office. [facility] f/u with testing scheduled 1/2020 and appt notices mailed.

## 2020-10-15 ENCOUNTER — PATIENT OUTREACH (OUTPATIENT)
Dept: FAMILY MEDICINE | Facility: CLINIC | Age: 60
End: 2020-10-15

## 2020-10-15 NOTE — PROGRESS NOTES
Population Health Outreach.  Spoke with patients sister, Sada, because pt is deaf. Scheduled est care with Dr Prater at [facility] for 10/22/20. She stated that pt uses [facility] to communicate. Put information in appt notes.

## 2020-10-22 ENCOUNTER — OFFICE VISIT (OUTPATIENT)
Dept: FAMILY MEDICINE | Facility: CLINIC | Age: 60
End: 2020-10-22
Payer: MEDICARE

## 2020-10-22 VITALS
BODY MASS INDEX: 20.39 KG/M2 | RESPIRATION RATE: 15 BRPM | OXYGEN SATURATION: 98 % | SYSTOLIC BLOOD PRESSURE: 139 MMHG | DIASTOLIC BLOOD PRESSURE: 78 MMHG | WEIGHT: 137.63 LBS | HEART RATE: 76 BPM | TEMPERATURE: 98 F | HEIGHT: 69 IN

## 2020-10-22 DIAGNOSIS — Z23 NEED FOR HEPATITIS B VACCINATION: ICD-10-CM

## 2020-10-22 DIAGNOSIS — R10.11 RIGHT UPPER QUADRANT PAIN: Primary | ICD-10-CM

## 2020-10-22 DIAGNOSIS — L29.9 ITCHY SCALP: ICD-10-CM

## 2020-10-22 DIAGNOSIS — M25.551 RIGHT HIP PAIN: ICD-10-CM

## 2020-10-22 DIAGNOSIS — Z23 NEED FOR INFLUENZA VACCINATION: ICD-10-CM

## 2020-10-22 PROCEDURE — G0008 FLU VACCINE (QUAD) GREATER THAN OR EQUAL TO 3YO PRESERVATIVE FREE IM: ICD-10-PCS | Mod: S$GLB,,, | Performed by: FAMILY MEDICINE

## 2020-10-22 PROCEDURE — G0008 ADMIN INFLUENZA VIRUS VAC: HCPCS | Mod: S$GLB,,, | Performed by: FAMILY MEDICINE

## 2020-10-22 PROCEDURE — 90686 FLU VACCINE (QUAD) GREATER THAN OR EQUAL TO 3YO PRESERVATIVE FREE IM: ICD-10-PCS | Mod: S$GLB,,, | Performed by: FAMILY MEDICINE

## 2020-10-22 PROCEDURE — G0010 HEPATITIS B VACCINE ADULT IM: ICD-10-PCS | Mod: S$GLB,,, | Performed by: FAMILY MEDICINE

## 2020-10-22 PROCEDURE — 90746 HEPB VACCINE 3 DOSE ADULT IM: CPT | Mod: S$GLB,,, | Performed by: FAMILY MEDICINE

## 2020-10-22 PROCEDURE — 99214 PR OFFICE/OUTPT VISIT, EST, LEVL IV, 30-39 MIN: ICD-10-PCS | Mod: 25,S$GLB,, | Performed by: FAMILY MEDICINE

## 2020-10-22 PROCEDURE — 90746 HEPATITIS B VACCINE ADULT IM: ICD-10-PCS | Mod: S$GLB,,, | Performed by: FAMILY MEDICINE

## 2020-10-22 PROCEDURE — 99214 OFFICE O/P EST MOD 30 MIN: CPT | Mod: 25,S$GLB,, | Performed by: FAMILY MEDICINE

## 2020-10-22 PROCEDURE — G0010 ADMIN HEPATITIS B VACCINE: HCPCS | Mod: S$GLB,,, | Performed by: FAMILY MEDICINE

## 2020-10-22 PROCEDURE — 90686 IIV4 VACC NO PRSV 0.5 ML IM: CPT | Mod: S$GLB,,, | Performed by: FAMILY MEDICINE

## 2020-10-22 RX ORDER — KETOCONAZOLE 20 MG/ML
SHAMPOO, SUSPENSION TOPICAL
Qty: 120 ML | Refills: 0 | Status: ON HOLD | OUTPATIENT
Start: 2020-10-22 | End: 2023-11-21 | Stop reason: HOSPADM

## 2020-10-22 RX ORDER — METHOCARBAMOL 750 MG/1
750 TABLET, FILM COATED ORAL 4 TIMES DAILY PRN
Qty: 60 TABLET | Refills: 1 | Status: SHIPPED | OUTPATIENT
Start: 2020-10-22 | End: 2021-07-15

## 2020-10-22 NOTE — PROGRESS NOTES
[facility] Note    Subjective      Mr. Mccabe is a 59 y.o. male who presents to clinic for Establish Care    Patient is deaf in uses [facility] for sign language interpretation.  Patient reports her last month or so he has had itchiness in his scalp.  Feels like there is a rash in his scalp.  Has a history of hepatitis-C status post treatment.  Last lab work reviewed.  Needs hepatitis B vaccination series.  Has had 1 dose in January of 2020.  Also reports pain in his right hip.  Worsens when he goes up stairs.  Also reports pain in his right upper quadrant.  Ultrasound of the liver showed positive sonographic Varner sign.  Recommended HIDA scan.    PMFRANKLYN Lee has a past medical history of Advanced liver fibrosis, Coronary artery disease, History of hepatitis C, treated / cured (SVR12 - 8/2020), Language barrier, MI (myocardial infarction), and Ulcer.   PSXH Jesus has a past surgical history that includes Abdominal surgery; Extensor tendon of forearm / wrist repair; Cardiac surgery; Intramedullary rodding of humerus (Left, 10/20/2019); Esophagogastroduodenoscopy (08/31/2013); and Colonoscopy (09/02/2013).   ODESSA Lee's family history is not on file.   CAMILO Lee reports that he has been smoking. He has been smoking about 1.00 pack per day. He has never used smokeless tobacco. He reports current alcohol use of about 1.0 standard drinks of alcohol per week. He reports that he does not use drugs.   CAIT Lee has No Known Allergies.   ALVIN Lee has a current medication list which includes the following prescription(s): lisinopril, ketoconazole, methocarbamol, and polyethylene glycol, and the following Facility-Administered Medications: lidocaine (pf) 10 mg/ml (1%).     Review of Systems   Constitutional: Negative for chills and fever.   Respiratory: Negative for shortness of breath.    Cardiovascular: Negative for chest pain.   Gastrointestinal: Positive for abdominal pain.   Musculoskeletal: Positive for  "arthralgias.   Skin: Positive for rash.     Objective     /78 (BP Location: Left arm, Patient Position: Sitting, BP Method: Large (Automatic))   Pulse 76   Temp 97.7 °F (36.5 °C) (Temporal)   Resp 15   Ht 5' 9" (1.753 m)   Wt 62.4 kg (137 lb 9.6 oz)   SpO2 98%   BMI 20.32 kg/m²     Physical Exam  Vitals signs and nursing note reviewed.   Constitutional:       General: He is not in acute distress.     Appearance: Normal appearance. He is well-developed. He is not diaphoretic.   HENT:      Head: Normocephalic and atraumatic.      Comments: No rash noted in the scalp.     Right Ear: External ear normal.      Left Ear: External ear normal.   Eyes:      General:         Right eye: No discharge.         Left eye: No discharge.   Cardiovascular:      Rate and Rhythm: Normal rate and regular rhythm.      Heart sounds: Normal heart sounds.   Pulmonary:      Effort: Pulmonary effort is normal.      Breath sounds: Normal breath sounds. No wheezing or rales.   Skin:     General: Skin is warm and dry.   Neurological:      General: No focal deficit present.      Mental Status: He is alert. Mental status is at baseline.   Psychiatric:         Mood and Affect: Mood normal.         Behavior: Behavior normal.         Thought Content: Thought content normal.         Judgment: Judgment normal.        Assessment/Plan     1. Right upper quadrant pain  NM Hepatobiliary Scan W Pharm Intervention   2. Right hip pain  methocarbamoL (ROBAXIN) 750 MG Tab   3. Itchy scalp  ketoconazole (NIZORAL) 2 % shampoo   4. Need for influenza vaccination  Influenza - Quadrivalent *Preferred* (6 months+) (PF)   5. Need for hepatitis B vaccination  (In Office Administered) Hepatitis B Vaccine (Adult) (IM)     New patient and new problems to me.  Given right upper quadrant pain persistent will obtain HIDA scan for further evaluation of the gallbladder.  Recommended Robaxin for right hip pain.  Recommended ketoconazole shampoo for itchy scalp.  " Due for influenza vaccine today.  Second dose of hepatitis-B vaccination today.  Follow-up in 3 months.    Future Appointments   Date Time Provider Department Center   1/5/2021  7:45 AM [facility] US1 [facility] US Center [facility]   1/5/2021 12:00 PM [facility], LABORATORY [facility] LAB [facility]   1/12/2021 10:30 AM Shelby Varghese NP [facility] HEPAT [address]   1/26/2021 10:00 AM Jose Prater MD [facility]         Jose Prater MD  Family Medicine  [facility] [place]

## 2020-11-03 ENCOUNTER — HOSPITAL ENCOUNTER (OUTPATIENT)
Dept: RADIOLOGY | Facility: HOSPITAL | Age: 60
Discharge: HOME | End: 2020-11-03
Attending: FAMILY MEDICINE
Payer: MEDICARE

## 2020-11-03 DIAGNOSIS — R10.11 RIGHT UPPER QUADRANT PAIN: ICD-10-CM

## 2020-11-03 PROCEDURE — A9537 TC99M MEBROFENIN: HCPCS

## 2020-11-03 PROCEDURE — 78227 HEPATOBIL SYST IMAGE W/DRUG: CPT | Mod: 26,,, | Performed by: RADIOLOGY

## 2020-11-03 PROCEDURE — 78227 NM HEPATOBILIARY(HIDA) WITH PHARM AND EF: ICD-10-PCS | Mod: 26,,, | Performed by: RADIOLOGY

## 2020-11-09 ENCOUNTER — TELEPHONE (OUTPATIENT)
Dept: FAMILY MEDICINE | Facility: CLINIC | Age: 60
End: 2020-11-09

## 2021-01-11 ENCOUNTER — TELEPHONE (OUTPATIENT)
Dept: HEPATOLOGY | Facility: CLINIC | Age: 61
End: 2021-01-11

## 2021-01-26 ENCOUNTER — HOSPITAL ENCOUNTER (EMERGENCY)
Facility: HOSPITAL | Age: 61
Discharge: HOME | End: 2021-01-26
Attending: FAMILY MEDICINE
Payer: MEDICARE

## 2021-01-26 VITALS
BODY MASS INDEX: 21.22 KG/M2 | OXYGEN SATURATION: 98 % | HEIGHT: 68 IN | SYSTOLIC BLOOD PRESSURE: 156 MMHG | TEMPERATURE: 99 F | HEART RATE: 86 BPM | WEIGHT: 140 LBS | RESPIRATION RATE: 18 BRPM | DIASTOLIC BLOOD PRESSURE: 76 MMHG

## 2021-01-26 DIAGNOSIS — R09.89 SYMPTOMS OF CEREBROVASCULAR ACCIDENT (CVA): ICD-10-CM

## 2021-01-26 DIAGNOSIS — M79.602 CHRONIC PAIN OF LEFT UPPER EXTREMITY: Primary | ICD-10-CM

## 2021-01-26 DIAGNOSIS — G89.29 CHRONIC PAIN OF LEFT UPPER EXTREMITY: Primary | ICD-10-CM

## 2021-01-26 DIAGNOSIS — M10.9 ACUTE GOUT OF LEFT HAND, UNSPECIFIED CAUSE: ICD-10-CM

## 2021-01-26 PROCEDURE — 63600175 PHARM REV CODE 636 W HCPCS: Performed by: NURSE PRACTITIONER

## 2021-01-26 PROCEDURE — 96372 THER/PROPH/DIAG INJ SC/IM: CPT

## 2021-01-26 PROCEDURE — 70450 CT HEAD WITHOUT CONTRAST: ICD-10-PCS | Mod: 26,,, | Performed by: RADIOLOGY

## 2021-01-26 PROCEDURE — 99284 EMERGENCY DEPT VISIT MOD MDM: CPT | Mod: 25

## 2021-01-26 PROCEDURE — 70450 CT HEAD/BRAIN W/O DYE: CPT | Mod: TC

## 2021-01-26 PROCEDURE — 70450 CT HEAD/BRAIN W/O DYE: CPT | Mod: 26,,, | Performed by: RADIOLOGY

## 2021-01-26 RX ORDER — KETOROLAC TROMETHAMINE 30 MG/ML
30 INJECTION, SOLUTION INTRAMUSCULAR; INTRAVENOUS
Status: COMPLETED | OUTPATIENT
Start: 2021-01-26 | End: 2021-01-26

## 2021-01-26 RX ORDER — COLCHICINE 0.6 MG/1
0.6 TABLET ORAL DAILY
Qty: 30 TABLET | Refills: 0 | Status: ON HOLD | OUTPATIENT
Start: 2021-01-26 | End: 2023-11-21 | Stop reason: HOSPADM

## 2021-01-26 RX ORDER — ALLOPURINOL 100 MG/1
100 TABLET ORAL DAILY
Qty: 30 TABLET | Refills: 0 | Status: ON HOLD | OUTPATIENT
Start: 2021-01-26 | End: 2023-11-21 | Stop reason: HOSPADM

## 2021-01-26 RX ADMIN — KETOROLAC TROMETHAMINE 30 MG: 30 INJECTION, SOLUTION INTRAMUSCULAR at 04:01

## 2021-03-17 ENCOUNTER — HOSPITAL ENCOUNTER (EMERGENCY)
Facility: HOSPITAL | Age: 61
Discharge: HOME | End: 2021-03-17
Attending: INTERNAL MEDICINE
Payer: MEDICARE

## 2021-03-17 VITALS
HEIGHT: 68 IN | BODY MASS INDEX: 21.22 KG/M2 | OXYGEN SATURATION: 97 % | RESPIRATION RATE: 16 BRPM | SYSTOLIC BLOOD PRESSURE: 135 MMHG | HEART RATE: 83 BPM | WEIGHT: 140 LBS | DIASTOLIC BLOOD PRESSURE: 91 MMHG | TEMPERATURE: 99 F

## 2021-03-17 DIAGNOSIS — K52.9 GASTROENTERITIS: Primary | ICD-10-CM

## 2021-03-17 LAB
ALBUMIN SERPL BCP-MCNC: 4 G/DL (ref 3.5–5.2)
ALP SERPL-CCNC: 45 U/L (ref 55–135)
ALT SERPL W/O P-5'-P-CCNC: 36 U/L (ref 10–44)
ANION GAP SERPL CALC-SCNC: 14 MMOL/L (ref 8–16)
AST SERPL-CCNC: 50 U/L (ref 10–40)
BACTERIA #/AREA URNS HPF: ABNORMAL /HPF
BASOPHILS # BLD AUTO: 0.03 K/UL (ref 0–0.2)
BASOPHILS NFR BLD: 0.7 % (ref 0–1.9)
BILIRUB SERPL-MCNC: 0.6 MG/DL (ref 0.1–1)
BILIRUB UR QL STRIP: NEGATIVE
BUN SERPL-MCNC: 7 MG/DL (ref 8–23)
CALCIUM SERPL-MCNC: 9 MG/DL (ref 8.7–10.5)
CHLORIDE SERPL-SCNC: 100 MMOL/L (ref 95–110)
CLARITY UR: CLEAR
CO2 SERPL-SCNC: 22 MMOL/L (ref 23–29)
COLOR UR: YELLOW
CREAT SERPL-MCNC: 0.7 MG/DL (ref 0.5–1.4)
CRP SERPL-MCNC: 0.11 MG/DL (ref 0–0.75)
DIFFERENTIAL METHOD: ABNORMAL
EOSINOPHIL # BLD AUTO: 0 K/UL (ref 0–0.5)
EOSINOPHIL NFR BLD: 1 % (ref 0–8)
ERYTHROCYTE [DISTWIDTH] IN BLOOD BY AUTOMATED COUNT: 15.8 % (ref 11.5–14.5)
EST. GFR  (AFRICAN AMERICAN): >60 ML/MIN/1.73 M^2
EST. GFR  (NON AFRICAN AMERICAN): >60 ML/MIN/1.73 M^2
GLUCOSE SERPL-MCNC: 88 MG/DL (ref 70–110)
GLUCOSE UR QL STRIP: NEGATIVE
HCT VFR BLD AUTO: 41 % (ref 40–54)
HGB BLD-MCNC: 13.5 G/DL (ref 14–18)
HGB UR QL STRIP: ABNORMAL
HYALINE CASTS #/AREA URNS LPF: ABNORMAL /LPF
IMM GRANULOCYTES # BLD AUTO: 0.01 K/UL (ref 0–0.04)
IMM GRANULOCYTES NFR BLD AUTO: 0.2 % (ref 0–0.5)
KETONES UR QL STRIP: NEGATIVE
LEUKOCYTE ESTERASE UR QL STRIP: NEGATIVE
LYMPHOCYTES # BLD AUTO: 1.8 K/UL (ref 1–4.8)
LYMPHOCYTES NFR BLD: 42.6 % (ref 18–48)
MCH RBC QN AUTO: 29.5 PG (ref 27–31)
MCHC RBC AUTO-ENTMCNC: 32.9 G/DL (ref 32–36)
MCV RBC AUTO: 90 FL (ref 82–98)
MICROSCOPIC COMMENT: ABNORMAL
MONOCYTES # BLD AUTO: 0.6 K/UL (ref 0.3–1)
MONOCYTES NFR BLD: 14.6 % (ref 4–15)
NEUTROPHILS # BLD AUTO: 1.7 K/UL (ref 1.8–7.7)
NEUTROPHILS NFR BLD: 40.9 % (ref 38–73)
NITRITE UR QL STRIP: NEGATIVE
NRBC BLD-RTO: 0 /100 WBC
PH UR STRIP: 6 [PH] (ref 5–8)
PLATELET # BLD AUTO: 93 K/UL (ref 150–350)
PMV BLD AUTO: 11.1 FL (ref 9.2–12.9)
POTASSIUM SERPL-SCNC: 4.1 MMOL/L (ref 3.5–5.1)
PROT SERPL-MCNC: 8.6 G/DL (ref 6–8.4)
PROT UR QL STRIP: ABNORMAL
RBC # BLD AUTO: 4.57 M/UL (ref 4.6–6.2)
RBC #/AREA URNS HPF: 4 /HPF (ref 0–4)
SODIUM SERPL-SCNC: 136 MMOL/L (ref 136–145)
SP GR UR STRIP: 1.01 (ref 1–1.03)
SQUAMOUS #/AREA URNS HPF: 5 /HPF
URN SPEC COLLECT METH UR: ABNORMAL
UROBILINOGEN UR STRIP-ACNC: NEGATIVE EU/DL
WBC # BLD AUTO: 4.18 K/UL (ref 3.9–12.7)
WBC #/AREA URNS HPF: 1 /HPF (ref 0–5)

## 2021-03-17 PROCEDURE — 80053 COMPREHEN METABOLIC PANEL: CPT | Performed by: INTERNAL MEDICINE

## 2021-03-17 PROCEDURE — 86140 C-REACTIVE PROTEIN: CPT | Performed by: INTERNAL MEDICINE

## 2021-03-17 PROCEDURE — 99284 EMERGENCY DEPT VISIT MOD MDM: CPT | Mod: 25

## 2021-03-17 PROCEDURE — 81000 URINALYSIS NONAUTO W/SCOPE: CPT | Performed by: INTERNAL MEDICINE

## 2021-03-17 PROCEDURE — 96375 TX/PRO/DX INJ NEW DRUG ADDON: CPT

## 2021-03-17 PROCEDURE — 63600175 PHARM REV CODE 636 W HCPCS: Performed by: INTERNAL MEDICINE

## 2021-03-17 PROCEDURE — 96361 HYDRATE IV INFUSION ADD-ON: CPT

## 2021-03-17 PROCEDURE — 85025 COMPLETE CBC W/AUTO DIFF WBC: CPT | Performed by: INTERNAL MEDICINE

## 2021-03-17 PROCEDURE — 25000003 PHARM REV CODE 250: Performed by: INTERNAL MEDICINE

## 2021-03-17 PROCEDURE — 96374 THER/PROPH/DIAG INJ IV PUSH: CPT

## 2021-03-17 RX ORDER — ONDANSETRON 2 MG/ML
4 INJECTION INTRAMUSCULAR; INTRAVENOUS
Status: DISCONTINUED | OUTPATIENT
Start: 2021-03-17 | End: 2021-03-17

## 2021-03-17 RX ORDER — DEXAMETHASONE SODIUM PHOSPHATE 10 MG/ML
10 INJECTION INTRAMUSCULAR; INTRAVENOUS
Status: DISCONTINUED | OUTPATIENT
Start: 2021-03-17 | End: 2021-03-17

## 2021-03-17 RX ORDER — ONDANSETRON 4 MG/1
4 TABLET, FILM COATED ORAL EVERY 6 HOURS
Qty: 12 TABLET | Refills: 0 | Status: ON HOLD | OUTPATIENT
Start: 2021-03-17 | End: 2023-11-21 | Stop reason: HOSPADM

## 2021-03-17 RX ORDER — DEXAMETHASONE SODIUM PHOSPHATE 10 MG/ML
10 INJECTION INTRAMUSCULAR; INTRAVENOUS
Status: COMPLETED | OUTPATIENT
Start: 2021-03-17 | End: 2021-03-17

## 2021-03-17 RX ORDER — ONDANSETRON 2 MG/ML
4 INJECTION INTRAMUSCULAR; INTRAVENOUS
Status: COMPLETED | OUTPATIENT
Start: 2021-03-17 | End: 2021-03-17

## 2021-03-17 RX ADMIN — SODIUM CHLORIDE 1000 ML: 0.9 INJECTION, SOLUTION INTRAVENOUS at 01:03

## 2021-03-17 RX ADMIN — DEXAMETHASONE SODIUM PHOSPHATE 10 MG: 10 INJECTION INTRAMUSCULAR; INTRAVENOUS at 01:03

## 2021-03-17 RX ADMIN — ONDANSETRON 4 MG: 2 INJECTION INTRAMUSCULAR; INTRAVENOUS at 02:03

## 2021-04-12 ENCOUNTER — HOSPITAL ENCOUNTER (EMERGENCY)
Facility: HOSPITAL | Age: 61
Discharge: HOME | End: 2021-04-12
Attending: EMERGENCY MEDICINE
Payer: MEDICARE

## 2021-04-12 VITALS
DIASTOLIC BLOOD PRESSURE: 99 MMHG | WEIGHT: 140 LBS | TEMPERATURE: 98 F | HEIGHT: 68 IN | OXYGEN SATURATION: 100 % | SYSTOLIC BLOOD PRESSURE: 155 MMHG | HEART RATE: 74 BPM | RESPIRATION RATE: 20 BRPM | BODY MASS INDEX: 21.22 KG/M2

## 2021-04-12 DIAGNOSIS — F10.10 ALCOHOL ABUSE: ICD-10-CM

## 2021-04-12 DIAGNOSIS — K02.9 DENTAL CARIES: ICD-10-CM

## 2021-04-12 DIAGNOSIS — K06.8 GUMS, BLEEDING: ICD-10-CM

## 2021-04-12 DIAGNOSIS — K08.89 PAIN, DENTAL: Primary | ICD-10-CM

## 2021-04-12 DIAGNOSIS — S02.5XXA CLOSED FRACTURE OF TOOTH, INITIAL ENCOUNTER: ICD-10-CM

## 2021-04-12 DIAGNOSIS — R10.84 GENERALIZED ABDOMINAL PAIN: ICD-10-CM

## 2021-04-12 DIAGNOSIS — I10 ESSENTIAL HYPERTENSION: ICD-10-CM

## 2021-04-12 DIAGNOSIS — Z71.6 TOBACCO ABUSE COUNSELING: ICD-10-CM

## 2021-04-12 DIAGNOSIS — R11.2 NAUSEA AND VOMITING, INTRACTABILITY OF VOMITING NOT SPECIFIED, UNSPECIFIED VOMITING TYPE: ICD-10-CM

## 2021-04-12 DIAGNOSIS — R19.7 DIARRHEA, UNSPECIFIED TYPE: ICD-10-CM

## 2021-04-12 LAB — OB PNL STL: NEGATIVE

## 2021-04-12 PROCEDURE — 82272 OCCULT BLD FECES 1-3 TESTS: CPT | Performed by: EMERGENCY MEDICINE

## 2021-04-12 PROCEDURE — 99282 EMERGENCY DEPT VISIT SF MDM: CPT

## 2021-06-13 ENCOUNTER — HOSPITAL ENCOUNTER (EMERGENCY)
Facility: HOSPITAL | Age: 61
Discharge: HOME | End: 2021-06-13
Attending: EMERGENCY MEDICINE
Payer: MEDICARE

## 2021-06-13 VITALS
DIASTOLIC BLOOD PRESSURE: 81 MMHG | HEART RATE: 84 BPM | WEIGHT: 140 LBS | OXYGEN SATURATION: 98 % | SYSTOLIC BLOOD PRESSURE: 119 MMHG | RESPIRATION RATE: 18 BRPM | BODY MASS INDEX: 21.22 KG/M2 | TEMPERATURE: 98 F | HEIGHT: 68 IN

## 2021-06-13 DIAGNOSIS — F10.920 ACUTE ALCOHOLIC INTOXICATION WITHOUT COMPLICATION: Primary | ICD-10-CM

## 2021-06-13 DIAGNOSIS — Z71.6 TOBACCO ABUSE COUNSELING: ICD-10-CM

## 2021-06-13 DIAGNOSIS — F12.10 CANNABIS ABUSE, EPISODIC: ICD-10-CM

## 2021-06-13 DIAGNOSIS — R16.0 HEPATOMEGALY: ICD-10-CM

## 2021-06-13 DIAGNOSIS — K80.20 CALCULUS OF GALLBLADDER WITHOUT CHOLECYSTITIS WITHOUT OBSTRUCTION: ICD-10-CM

## 2021-06-13 DIAGNOSIS — R10.9 ABDOMINAL PAIN: ICD-10-CM

## 2021-06-13 DIAGNOSIS — K76.0 HEPATIC STEATOSIS: ICD-10-CM

## 2021-06-13 DIAGNOSIS — R51.9 NONINTRACTABLE HEADACHE, UNSPECIFIED CHRONICITY PATTERN, UNSPECIFIED HEADACHE TYPE: ICD-10-CM

## 2021-06-13 DIAGNOSIS — R03.0 ELEVATED BLOOD PRESSURE READING WITHOUT DIAGNOSIS OF HYPERTENSION: ICD-10-CM

## 2021-06-13 DIAGNOSIS — Z72.0 TOBACCO ABUSE: ICD-10-CM

## 2021-06-13 LAB
ALBUMIN SERPL BCP-MCNC: 3.7 G/DL (ref 3.5–5.2)
ALP SERPL-CCNC: 46 U/L (ref 55–135)
ALT SERPL W/O P-5'-P-CCNC: 42 U/L (ref 10–44)
AMPHET+METHAMPHET UR QL: NEGATIVE
ANION GAP SERPL CALC-SCNC: 15 MMOL/L (ref 8–16)
APTT BLDCRRT: 26.3 SEC (ref 21–32)
AST SERPL-CCNC: 56 U/L (ref 10–40)
BACTERIA #/AREA URNS HPF: ABNORMAL /HPF
BARBITURATES UR QL SCN>200 NG/ML: NEGATIVE
BASOPHILS # BLD AUTO: 0.06 K/UL (ref 0–0.2)
BASOPHILS NFR BLD: 1 % (ref 0–1.9)
BENZODIAZ UR QL SCN>200 NG/ML: NEGATIVE
BILIRUB SERPL-MCNC: 0.2 MG/DL (ref 0.1–1)
BILIRUB UR QL STRIP: NEGATIVE
BUN SERPL-MCNC: 5 MG/DL (ref 8–23)
BZE UR QL SCN: NEGATIVE
CALCIUM SERPL-MCNC: 9 MG/DL (ref 8.7–10.5)
CANNABINOIDS UR QL SCN: NORMAL
CHLORIDE SERPL-SCNC: 102 MMOL/L (ref 95–110)
CLARITY UR: CLEAR
CO2 SERPL-SCNC: 18 MMOL/L (ref 23–29)
COLOR UR: YELLOW
CREAT SERPL-MCNC: 0.8 MG/DL (ref 0.5–1.4)
CREAT UR-MCNC: 25.7 MG/DL (ref 23–375)
DIFFERENTIAL METHOD: ABNORMAL
EOSINOPHIL # BLD AUTO: 0.1 K/UL (ref 0–0.5)
EOSINOPHIL NFR BLD: 1 % (ref 0–8)
ERYTHROCYTE [DISTWIDTH] IN BLOOD BY AUTOMATED COUNT: 15.9 % (ref 11.5–14.5)
EST. GFR  (AFRICAN AMERICAN): >60 ML/MIN/1.73 M^2
EST. GFR  (NON AFRICAN AMERICAN): >60 ML/MIN/1.73 M^2
ETHANOL SERPL-MCNC: 371 MG/DL (ref 0–10)
GLUCOSE SERPL-MCNC: 95 MG/DL (ref 70–110)
GLUCOSE UR QL STRIP: NEGATIVE
HCT VFR BLD AUTO: 37.9 % (ref 40–54)
HGB BLD-MCNC: 12.6 G/DL (ref 14–18)
HGB UR QL STRIP: ABNORMAL
HYALINE CASTS #/AREA URNS LPF: ABNORMAL /LPF
IMM GRANULOCYTES # BLD AUTO: 0.02 K/UL (ref 0–0.04)
IMM GRANULOCYTES NFR BLD AUTO: 0.3 % (ref 0–0.5)
INR PPP: 1 (ref 0.8–1.2)
KETONES UR QL STRIP: NEGATIVE
LEUKOCYTE ESTERASE UR QL STRIP: NEGATIVE
LIPASE SERPL-CCNC: 53 U/L (ref 4–60)
LYMPHOCYTES # BLD AUTO: 2.7 K/UL (ref 1–4.8)
LYMPHOCYTES NFR BLD: 43.7 % (ref 18–48)
MCH RBC QN AUTO: 30.6 PG (ref 27–31)
MCHC RBC AUTO-ENTMCNC: 33.2 G/DL (ref 32–36)
MCV RBC AUTO: 92 FL (ref 82–98)
METHADONE UR QL SCN>300 NG/ML: NEGATIVE
MICROSCOPIC COMMENT: ABNORMAL
MONOCYTES # BLD AUTO: 0.8 K/UL (ref 0.3–1)
MONOCYTES NFR BLD: 12.7 % (ref 4–15)
NEUTROPHILS # BLD AUTO: 2.5 K/UL (ref 1.8–7.7)
NEUTROPHILS NFR BLD: 41.3 % (ref 38–73)
NITRITE UR QL STRIP: NEGATIVE
NRBC BLD-RTO: 0 /100 WBC
OPIATES UR QL SCN: NEGATIVE
PCP UR QL SCN>25 NG/ML: NEGATIVE
PH UR STRIP: 5 [PH] (ref 5–8)
PLATELET # BLD AUTO: 140 K/UL (ref 150–450)
PLATELET BLD QL SMEAR: ABNORMAL
PMV BLD AUTO: 10.4 FL (ref 9.2–12.9)
POTASSIUM SERPL-SCNC: 4.2 MMOL/L (ref 3.5–5.1)
PROT SERPL-MCNC: 8.3 G/DL (ref 6–8.4)
PROT UR QL STRIP: ABNORMAL
PROTHROMBIN TIME: 10.3 SEC (ref 9–12.5)
RBC # BLD AUTO: 4.12 M/UL (ref 4.6–6.2)
RBC #/AREA URNS HPF: 3 /HPF (ref 0–4)
SARS-COV-2 RDRP RESP QL NAA+PROBE: NEGATIVE
SODIUM SERPL-SCNC: 135 MMOL/L (ref 136–145)
SP GR UR STRIP: <=1.005 (ref 1–1.03)
TOXICOLOGY INFORMATION: NORMAL
TROPONIN I SERPL DL<=0.01 NG/ML-MCNC: <0.006 NG/ML (ref 0–0.03)
URN SPEC COLLECT METH UR: ABNORMAL
UROBILINOGEN UR STRIP-ACNC: NEGATIVE EU/DL
WBC # BLD AUTO: 6.06 K/UL (ref 3.9–12.7)
WBC #/AREA URNS HPF: 0 /HPF (ref 0–5)

## 2021-06-13 PROCEDURE — 85730 THROMBOPLASTIN TIME PARTIAL: CPT | Performed by: EMERGENCY MEDICINE

## 2021-06-13 PROCEDURE — 74176 CT ABD & PELVIS W/O CONTRAST: CPT | Mod: 26,,, | Performed by: RADIOLOGY

## 2021-06-13 PROCEDURE — 99285 EMERGENCY DEPT VISIT HI MDM: CPT | Mod: 25

## 2021-06-13 PROCEDURE — U0002 COVID-19 LAB TEST NON-CDC: HCPCS | Performed by: EMERGENCY MEDICINE

## 2021-06-13 PROCEDURE — 80307 DRUG TEST PRSMV CHEM ANLYZR: CPT | Performed by: EMERGENCY MEDICINE

## 2021-06-13 PROCEDURE — 85025 COMPLETE CBC W/AUTO DIFF WBC: CPT | Performed by: EMERGENCY MEDICINE

## 2021-06-13 PROCEDURE — 84484 ASSAY OF TROPONIN QUANT: CPT | Performed by: EMERGENCY MEDICINE

## 2021-06-13 PROCEDURE — 71045 XR CHEST AP PORTABLE: ICD-10-PCS | Mod: 26,,, | Performed by: RADIOLOGY

## 2021-06-13 PROCEDURE — 93005 ELECTROCARDIOGRAM TRACING: CPT

## 2021-06-13 PROCEDURE — 82077 ASSAY SPEC XCP UR&BREATH IA: CPT | Performed by: EMERGENCY MEDICINE

## 2021-06-13 PROCEDURE — 71045 X-RAY EXAM CHEST 1 VIEW: CPT | Mod: 26,,, | Performed by: RADIOLOGY

## 2021-06-13 PROCEDURE — 80053 COMPREHEN METABOLIC PANEL: CPT | Performed by: EMERGENCY MEDICINE

## 2021-06-13 PROCEDURE — 83690 ASSAY OF LIPASE: CPT | Performed by: EMERGENCY MEDICINE

## 2021-06-13 PROCEDURE — 74176 CT ABDOMEN PELVIS WITHOUT CONTRAST: ICD-10-PCS | Mod: 26,,, | Performed by: RADIOLOGY

## 2021-06-13 PROCEDURE — 74176 CT ABD & PELVIS W/O CONTRAST: CPT | Mod: TC

## 2021-06-13 PROCEDURE — 85610 PROTHROMBIN TIME: CPT | Performed by: EMERGENCY MEDICINE

## 2021-06-13 PROCEDURE — 36415 COLL VENOUS BLD VENIPUNCTURE: CPT | Performed by: EMERGENCY MEDICINE

## 2021-06-13 PROCEDURE — 81000 URINALYSIS NONAUTO W/SCOPE: CPT | Mod: 59 | Performed by: EMERGENCY MEDICINE

## 2021-06-13 PROCEDURE — 71045 X-RAY EXAM CHEST 1 VIEW: CPT | Mod: TC,FY

## 2021-06-16 ENCOUNTER — OUTPATIENT CASE MANAGEMENT (OUTPATIENT)
Dept: ADMINISTRATIVE | Facility: OTHER | Age: 61
End: 2021-06-16

## 2021-06-22 ENCOUNTER — OUTPATIENT CASE MANAGEMENT (OUTPATIENT)
Dept: ADMINISTRATIVE | Facility: OTHER | Age: 61
End: 2021-06-22

## 2021-06-23 ENCOUNTER — OUTPATIENT CASE MANAGEMENT (OUTPATIENT)
Dept: ADMINISTRATIVE | Facility: OTHER | Age: 61
End: 2021-06-23

## 2021-06-30 ENCOUNTER — HOSPITAL ENCOUNTER (OUTPATIENT)
Facility: HOSPITAL | Age: 61
Discharge: HOME | End: 2021-07-02
Attending: EMERGENCY MEDICINE | Admitting: FAMILY MEDICINE
Payer: MEDICARE
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
## 2021-06-30 DIAGNOSIS — I25.10 CORONARY ARTERY DISEASE: ICD-10-CM

## 2021-06-30 DIAGNOSIS — N17.9 ACUTE KIDNEY INJURY: Primary | ICD-10-CM

## 2021-06-30 DIAGNOSIS — E87.1 HYPONATREMIA: ICD-10-CM

## 2021-06-30 LAB
BASOPHILS # BLD AUTO: 0.05 K/UL (ref 0–0.2)
BASOPHILS NFR BLD: 0.7 % (ref 0–1.9)
DIFFERENTIAL METHOD: ABNORMAL
EOSINOPHIL # BLD AUTO: 0 K/UL (ref 0–0.5)
EOSINOPHIL NFR BLD: 0.6 % (ref 0–8)
ERYTHROCYTE [DISTWIDTH] IN BLOOD BY AUTOMATED COUNT: 14.9 % (ref 11.5–14.5)
HCT VFR BLD AUTO: 36.9 % (ref 40–54)
HGB BLD-MCNC: 12 G/DL (ref 14–18)
IMM GRANULOCYTES # BLD AUTO: 0.07 K/UL (ref 0–0.04)
IMM GRANULOCYTES NFR BLD AUTO: 1 % (ref 0–0.5)
LYMPHOCYTES # BLD AUTO: 1.8 K/UL (ref 1–4.8)
LYMPHOCYTES NFR BLD: 25.3 % (ref 18–48)
MCH RBC QN AUTO: 30.4 PG (ref 27–31)
MCHC RBC AUTO-ENTMCNC: 32.5 G/DL (ref 32–36)
MCV RBC AUTO: 93 FL (ref 82–98)
MONOCYTES # BLD AUTO: 1.1 K/UL (ref 0.3–1)
MONOCYTES NFR BLD: 15.9 % (ref 4–15)
NEUTROPHILS # BLD AUTO: 3.9 K/UL (ref 1.8–7.7)
NEUTROPHILS NFR BLD: 56.5 % (ref 38–73)
NRBC BLD-RTO: 0 /100 WBC
PLATELET # BLD AUTO: 96 K/UL (ref 150–450)
PMV BLD AUTO: 11.2 FL (ref 9.2–12.9)
RBC # BLD AUTO: 3.95 M/UL (ref 4.6–6.2)
WBC # BLD AUTO: 6.96 K/UL (ref 3.9–12.7)

## 2021-06-30 PROCEDURE — 99285 EMERGENCY DEPT VISIT HI MDM: CPT | Mod: 25

## 2021-06-30 PROCEDURE — 71045 X-RAY EXAM CHEST 1 VIEW: CPT | Mod: 26,,, | Performed by: RADIOLOGY

## 2021-06-30 PROCEDURE — 71045 XR CHEST AP PORTABLE: ICD-10-PCS | Mod: 26,,, | Performed by: RADIOLOGY

## 2021-06-30 PROCEDURE — 80053 COMPREHEN METABOLIC PANEL: CPT | Performed by: EMERGENCY MEDICINE

## 2021-06-30 PROCEDURE — 83690 ASSAY OF LIPASE: CPT | Performed by: EMERGENCY MEDICINE

## 2021-06-30 PROCEDURE — 85025 COMPLETE CBC W/AUTO DIFF WBC: CPT | Performed by: EMERGENCY MEDICINE

## 2021-06-30 PROCEDURE — 84484 ASSAY OF TROPONIN QUANT: CPT | Performed by: EMERGENCY MEDICINE

## 2021-06-30 PROCEDURE — 71045 X-RAY EXAM CHEST 1 VIEW: CPT | Mod: TC,FY

## 2021-06-30 PROCEDURE — 82077 ASSAY SPEC XCP UR&BREATH IA: CPT | Performed by: EMERGENCY MEDICINE

## 2021-07-01 PROBLEM — E87.20 METABOLIC ACIDOSIS: Status: ACTIVE | Noted: 2021-07-01

## 2021-07-01 PROBLEM — N17.9 ACUTE KIDNEY INJURY: Status: ACTIVE | Noted: 2021-07-01
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
## 2021-07-01 PROBLEM — E87.1 HYPONATREMIA: Status: ACTIVE | Noted: 2021-07-01

## 2021-07-01 LAB
ALBUMIN SERPL BCP-MCNC: 2.9 G/DL (ref 3.5–5.2)
ALP SERPL-CCNC: 39 U/L (ref 55–135)
ALT SERPL W/O P-5'-P-CCNC: 26 U/L (ref 10–44)
AMPHET+METHAMPHET UR QL: NEGATIVE
ANION GAP SERPL CALC-SCNC: 15 MMOL/L (ref 8–16)
ANION GAP SERPL CALC-SCNC: 15 MMOL/L (ref 8–16)
ANION GAP SERPL CALC-SCNC: 17 MMOL/L (ref 8–16)
AST SERPL-CCNC: 33 U/L (ref 10–40)
BARBITURATES UR QL SCN>200 NG/ML: NEGATIVE
BENZODIAZ UR QL SCN>200 NG/ML: NEGATIVE
BILIRUB SERPL-MCNC: 0.3 MG/DL (ref 0.1–1)
BILIRUB UR QL STRIP: NEGATIVE
BNP SERPL-MCNC: 55 PG/ML (ref 0–99)
BUN SERPL-MCNC: 22 MG/DL (ref 8–23)
BUN SERPL-MCNC: 23 MG/DL (ref 8–23)
BUN SERPL-MCNC: 24 MG/DL (ref 8–23)
BZE UR QL SCN: NEGATIVE
CALCIUM SERPL-MCNC: 7.9 MG/DL (ref 8.7–10.5)
CALCIUM SERPL-MCNC: 9.3 MG/DL (ref 8.7–10.5)
CALCIUM SERPL-MCNC: 9.4 MG/DL (ref 8.7–10.5)
CANNABINOIDS UR QL SCN: NEGATIVE
CHLORIDE SERPL-SCNC: 102 MMOL/L (ref 95–110)
CHLORIDE SERPL-SCNC: 103 MMOL/L (ref 95–110)
CHLORIDE SERPL-SCNC: 97 MMOL/L (ref 95–110)
CLARITY UR: CLEAR
CO2 SERPL-SCNC: 11 MMOL/L (ref 23–29)
CO2 SERPL-SCNC: 15 MMOL/L (ref 23–29)
CO2 SERPL-SCNC: 15 MMOL/L (ref 23–29)
COLOR UR: YELLOW
CREAT SERPL-MCNC: 1.9 MG/DL (ref 0.5–1.4)
CREAT SERPL-MCNC: 2.1 MG/DL (ref 0.5–1.4)
CREAT SERPL-MCNC: 2.1 MG/DL (ref 0.5–1.4)
CREAT UR-MCNC: 53.4 MG/DL (ref 23–375)
EST. GFR  (AFRICAN AMERICAN): 38.1 ML/MIN/1.73 M^2
EST. GFR  (AFRICAN AMERICAN): 38.1 ML/MIN/1.73 M^2
EST. GFR  (AFRICAN AMERICAN): 43 ML/MIN/1.73 M^2
EST. GFR  (NON AFRICAN AMERICAN): 33 ML/MIN/1.73 M^2
EST. GFR  (NON AFRICAN AMERICAN): 33 ML/MIN/1.73 M^2
EST. GFR  (NON AFRICAN AMERICAN): 37.2 ML/MIN/1.73 M^2
ETHANOL SERPL-MCNC: 148 MG/DL (ref 0–10)
GLUCOSE SERPL-MCNC: 82 MG/DL (ref 70–110)
GLUCOSE SERPL-MCNC: 89 MG/DL (ref 70–110)
GLUCOSE SERPL-MCNC: 89 MG/DL (ref 70–110)
GLUCOSE UR QL STRIP: NEGATIVE
HGB UR QL STRIP: NEGATIVE
KETONES UR QL STRIP: NEGATIVE
LEUKOCYTE ESTERASE UR QL STRIP: NEGATIVE
LIPASE SERPL-CCNC: 49 U/L (ref 4–60)
METHADONE UR QL SCN>300 NG/ML: NEGATIVE
NITRITE UR QL STRIP: NEGATIVE
OPIATES UR QL SCN: NEGATIVE
PCP UR QL SCN>25 NG/ML: NEGATIVE
PH UR STRIP: 5 [PH] (ref 5–8)
POTASSIUM SERPL-SCNC: 3.7 MMOL/L (ref 3.5–5.1)
POTASSIUM SERPL-SCNC: 3.9 MMOL/L (ref 3.5–5.1)
POTASSIUM SERPL-SCNC: 4.1 MMOL/L (ref 3.5–5.1)
PROT SERPL-MCNC: 6.7 G/DL (ref 6–8.4)
PROT UR QL STRIP: NEGATIVE
SARS-COV-2 RDRP RESP QL NAA+PROBE: NEGATIVE
SODIUM SERPL-SCNC: 129 MMOL/L (ref 136–145)
SODIUM SERPL-SCNC: 129 MMOL/L (ref 136–145)
SODIUM SERPL-SCNC: 132 MMOL/L (ref 136–145)
SP GR UR STRIP: <=1.005 (ref 1–1.03)
TOXICOLOGY INFORMATION: NORMAL
TROPONIN I SERPL DL<=0.01 NG/ML-MCNC: 0.01 NG/ML (ref 0–0.03)
URN SPEC COLLECT METH UR: NORMAL
UROBILINOGEN UR STRIP-ACNC: NEGATIVE EU/DL

## 2021-07-01 PROCEDURE — 93005 ELECTROCARDIOGRAM TRACING: CPT

## 2021-07-01 PROCEDURE — G0378 HOSPITAL OBSERVATION PER HR: HCPCS | Mod: CS

## 2021-07-01 PROCEDURE — 80048 BASIC METABOLIC PNL TOTAL CA: CPT | Performed by: EMERGENCY MEDICINE

## 2021-07-01 PROCEDURE — 93010 EKG 12-LEAD: ICD-10-PCS | Mod: ,,, | Performed by: INTERNAL MEDICINE

## 2021-07-01 PROCEDURE — 83880 ASSAY OF NATRIURETIC PEPTIDE: CPT | Performed by: EMERGENCY MEDICINE

## 2021-07-01 PROCEDURE — 25000003 PHARM REV CODE 250: Performed by: EMERGENCY MEDICINE

## 2021-07-01 PROCEDURE — 99219 PR INITIAL OBSERVATION CARE,LEVL II: CPT | Mod: ,,, | Performed by: FAMILY MEDICINE

## 2021-07-01 PROCEDURE — U0002 COVID-19 LAB TEST NON-CDC: HCPCS | Performed by: EMERGENCY MEDICINE

## 2021-07-01 PROCEDURE — 81003 URINALYSIS AUTO W/O SCOPE: CPT | Mod: 59 | Performed by: EMERGENCY MEDICINE

## 2021-07-01 PROCEDURE — 96361 HYDRATE IV INFUSION ADD-ON: CPT

## 2021-07-01 PROCEDURE — 25000003 PHARM REV CODE 250: Performed by: FAMILY MEDICINE

## 2021-07-01 PROCEDURE — 96374 THER/PROPH/DIAG INJ IV PUSH: CPT | Performed by: EMERGENCY MEDICINE

## 2021-07-01 PROCEDURE — 96361 HYDRATE IV INFUSION ADD-ON: CPT | Performed by: EMERGENCY MEDICINE

## 2021-07-01 PROCEDURE — 80307 DRUG TEST PRSMV CHEM ANLYZR: CPT | Performed by: EMERGENCY MEDICINE

## 2021-07-01 PROCEDURE — 63600175 PHARM REV CODE 636 W HCPCS: Performed by: FAMILY MEDICINE

## 2021-07-01 PROCEDURE — 93010 ELECTROCARDIOGRAM REPORT: CPT | Mod: ,,, | Performed by: INTERNAL MEDICINE

## 2021-07-01 PROCEDURE — 99219 PR INITIAL OBSERVATION CARE,LEVL II: ICD-10-PCS | Mod: ,,, | Performed by: FAMILY MEDICINE

## 2021-07-01 PROCEDURE — 36415 COLL VENOUS BLD VENIPUNCTURE: CPT | Performed by: EMERGENCY MEDICINE

## 2021-07-01 RX ORDER — FAMOTIDINE 20 MG/1
20 TABLET, FILM COATED ORAL 2 TIMES DAILY
Status: DISCONTINUED | OUTPATIENT
Start: 2021-07-01 | End: 2021-07-02 | Stop reason: HOSPADM

## 2021-07-01 RX ORDER — MORPHINE SULFATE 4 MG/ML
2 INJECTION, SOLUTION INTRAMUSCULAR; INTRAVENOUS EVERY 4 HOURS PRN
Status: DISCONTINUED | OUTPATIENT
Start: 2021-07-01 | End: 2021-07-02 | Stop reason: HOSPADM

## 2021-07-01 RX ORDER — SODIUM CHLORIDE 0.9 % (FLUSH) 0.9 %
10 SYRINGE (ML) INJECTION
Status: DISCONTINUED | OUTPATIENT
Start: 2021-07-01 | End: 2021-07-02 | Stop reason: HOSPADM

## 2021-07-01 RX ORDER — PROMETHAZINE HYDROCHLORIDE 12.5 MG/1
25 TABLET ORAL EVERY 6 HOURS PRN
Status: DISCONTINUED | OUTPATIENT
Start: 2021-07-01 | End: 2021-07-02 | Stop reason: HOSPADM

## 2021-07-01 RX ORDER — CLORAZEPATE DIPOTASSIUM 7.5 MG/1
15 TABLET ORAL 3 TIMES DAILY PRN
Status: DISCONTINUED | OUTPATIENT
Start: 2021-07-01 | End: 2021-07-02 | Stop reason: HOSPADM

## 2021-07-01 RX ORDER — SODIUM CHLORIDE, SODIUM LACTATE, POTASSIUM CHLORIDE, CALCIUM CHLORIDE 600; 310; 30; 20 MG/100ML; MG/100ML; MG/100ML; MG/100ML
INJECTION, SOLUTION INTRAVENOUS CONTINUOUS
Status: DISCONTINUED | OUTPATIENT
Start: 2021-07-01 | End: 2021-07-02 | Stop reason: HOSPADM

## 2021-07-01 RX ORDER — ONDANSETRON 2 MG/ML
4 INJECTION INTRAMUSCULAR; INTRAVENOUS EVERY 8 HOURS PRN
Status: DISCONTINUED | OUTPATIENT
Start: 2021-07-01 | End: 2021-07-02 | Stop reason: HOSPADM

## 2021-07-01 RX ADMIN — SODIUM CHLORIDE 1000 ML: 0.9 INJECTION, SOLUTION INTRAVENOUS at 04:07

## 2021-07-01 RX ADMIN — FAMOTIDINE 20 MG: 20 TABLET, FILM COATED ORAL at 11:07

## 2021-07-01 RX ADMIN — SODIUM CHLORIDE, SODIUM LACTATE, POTASSIUM CHLORIDE, AND CALCIUM CHLORIDE: .6; .31; .03; .02 INJECTION, SOLUTION INTRAVENOUS at 09:07

## 2021-07-01 RX ADMIN — SODIUM CHLORIDE 1000 ML: 0.9 INJECTION, SOLUTION INTRAVENOUS at 01:07

## 2021-07-01 RX ADMIN — FAMOTIDINE 20 MG: 20 TABLET, FILM COATED ORAL at 08:07

## 2021-07-01 RX ADMIN — SODIUM CHLORIDE, SODIUM LACTATE, POTASSIUM CHLORIDE, AND CALCIUM CHLORIDE: .6; .31; .03; .02 INJECTION, SOLUTION INTRAVENOUS at 11:07

## 2021-07-02 VITALS
TEMPERATURE: 98 F | RESPIRATION RATE: 18 BRPM | DIASTOLIC BLOOD PRESSURE: 71 MMHG | WEIGHT: 140 LBS | BODY MASS INDEX: 21.22 KG/M2 | HEART RATE: 90 BPM | HEIGHT: 68 IN | SYSTOLIC BLOOD PRESSURE: 156 MMHG | OXYGEN SATURATION: 95 %

## 2021-07-02 LAB
ALBUMIN SERPL BCP-MCNC: 3 G/DL (ref 3.5–5.2)
ALBUMIN SERPL BCP-MCNC: 3 G/DL (ref 3.5–5.2)
ALP SERPL-CCNC: 39 U/L (ref 55–135)
ALT SERPL W/O P-5'-P-CCNC: 19 U/L (ref 10–44)
ANION GAP SERPL CALC-SCNC: 11 MMOL/L (ref 8–16)
AST SERPL-CCNC: 16 U/L (ref 10–40)
BASOPHILS # BLD AUTO: 0.03 K/UL (ref 0–0.2)
BASOPHILS NFR BLD: 0.6 % (ref 0–1.9)
BILIRUB DIRECT SERPL-MCNC: 0.2 MG/DL (ref 0.1–0.3)
BILIRUB SERPL-MCNC: 0.4 MG/DL (ref 0.1–1)
BUN SERPL-MCNC: 18 MG/DL (ref 8–23)
CALCIUM SERPL-MCNC: 9.2 MG/DL (ref 8.7–10.5)
CHLORIDE SERPL-SCNC: 106 MMOL/L (ref 95–110)
CO2 SERPL-SCNC: 20 MMOL/L (ref 23–29)
CREAT SERPL-MCNC: 1.2 MG/DL (ref 0.5–1.4)
DIFFERENTIAL METHOD: ABNORMAL
EOSINOPHIL # BLD AUTO: 0 K/UL (ref 0–0.5)
EOSINOPHIL NFR BLD: 0.8 % (ref 0–8)
ERYTHROCYTE [DISTWIDTH] IN BLOOD BY AUTOMATED COUNT: 14.7 % (ref 11.5–14.5)
EST. GFR  (AFRICAN AMERICAN): >60 ML/MIN/1.73 M^2
EST. GFR  (NON AFRICAN AMERICAN): >60 ML/MIN/1.73 M^2
GLUCOSE SERPL-MCNC: 95 MG/DL (ref 70–110)
HCT VFR BLD AUTO: 37.5 % (ref 40–54)
HGB BLD-MCNC: 12.9 G/DL (ref 14–18)
IMM GRANULOCYTES # BLD AUTO: 0.02 K/UL (ref 0–0.04)
IMM GRANULOCYTES NFR BLD AUTO: 0.4 % (ref 0–0.5)
INR PPP: 1 (ref 0.8–1.2)
LYMPHOCYTES # BLD AUTO: 1 K/UL (ref 1–4.8)
LYMPHOCYTES NFR BLD: 19.9 % (ref 18–48)
MCH RBC QN AUTO: 31.1 PG (ref 27–31)
MCHC RBC AUTO-ENTMCNC: 34.4 G/DL (ref 32–36)
MCV RBC AUTO: 90 FL (ref 82–98)
MONOCYTES # BLD AUTO: 0.7 K/UL (ref 0.3–1)
MONOCYTES NFR BLD: 13.4 % (ref 4–15)
NEUTROPHILS # BLD AUTO: 3.2 K/UL (ref 1.8–7.7)
NEUTROPHILS NFR BLD: 64.9 % (ref 38–73)
NRBC BLD-RTO: 0 /100 WBC
PHOSPHATE SERPL-MCNC: 2.1 MG/DL (ref 2.7–4.5)
PLATELET # BLD AUTO: 120 K/UL (ref 150–450)
PMV BLD AUTO: 10.6 FL (ref 9.2–12.9)
POTASSIUM SERPL-SCNC: 3.7 MMOL/L (ref 3.5–5.1)
PROT SERPL-MCNC: 6.7 G/DL (ref 6–8.4)
PROTHROMBIN TIME: 10.6 SEC (ref 9–12.5)
RBC # BLD AUTO: 4.15 M/UL (ref 4.6–6.2)
SODIUM SERPL-SCNC: 137 MMOL/L (ref 136–145)
WBC # BLD AUTO: 4.92 K/UL (ref 3.9–12.7)

## 2021-07-02 PROCEDURE — 85610 PROTHROMBIN TIME: CPT | Performed by: FAMILY MEDICINE

## 2021-07-02 PROCEDURE — 97530 THERAPEUTIC ACTIVITIES: CPT

## 2021-07-02 PROCEDURE — 97166 OT EVAL MOD COMPLEX 45 MIN: CPT

## 2021-07-02 PROCEDURE — 96374 THER/PROPH/DIAG INJ IV PUSH: CPT | Performed by: EMERGENCY MEDICINE

## 2021-07-02 PROCEDURE — G0378 HOSPITAL OBSERVATION PER HR: HCPCS | Mod: CS

## 2021-07-02 PROCEDURE — 99217 PR OBSERVATION CARE DISCHARGE: CPT | Mod: ,,, | Performed by: FAMILY MEDICINE

## 2021-07-02 PROCEDURE — 84075 ASSAY ALKALINE PHOSPHATASE: CPT | Performed by: FAMILY MEDICINE

## 2021-07-02 PROCEDURE — 63600175 PHARM REV CODE 636 W HCPCS: Performed by: FAMILY MEDICINE

## 2021-07-02 PROCEDURE — 25000003 PHARM REV CODE 250: Performed by: FAMILY MEDICINE

## 2021-07-02 PROCEDURE — 85025 COMPLETE CBC W/AUTO DIFF WBC: CPT | Performed by: FAMILY MEDICINE

## 2021-07-02 PROCEDURE — 36415 COLL VENOUS BLD VENIPUNCTURE: CPT | Performed by: FAMILY MEDICINE

## 2021-07-02 PROCEDURE — 99217 PR OBSERVATION CARE DISCHARGE: ICD-10-PCS | Mod: ,,, | Performed by: FAMILY MEDICINE

## 2021-07-02 PROCEDURE — 84450 TRANSFERASE (AST) (SGOT): CPT | Performed by: FAMILY MEDICINE

## 2021-07-02 PROCEDURE — 80069 RENAL FUNCTION PANEL: CPT | Performed by: FAMILY MEDICINE

## 2021-07-02 PROCEDURE — G0378 HOSPITAL OBSERVATION PER HR: HCPCS

## 2021-07-02 PROCEDURE — 97535 SELF CARE MNGMENT TRAINING: CPT | Mod: 59

## 2021-07-02 RX ADMIN — MORPHINE SULFATE 2 MG: 4 INJECTION INTRAVENOUS at 01:07

## 2021-07-02 RX ADMIN — FAMOTIDINE 20 MG: 20 TABLET, FILM COATED ORAL at 09:07

## 2021-07-12 ENCOUNTER — OUTPATIENT CASE MANAGEMENT (OUTPATIENT)
Dept: ADMINISTRATIVE | Facility: OTHER | Age: 61
End: 2021-07-12

## 2021-07-15 ENCOUNTER — HOSPITAL ENCOUNTER (EMERGENCY)
Facility: HOSPITAL | Age: 61
Discharge: HOME | End: 2021-07-15
Attending: EMERGENCY MEDICINE
Payer: MEDICARE

## 2021-07-15 VITALS
TEMPERATURE: 98 F | HEART RATE: 90 BPM | RESPIRATION RATE: 20 BRPM | BODY MASS INDEX: 19.5 KG/M2 | DIASTOLIC BLOOD PRESSURE: 72 MMHG | SYSTOLIC BLOOD PRESSURE: 117 MMHG | HEIGHT: 72 IN | WEIGHT: 144 LBS | OXYGEN SATURATION: 99 %

## 2021-07-15 DIAGNOSIS — M54.6 THORACIC BACK PAIN, UNSPECIFIED BACK PAIN LATERALITY, UNSPECIFIED CHRONICITY: Primary | ICD-10-CM

## 2021-07-15 LAB
BACTERIA #/AREA URNS HPF: NORMAL /HPF
BILIRUB UR QL STRIP: ABNORMAL
CLARITY UR: CLEAR
COLOR UR: YELLOW
GLUCOSE UR QL STRIP: NEGATIVE
HGB UR QL STRIP: NEGATIVE
HYALINE CASTS #/AREA URNS LPF: 0 /LPF
KETONES UR QL STRIP: ABNORMAL
LEUKOCYTE ESTERASE UR QL STRIP: NEGATIVE
MICROSCOPIC COMMENT: NORMAL
NITRITE UR QL STRIP: NEGATIVE
PH UR STRIP: 5 [PH] (ref 5–8)
PROT UR QL STRIP: ABNORMAL
RBC #/AREA URNS HPF: 2 /HPF (ref 0–4)
SP GR UR STRIP: 1.02 (ref 1–1.03)
SQUAMOUS #/AREA URNS HPF: 4 /HPF
URN SPEC COLLECT METH UR: ABNORMAL
UROBILINOGEN UR STRIP-ACNC: NEGATIVE EU/DL
WBC #/AREA URNS HPF: 3 /HPF (ref 0–5)

## 2021-07-15 PROCEDURE — 81000 URINALYSIS NONAUTO W/SCOPE: CPT | Performed by: NURSE PRACTITIONER

## 2021-07-15 PROCEDURE — 99283 EMERGENCY DEPT VISIT LOW MDM: CPT

## 2021-07-15 RX ORDER — METHOCARBAMOL 750 MG/1
750 TABLET, FILM COATED ORAL 4 TIMES DAILY PRN
Qty: 40 TABLET | Refills: 0 | Status: SHIPPED | OUTPATIENT
Start: 2021-07-15 | End: 2021-07-25

## 2021-07-29 ENCOUNTER — TELEPHONE (OUTPATIENT)
Dept: FAMILY MEDICINE | Facility: CLINIC | Age: 61
End: 2021-07-29

## 2021-07-29 ENCOUNTER — OUTPATIENT CASE MANAGEMENT (OUTPATIENT)
Dept: ADMINISTRATIVE | Facility: OTHER | Age: 61
End: 2021-07-29

## 2021-08-02 ENCOUNTER — OUTPATIENT CASE MANAGEMENT (OUTPATIENT)
Dept: ADMINISTRATIVE | Facility: OTHER | Age: 61
End: 2021-08-02

## 2021-08-03 ENCOUNTER — OUTPATIENT CASE MANAGEMENT (OUTPATIENT)
Dept: ADMINISTRATIVE | Facility: OTHER | Age: 61
End: 2021-08-03

## 2021-08-11 ENCOUNTER — OUTPATIENT CASE MANAGEMENT (OUTPATIENT)
Dept: ADMINISTRATIVE | Facility: OTHER | Age: 61
End: 2021-08-11

## 2021-08-16 ENCOUNTER — OUTPATIENT CASE MANAGEMENT (OUTPATIENT)
Dept: ADMINISTRATIVE | Facility: OTHER | Age: 61
End: 2021-08-16

## 2022-04-13 ENCOUNTER — TELEPHONE (OUTPATIENT)
Dept: FAMILY MEDICINE | Facility: CLINIC | Age: 62
End: 2022-04-13
Payer: MEDICARE

## 2022-04-13 NOTE — LETTER
April 13, 2022    Jesus Mccabe  [address] [place] [address]             [address]  [place] [address]  Phone: [phone]  Fax: [phone]   [facility] is committed to your overall health and would like to ensure that you are up to date on your recommended test and/or procedures.   Jose Prater MD  has found that your chart shows you may be due for the following:    Health Maintenance Due   Topic Date Due    Lipid Panel  Never done    COVID-19 Vaccine (1) Never done    HIV Screening  Never done    TETANUS VACCINE  Never done    Aspirin/Antiplatelet Therapy  Never done    High Dose Statin  Never done    Shingles Vaccine (1 of 2) Never done    Pneumococcal Vaccines (Age 0-64) (2 - PPSV23 or PCV20) 10/23/2020    Influenza Vaccine (1) 09/01/2021       If you have had any of the above done at another facility, please let us know so that we may obtain copies from that facility.      If you have a copy of these records, please provide a copy for us to scan into your chart.  You are welcome to request that the report be faxed to us at  ([phone]).     If you have an upcoming scheduled appointment for the item above, please disregard this letter.      Diana Bowers CMA [facility] [address] [place], [place] [id number] [phone]

## 2022-07-06 DIAGNOSIS — I10 HTN (HYPERTENSION): ICD-10-CM

## 2023-06-12 ENCOUNTER — HOSPITAL ENCOUNTER (EMERGENCY)
Facility: HOSPITAL | Age: 63
Discharge: HOME | End: 2023-06-12
Payer: MEDICARE

## 2023-06-12 VITALS
HEART RATE: 105 BPM | WEIGHT: 145 LBS | TEMPERATURE: 98 F | RESPIRATION RATE: 18 BRPM | OXYGEN SATURATION: 97 % | BODY MASS INDEX: 19.64 KG/M2 | HEIGHT: 72 IN | DIASTOLIC BLOOD PRESSURE: 92 MMHG | SYSTOLIC BLOOD PRESSURE: 148 MMHG

## 2023-06-12 DIAGNOSIS — R52 PAIN: ICD-10-CM

## 2023-06-12 DIAGNOSIS — S42.291A OTHER CLOSED DISPLACED FRACTURE OF PROXIMAL END OF RIGHT HUMERUS, INITIAL ENCOUNTER: Primary | ICD-10-CM
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
## 2023-06-12 PROCEDURE — 73030 X-RAY EXAM OF SHOULDER: CPT | Mod: TC,RT

## 2023-06-12 PROCEDURE — 73030 X-RAY EXAM OF SHOULDER: CPT | Mod: 26,RT,, | Performed by: RADIOLOGY

## 2023-06-12 PROCEDURE — 99283 EMERGENCY DEPT VISIT LOW MDM: CPT

## 2023-06-12 PROCEDURE — 73030 XR SHOULDER TRAUMA 3 VIEW RIGHT: ICD-10-PCS | Mod: 26,RT,, | Performed by: RADIOLOGY

## 2023-06-12 RX ORDER — IBUPROFEN 800 MG/1
800 TABLET ORAL EVERY 6 HOURS PRN
Qty: 20 TABLET | Refills: 0 | Status: ON HOLD | OUTPATIENT
Start: 2023-06-12 | End: 2023-11-21 | Stop reason: HOSPADM

## 2023-06-12 NOTE — DISCHARGE INSTRUCTIONS
You may use Tylenol ibuprofen as needed for pain, use instructions on the package.    Keep arm in immobilizer unless taking a bath.    Follow-up with orthopedics as scheduled.    Follow up with primary care provider within next 5-7 days for blood pressure monitoring.    Return emergency room if patient develops any new other worrisome symptoms.

## 2023-06-12 NOTE — ED PROVIDER NOTES
Encounter Date: 6/12/2023       History     Chief Complaint   Patient presents with    Shoulder Injury     Right shoulder pain s/p fall about 1 week ago     Patient is a 62-year-old male who presents emergency room with his sister with complaint of right shoulder pain.  Patient is deaf and  was used for communication with the patient directed.  Patient is known alcoholic.  Patient states he fell approximately 2 weeks ago onto his right shoulder.  Patient was drinking at that time.  Sister's states she refused to bring him to the hospital until he quit drinking.  Last alcoholic beverage was 2 days ago.  Patient states has been suffering right shoulder pain since the incident.  Patient is unsure of actual loss of consciousness during that time.  Sister states patient does have history of hypertension, not followed up with his primary care provider in quite some time.  According to records last time he seen Dr. Prater was documented on 06/2022 according to our records.  Patient denies having any shortness of breath, chest pains, nausea, vomiting, diarrhea, headache, neck pain.  Patient still smokes proximally half to 1 pack of cigarettes a day.  No fevers, chills, sick contacts noted.    Review of patient's allergies indicates:  No Known Allergies  Past Medical History:   Diagnosis Date    Advanced liver fibrosis     FibroScan 1/2020 - F3    Coronary artery disease     Gout     History of hepatitis C, treated / cured (SVR12 - 8/2020)     Language barrier     MI (myocardial infarction)     Ulcer      Past Surgical History:   Procedure Laterality Date    ABDOMINAL SURGERY      CARDIAC SURGERY      COLONOSCOPY  09/02/2013    ESOPHAGOGASTRODUODENOSCOPY  08/31/2013    Dr Anjelica Lemos[facility]    EXTENSOR TENDON OF FOREARM / WRIST REPAIR      INTRAMEDULLARY RODDING OF HUMERUS Left 10/20/2019    Procedure: INSERTION, INTRAMEDULLARY SISSY, HUMERUS;  Surgeon: Jose Alejandro Funes MD;  Location:  [facility] OR;  Service: Orthopedics;  Laterality: Left;     History reviewed. No pertinent family history.  Social History     Tobacco Use    Smoking status: Every Day     Packs/day: 1.00     Types: Cigarettes    Smokeless tobacco: Never   Substance Use Topics    Alcohol use: Yes     Alcohol/week: 1.0 standard drink     Types: 1 Cans of beer per week    Drug use: No     Review of Systems   Constitutional: Negative.    HENT: Negative.     Eyes: Negative.    Respiratory: Negative.     Cardiovascular: Negative.    Gastrointestinal: Negative.    Endocrine: Negative.    Genitourinary: Negative.    Musculoskeletal:         Right shoulder pain   Skin: Negative.    Allergic/Immunologic: Negative for food allergies.   Neurological: Negative.    Hematological: Negative.    Psychiatric/Behavioral: Negative.     All other systems reviewed and are negative.    Physical Exam     Initial Vitals [06/12/23 1022]   BP Pulse Resp Temp SpO2   (!) 148/92 105 18 98.1 °F (36.7 °C) 97 %      MAP       --         Physical Exam    Nursing note and vitals reviewed.  Constitutional: He appears well-developed and well-nourished. He is not diaphoretic. No distress.   HENT:   Head: Normocephalic and atraumatic.   Mouth/Throat: Oropharynx is clear and moist.   Eyes: Conjunctivae and EOM are normal. Pupils are equal, round, and reactive to light. No scleral icterus.   Neck:   Normal range of motion.  Cardiovascular:  Normal rate, regular rhythm, normal heart sounds and intact distal pulses.           No murmur heard.  Pulmonary/Chest: Breath sounds normal. No respiratory distress.   Musculoskeletal:         General: No edema.      Right shoulder: Bony tenderness present. No deformity, effusion or crepitus. Decreased range of motion (Range of motion produces pain). Normal pulse.      Cervical back: Normal range of motion.     Neurological: He is alert and oriented to person, place, and time. He has normal strength. No sensory deficit. GCS score is 15.  GCS eye subscore is 4. GCS verbal subscore is 5. GCS motor subscore is 6.   Skin: Skin is warm and dry. Capillary refill takes 2 to 3 seconds.   Psychiatric: He has a normal mood and affect.       ED Course   Procedures  Labs Reviewed - No data to display       Imaging Results               X-Ray Shoulder Trauma 3 view Right (Final result)  Result time 06/12/23 11:06:54   Procedure changed from X-Ray Shoulder Complete 2 View Right     Final result by Colton Mon MD (06/12/23 11:06:54)                   Impression:      Subacute comminuted displaced oblique mildly impacted fracture proximal metadiaphysis of the humerus with mild valgus angulation.    This report was flagged in Epic as abnormal.      Electronically signed by: Colton Mon  Date:    06/12/2023  Time:    11:06               Narrative:    EXAMINATION:  XR SHOULDER TRAUMA 3 VIEW RIGHT    CLINICAL HISTORY:  injury;Pain, unspecified    TECHNIQUE:  Three or four views of the right shoulder were performed.    COMPARISON:  07/02/2021.    FINDINGS:  There is a subacute comminuted displaced oblique fracture involving the proximal metadiaphysis of the humerus.  The distal fracture fragment is displaced medially approximately 11 mm with mild impaction and mild valgus angulation.  Humeral head remains normally positioned within the glenoid cavity.  There is mild adjacent soft tissue swelling.    AC joint intact with mild early degenerative osteoarthrosis.                                    X-Rays:   Independently Interpreted Readings:   Other Readings:  Right shoulder x-ray    Proximal humeral fracture noted.  Radiologist report reviewed, I agree, see findings below.      FINDINGS:  There is a subacute comminuted displaced oblique fracture involving the proximal metadiaphysis of the humerus.  The distal fracture fragment is displaced medially approximately 11 mm with mild impaction and mild valgus angulation.  Humeral head remains normally positioned  within the glenoid cavity.  There is mild adjacent soft tissue swelling.     AC joint intact with mild early degenerative osteoarthrosis.  Medications - No data to display  Medical Decision Making:   History:   Old Records Summarized: records from clinic visits.       <> Summary of Records: Patient's past medical history consists of hypertension, multiple falls, nicotine use, old MI, gout, ANDREWS, hepatic fibrosis  Initial Assessment:   Patient seen examined emergency room, no acute distress noted at this time.  Detailed assessment as noted above.  Differential Diagnosis:   Fractures, dislocations, effusions, rotator cuff tear, muscle spasms, sprain, scapula fracture  Clinical Tests:   Radiological Study: Ordered and Reviewed  ED Management:  After patient seen examined emergency room, x-ray of right shoulder was ordered.    X-ray reviewed, shows proximal humeral fracture.  Discussed case with Dr. Frank, advised put patient in a shoulder immobilizer, follow up in his office.  Will give the patient ibuprofen as needed for pain.  Information was given to sister as well since she will be his primary caretaker.  There was advised to follow up Dr. Frank's office for further advice on treatment plan on 06/14 at 1:45 p.m..  Patient and sister verbalized understanding.  Other:   I have discussed this case with another health care provider.                        Clinical Impression:   Final diagnoses:  [R52] Pain  [S42.291A] Other closed displaced fracture of proximal end of right humerus, initial encounter (Primary)  [W19.XXXA] Fall, initial encounter        ED Disposition Condition    Discharge Stable          ED Prescriptions       Medication Sig Dispense Start Date End Date Auth. Provider    ibuprofen (ADVIL,MOTRIN) 800 MG tablet Take 1 tablet (800 mg total) by mouth every 6 (six) hours as needed for Pain. 20 tablet 6/12/2023 -- Don Smith NP          Follow-up Information       Follow up With Specialties  Details Why Contact Info    Jose Prater MD Family Medicine In 1 week If symptoms worsen, As needed, blood pressure monitoring [address] [facility] [place] [address]  [phone]      Lakhwinder Frank, DO Orthopedic Surgery Go on 6/14/2023 At 1:45 p.m. [address] [facility] [place] [address]  [phone]               Don Smith, HARRIET  06/12/23 1150

## 2023-06-14 ENCOUNTER — OFFICE VISIT (OUTPATIENT)
Dept: ORTHOPEDICS | Facility: CLINIC | Age: 63
End: 2023-06-14
Payer: MEDICARE

## 2023-06-14 ENCOUNTER — LAB VISIT (OUTPATIENT)
Dept: LAB | Facility: HOSPITAL | Age: 63
End: 2023-06-14
Attending: ORTHOPAEDIC SURGERY
Payer: MEDICARE

## 2023-06-14 VITALS — HEIGHT: 72 IN | BODY MASS INDEX: 19.65 KG/M2 | WEIGHT: 145.06 LBS | RESPIRATION RATE: 16 BRPM

## 2023-06-14 DIAGNOSIS — Z01.818 PRE-OP TESTING: ICD-10-CM

## 2023-06-14 DIAGNOSIS — S42.291A: Primary | ICD-10-CM

## 2023-06-14 DIAGNOSIS — M25.511 ACUTE PAIN OF RIGHT SHOULDER: ICD-10-CM

## 2023-06-14 DIAGNOSIS — S42.291A: ICD-10-CM

## 2023-06-14 LAB
ANION GAP SERPL CALC-SCNC: 19 MMOL/L (ref 8–16)
BASOPHILS # BLD AUTO: 0.06 K/UL (ref 0–0.2)
BASOPHILS NFR BLD: 1 % (ref 0–1.9)
BUN SERPL-MCNC: 13 MG/DL (ref 8–23)
CALCIUM SERPL-MCNC: 10.3 MG/DL (ref 8.7–10.5)
CHLORIDE SERPL-SCNC: 94 MMOL/L (ref 95–110)
CO2 SERPL-SCNC: 20 MMOL/L (ref 23–29)
CREAT SERPL-MCNC: 1.1 MG/DL (ref 0.5–1.4)
DIFFERENTIAL METHOD: ABNORMAL
EOSINOPHIL # BLD AUTO: 0.1 K/UL (ref 0–0.5)
EOSINOPHIL NFR BLD: 1 % (ref 0–8)
ERYTHROCYTE [DISTWIDTH] IN BLOOD BY AUTOMATED COUNT: 15.6 % (ref 11.5–14.5)
EST. GFR  (NO RACE VARIABLE): >60 ML/MIN/1.73 M^2
GLUCOSE SERPL-MCNC: 77 MG/DL (ref 70–110)
HCT VFR BLD AUTO: 40.6 % (ref 40–54)
HGB BLD-MCNC: 13.6 G/DL (ref 14–18)
IMM GRANULOCYTES # BLD AUTO: 0.02 K/UL (ref 0–0.04)
IMM GRANULOCYTES NFR BLD AUTO: 0.3 % (ref 0–0.5)
INR PPP: 1 (ref 0.8–1.2)
LYMPHOCYTES # BLD AUTO: 1.9 K/UL (ref 1–4.8)
LYMPHOCYTES NFR BLD: 31.1 % (ref 18–48)
MCH RBC QN AUTO: 30.9 PG (ref 27–31)
MCHC RBC AUTO-ENTMCNC: 33.5 G/DL (ref 32–36)
MCV RBC AUTO: 92 FL (ref 82–98)
MONOCYTES # BLD AUTO: 0.8 K/UL (ref 0.3–1)
MONOCYTES NFR BLD: 13.2 % (ref 4–15)
NEUTROPHILS # BLD AUTO: 3.3 K/UL (ref 1.8–7.7)
NEUTROPHILS NFR BLD: 53.4 % (ref 38–73)
NRBC BLD-RTO: 0 /100 WBC
PLATELET # BLD AUTO: 122 K/UL (ref 150–450)
PMV BLD AUTO: 10 FL (ref 9.2–12.9)
POTASSIUM SERPL-SCNC: 4.3 MMOL/L (ref 3.5–5.1)
PROTHROMBIN TIME: 10.3 SEC (ref 9–12.5)
RBC # BLD AUTO: 4.4 M/UL (ref 4.6–6.2)
SODIUM SERPL-SCNC: 133 MMOL/L (ref 136–145)
WBC # BLD AUTO: 6.23 K/UL (ref 3.9–12.7)

## 2023-06-14 PROCEDURE — 99213 OFFICE O/P EST LOW 20 MIN: CPT | Mod: PBBFAC | Performed by: ORTHOPAEDIC SURGERY

## 2023-06-14 PROCEDURE — 99999 PR PBB SHADOW E&M-EST. PATIENT-LVL III: CPT | Mod: PBBFAC,,, | Performed by: ORTHOPAEDIC SURGERY

## 2023-06-14 PROCEDURE — 99999 PR PBB SHADOW E&M-EST. PATIENT-LVL III: ICD-10-PCS | Mod: PBBFAC,,, | Performed by: ORTHOPAEDIC SURGERY

## 2023-06-14 PROCEDURE — 36415 COLL VENOUS BLD VENIPUNCTURE: CPT | Performed by: ORTHOPAEDIC SURGERY

## 2023-06-14 PROCEDURE — 85610 PROTHROMBIN TIME: CPT | Performed by: ORTHOPAEDIC SURGERY

## 2023-06-14 PROCEDURE — 80048 BASIC METABOLIC PNL TOTAL CA: CPT | Performed by: ORTHOPAEDIC SURGERY

## 2023-06-14 PROCEDURE — 99204 OFFICE O/P NEW MOD 45 MIN: CPT | Mod: 57,S$PBB,, | Performed by: ORTHOPAEDIC SURGERY

## 2023-06-14 PROCEDURE — 85025 COMPLETE CBC W/AUTO DIFF WBC: CPT | Performed by: ORTHOPAEDIC SURGERY

## 2023-06-14 PROCEDURE — 99204 PR OFFICE/OUTPT VISIT, NEW, LEVL IV, 45-59 MIN: ICD-10-PCS | Mod: 57,S$PBB,, | Performed by: ORTHOPAEDIC SURGERY

## 2023-06-14 RX ORDER — CEPHALEXIN 500 MG/1
500 CAPSULE ORAL 4 TIMES DAILY
Qty: 20 CAPSULE | Refills: 0 | Status: ON HOLD | OUTPATIENT
Start: 2023-06-14 | End: 2023-11-21 | Stop reason: HOSPADM

## 2023-06-14 NOTE — H&P
Chief Complaint:  Right proximal humerus fracture     HPI:  Mr. Mccabe is a 62-year-old right-hand-dominant male who presented with complaints of a proximally 1 month of right shoulder pain which began after he was imbibing and tripped and fell onto his shoulder.  He had a sudden exacerbation of pain when he fell again a proximally 2 weeks ago.  He presented to the emergency room on 2023 after his pain did not resolve.  X-rays were taken which showed a displaced proximal humerus fracture he was placed in a sling.  Moving his arm increases his symptoms while nothing seems to improve them.  His symptoms awaken him at night.  He can not lay on his shoulder.  He can not bear weight on his right upper extremity.  He denied new onset numbness or tingling.          Past Medical History:   Diagnosis Date    Coronary artery disease      Language barrier      MI (myocardial infarction)       *  *  *  *  *  *  * Ulcer  Hypertension  Depression  Anxiety  Mitral stenosis as child treated with surgery  Deafness  GERD  Headaches              Past Surgical History:   Procedure Laterality Date    ABDOMINAL SURGERY        CARDIAC SURGERY mitral heart valve repair as child        EXTENSOR TENDON OF FOREARM / WRIST REPAIR        INTRAMEDULLARY RODDING OF HUMERUS Left 10/20/2019     Procedure: INSERTION, INTRAMEDULLARY SISSY, HUMERUS;  Surgeon: Jose Alejandro Funes MD;  Location: [facility];  Service: Orthopedics;  Laterality: Left;         Review of patient's allergies indicates:  No Known Allergies     Social History            Occupational History    Disabled   Tobacco Use    Smoking status: Current Every Day Smoker       Packs/day: 1.00    Smokeless tobacco: Never Used   Substance and Sexual Activity    Alcohol use: Yes       Alcohol/week: 1.0 standard drinks       Types: 1 Cans of beer per week    Drug use: Marijuana    Sexual activity: Never      Family history:  Father:  , heart failure.  Mother:  ,  MI.  Brother:  3, 1 , drowning  Sister:  2, 1 , leukemia.     Previous Hospitalizations:  Heart valve repair     ROS:   Review of Systems   Constitution: Negative for chills and fever.   HENT: Positive for hearing loss. Negative for congestion.    Eyes: Negative for blurred vision.   Cardiovascular: Negative for chest pain.   Respiratory: Negative for cough.    Endocrine: Negative for polydipsia.   Hematologic/Lymphatic: Negative for adenopathy.   Skin: Negative for flushing and itching.   Musculoskeletal: Negative for gout.   Gastrointestinal: Positive for heartburn. Negative for constipation and diarrhea.   Genitourinary: Negative for nocturia.   Neurological: Positive for headaches. Negative for seizures.   Psychiatric/Behavioral: Positive for depression and substance abuse. The patient is nervous/anxious.    Allergic/Immunologic: Negative for environmental allergies.             Objective:   Physical Exam:   General:  Patient communicates through sign language with teleprompter .  Malodorous.  AAOx3.  No acute distress  HEENT: Normocephalic, PEARLA EOMI.  Poor dentition with missing teeth.  Neck: Supple, No JVD  Chest: Symetric, equal excursion on inspiration  Abdomen: Soft NTND  Vascular:  Pulses intact and equal bilaterally.  Capillary refill less than 3 seconds and equal bilaterally  Neurologic:  Pinprick and soft touch over both deltoids intact and equal bilaterally  Integment:  No ecchymosis, no errythema.  Incisions well approximated with staples in place.  Extremity:  Shoulder:  Forward flexion/abduction right shoulder 0/10 degrees. Tender with motion right shoulder.  Tender with palpation right shoulder.  No swelling either shoulder.  Crepitus felt with motion right shoulder.  Nontender over the AC joint.  No swelling over the AC joint.  Motion at fracture site with radial/ulnar stressing.  Tender with radial/ulna stressing right proximal humerus.  Flexion contracture fingers  of right hand consistent with an injury the patient had in the 1980s.  Radiography:  Personally reviewed x-rays of the right humerus completed on 06/12/2023 showed a displaced impacted anatomic neck fracture of the proximal humerus with early callus      Assessment:       Impression:       1. Traumatic closed displaced anatomic neck fracture, right proximal humerus   2. Right shoulder pain                Plan:       1.  Discussed physical examination and radiographic findings with the patient. Jesus understands that he has a displaced fracture of the anatomic neck of his right proximal humerus.  Treatment alternatives and outcomes were discussed with the patient he understands he could be treated conservatively with observation, activity modification, NSAIDs, bracing, traction, sling, or he could consider surgical intervention such as reduction and internal fixation.  He stated that he needs to be able to use his hands because he is deaf and has to sign and can not do it in the condition he is in.  He had a fracture of the proximal humerus of his left arm and did well and would prefer to have surgery on his right arm so he can start using it.  Had a long talk with the patient that it appears that he is having some healing of his fracture and he could treated without surgery he stated that he needs to be able to use his arm and is too painful right now and would like to proceed with surgery since he had a good result he also understands that since there is callus he may not get perfect alignment of his fracture but it can be stabilized so he can at least start using his arm for the activities of daily living and signing as needed.  2. Continue with sling.    3. Possible complications of surgery to include bleeding, infection, scarring, nerve/blood vessel/tendon damage, need for further surgery, failed surgery, failure to improve, possible malalignment, possible persistent pain, possible nonunion, possible fracture,  possible hardware failure, possible breakage, and possible amputation were discussed with the patient.  The patient was permitted to ask questions and all concerns were addressed to his satisfaction.    4. Consent for reduction and internal fixation right proximal humerus.    5. Place patient on surgery schedule for 06/15/2023.    6. Keflex 500 mg, 1 p.o. q.i.d., dispense 20, refill 0, prescription was forwarded to the patient's pharmacy by [facility].    7. Norco 7.5/325, 1 p.o. Q 6 hours p.r.n. pain, dispense 28, refill 0, prescription was given to the patient to have filled at his pharmacy.  8. One-handed activities with the left hand only no lifting/pushing/pulling/climbing requiring the use of the right hand.  No activities on ladders/scaffolding/stairs.  No sports.  9. Follow up approximately 10-12 days postoperatively.

## 2023-06-14 NOTE — H&P (VIEW-ONLY)
Chief Complaint:  Right proximal humerus fracture     HPI:  Mr. Mccabe is a 62-year-old right-hand-dominant male who presented with complaints of a proximally 1 month of right shoulder pain which began after he was imbibing and tripped and fell onto his shoulder.  He had a sudden exacerbation of pain when he fell again a proximally 2 weeks ago.  He presented to the emergency room on 2023 after his pain did not resolve.  X-rays were taken which showed a displaced proximal humerus fracture he was placed in a sling.  Moving his arm increases his symptoms while nothing seems to improve them.  His symptoms awaken him at night.  He can not lay on his shoulder.  He can not bear weight on his right upper extremity.  He denied new onset numbness or tingling.          Past Medical History:   Diagnosis Date    Coronary artery disease      Language barrier      MI (myocardial infarction)       *  *  *  *  *  *  * Ulcer  Hypertension  Depression  Anxiety  Mitral stenosis as child treated with surgery  Deafness  GERD  Headaches              Past Surgical History:   Procedure Laterality Date    ABDOMINAL SURGERY        CARDIAC SURGERY mitral heart valve repair as child        EXTENSOR TENDON OF FOREARM / WRIST REPAIR        INTRAMEDULLARY RODDING OF HUMERUS Left 10/20/2019     Procedure: INSERTION, INTRAMEDULLARY SISSY, HUMERUS;  Surgeon: Jose Alejandro Funes MD;  Location: [facility];  Service: Orthopedics;  Laterality: Left;         Review of patient's allergies indicates:  No Known Allergies     Social History            Occupational History    Disabled   Tobacco Use    Smoking status: Current Every Day Smoker       Packs/day: 1.00    Smokeless tobacco: Never Used   Substance and Sexual Activity    Alcohol use: Yes       Alcohol/week: 1.0 standard drinks       Types: 1 Cans of beer per week    Drug use: Marijuana    Sexual activity: Never      Family history:  Father:  , heart failure.  Mother:  ,  MI.  Brother:  3, 1 , drowning  Sister:  2, 1 , leukemia.     Previous Hospitalizations:  Heart valve repair     ROS:   Review of Systems   Constitution: Negative for chills and fever.   HENT: Positive for hearing loss. Negative for congestion.    Eyes: Negative for blurred vision.   Cardiovascular: Negative for chest pain.   Respiratory: Negative for cough.    Endocrine: Negative for polydipsia.   Hematologic/Lymphatic: Negative for adenopathy.   Skin: Negative for flushing and itching.   Musculoskeletal: Negative for gout.   Gastrointestinal: Positive for heartburn. Negative for constipation and diarrhea.   Genitourinary: Negative for nocturia.   Neurological: Positive for headaches. Negative for seizures.   Psychiatric/Behavioral: Positive for depression and substance abuse. The patient is nervous/anxious.    Allergic/Immunologic: Negative for environmental allergies.             Objective:   Physical Exam:   General:  Patient communicates through sign language with teleprompter .  Malodorous.  AAOx3.  No acute distress  HEENT: Normocephalic, PEARLA EOMI.  Poor dentition with missing teeth.  Neck: Supple, No JVD  Chest: Symetric, equal excursion on inspiration  Abdomen: Soft NTND  Vascular:  Pulses intact and equal bilaterally.  Capillary refill less than 3 seconds and equal bilaterally  Neurologic:  Pinprick and soft touch over both deltoids intact and equal bilaterally  Integment:  No ecchymosis, no errythema.  Incisions well approximated with staples in place.  Extremity:  Shoulder:  Forward flexion/abduction right shoulder 0/10 degrees. Tender with motion right shoulder.  Tender with palpation right shoulder.  No swelling either shoulder.  Crepitus felt with motion right shoulder.  Nontender over the AC joint.  No swelling over the AC joint.  Motion at fracture site with radial/ulnar stressing.  Tender with radial/ulna stressing right proximal humerus.  Flexion contracture fingers  of right hand consistent with an injury the patient had in the 1980s.  Radiography:  Personally reviewed x-rays of the right humerus completed on 06/12/2023 showed a displaced impacted anatomic neck fracture of the proximal humerus with early callus      Assessment:       Impression:       1. Traumatic closed displaced anatomic neck fracture, right proximal humerus   2. Right shoulder pain                Plan:       1.  Discussed physical examination and radiographic findings with the patient. Jesus understands that he has a displaced fracture of the anatomic neck of his right proximal humerus.  Treatment alternatives and outcomes were discussed with the patient he understands he could be treated conservatively with observation, activity modification, NSAIDs, bracing, traction, sling, or he could consider surgical intervention such as reduction and internal fixation.  He stated that he needs to be able to use his hands because he is deaf and has to sign and can not do it in the condition he is in.  He had a fracture of the proximal humerus of his left arm and did well and would prefer to have surgery on his right arm so he can start using it.  Had a long talk with the patient that it appears that he is having some healing of his fracture and he could treated without surgery he stated that he needs to be able to use his arm and is too painful right now and would like to proceed with surgery since he had a good result he also understands that since there is callus he may not get perfect alignment of his fracture but it can be stabilized so he can at least start using his arm for the activities of daily living and signing as needed.  2. Continue with sling.    3. Possible complications of surgery to include bleeding, infection, scarring, nerve/blood vessel/tendon damage, need for further surgery, failed surgery, failure to improve, possible malalignment, possible persistent pain, possible nonunion, possible fracture,  possible hardware failure, possible breakage, and possible amputation were discussed with the patient.  The patient was permitted to ask questions and all concerns were addressed to his satisfaction.    4. Consent for reduction and internal fixation right proximal humerus.    5. Place patient on surgery schedule for 06/15/2023.    6. Keflex 500 mg, 1 p.o. q.i.d., dispense 20, refill 0, prescription was forwarded to the patient's pharmacy by [facility].    7. Norco 7.5/325, 1 p.o. Q 6 hours p.r.n. pain, dispense 28, refill 0, prescription was given to the patient to have filled at his pharmacy.  8. One-handed activities with the left hand only no lifting/pushing/pulling/climbing requiring the use of the right hand.  No activities on ladders/scaffolding/stairs.  No sports.  9. Follow up approximately 10-12 days postoperatively.

## 2023-06-14 NOTE — PROGRESS NOTES
Patient ID: Jesus Mccabe is a 58 y.o. male.     Chief Complaint:  Right proximal humerus fracture     Referring Provider:  [facility] emergency room physician     HPI:  Mr. Mccabe is a 62-year-old right-hand-dominant male who presented with complaints of a proximally 1 month of right shoulder pain which began after he was imbibing and tripped and fell onto his shoulder.  He had a sudden exacerbation of pain when he fell again a proximally 2 weeks ago.  He presented to the emergency room on 06/12/2023 after his pain did not resolve.  X-rays were taken which showed a displaced proximal humerus fracture he was placed in a sling.  Moving his arm increases his symptoms while nothing seems to improve them.  His symptoms awaken him at night.  He can not lay on his shoulder.  He can not bear weight on his right upper extremity.  He denied new onset numbness or tingling.          Past Medical History:   Diagnosis Date    Coronary artery disease      Language barrier      MI (myocardial infarction)       *  *  *  *  *  *  * Ulcer  Hypertension  Depression  Anxiety  Mitral stenosis as child treated with surgery  Deafness  GERD  Headaches              Past Surgical History:   Procedure Laterality Date    ABDOMINAL SURGERY        CARDIAC SURGERY mitral heart valve repair as child        EXTENSOR TENDON OF FOREARM / WRIST REPAIR        INTRAMEDULLARY RODDING OF HUMERUS Left 10/20/2019     Procedure: INSERTION, INTRAMEDULLARY SISSY, HUMERUS;  Surgeon: Jose Alejandro Funes MD;  Location: [facility];  Service: Orthopedics;  Laterality: Left;         Review of patient's allergies indicates:  No Known Allergies     Social History            Occupational History    Disabled   Tobacco Use    Smoking status: Current Every Day Smoker       Packs/day: 1.00    Smokeless tobacco: Never Used   Substance and Sexual Activity    Alcohol use: Yes       Alcohol/week: 1.0 standard drinks       Types: 1 Cans of beer per week    Drug use:  Marijuana    Sexual activity: Never      Family history:  Father:  , heart failure.  Mother:  , MI.  Brother:  3, 1 , drowning  Sister:  2, 1 , leukemia.     Previous Hospitalizations:  Heart valve repair     ROS:   Review of Systems   Constitution: Negative for chills and fever.   HENT: Positive for hearing loss. Negative for congestion.    Eyes: Negative for blurred vision.   Cardiovascular: Negative for chest pain.   Respiratory: Negative for cough.    Endocrine: Negative for polydipsia.   Hematologic/Lymphatic: Negative for adenopathy.   Skin: Negative for flushing and itching.   Musculoskeletal: Negative for gout.   Gastrointestinal: Positive for heartburn. Negative for constipation and diarrhea.   Genitourinary: Negative for nocturia.   Neurological: Positive for headaches. Negative for seizures.   Psychiatric/Behavioral: Positive for depression and substance abuse. The patient is nervous/anxious.    Allergic/Immunologic: Negative for environmental allergies.             Objective:   Physical Exam:   General:  Patient communicates through sign language with teleprompter .  Malodorous.  AAOx3.  No acute distress  HEENT: Normocephalic, PEARLA EOMI.  Poor dentition with missing teeth.  Neck: Supple, No JVD  Chest: Symetric, equal excursion on inspiration  Abdomen: Soft NTND  Vascular:  Pulses intact and equal bilaterally.  Capillary refill less than 3 seconds and equal bilaterally  Neurologic:  Pinprick and soft touch over both deltoids intact and equal bilaterally  Integment:  No ecchymosis, no errythema.  Incisions well approximated with staples in place.  Extremity:  Shoulder:  Forward flexion/abduction right shoulder 0/10 degrees. Tender with motion right shoulder.  Tender with palpation right shoulder.  No swelling either shoulder.  Crepitus felt with motion right shoulder.  Nontender over the AC joint.  No swelling over the AC joint.  Motion at fracture site with  radial/ulnar stressing.  Tender with radial/ulna stressing right proximal humerus.  Flexion contracture fingers of right hand consistent with an injury the patient had in the 1980s.  Radiography:  Personally reviewed x-rays of the right humerus completed on 06/12/2023 showed a displaced impacted anatomic neck fracture of the proximal humerus with early callus      Assessment:       Impression:       1. Traumatic closed displaced anatomic neck fracture, right proximal humerus   2. Right shoulder pain                Plan:       1.  Discussed physical examination and radiographic findings with the patient. Jesus understands that he has a displaced fracture of the anatomic neck of his right proximal humerus.  Treatment alternatives and outcomes were discussed with the patient he understands he could be treated conservatively with observation, activity modification, NSAIDs, bracing, traction, sling, or he could consider surgical intervention such as reduction and internal fixation.  He stated that he needs to be able to use his hands because he is deaf and has to sign and can not do it in the condition he is in.  He had a fracture of the proximal humerus of his left arm and did well and would prefer to have surgery on his right arm so he can start using it.  Had a long talk with the patient that it appears that he is having some healing of his fracture and he could treated without surgery he stated that he needs to be able to use his arm and is too painful right now and would like to proceed with surgery since he had a good result he also understands that since there is callus he may not get perfect alignment of his fracture but it can be stabilized so he can at least start using his arm for the activities of daily living and signing as needed.  2. Continue with sling.    3. Possible complications of surgery to include bleeding, infection, scarring, nerve/blood vessel/tendon damage, need for further surgery, failed  surgery, failure to improve, possible malalignment, possible persistent pain, possible nonunion, possible fracture, possible hardware failure, possible breakage, and possible amputation were discussed with the patient.  The patient was permitted to ask questions and all concerns were addressed to his satisfaction.    4. Consent for reduction and internal fixation right proximal humerus.    5. Place patient on surgery schedule for 06/15/2023.    6. Keflex 500 mg, 1 p.o. q.i.d., dispense 20, refill 0, prescription was forwarded to the patient's pharmacy by [facility].    7. Norco 7.5/325, 1 p.o. Q 6 hours p.r.n. pain, dispense 28, refill 0, prescription was given to the patient to have filled at his pharmacy.  8. One-handed activities with the left hand only no lifting/pushing/pulling/climbing requiring the use of the right hand.  No activities on ladders/scaffolding/stairs.  No sports.  9. Follow up approximately 10-12 days postoperatively.

## 2023-06-15 ENCOUNTER — ANESTHESIA EVENT (OUTPATIENT)
Dept: SURGERY | Facility: HOSPITAL | Age: 63
End: 2023-06-15
Payer: MEDICARE

## 2023-06-15 ENCOUNTER — HOSPITAL ENCOUNTER (OUTPATIENT)
Facility: HOSPITAL | Age: 63
Discharge: HOME | End: 2023-06-15
Attending: ORTHOPAEDIC SURGERY | Admitting: ORTHOPAEDIC SURGERY
Payer: MEDICARE

## 2023-06-15 ENCOUNTER — ANESTHESIA (OUTPATIENT)
Dept: SURGERY | Facility: HOSPITAL | Age: 63
End: 2023-06-15
Payer: MEDICARE

## 2023-06-15 VITALS
WEIGHT: 145 LBS | HEART RATE: 82 BPM | BODY MASS INDEX: 19.64 KG/M2 | SYSTOLIC BLOOD PRESSURE: 172 MMHG | OXYGEN SATURATION: 98 % | HEIGHT: 72 IN | RESPIRATION RATE: 16 BRPM | DIASTOLIC BLOOD PRESSURE: 94 MMHG | TEMPERATURE: 98 F

## 2023-06-15 DIAGNOSIS — S42.291A: Primary | ICD-10-CM

## 2023-06-15 PROCEDURE — 37000009 HC ANESTHESIA EA ADD 15 MINS: Performed by: ORTHOPAEDIC SURGERY

## 2023-06-15 PROCEDURE — 36000711: Performed by: ORTHOPAEDIC SURGERY

## 2023-06-15 PROCEDURE — D9220A PRA ANESTHESIA: Mod: CRNA,,, | Performed by: NURSE ANESTHETIST, CERTIFIED REGISTERED

## 2023-06-15 PROCEDURE — C1713 ANCHOR/SCREW BN/BN,TIS/BN: HCPCS | Performed by: ORTHOPAEDIC SURGERY

## 2023-06-15 PROCEDURE — 36000710: Performed by: ORTHOPAEDIC SURGERY

## 2023-06-15 PROCEDURE — C1769 GUIDE WIRE: HCPCS | Performed by: ORTHOPAEDIC SURGERY

## 2023-06-15 PROCEDURE — 25000003 PHARM REV CODE 250: Performed by: ANESTHESIOLOGY

## 2023-06-15 PROCEDURE — 71000033 HC RECOVERY, INTIAL HOUR: Performed by: ORTHOPAEDIC SURGERY

## 2023-06-15 PROCEDURE — 23615 PR OPEN TREATMENT PROX HUMERAL FRACTURE: ICD-10-PCS | Mod: RT,,, | Performed by: ORTHOPAEDIC SURGERY

## 2023-06-15 PROCEDURE — 71000015 HC POSTOP RECOV 1ST HR: Performed by: ORTHOPAEDIC SURGERY

## 2023-06-15 PROCEDURE — D9220A PRA ANESTHESIA: ICD-10-PCS | Mod: CRNA,,, | Performed by: NURSE ANESTHETIST, CERTIFIED REGISTERED

## 2023-06-15 PROCEDURE — 27201423 OPTIME MED/SURG SUP & DEVICES STERILE SUPPLY: Performed by: ORTHOPAEDIC SURGERY

## 2023-06-15 PROCEDURE — 25000003 PHARM REV CODE 250: Performed by: NURSE ANESTHETIST, CERTIFIED REGISTERED

## 2023-06-15 PROCEDURE — 25000003 PHARM REV CODE 250: Performed by: ORTHOPAEDIC SURGERY

## 2023-06-15 PROCEDURE — 63600175 PHARM REV CODE 636 W HCPCS: Performed by: NURSE ANESTHETIST, CERTIFIED REGISTERED

## 2023-06-15 PROCEDURE — 37000008 HC ANESTHESIA 1ST 15 MINUTES: Performed by: ORTHOPAEDIC SURGERY

## 2023-06-15 PROCEDURE — 23615 OPTX PROX HUMRL FX W/INT FIX: CPT | Mod: RT,,, | Performed by: ORTHOPAEDIC SURGERY

## 2023-06-15 PROCEDURE — 63600175 PHARM REV CODE 636 W HCPCS: Performed by: ANESTHESIOLOGY

## 2023-06-15 PROCEDURE — D9220A PRA ANESTHESIA: Mod: ANES,,, | Performed by: ANESTHESIOLOGY

## 2023-06-15 PROCEDURE — D9220A PRA ANESTHESIA: ICD-10-PCS | Mod: ANES,,, | Performed by: ANESTHESIOLOGY

## 2023-06-15 DEVICE — IMPLANTABLE DEVICE: Type: IMPLANTABLE DEVICE | Site: SHOULDER | Status: FUNCTIONAL

## 2023-06-15 DEVICE — SCREW BONE NL HEXALOBE 3.5 X 2: Type: IMPLANTABLE DEVICE | Site: SHOULDER | Status: FUNCTIONAL

## 2023-06-15 RX ORDER — SODIUM CHLORIDE, SODIUM LACTATE, POTASSIUM CHLORIDE, CALCIUM CHLORIDE 600; 310; 30; 20 MG/100ML; MG/100ML; MG/100ML; MG/100ML
INJECTION, SOLUTION INTRAVENOUS CONTINUOUS
Status: DISCONTINUED | OUTPATIENT
Start: 2023-06-15 | End: 2023-06-19 | Stop reason: HOSPADM

## 2023-06-15 RX ORDER — CEFAZOLIN SODIUM 2 G/50ML
SOLUTION INTRAVENOUS
Status: DISCONTINUED
Start: 2023-06-15 | End: 2023-06-15 | Stop reason: HOSPADM

## 2023-06-15 RX ORDER — PROPOFOL 10 MG/ML
VIAL (ML) INTRAVENOUS
Status: DISCONTINUED | OUTPATIENT
Start: 2023-06-15 | End: 2023-06-15

## 2023-06-15 RX ORDER — EPHEDRINE SULFATE 50 MG/ML
INJECTION, SOLUTION INTRAVENOUS
Status: DISCONTINUED | OUTPATIENT
Start: 2023-06-15 | End: 2023-06-15

## 2023-06-15 RX ORDER — LIDOCAINE HYDROCHLORIDE 20 MG/ML
INJECTION, SOLUTION EPIDURAL; INFILTRATION; INTRACAUDAL; PERINEURAL
Status: DISCONTINUED | OUTPATIENT
Start: 2023-06-15 | End: 2023-06-15

## 2023-06-15 RX ORDER — SUCCINYLCHOLINE CHLORIDE 20 MG/ML
INJECTION INTRAMUSCULAR; INTRAVENOUS
Status: DISCONTINUED | OUTPATIENT
Start: 2023-06-15 | End: 2023-06-15

## 2023-06-15 RX ORDER — FENTANYL CITRATE 50 UG/ML
INJECTION, SOLUTION INTRAMUSCULAR; INTRAVENOUS
Status: DISCONTINUED | OUTPATIENT
Start: 2023-06-15 | End: 2023-06-15

## 2023-06-15 RX ORDER — MORPHINE SULFATE 2 MG/ML
4 INJECTION, SOLUTION INTRAMUSCULAR; INTRAVENOUS ONCE
Status: COMPLETED | OUTPATIENT
Start: 2023-06-15 | End: 2023-06-15

## 2023-06-15 RX ORDER — ROCURONIUM BROMIDE 10 MG/ML
INJECTION, SOLUTION INTRAVENOUS
Status: DISCONTINUED | OUTPATIENT
Start: 2023-06-15 | End: 2023-06-15

## 2023-06-15 RX ORDER — MIDAZOLAM HYDROCHLORIDE 1 MG/ML
INJECTION INTRAMUSCULAR; INTRAVENOUS
Status: DISCONTINUED | OUTPATIENT
Start: 2023-06-15 | End: 2023-06-15

## 2023-06-15 RX ORDER — LIDOCAINE HYDROCHLORIDE AND EPINEPHRINE 20; 10 MG/ML; UG/ML
INJECTION, SOLUTION INFILTRATION; PERINEURAL
Status: DISCONTINUED | OUTPATIENT
Start: 2023-06-15 | End: 2023-06-15 | Stop reason: HOSPADM

## 2023-06-15 RX ORDER — OXYCODONE AND ACETAMINOPHEN 5; 325 MG/1; MG/1
1 TABLET ORAL ONCE
Status: COMPLETED | OUTPATIENT
Start: 2023-06-15 | End: 2023-06-15

## 2023-06-15 RX ORDER — DEXAMETHASONE SODIUM PHOSPHATE 4 MG/ML
INJECTION, SOLUTION INTRA-ARTICULAR; INTRALESIONAL; INTRAMUSCULAR; INTRAVENOUS; SOFT TISSUE
Status: DISCONTINUED | OUTPATIENT
Start: 2023-06-15 | End: 2023-06-15

## 2023-06-15 RX ORDER — ONDANSETRON 2 MG/ML
INJECTION INTRAMUSCULAR; INTRAVENOUS
Status: DISCONTINUED | OUTPATIENT
Start: 2023-06-15 | End: 2023-06-15

## 2023-06-15 RX ADMIN — ROCURONIUM BROMIDE 30 MG: 10 INJECTION, SOLUTION INTRAVENOUS at 09:06

## 2023-06-15 RX ADMIN — ONDANSETRON 4 MG: 2 INJECTION INTRAMUSCULAR; INTRAVENOUS at 09:06

## 2023-06-15 RX ADMIN — MORPHINE SULFATE 4 MG: 2 INJECTION, SOLUTION INTRAMUSCULAR; INTRAVENOUS at 11:06

## 2023-06-15 RX ADMIN — EPHEDRINE SULFATE 25 MG: 50 INJECTION INTRAVENOUS at 09:06

## 2023-06-15 RX ADMIN — PROPOFOL 200 MG: 10 INJECTION, EMULSION INTRAVENOUS at 09:06

## 2023-06-15 RX ADMIN — MIDAZOLAM HYDROCHLORIDE 2 MG: 1 INJECTION, SOLUTION INTRAMUSCULAR; INTRAVENOUS at 09:06

## 2023-06-15 RX ADMIN — FENTANYL CITRATE 100 MCG: 50 INJECTION, SOLUTION INTRAMUSCULAR; INTRAVENOUS at 09:06

## 2023-06-15 RX ADMIN — SUGAMMADEX 50 MG: 100 INJECTION, SOLUTION INTRAVENOUS at 10:06

## 2023-06-15 RX ADMIN — SUCCINYLCHOLINE CHLORIDE 120 MG: 20 INJECTION, SOLUTION INTRAMUSCULAR; INTRAVENOUS at 09:06

## 2023-06-15 RX ADMIN — OXYCODONE HYDROCHLORIDE AND ACETAMINOPHEN 1 TABLET: 5; 325 TABLET ORAL at 12:06

## 2023-06-15 RX ADMIN — SUGAMMADEX 100 MG: 100 INJECTION, SOLUTION INTRAVENOUS at 10:06

## 2023-06-15 RX ADMIN — SODIUM CHLORIDE, POTASSIUM CHLORIDE, SODIUM LACTATE AND CALCIUM CHLORIDE: 600; 310; 30; 20 INJECTION, SOLUTION INTRAVENOUS at 09:06

## 2023-06-15 RX ADMIN — SODIUM CHLORIDE, POTASSIUM CHLORIDE, SODIUM LACTATE AND CALCIUM CHLORIDE: 600; 310; 30; 20 INJECTION, SOLUTION INTRAVENOUS at 08:06

## 2023-06-15 RX ADMIN — LIDOCAINE HYDROCHLORIDE 100 MG: 20 INJECTION, SOLUTION EPIDURAL; INFILTRATION; INTRACAUDAL; PERINEURAL at 09:06

## 2023-06-15 RX ADMIN — DEXAMETHASONE SODIUM PHOSPHATE 4 MG: 4 INJECTION, SOLUTION INTRAMUSCULAR; INTRAVENOUS at 09:06

## 2023-06-15 NOTE — TRANSFER OF CARE
Anesthesia Transfer of Care Note    Patient: Jesus Mccabe    Procedure(s) Performed: Procedure(s) (LRB):  Reduction and Internal Fixation Right Proximal Humerus (Right)    Patient location: PACU    Anesthesia Type: general    Transport from OR: Transported from OR on room air with adequate spontaneous ventilation    Post pain: adequate analgesia    Post assessment: no apparent anesthetic complications and tolerated procedure well    Post vital signs: stable    Level of consciousness: awake, alert and oriented    Nausea/Vomiting: no nausea/vomiting    Complications: none    Transfer of care protocol was followed      Last vitals:   Visit Vitals  BP (!) 169/91   Pulse 88   Temp 36.4 °C (97.6 °F) (Tympanic)   Resp (!) 65   Ht 6' (1.829 m)   Wt 65.8 kg (145 lb)   SpO2 98%   BMI 19.67 kg/m²

## 2023-06-15 NOTE — ANESTHESIA PROCEDURE NOTES
Intubation    Date/Time: 6/15/2023 9:20 AM  Performed by: Selina Solis CRNA  Authorized by: Aric Jack MD     Intubation:     Induction:  Intravenous    Intubated:  Postinduction    Mask Ventilation:  Easy mask    Attempts:  1    Attempted By:  CRNA    Method of Intubation:  Video laryngoscopy    Blade:  Franklin 4    Laryngeal View Grade: Grade I - full view of cords      Difficult Airway Encountered?: No      Complications:  None    Airway Device:  Oral endotracheal tube    Airway Device Size:  7.0    Style/Cuff Inflation:  Cuffed (inflated to minimal occlusive pressure)    Tube secured:  22    Secured at:  The lips    Placement Verified By:  Capnometry    Complicating Factors:  None    Findings Post-Intubation:  BS equal bilateral and atraumatic/condition of teeth unchanged

## 2023-06-15 NOTE — PLAN OF CARE
FRANCISCO consult- was told that patient appears to have not bathed in awhile, he lives alone and drinks a lot. Patient was having surgery on shoulder today and would be staying with his sister during recovery.    FRANCISCO spoke with sister, Sada Thomas [phone]- stated that patient lives in a studio apartment behind her house. He drinks too much, gets drunk and falls down. His residence is dirty and he does not take care of himself. Sister verbalized desire that patient go live in a nursing home or assisted living facility. She acknowledged that patient does not want help and does not want to leave his home or change his behaviors. SW agreed to speak with patient about home health services and to speak with Dr. Frank's nurse about whether he will put in HH orders.    Patient is deaf. FRANCISCO wrote notes introducing self, why there and ask if he would like to get some additional help at home during his recovery if Dr. Frank will put in the HH order. Patient nodded in agreement and signed Patient choice form for [facility].    FRANCISCO messaged Dr. [facility] nurse, Yenny Mena to ask about Dr. Frank putting in a HH order. Dr. Glass left to go out of town after his surgery and will not be able to place order.    FRANCISCO called patient's sister, Sada Thomas [phone]- left message explaining situation and next step of going to Dr. Prater, patient's PCP to get HH orders. Left call back information including phone number.

## 2023-06-15 NOTE — PLAN OF CARE
Pt HOB elevated, pt tolerating sips of water.  Pt urinated on self and top sheets, bed linens and gown changed. amr

## 2023-06-15 NOTE — OP NOTE
[facility]  Orthopedic Surgery    6/15/2023    Jesus Mccabe  [id number]      PREOPERATIVE DIAGNOSIS: Traumatic closed displaced fracture of anatomical neck of humerus, right, initial encounter [S49.442U]    POSTOPERATIVE DIAGNOSIS:  Displaced anatomic neck fracture, right proximal humerus    PROCEDURE:  Closed reduction and intramedullary zaid fixation right proximal humerus with Accu Med Polaris 3 short humeral nail and 7 bone screws.    SURGEON: Lakhwinder Frank D.O.    ASSISTANT:  None.    ANESTHESIA:  General    BLOOD LOSS:  Less than 30 cc    TOURNIQUET:  Non applicable    DRAINS:  None    PATHOLOGY:  None    COMPLICATION:  None    INDICATIONS FOR PROCEDURE:   Mr. Mccabe is a 62-year-old right-hand-dominant male who has 1 month of right shoulder pain which began after he was imbibing and tripped, falling onto his right shoulder.  He had a sudden exacerbation of pain when he fell again 2 weeks later.  He presented to the emergency room on 06/12/2023 after his pain did not resolve.  X-rays were taken which showed a displaced proximal humerus fracture; he was placed in a sling.  Moving his arm increases his symptoms while nothing seems to improve them.  His symptoms awaken him at night.  He can not lay on his shoulder.  He can not bear weight on his right upper extremity.  He denied new onset numbness or tingling.   He elected to proceed with surgery after complications to include bleeding, infection, scarring, nerve/blood vessel/tendon damage, need for further surgery, failed surgery, failure to improve, stiffness, fracture, hardware failure, hardware breakage, nonunion, and possible amputation were discussed.  He signed a consent.    PROCEDURE IN DETAIL:   The patient was brought to the operating room and transferred to the operating room bed where all bony prominences were well padded.  General anesthesia was then administered by the Anesthesiology Department.  After general anesthesia was  administered he was positioned to a semi beach chair position and then his right upper extremity was prepped with chlorhexidine solution and draped in the normal sterile fashion.  After prepping and draping bony and soft tissue landmarks were identified and marked on the patient's shoulder a mini open incision site was marked at the anterolateral shoulder.  The fracture was localized under fluoroscopic orthogonal views.       Because the patient had some early callus a stab incision was made at the anterolateral humerus with a #15 blade and blunt dissection was taken to the level of the humerus.  A Steinmann pin was then placed in the incision and placed within the fracture site freeing the fracture to help facilitate fracture reduction.  After his good fracture reduction could be obtained sharp incision was then made with a #15 blade at the mini open anterolateral shoulder incision site that was previously marked.  Dissection was taken to the level of the deltoid which was opened sharply followed by opening of a rotator cuff in a longitudinal fashion.  An awl was then placed in the incision site and advanced at the greater tuberosity into the humeral head and across the fracture site into the distal fracture fragment under fluoroscopic orthogonal views.  Once this was in the appropriate position a guidewire was placed and the awl was removed.  A Reamer was then placed in the incision and the proximal humeral head was reamed followed by placement of a short intramedullary humeral nail on an outrigger assembly.  The nail placement was checked with fluoroscopic orthogonal views and when it was in the appropriate position the guidewire was removed and then drill guides were placed on the outrigger assembly and advanced the skin sharp incision was made with a#15 blade followed by blunt dissection to the level of the humeral head.  Drill holes were then made and checked with fluoroscopic views once the drill hole was in  the appropriate position it was measured and then appropriate length screws were placed.  Five proximal interlocking screws were placed in his fashion.  After placement of the proximal interlocking screws attention was placed on the distal interlocking screws which were placed in a similar fashion as the proximal interlocking screws.  After all screws were placed and the nail was placed the outrigger assembly was removed and final fluoroscopic orthogonal views were taken showing near anatomic alignment of the fracture site with good placement of the zaid and screws.       The incisions were then extensively irrigated and then closed with Vicryl suture in a layered fashion followed by plastic closure.  The incisions were then dressed with Mastisol, Steri-Strips, Adaptic, sterile gauze and op site dressing.  He was then placed in a shoulder sling/immobilizer and then awakened by anesthesia and transferred from the operating room to the recovery room in stable condition.  He tolerated the procedures well without complication.

## 2023-06-15 NOTE — DISCHARGE SUMMARY
[facility], [place], operated by [facility]  Discharge Note  Short Stay    Procedure(s) (LRB):  Reduction and Internal Fixation Right Proximal Humerus (Right)      OUTCOME: Patient tolerated treatment/procedure well without complication and is now ready for discharge.    DISPOSITION: Home or Self Care    FINAL DIAGNOSIS:  Displaced anatomic neck fracture, right proximal humerus    FOLLOWUP: In clinic    DISCHARGE INSTRUCTIONS:    Discharge Procedure Orders   Diet Adult Regular     Keep surgical extremity elevated     Ice to affected area     Lifting restrictions   Order Comments: One-handed activities with the left hand only no lifting/pushing/pulling/climbing requiring the use of the right hand.  No activities on ladders/scaffolding/stairs.  No sports.  May use right arm for light activities of daily living such as bathing, dressing, and feeding oneself.     Other restrictions (specify):   Order Comments: 1. Elevate and ice right shoulder.    2. One-handed activities with the left hand only no lifting/pushing/pulling/climbing requiring the use of the right hand.  No activities on ladders/scaffolding/stairs.  No sports.  May use right arm for light activities of daily living such as bathing, dressing, and feeding oneself.  3. May remove dressing in 3 days.  May shower after removal of dressing, do not soak in a tub.  Place Band-Aids over incisions after removal of dressing.  4. Start doing light home exercises with right shoulder such as elephant trunk exercises     Notify your health care provider if you experience any of the following:  temperature >100.4      Remove dressing in 72 hours   Order Comments: May remove dressing in 3 days.  May shower after removal of dressing, do not soak in a tub.  Place Band-Aids over incisions after removal of dressing     Shower on day dressing removed (No bath)   Order Comments: May remove dressing in 3 days.  May shower after removal of dressing, do not soak in a tub.  Place Band-Aids over incisions  after removal of dressing.        TIME SPENT ON DISCHARGE: 20 minutes

## 2023-06-15 NOTE — PLAN OF CARE
Pt awake and oriented, pt received from OR with small dot of red blood on dressing, has progressed to fully saturated red blood on dressing.  MD notified by LINSEY Magallon.  RN will change dressing at recovery bedside. eva.

## 2023-06-15 NOTE — ANESTHESIA PREPROCEDURE EVALUATION
06/15/2023  Jesus Mccabe is a 62 y.o., male.      Pre-op Assessment    I have reviewed the Patient Summary Reports.     I have reviewed the Nursing Notes.    I have reviewed the Medications.     Review of Systems  Anesthesia Hx:  No problems with previous Anesthesia  Neg history of prior surgery. Denies Family Hx of Anesthesia complications.   Denies Personal Hx of Anesthesia complications.   Social:  Smoker    Hematology/Oncology:  Hematology Normal   Oncology Normal     EENT/Dental:  EENT/Dental Normal Deaf and mute.Cosent and medical obtained via interpretor.   Cardiovascular:   Hypertension, well controlled Past MI CAD asymptomatic   Denies Angina. Unknown valvular surgery at age 16.   Pulmonary:  Pulmonary Normal    Renal/:   Chronic Renal Disease    Hepatic/GI:   Liver Disease, Hepatitis, C Patient was on Liver transplant list but couldn't abstain from drinking alcohol.   Musculoskeletal:  Musculoskeletal Normal    Neurological:  Neurology Normal    Endocrine:  Endocrine Normal    Dermatological:  Skin Normal    Psych:  Psychiatric Normal           Physical Exam  General: Alert    Airway:  Mallampati: II   Mouth Opening: Normal  TM Distance: Normal  Neck ROM: Normal ROM    Dental:  Loose teeth    Chest/Lungs:  Clear to auscultation, Normal Respiratory Rate    Heart:  Rate: Normal    Abdomen:  Normal        Anesthesia Plan  Type of Anesthesia, risks & benefits discussed:    Anesthesia Type: Gen ETT  Post Op Pain Control Plan: multimodal analgesia  Airway Plan: Direct, Post-Induction  Informed Consent: Informed consent signed with the Patient and all parties understand the risks and agree with anesthesia plan.  All questions answered. Patient consented to blood products? No  ASA Score: 3  Day of Surgery Review of History & Physical: H&P Update referred to the surgeon/provider.    Ready For Surgery From  Anesthesia Perspective.     .

## 2023-06-15 NOTE — ANESTHESIA POSTPROCEDURE EVALUATION
Anesthesia Post Evaluation    Patient: Jesus Mccabe    Procedure(s) Performed: Procedure(s) (LRB):  Reduction and Internal Fixation Right Proximal Humerus (Right)    Final Anesthesia Type: general      Patient location during evaluation: PACU  Patient participation: Yes- Able to Participate  Level of consciousness: awake and awake and alert  Post-procedure vital signs: reviewed and stable  Pain management: adequate  Airway patency: patent    PONV status at discharge: No PONV  Anesthetic complications: no      Cardiovascular status: blood pressure returned to baseline  Respiratory status: unassisted and spontaneous ventilation  Hydration status: euvolemic  Follow-up not needed.          Vitals Value Taken Time   /105 06/15/23 1217   Temp 36.4 °C (97.6 °F) 06/15/23 1100   Pulse 78 06/15/23 1219   Resp 79 06/15/23 1219   SpO2 100 % 06/15/23 1150   Vitals shown include unvalidated device data.      Event Time   Out of Recovery 11:27:00         Pain/Natalie Score: Pain Rating Prior to Med Admin: 7 (6/15/2023 12:10 PM)  Natalie Score: 9 (6/15/2023 11:23 AM)  Modified Natalie Score: 19 (6/15/2023 12:14 PM)

## 2023-06-28 ENCOUNTER — OFFICE VISIT (OUTPATIENT)
Dept: ORTHOPEDICS | Facility: CLINIC | Age: 63
End: 2023-06-28
Payer: MEDICARE

## 2023-06-28 VITALS — HEIGHT: 72 IN | RESPIRATION RATE: 18 BRPM | BODY MASS INDEX: 19.65 KG/M2 | WEIGHT: 145.06 LBS

## 2023-06-28 DIAGNOSIS — S42.291D: Primary | ICD-10-CM

## 2023-06-28 PROCEDURE — 99213 OFFICE O/P EST LOW 20 MIN: CPT | Mod: PBBFAC | Performed by: ORTHOPAEDIC SURGERY

## 2023-06-28 PROCEDURE — 99024 PR POST-OP FOLLOW-UP VISIT: ICD-10-PCS | Mod: POP,,, | Performed by: ORTHOPAEDIC SURGERY

## 2023-06-28 PROCEDURE — 99999 PR PBB SHADOW E&M-EST. PATIENT-LVL III: ICD-10-PCS | Mod: PBBFAC,,, | Performed by: ORTHOPAEDIC SURGERY

## 2023-06-28 PROCEDURE — 99024 POSTOP FOLLOW-UP VISIT: CPT | Mod: POP,,, | Performed by: ORTHOPAEDIC SURGERY

## 2023-06-28 PROCEDURE — 99999 PR PBB SHADOW E&M-EST. PATIENT-LVL III: CPT | Mod: PBBFAC,,, | Performed by: ORTHOPAEDIC SURGERY

## 2023-06-28 NOTE — PROGRESS NOTES
Subjective:      Patient ID: Jesus Mccabe is a 62 y.o. male.    Chief Complaint: Post-op Evaluation and Pain of the Right Shoulder      HPI:  Mr. Mccabe returns today for his 1st postop visit after reduction and intramedullary zaid fixation of a surgical neck fracture as right proximal humerus.  He stated some pain but otherwise is doing well.  He has been wearing a shoulder immobilizer.  His date of surgery 06/15/2023.  He originally injured his right shoulder about 2 months ago after he was imbibing and tripped falling onto his shoulder.    ROS:  New diagnosis/surgery/prescriptions since last office visit on 06/14/2023:  IM zaid right proximal humerus  Constitution: Negative for chills and fever.   HENT: Positive for hearing loss. Negative for congestion.    Eyes: Negative for blurred vision.   Cardiovascular: Negative for chest pain.   Respiratory: Negative for cough.    Endocrine: Negative for polydipsia.   Hematologic/Lymphatic: Negative for adenopathy.   Skin: Negative for flushing and itching.   Musculoskeletal: Negative for gout.   Gastrointestinal: Positive for heartburn. Negative for constipation and diarrhea.   Genitourinary: Negative for nocturia.   Neurological: Positive for headaches. Negative for seizures.   Psychiatric/Behavioral: Positive for depression and substance abuse. The patient is nervous/anxious.    Allergic/Immunologic: Negative for environmental allergies.       Objective:      Physical Exam:   General: AAOx3.  No acute distress  Vascular:  Pulses intact and equal bilaterally.  Capillary refill less than 3 seconds and equal bilaterally  Neurologic:  Pinprick and soft touch intact and equal bilaterally.  Pinprick over the deltoid intact.  Integment:  Incision sites well approximated and healing well.  Extremity:  Shoulder:  Right arm in sling.  With sling removed:  Forward flexion/abduction 0/90 degrees right shoulder.  No swelling.  Internal/external rotation decreased compared to the left  shoulder.  Tender with motion right shoulder.  Tenderness with palpation right.  Moves elbow with relatively little pain.  Radiography:  No new x-rays done today.      Assessment:       Impression:     1. Reduction and intramedullary zaid fixation anatomic neck fracture, right proximal humerus         Plan:       1.  Discussed physical examination with the patient. Jesus understands that he had a reduction with intramedullary zaid fixation of his right shoulder.  He appears to be doing well but is somewhat stiff.  He needs to start doing motion with his shoulder.  2. Refer to occupational therapy to start motion exercises.  3. Start home exercises such as Codman exercises, cane exercises, wall walking exercises, towel exercises, and pulley exercises.  A pamphlet on shoulder exercises was given to the patient.  4. One-handed activities with the left hand.  No lifting/pushing/pulling/climbing requiring the use of the right hand.  No activities on ladders/scaffolding/stairs.  May use right arm for light activities of daily living such as dressing and eating.   5. Discontinue sling.    6. Any pain can be treated with over-the-counter medications dosed per box   7.  Follow up in 1 month with an x-ray of the humerus.

## 2023-07-20 DIAGNOSIS — S42.291D: Primary | ICD-10-CM

## 2023-07-26 ENCOUNTER — HOSPITAL ENCOUNTER (OUTPATIENT)
Dept: RADIOLOGY | Facility: HOSPITAL | Age: 63
Discharge: HOME | End: 2023-07-26
Attending: ORTHOPAEDIC SURGERY
Payer: MEDICARE

## 2023-07-26 ENCOUNTER — OFFICE VISIT (OUTPATIENT)
Dept: ORTHOPEDICS | Facility: CLINIC | Age: 63
End: 2023-07-26
Payer: MEDICARE

## 2023-07-26 VITALS — WEIGHT: 145.06 LBS | BODY MASS INDEX: 19.65 KG/M2 | HEIGHT: 72 IN

## 2023-07-26 DIAGNOSIS — S42.291D: Primary | ICD-10-CM

## 2023-07-26 DIAGNOSIS — S42.291D: ICD-10-CM

## 2023-07-26 PROCEDURE — 73060 X-RAY EXAM OF HUMERUS: CPT | Mod: TC,RT

## 2023-07-26 PROCEDURE — 73060 X-RAY EXAM OF HUMERUS: CPT | Mod: 26,RT,, | Performed by: RADIOLOGY

## 2023-07-26 PROCEDURE — 73060 XR HUMERUS 2 VIEW RIGHT: ICD-10-PCS | Mod: 26,RT,, | Performed by: RADIOLOGY

## 2023-07-26 PROCEDURE — 99999 PR PBB SHADOW E&M-EST. PATIENT-LVL III: CPT | Mod: PBBFAC,,, | Performed by: ORTHOPAEDIC SURGERY

## 2023-07-26 PROCEDURE — 99213 OFFICE O/P EST LOW 20 MIN: CPT | Mod: PBBFAC | Performed by: ORTHOPAEDIC SURGERY

## 2023-07-26 PROCEDURE — 99024 POSTOP FOLLOW-UP VISIT: CPT | Mod: POP,,, | Performed by: ORTHOPAEDIC SURGERY

## 2023-07-26 PROCEDURE — 99024 PR POST-OP FOLLOW-UP VISIT: ICD-10-PCS | Mod: POP,,, | Performed by: ORTHOPAEDIC SURGERY

## 2023-07-26 PROCEDURE — 99999 PR PBB SHADOW E&M-EST. PATIENT-LVL III: ICD-10-PCS | Mod: PBBFAC,,, | Performed by: ORTHOPAEDIC SURGERY

## 2023-07-26 NOTE — PROGRESS NOTES
Patient ID: Jesus Mccabe is a 62 y.o. male.     Chief Complaint: Post-op Evaluation and Pain of the Right Shoulder        HPI:  Mr. Mccabe returns today for a 6 week follow-up visit after reduction and intramedullary zaid fixation of a surgical neck fracture as right proximal humerus.  His date of surgery was 0 6/15/2023.  He stated he still has pain in his shoulder.  His sister stated that he has not been doing home exercises.  At his last visit he was referred to physical therapy but has not attended yet.  He originally injured his right shoulder about 3 months ago after he was imbibing and tripped falling onto his shoulder.     ROS:  No new diagnosis/surgery/prescriptions since last office visit on 06/28/2023  Constitution: Negative for chills and fever.   HENT: Positive for hearing loss. Negative for congestion.    Eyes: Negative for blurred vision.   Cardiovascular: Negative for chest pain.   Respiratory: Negative for cough.    Endocrine: Negative for polydipsia.   Hematologic/Lymphatic: Negative for adenopathy.   Skin: Negative for flushing and itching.   Musculoskeletal: Negative for gout.   Gastrointestinal: Positive for heartburn. Negative for constipation and diarrhea.   Genitourinary: Negative for nocturia.   Neurological: Positive for headaches. Negative for seizures.   Psychiatric/Behavioral: Positive for depression and substance abuse. The patient is nervous/anxious.    Allergic/Immunologic: Negative for environmental allergies.         Objective:     Physical Exam:   General: AAOx3.  No acute distress  Vascular:  Pulses intact and equal bilaterally.  Capillary refill less than 3 seconds and equal bilaterally  Neurologic:  Pinprick and soft touch intact and equal bilaterally.  Pinprick over the deltoid intact.  Integment:  Incision sites well approximated and well healed  Extremity:  Shoulder:  Right arm in sling.  With sling removed:  Forward flexion/abduction 0/60 degrees right shoulder.  No  swelling.  Internal/external rotation decreased compared to the left shoulder.  Tender with motion right shoulder.  Tenderness with palpation right.  Moves elbow with relatively little pain.  Radiography:  Personally reviewed x-rays of the right humerus completed on 07/26/2023 which showed a near anatomically aligned proximal humerus shaft/metaphyseal junction fracture with an intramedullary zaid in place and screws copious callus is present.      Assessment:     Impression:      1. Reduction and intramedullary zaid fixation anatomic neck fracture, right proximal humerus       Plan:     1.  Discussed physical examination and radiographic findings with the patient. Jesus understands that he is healing his fracture and should be going to physical therapy otherwise he is developing a arthrofibrosis of his shoulder.  At the end of his evaluation he stated he had some swelling in his left hand which she would like to have evaluated at his next visit.  2. Refer to physical therapy to start aggressive motion.  3. Any pain can be treated with over-the-counter medications dosed per box instructions.  4. He understands he should start an aggressive shoulder motion exercise program with wall walking, cane exercises, towel exercises, pulley exercises, and Codman exercises.  A pamphlet on home exercises was given to the patient.    5.  Activities with the left hand no lifting/pushing/pulling/climbing requiring the use of the right hand.  No activities on ladders/scaffolding/stairs.  6. Follow up in 6 weeks with an x-ray of the right humerus and left hand.

## 2023-08-29 NOTE — ASSESSMENT & PLAN NOTE
Continued Stay Note  [facility]     Patient Name: Aparna Matson  MRN: [id number]  Today's Date: 8/29/2023    Admit Date: 8/26/2023    Plan: Home with daughter. [facility] accepted, pending order.   Discharge Plan       Row Name 08/29/23 1348       Plan    Plan Home with daughter. [facility] accepted, pending order.    Plan Comments Patient will discharge home with daughter, daughter's address is in CM handoff. [facility] accepted, order needed prior to discharge. D/C barriers: 8/29-pacemaker                  Phone communication or documentation only - no physical contact with patient or family.  Trina Lujan RN, BSN  3A/2A   [address] [address]  Phone: [phone]  Fax: [phone]       Patient found to be hypertensive in the emergency department put  Uncertain if this secondary to pain versus chronic illness  Patient uncertain of home meds

## 2023-09-15 ENCOUNTER — HOSPITAL ENCOUNTER (EMERGENCY)
Facility: HOSPITAL | Age: 63
Discharge: HOME | End: 2023-09-15
Attending: EMERGENCY MEDICINE
Payer: MEDICARE

## 2023-09-15 VITALS
WEIGHT: 145 LBS | SYSTOLIC BLOOD PRESSURE: 146 MMHG | RESPIRATION RATE: 16 BRPM | OXYGEN SATURATION: 100 % | BODY MASS INDEX: 19.64 KG/M2 | TEMPERATURE: 98 F | DIASTOLIC BLOOD PRESSURE: 82 MMHG | HEIGHT: 72 IN | HEART RATE: 95 BPM

## 2023-09-15 DIAGNOSIS — M67.442 GANGLION CYST OF FINGER OF LEFT HAND: Primary | ICD-10-CM

## 2023-09-15 DIAGNOSIS — M79.89 SWELLING OF HAND: ICD-10-CM

## 2023-09-15 PROCEDURE — 99284 EMERGENCY DEPT VISIT MOD MDM: CPT

## 2023-09-15 PROCEDURE — 73120 X-RAY EXAM OF HAND: CPT | Mod: 26,LT,, | Performed by: RADIOLOGY

## 2023-09-15 PROCEDURE — 25000003 PHARM REV CODE 250: Performed by: PHYSICIAN ASSISTANT

## 2023-09-15 PROCEDURE — 73120 XR HAND 2 VIEW LEFT: ICD-10-PCS | Mod: 26,LT,, | Performed by: RADIOLOGY

## 2023-09-15 PROCEDURE — 73120 X-RAY EXAM OF HAND: CPT | Mod: TC,LT

## 2023-09-15 RX ORDER — LIDOCAINE HYDROCHLORIDE 10 MG/ML
10 INJECTION INFILTRATION; PERINEURAL ONCE
Status: COMPLETED | OUTPATIENT
Start: 2023-09-15 | End: 2023-09-15

## 2023-09-15 RX ADMIN — LIDOCAINE HYDROCHLORIDE 10 ML: 10 INJECTION, SOLUTION INFILTRATION; PERINEURAL at 12:09

## 2023-09-15 NOTE — ED PROVIDER NOTES
Please note that my documentation in this Electronic Healthcare Record was produced using speech recognition software and therefore may contain errors related to that software.These could include grammar, punctuation and spelling errors or the inclusion/ exclusion of phrases that were not intended. Please contact myself for any clarification, questions or concerns.    HPI: Patient is a 62 y.o. male who presents with the chief complaint of left hand lump X yesterday.  Patient awoke with the swelling and pain.  Denies any trauma or injury, fever, history of prior.  History obtained using .     REVIEW OF SYSTEMS - 10 systems were independently reviewed and are otherwise negative with the exception of those items previously documented in the HPI and nursing notes.    Allergy: Patient has no known allergies.    Past medical history:   Past Medical History:   Diagnosis Date    Advanced liver fibrosis     FibroScan 1/2020 - F3    Coronary artery disease     Gout     History of hepatitis C, treated / cured (SVR12 - 8/2020)     Language barrier     MI (myocardial infarction)     Ulcer        Surgical History:   Past Surgical History:   Procedure Laterality Date    ABDOMINAL SURGERY      CARDIAC SURGERY      COLONOSCOPY  09/02/2013    ESOPHAGOGASTRODUODENOSCOPY  08/31/2013    Dr Anjelica Lemos-[facility] [place]    EXTENSOR TENDON OF FOREARM / WRIST REPAIR      INTRAMEDULLARY RODDING OF HUMERUS Left 10/20/2019    Procedure: INSERTION, INTRAMEDULLARY SISSY, HUMERUS;  Surgeon: Jose Alejandro Funes MD;  Location: [facility] OR;  Service: Orthopedics;  Laterality: Left;    OPEN REDUCTION AND INTERNAL FIXATION (ORIF) OF FRACTURE OF PROXIMAL HUMERUS Right 6/15/2023    Procedure: Reduction and Internal Fixation Right Proximal Humerus;  Surgeon: Lakhwinder Frank DO;  Location: [facility] OR;  Service: Orthopedics;  Laterality: Right;       Social history:   Social History     Socioeconomic History    Marital status: Single    Tobacco Use    Smoking status: Every Day     Current packs/day: 1.00     Types: Cigarettes    Smokeless tobacco: Never   Substance and Sexual Activity    Alcohol use: Yes     Alcohol/week: 1.0 standard drink of alcohol     Types: 1 Cans of beer per week    Drug use: No    Sexual activity: Never       Family history: non-contributory    EHR: reviewed    Vitals: BP (!) 146/82   Pulse 95   Temp 98.2 °F (36.8 °C) (Oral)   Resp 16   Ht 6' (1.829 m)   Wt 65.8 kg (145 lb)   SpO2 100%   BMI 19.67 kg/m²     PHYSICAL EXAM:    General- 62-year-old male awake and alert, oriented, GCS 15, in no acute distress,  HEENT- normocephalic, atraumatic, sclera anicteric  CARDIOVASCULAR- regular rate and rhythm  PULMONARY- nonlabored, no respiratory distress  NEUROLOGIC- mental status normal, speech fluid, cognition normal, CN II-XII grossly intact, ambulatory with proper gait.  MUSCULOSKELETAL- well-nourished, well-developed, neurovascular intact, peripheral pulse 2 +/4, cap refill less than 3 seconds, full range of motion joints of the left hand, 5.0 cm fluctuant mass dorsal left hand distal 5th metacarpal.  No erythema or increased warmth.  Moderate tenderness.  DERMATOLOGIC- warm and dry, no visible rashes  PSYCHIATRIC- normal affect, normal concentration           Labs Reviewed - No data to display    X-Ray Hand 2 View Left   Final Result      Soft tissue swelling overlies the 5th metacarpal.  Osteoarthritic changes are noted at the distal radioulnar joint, at the radiocarpal and ulnar carpal joint, at the 1st carpal metacarpal joint and at the 1st metacarpophalangeal joint.         Electronically signed by: Iglesia Nava MD   Date:    09/15/2023   Time:    12:30      Aspiration:   The risks (including but not limited to pain, bleeding or infection) and benefits of aspiration were discussed with the patient. Questions were sought/answered and verbal consent was obtained for the procedure.    The area was prepped and  draped in standard bedside fashion and sterile technique was utilized. Using a fine gauge needle, the skin and deeper tissue was adequately anesthetized with 2 ml of 1% plain lidocaine. An 18 guage needle was introduced into the area from a anterior approach with return of 8 cc of thick clear material consistent with ganglion cyst. The needle was removed and a dressing applied. The patient tolerated the procedure well without any acute complications and the distal neurovascular exam remained unchanged status post procedure. Instructions were given to seek immediate medical care for increasing pain, redness, streaking or any other worsening or worrisome concerns.     MEDICAL DECISION MAKING: Patient is a 62 y.o. male who presented with chief complaint of mass of the left hand.  Denies trauma or injury, numbness or tingling.  States it just popped up overnight.  On examination, he does have a mass to the distal dorsal left hand.  Differentials considered, abscess, ganglion cyst, sebaceous cyst, etc..  X-ray shows soft tissue swelling and some osteoarthritic changes.  Aspiration performed shows a ganglion cyst material.  Patient given referral to Orthopedic.  Advised on anti-inflammatory medications as needed.  Patient's vitals are stable and normal.  They will be discharged home in stable condition.  They verbalized their understanding and agreed to this plan.    CLINICAL IMPRESSION:  1. Ganglion cyst of finger of left hand    2. Swelling of hand         Tanvi Hurst PA  09/15/23 8649

## 2023-09-15 NOTE — DISCHARGE INSTRUCTIONS
Take over-the-counter Tylenol or ibuprofen as needed for pain.  Please follow-up with orthopedic for removal of ganglion cyst.  Return if you develop any worsening pain, or signs of infection.

## 2023-09-20 ENCOUNTER — HOSPITAL ENCOUNTER (OUTPATIENT)
Dept: RADIOLOGY | Facility: HOSPITAL | Age: 63
Discharge: HOME | End: 2023-09-20
Attending: ORTHOPAEDIC SURGERY
Payer: MEDICARE

## 2023-09-20 ENCOUNTER — OFFICE VISIT (OUTPATIENT)
Dept: ORTHOPEDICS | Facility: CLINIC | Age: 63
End: 2023-09-20
Payer: MEDICARE

## 2023-09-20 VITALS — WEIGHT: 145.06 LBS | HEIGHT: 72 IN | BODY MASS INDEX: 19.65 KG/M2 | RESPIRATION RATE: 16 BRPM

## 2023-09-20 DIAGNOSIS — M67.442 GANGLION CYST OF FINGER OF LEFT HAND: ICD-10-CM

## 2023-09-20 DIAGNOSIS — S42.291D: ICD-10-CM

## 2023-09-20 DIAGNOSIS — M79.89 PALPABLE MASS OF SOFT TISSUE OF HAND: Primary | ICD-10-CM

## 2023-09-20 DIAGNOSIS — S42.291D: Primary | ICD-10-CM

## 2023-09-20 DIAGNOSIS — Z01.818 PRE-OP TESTING: ICD-10-CM

## 2023-09-20 DIAGNOSIS — M18.12 ARTHRITIS OF CARPOMETACARPAL (CMC) JOINT OF LEFT THUMB: ICD-10-CM

## 2023-09-20 DIAGNOSIS — M19.039 WRIST ARTHRITIS: ICD-10-CM

## 2023-09-20 PROCEDURE — 99213 OFFICE O/P EST LOW 20 MIN: CPT | Mod: PBBFAC | Performed by: ORTHOPAEDIC SURGERY

## 2023-09-20 PROCEDURE — 73030 X-RAY EXAM OF SHOULDER: CPT | Mod: 26,RT,, | Performed by: RADIOLOGY

## 2023-09-20 PROCEDURE — 99999 PR PBB SHADOW E&M-EST. PATIENT-LVL III: CPT | Mod: PBBFAC,,, | Performed by: ORTHOPAEDIC SURGERY

## 2023-09-20 PROCEDURE — 99214 PR OFFICE/OUTPT VISIT, EST, LEVL IV, 30-39 MIN: ICD-10-PCS | Mod: S$PBB,,, | Performed by: ORTHOPAEDIC SURGERY

## 2023-09-20 PROCEDURE — 73030 XR SHOULDER TRAUMA 3 VIEW RIGHT: ICD-10-PCS | Mod: 26,RT,, | Performed by: RADIOLOGY

## 2023-09-20 PROCEDURE — 99214 OFFICE O/P EST MOD 30 MIN: CPT | Mod: S$PBB,,, | Performed by: ORTHOPAEDIC SURGERY

## 2023-09-20 PROCEDURE — 99999 PR PBB SHADOW E&M-EST. PATIENT-LVL III: ICD-10-PCS | Mod: PBBFAC,,, | Performed by: ORTHOPAEDIC SURGERY

## 2023-09-20 PROCEDURE — 73030 X-RAY EXAM OF SHOULDER: CPT | Mod: TC,RT

## 2023-09-20 NOTE — H&P
Chief Complaint:  Painful mass dorsum left hand     HPI:  Mr. Mccabe is a 62-year-old right-hand-dominant male who returns today with new complaints of 2 months of painful swelling at the dorsal ulnar aspect of his left hand.  He stated he noticed the swelling about 2 months ago and has progressively increased in size.  He does not remember a traumatic event causing the swelling.  He stated the pain has improved but he has limited motion in his hand due to the swelling.  He went to the emergency room several days ago and had an aspiration of it of which it recurred.  He has not worn a brace.  He has taken NSAIDs which have not helped.  He is also here for follow-up on an IM sissy fixation of a proximal humerus fracture of his right shoulder.  He is now over 3 months after  reduction and intramedullary sissy fixation of a surgical neck fracture as right proximal humerus.  His date of surgery was 06/15/2023.  He has not attended physical therapy.  He has some pain and loss of motion in his shoulder but otherwise is okay.  He originally injured his right shoulder about 3 months ago after he was imbibing and tripped falling onto his shoulder             Past Medical History:   Diagnosis Date    Coronary artery disease      Language barrier      MI (myocardial infarction)       *  *  *  *  *  *  * Ulcer  Hypertension  Depression  Anxiety  Mitral stenosis as child treated with surgery  Deafness  GERD  Headaches                  Past Surgical History:   Procedure Laterality Date    ABDOMINAL SURGERY        CARDIAC SURGERY mitral heart valve repair as child        EXTENSOR TENDON OF FOREARM / WRIST REPAIR        INTRAMEDULLARY RODDING OF HUMERUS Left 10/20/2019    * REDUCTION AND INTRAMEDULLARY SISSY FIXATION RIGHT PROXIMAL HUMERUS         Review of patient's allergies indicates:  No Known Allergies     Social History                Occupational History    Disabled   Tobacco Use    Smoking status: Current Every Day Smoker        Packs/day: 1.00    Smokeless tobacco: Never Used   Substance and Sexual Activity    Alcohol use: Yes       Alcohol/week: 1.0 standard drinks       Types: 1 Cans of beer per week    Drug use: Marijuana    Sexual activity: Never      Family history:  Father:  , heart failure.  Mother:  , MI.  Brother:  3, 1 , drowning  Sister:  2, 1 , leukemia.     Previous Hospitalizations:  Heart valve repair     ROS:  No new diagnosis/surgery/prescriptions since last office visit on 2023   Constitution: Negative for chills and fever.   HENT: Positive for hearing loss. Negative for congestion.    Eyes: Negative for blurred vision.   Cardiovascular: Negative for chest pain.   Respiratory: Negative for cough.    Endocrine: Negative for polydipsia.   Hematologic/Lymphatic: Negative for adenopathy.   Skin: Negative for flushing and itching.   Musculoskeletal: Negative for gout.   Gastrointestinal: Positive for heartburn. Negative for constipation and diarrhea.   Genitourinary: Negative for nocturia.   Neurological: Positive for headaches. Negative for seizures.   Psychiatric/Behavioral: Positive for depression and substance abuse. The patient is nervous/anxious.    Allergic/Immunologic: Negative for environmental allergies.             Objective:   Physical Exam:   General:  Patient communicates through sign language with teleprompter .  Malodorous.  AAOx3.  No acute distress  HEENT: Normocephalic, PEARLA EOMI.  Poor dentition with missing teeth.  Neck: Supple, No JVD  Chest: Symetric, equal excursion on inspiration  Abdomen: Soft NTND  Vascular:  Pulses intact and equal bilaterally.  Capillary refill less than 3 seconds and equal bilaterally  Neurologic:  Pinprick and soft touch over both deltoids intact and equal bilaterally  Integment:  No ecchymosis, no errythema.  Incisions well approximated with staples in place.  Extremity:  Wrist/hand:  Flexion contracture fingers right hand  consistent with a previous injury from the 1980s.  Pronation/supination right hand 45/45 degrees, left hand 90/90 degrees.  Dorsiflexion/volar flexion left hand 70/70 degrees, right hand 45/45 degrees.  Minimal motion fingers right hand.  Nontender in the anatomic snuffbox bilaterally.  Nontender at the 1st dorsal compartment bilaterally.  No swelling at the 1st dorsal compartment bilaterally.  Finkelstein's negative bilaterally.  Nontender at the scapholunate interval bilaterally.  Higgins's test negative bilaterally.  Mild tenderness at the DRUJ/TFCC left wrist.  Ulnar impaction test positive left wrist.  Wartenberg sign positive right hand.  Interosseous atrophy right hand mild thenar atrophy left hand with extensive right hand.  3.5 cm x 2 cm dorsal ulnar soft tissue mass left hand.  Nontender with palpation mass.                    Shoulder:  Forward flexion/abduction 0/60 degrees right shoulder.  No swelling.  Internal/external rotation decreased compared to the left shoulder.  Mild tenderness with motion right shoulder.  Minimal tenderness with palpation right.  Moves elbow with relatively little pain.  Radiography:  Personally reviewed x-rays of left hand completed on 09/15/2022 showed ulnar-sided soft tissue swelling 1st CMC arthritis, radiocarpal arthritis and ulna lunate impingement with arthritis from a ulnar positive wrist.  X-rays of the right shoulder completed on 0 9/20/2023 showed an intramedullary zaid and screws holding a healed near anatomically aligned surgical neck proximal humerus fracture.      Assessment:       Impression:       1. Dorsal ulna soft tissue mass, left hand.   2. First CMC arthritis left wrist.   3.   4.  Radiocarpal arthritis, left wrist.  Healed proximal humerus fracture status post reduction and intramedullary zaid fixation          Plan:       1.  Discussed physical examination and radiographic findings with the patient. Jesus understands that he has large soft tissue mass at  the dorsal ulnar aspect of his left hand.  He also understands he has healed his proximal humerus fracture..  Treatment alternatives and outcomes were discussed with the patient he understands he could be treated conservatively with observation, activity modification, NSAIDs, bracing, aspiration, bracing, or he could consider surgical intervention such as excision mass from his hands.  He stated that he would like to have the mass removed as he keeps banging it  2. Possible complications of surgery to include bleeding, infection, scarring, nerve/blood vessel/tendon damage, need for further surgery, failed surgery, failure to improve, possible skin slough possible persistent pain, and possible recurrence were discussed with the patient.  The patient was permitted to ask questions and all concerns were addressed to his satisfaction.    3. Consent for excision mass left hand    4. Place patient on surgery schedule for 10/02/2020.    5. All postoperative meds will be prescribed on the date of surgery.    6. Will refer him back to physical therapy after his hand surgery.  7. Continue doing home exercises with the right shoulder to gain motion.  Any current pain can be treated with over-the-counter medications dosed per box instructions.    8. Follow up about 10-12 days postop.

## 2023-09-20 NOTE — PROGRESS NOTES
Chief Complaint:  Painful mass dorsum left hand     HPI:  Mr. Mccabe is a 62-year-old right-hand-dominant male who returns today with new complaints of 2 months of painful swelling at the dorsal ulnar aspect of his left hand.  He stated he noticed the swelling about 2 months ago and has progressively increased in size.  He does not remember a traumatic event causing the swelling.  He stated the pain has improved but he has limited motion in his hand due to the swelling.  He went to the emergency room several days ago and had an aspiration of it of which it recurred.  He has not worn a brace.  He has taken NSAIDs which have not helped.  He is also here for follow-up on an IM sissy fixation of a proximal humerus fracture of his right shoulder.  He is now over 3 months after  reduction and intramedullary sissy fixation of a surgical neck fracture as right proximal humerus.  His date of surgery was 06/15/2023.  He has not attended physical therapy.  He has some pain and loss of motion in his shoulder but otherwise is okay.  He originally injured his right shoulder about 3 months ago after he was imbibing and tripped falling onto his shoulder             Past Medical History:   Diagnosis Date    Coronary artery disease      Language barrier      MI (myocardial infarction)       *  *  *  *  *  *  * Ulcer  Hypertension  Depression  Anxiety  Mitral stenosis as child treated with surgery  Deafness  GERD  Headaches                  Past Surgical History:   Procedure Laterality Date    ABDOMINAL SURGERY        CARDIAC SURGERY mitral heart valve repair as child        EXTENSOR TENDON OF FOREARM / WRIST REPAIR        INTRAMEDULLARY RODDING OF HUMERUS Left 10/20/2019    * REDUCTION AND INTRAMEDULLARY SISSY FIXATION RIGHT PROXIMAL HUMERUS         Review of patient's allergies indicates:  No Known Allergies     Social History                Occupational History    Disabled   Tobacco Use    Smoking status: Current Every Day Smoker        Packs/day: 1.00    Smokeless tobacco: Never Used   Substance and Sexual Activity    Alcohol use: Yes       Alcohol/week: 1.0 standard drinks       Types: 1 Cans of beer per week    Drug use: Marijuana    Sexual activity: Never      Family history:  Father:  , heart failure.  Mother:  , MI.  Brother:  3, 1 , drowning  Sister:  2, 1 , leukemia.     Previous Hospitalizations:  Heart valve repair     ROS:  No new diagnosis/surgery/prescriptions since last office visit on 2023   Constitution: Negative for chills and fever.   HENT: Positive for hearing loss. Negative for congestion.    Eyes: Negative for blurred vision.   Cardiovascular: Negative for chest pain.   Respiratory: Negative for cough.    Endocrine: Negative for polydipsia.   Hematologic/Lymphatic: Negative for adenopathy.   Skin: Negative for flushing and itching.   Musculoskeletal: Negative for gout.   Gastrointestinal: Positive for heartburn. Negative for constipation and diarrhea.   Genitourinary: Negative for nocturia.   Neurological: Positive for headaches. Negative for seizures.   Psychiatric/Behavioral: Positive for depression and substance abuse. The patient is nervous/anxious.    Allergic/Immunologic: Negative for environmental allergies.             Objective:   Physical Exam:   General:  Patient communicates through sign language with teleprompter .  Malodorous.  AAOx3.  No acute distress  HEENT: Normocephalic, PEARLA EOMI.  Poor dentition with missing teeth.  Neck: Supple, No JVD  Chest: Symetric, equal excursion on inspiration  Abdomen: Soft NTND  Vascular:  Pulses intact and equal bilaterally.  Capillary refill less than 3 seconds and equal bilaterally  Neurologic:  Pinprick and soft touch over both deltoids intact and equal bilaterally  Integment:  No ecchymosis, no errythema.  Incisions well approximated with staples in place.  Extremity:  Wrist/hand:  Flexion contracture fingers right hand  consistent with a previous injury from the 1980s.  Pronation/supination right hand 45/45 degrees, left hand 90/90 degrees.  Dorsiflexion/volar flexion left hand 70/70 degrees, right hand 45/45 degrees.  Minimal motion fingers right hand.  Nontender in the anatomic snuffbox bilaterally.  Nontender at the 1st dorsal compartment bilaterally.  No swelling at the 1st dorsal compartment bilaterally.  Finkelstein's negative bilaterally.  Nontender at the scapholunate interval bilaterally.  Higgins's test negative bilaterally.  Mild tenderness at the DRUJ/TFCC left wrist.  Ulnar impaction test positive left wrist.  Wartenberg sign positive right hand.  Interosseous atrophy right hand mild thenar atrophy left hand with extensive right hand.  3.5 cm x 2 cm dorsal ulnar soft tissue mass left hand.  Nontender with palpation mass.                    Shoulder:  Forward flexion/abduction 0/60 degrees right shoulder.  No swelling.  Internal/external rotation decreased compared to the left shoulder.  Mild tenderness with motion right shoulder.  Minimal tenderness with palpation right.  Moves elbow with relatively little pain.  Radiography:  Personally reviewed x-rays of left hand completed on 09/15/2022 showed ulnar-sided soft tissue swelling 1st CMC arthritis, radiocarpal arthritis and ulna lunate impingement with arthritis from a ulnar positive wrist.  X-rays of the right shoulder completed on 0 9/20/2023 showed an intramedullary zaid and screws holding a healed near anatomically aligned surgical neck proximal humerus fracture.  Moderate AC arthritis and glenohumeral arthritis.      Assessment:       Impression:       1. Dorsal ulna soft tissue mass, left hand.   2. First CMC arthritis left wrist.   3.   4.  Radiocarpal arthritis, left wrist.  Healed proximal humerus fracture status post reduction and intramedullary zaid fixation          Plan:       1.  Discussed physical examination and radiographic findings with the patient.  Jesus understands that he has large soft tissue mass at the dorsal ulnar aspect of his left hand.  He also understands he has healed his proximal humerus fracture..  Treatment alternatives and outcomes were discussed with the patient he understands he could be treated conservatively with observation, activity modification, NSAIDs, bracing, aspiration, bracing, or he could consider surgical intervention such as excision mass from his hands.  He stated that he would like to have the mass removed as he keeps banging it  2. Possible complications of surgery to include bleeding, infection, scarring, nerve/blood vessel/tendon damage, need for further surgery, failed surgery, failure to improve, possible skin slough possible persistent pain, and possible recurrence were discussed with the patient.  The patient was permitted to ask questions and all concerns were addressed to his satisfaction.    3. Consent for excision mass left hand    4. Place patient on surgery schedule for 10/02/2020.    5. All postoperative meds will be prescribed on the date of surgery.    6. Will refer him back to physical therapy after his hand surgery.  7. Continue doing home exercises with the right shoulder to gain motion.  Any current pain can be treated with over-the-counter medications dosed per box instructions.    8. Follow up about 10-12 days postop.

## 2023-09-24 NOTE — TELEPHONE ENCOUNTER
HCV LAB REVIEW  Completed 12 weeks epclusa 5/2020  G2, naive, F3      8/25/20  INR 1.1  AFP 4.5  HCV neg    U/S 8/25/20 - no liver lesions  (+) sonographic Varner's. (+) GS. No sonographic evidence of cholecystitis.      These labs document SVR16 following successful HCV treatment with Epclusa    please tell patient:  1.) Lab test shows there is NO Hepatitis C in the blood. This means the Hepatitis C is cured!! We do not expect the virus to return. This does not give protection from Hepatitis C and patient could be infected again if ever exposed to the virus again.    2.) Liver cancer screening looked fine. This will be needed every 6 months forever. Due again 1/2021 in the General Hepatology clinic (not HCV clinic since HCV is cured)    3.) u/s report indicated pt had abdominal pain at time of ultrasound. If he's still having abdominal pain he needs to see PCP    4.) he had 1st dose HBV vaccine here in January. Did he have the 2nd and 3rd shots somewhere else? If not, he needs to see PCP for these also. Or we can see if I can schedule them at local [facility], but not sure if we can schedule them there.       Please schedule   - HCV RNA, Peth, CBC, CMP, INR, AFP, U/S ABDOMEN, VISIT in GENERAL HEPATOLOGY clinic (established patient visit w/ one of APPs) -      05-Sep-2023 14:02

## 2023-10-10 DIAGNOSIS — M79.89 PALPABLE MASS OF SOFT TISSUE OF HAND: ICD-10-CM

## 2023-10-10 DIAGNOSIS — S42.291D: Primary | ICD-10-CM

## 2023-10-12 ENCOUNTER — HOSPITAL ENCOUNTER (OUTPATIENT)
Dept: PREADMISSION TESTING | Facility: HOSPITAL | Age: 63
Discharge: HOME | End: 2023-10-12
Attending: FAMILY MEDICINE
Payer: MEDICARE

## 2023-10-16 ENCOUNTER — ANESTHESIA (OUTPATIENT)
Dept: SURGERY | Facility: HOSPITAL | Age: 63
End: 2023-10-16
Payer: MEDICARE

## 2023-10-16 ENCOUNTER — HOSPITAL ENCOUNTER (OUTPATIENT)
Facility: HOSPITAL | Age: 63
Discharge: HOME | End: 2023-10-16
Attending: ORTHOPAEDIC SURGERY | Admitting: ORTHOPAEDIC SURGERY
Payer: MEDICARE

## 2023-10-16 ENCOUNTER — ANESTHESIA EVENT (OUTPATIENT)
Dept: SURGERY | Facility: HOSPITAL | Age: 63
End: 2023-10-16
Payer: MEDICARE

## 2023-10-16 VITALS
RESPIRATION RATE: 17 BRPM | SYSTOLIC BLOOD PRESSURE: 133 MMHG | HEART RATE: 88 BPM | TEMPERATURE: 98 F | OXYGEN SATURATION: 95 % | DIASTOLIC BLOOD PRESSURE: 78 MMHG

## 2023-10-16 DIAGNOSIS — M79.89 PALPABLE MASS OF SOFT TISSUE OF HAND: Primary | ICD-10-CM

## 2023-10-16 PROCEDURE — 26418 REPAIR FINGER TENDON: CPT | Mod: F3,,, | Performed by: ORTHOPAEDIC SURGERY

## 2023-10-16 PROCEDURE — 26113 EXC HAND TUM DEEP 1.5 CM/>: CPT | Mod: 51,LT,, | Performed by: ORTHOPAEDIC SURGERY

## 2023-10-16 PROCEDURE — 71000033 HC RECOVERY, INTIAL HOUR: Performed by: ORTHOPAEDIC SURGERY

## 2023-10-16 PROCEDURE — 37000009 HC ANESTHESIA EA ADD 15 MINS: Performed by: ORTHOPAEDIC SURGERY

## 2023-10-16 PROCEDURE — 88307 TISSUE EXAM BY PATHOLOGIST: CPT | Performed by: PATHOLOGY

## 2023-10-16 PROCEDURE — D9220A PRA ANESTHESIA: ICD-10-PCS | Mod: ANES,,, | Performed by: ANESTHESIOLOGY

## 2023-10-16 PROCEDURE — 36000706: Performed by: ORTHOPAEDIC SURGERY

## 2023-10-16 PROCEDURE — D9220A PRA ANESTHESIA: Mod: CRNA,,, | Performed by: NURSE ANESTHETIST, CERTIFIED REGISTERED

## 2023-10-16 PROCEDURE — D9220A PRA ANESTHESIA: Mod: ANES,,, | Performed by: ANESTHESIOLOGY

## 2023-10-16 PROCEDURE — 63600175 PHARM REV CODE 636 W HCPCS: Performed by: ANESTHESIOLOGY

## 2023-10-16 PROCEDURE — 71000015 HC POSTOP RECOV 1ST HR: Performed by: ORTHOPAEDIC SURGERY

## 2023-10-16 PROCEDURE — 71000039 HC RECOVERY, EACH ADD'L HOUR: Performed by: ORTHOPAEDIC SURGERY

## 2023-10-16 PROCEDURE — 26418 PR REPAIR EXTEN TENDON,DORSUM FINGR,EA: ICD-10-PCS | Mod: F3,,, | Performed by: ORTHOPAEDIC SURGERY

## 2023-10-16 PROCEDURE — 26113 PR EX TUM/VASC MAL SFT TIS HAND/FNGR SUBFSC 1.5+CM: ICD-10-PCS | Mod: 51,LT,, | Performed by: ORTHOPAEDIC SURGERY

## 2023-10-16 PROCEDURE — 37000008 HC ANESTHESIA 1ST 15 MINUTES: Performed by: ORTHOPAEDIC SURGERY

## 2023-10-16 PROCEDURE — 88307 TISSUE EXAM BY PATHOLOGIST: CPT | Mod: 26,,, | Performed by: PATHOLOGY

## 2023-10-16 PROCEDURE — 63600175 PHARM REV CODE 636 W HCPCS: Performed by: ORTHOPAEDIC SURGERY

## 2023-10-16 PROCEDURE — 25000003 PHARM REV CODE 250: Performed by: NURSE ANESTHETIST, CERTIFIED REGISTERED

## 2023-10-16 PROCEDURE — 88307 PR  SURG PATH,LEVEL V: ICD-10-PCS | Mod: 26,,, | Performed by: PATHOLOGY

## 2023-10-16 PROCEDURE — 36000707: Performed by: ORTHOPAEDIC SURGERY

## 2023-10-16 PROCEDURE — 63600175 PHARM REV CODE 636 W HCPCS: Performed by: NURSE ANESTHETIST, CERTIFIED REGISTERED

## 2023-10-16 PROCEDURE — D9220A PRA ANESTHESIA: ICD-10-PCS | Mod: CRNA,,, | Performed by: NURSE ANESTHETIST, CERTIFIED REGISTERED

## 2023-10-16 RX ORDER — SODIUM CHLORIDE, SODIUM LACTATE, POTASSIUM CHLORIDE, CALCIUM CHLORIDE 600; 310; 30; 20 MG/100ML; MG/100ML; MG/100ML; MG/100ML
INJECTION, SOLUTION INTRAVENOUS CONTINUOUS
Status: DISCONTINUED | OUTPATIENT
Start: 2023-10-16 | End: 2023-10-16 | Stop reason: HOSPADM

## 2023-10-16 RX ORDER — CEFAZOLIN SODIUM 1 G/3ML
INJECTION, POWDER, FOR SOLUTION INTRAMUSCULAR; INTRAVENOUS
Status: DISCONTINUED | OUTPATIENT
Start: 2023-10-16 | End: 2023-10-16

## 2023-10-16 RX ORDER — LIDOCAINE HYDROCHLORIDE 10 MG/ML
1 INJECTION, SOLUTION EPIDURAL; INFILTRATION; INTRACAUDAL; PERINEURAL ONCE
Status: DISCONTINUED | OUTPATIENT
Start: 2023-10-16 | End: 2023-10-16 | Stop reason: HOSPADM

## 2023-10-16 RX ORDER — ONDANSETRON 2 MG/ML
INJECTION INTRAMUSCULAR; INTRAVENOUS
Status: DISCONTINUED | OUTPATIENT
Start: 2023-10-16 | End: 2023-10-16

## 2023-10-16 RX ORDER — DEXAMETHASONE SODIUM PHOSPHATE 4 MG/ML
INJECTION, SOLUTION INTRA-ARTICULAR; INTRALESIONAL; INTRAMUSCULAR; INTRAVENOUS; SOFT TISSUE
Status: DISCONTINUED | OUTPATIENT
Start: 2023-10-16 | End: 2023-10-16

## 2023-10-16 RX ORDER — PROPOFOL 10 MG/ML
VIAL (ML) INTRAVENOUS
Status: DISCONTINUED | OUTPATIENT
Start: 2023-10-16 | End: 2023-10-16

## 2023-10-16 RX ORDER — BUPIVACAINE HYDROCHLORIDE 5 MG/ML
INJECTION, SOLUTION EPIDURAL; INTRACAUDAL
Status: DISCONTINUED | OUTPATIENT
Start: 2023-10-16 | End: 2023-10-16 | Stop reason: HOSPADM

## 2023-10-16 RX ORDER — MIDAZOLAM HYDROCHLORIDE 1 MG/ML
INJECTION INTRAMUSCULAR; INTRAVENOUS
Status: DISCONTINUED | OUTPATIENT
Start: 2023-10-16 | End: 2023-10-16

## 2023-10-16 RX ORDER — FENTANYL CITRATE 50 UG/ML
INJECTION, SOLUTION INTRAMUSCULAR; INTRAVENOUS
Status: DISCONTINUED | OUTPATIENT
Start: 2023-10-16 | End: 2023-10-16

## 2023-10-16 RX ORDER — SODIUM CHLORIDE, SODIUM LACTATE, POTASSIUM CHLORIDE, CALCIUM CHLORIDE 600; 310; 30; 20 MG/100ML; MG/100ML; MG/100ML; MG/100ML
125 INJECTION, SOLUTION INTRAVENOUS CONTINUOUS
Status: DISCONTINUED | OUTPATIENT
Start: 2023-10-16 | End: 2023-10-16 | Stop reason: HOSPADM

## 2023-10-16 RX ORDER — HYDROMORPHONE HYDROCHLORIDE 1 MG/ML
0.5 INJECTION, SOLUTION INTRAMUSCULAR; INTRAVENOUS; SUBCUTANEOUS EVERY 5 MIN PRN
Status: DISCONTINUED | OUTPATIENT
Start: 2023-10-16 | End: 2023-10-16 | Stop reason: HOSPADM

## 2023-10-16 RX ORDER — ONDANSETRON 2 MG/ML
4 INJECTION INTRAMUSCULAR; INTRAVENOUS DAILY PRN
Status: DISCONTINUED | OUTPATIENT
Start: 2023-10-16 | End: 2023-10-16 | Stop reason: HOSPADM

## 2023-10-16 RX ORDER — OXYCODONE HYDROCHLORIDE 5 MG/1
5 TABLET ORAL
Status: DISCONTINUED | OUTPATIENT
Start: 2023-10-16 | End: 2023-10-16 | Stop reason: HOSPADM

## 2023-10-16 RX ORDER — LIDOCAINE HYDROCHLORIDE 20 MG/ML
INJECTION, SOLUTION EPIDURAL; INFILTRATION; INTRACAUDAL; PERINEURAL
Status: DISCONTINUED | OUTPATIENT
Start: 2023-10-16 | End: 2023-10-16

## 2023-10-16 RX ADMIN — ONDANSETRON 4 MG: 2 INJECTION INTRAMUSCULAR; INTRAVENOUS at 10:10

## 2023-10-16 RX ADMIN — FENTANYL CITRATE 100 MCG: 50 INJECTION, SOLUTION INTRAMUSCULAR; INTRAVENOUS at 10:10

## 2023-10-16 RX ADMIN — CEFAZOLIN 2 G: 330 INJECTION, POWDER, FOR SOLUTION INTRAMUSCULAR; INTRAVENOUS at 09:10

## 2023-10-16 RX ADMIN — LIDOCAINE HYDROCHLORIDE 100 MG: 20 INJECTION, SOLUTION EPIDURAL; INFILTRATION; INTRACAUDAL; PERINEURAL at 10:10

## 2023-10-16 RX ADMIN — MIDAZOLAM HYDROCHLORIDE 2 MG: 1 INJECTION, SOLUTION INTRAMUSCULAR; INTRAVENOUS at 09:10

## 2023-10-16 RX ADMIN — DEXAMETHASONE SODIUM PHOSPHATE 4 MG: 4 INJECTION, SOLUTION INTRAMUSCULAR; INTRAVENOUS at 10:10

## 2023-10-16 RX ADMIN — GLYCOPYRROLATE 0.2 MG: 0.2 INJECTION INTRAMUSCULAR; INTRAVENOUS at 10:10

## 2023-10-16 RX ADMIN — PROPOFOL 150 MG: 10 INJECTION, EMULSION INTRAVENOUS at 10:10

## 2023-10-16 RX ADMIN — HYDROMORPHONE HYDROCHLORIDE 0.5 MG: 1 INJECTION, SOLUTION INTRAMUSCULAR; INTRAVENOUS; SUBCUTANEOUS at 12:10

## 2023-10-16 RX ADMIN — SODIUM CHLORIDE, POTASSIUM CHLORIDE, SODIUM LACTATE AND CALCIUM CHLORIDE: 600; 310; 30; 20 INJECTION, SOLUTION INTRAVENOUS at 09:10

## 2023-10-16 NOTE — PLAN OF CARE
Pt was given discharge instructions with sister at the bedside. Pt understood discharge instructions by nodding his head in agreement. Pt pain level was a 5/10 upon discharge and patient communicated that was a tolerable level for him. Pt and sister are both aware of his paper prescription that needs to be filled. Pt transferred out via wheelchair to sisters car. Car driven by sister.

## 2023-10-16 NOTE — TRANSFER OF CARE
Anesthesia Transfer of Care Note    Patient: Jesus Mccabe    Procedure(s) Performed: Procedure(s) (LRB):  EXCISION, MASS, HAND (Left)  TENODESIS, FINGER EXTENSOR, AT WRIST (Left)    Patient location: PACU    Anesthesia Type: general    Transport from OR: Transported from OR on room air with adequate spontaneous ventilation    Post pain: adequate analgesia    Post assessment: no apparent anesthetic complications and tolerated procedure well    Post vital signs: stable    Level of consciousness: sedated and responds to stimulation    Nausea/Vomiting: no nausea/vomiting    Complications: none    Transfer of care protocol was followed      Last vitals: There were no vitals taken for this visit.

## 2023-10-16 NOTE — OP NOTE
[facility]  Orthopedic Surgery    10/16/2023    Jesus Mccabe  [id number]      PREOPERATIVE DIAGNOSIS: Palpable mass of soft tissue of hand [M79.89]    POSTOPERATIVE DIAGNOSIS:  Severe erosive hemorrhagic tophaceous gout causing 3 cm long by 2 cm wide by 2 cm deep cyst extensor digiti minimi tendon, dorsum left hand.    PROCEDURE:  1. Excision erosive intratendinous dorsoulna tophaceous gout cyst, left hand.    2. Extensor digiti communis tendon to extensor digiti minimi tendon tenodesis, left hand.   3. Irrigation and debridement left hand extensor tendons.    4. Short-arm finger spica plaster splint, left hand.      SURGEON: Lakhwinder Frank D.O.    ASSISTANT: Orton Grinnell, CFA    ANESTHESIA:  General    BLOOD LOSS:  Less than 5 cc    TOURNIQUET:  110 minutes    DRAINS:  None    PATHOLOGY:  Cyst and necrotic tendon    COMPLICATION:  None    INDICATIONS FOR PROCEDURE:    Mr. Mccabe is a 62-year-old right-hand-dominant male who 3 months of painful swelling at the dorsal ulnar aspect of his left hand which has progressively increased in size.  He does not remember a traumatic event causing the swelling.  The pain has improved but he has limited motion in his hand due to the swelling.  He went to the emergency room once and had an aspiration of it of which it recurred.  He has not worn a brace.  He has taken NSAIDs which have not helped.  He elected to proceed with surgery after complications to include bleeding, infection, scarring, nerve/blood vessel/tendon damage, need for further surgery, failed surgery, failure to improve, stiffness, skin slough, and possible amputation were discussed.  He signed a consent.    PROCEDURE IN DETAIL:   The patient was brought to the operating room and was transferred to the operating bed where all bony prominences were well padded.  General anesthesia was then administered by the Anesthesiology Department.  After general anesthesia was administered a tourniquet was  applied to the upper part of the patient's left upper extremity.  The patient's left arm was then prepped with Betadine solution and draped in the normal sterile fashion.  After prepping and draping bony and soft tissue landmarks were identified and a longitudinal incision was drawn on the patient's hand over dorsal ulnar swelling.  The patient's hand was then elevated, exsanguinated, and the tourniquet was inflated.       Sharp incision was then made with a #15 blade followed by dissection to the level of a large 3 cm long by 2 cm wide by 2 cm deep intra tendinous cyst within the extensor digiti minimi tendon of the left hand.  The tendon was found to be incompetent through erosion.  The cyst was freed from investing tissues and removed in toto during the dissection a small rent occurred in the cyst of which brownish white speckled extruded.  Further dissection was taken removing the cyst.  The tendons were also dissected and necrotic tendon was removed.  After the cyst and necrotic tendon was debrided there was an absence of tendon at the midsubstance of the extensor digiti minimi tendon.  A distal tendon stump was present.  The patient's hand was then extensively irrigated.         A section of the extensor digiti communis tendon to the ring finger was harvested and redirected to the tendon stump of the extensor digiti minimi and approximated to the stump while holding appropriate tension on the patient's small finger while it was approximated to the stump with nylon suture in a Alegria stitch type of fashion.  Further repair of the tendon was completed with nylon suture in a running epitendinous fashion.  The patient's finger was put through full motion and no gapping of the tenodesis was observed.  Copiously irrigated.  The tourniquet was then released and full hemostasis was ensured.  Was then closed in layers with Vicryl suture with final closure with nylon suture in a running baseball stitch type of  shane.       His incision was then dressed with Adaptic, sterile gauze, sterile cast padding followed by a finger spica short-arm plaster splint maintaining the patient's fingers in the safe position.  He was then awakened by anesthesia and transferred from the operating room to the recovery room in stable condition.  He tolerated the procedures well without complication.

## 2023-10-16 NOTE — ANESTHESIA POSTPROCEDURE EVALUATION
Anesthesia Post Evaluation    Patient: Jesus Mccabe    Procedure(s) Performed: Procedure(s) (LRB):  EXCISION, MASS, HAND (Left)  TENODESIS, FINGER EXTENSOR, AT WRIST (Left)    Final Anesthesia Type: general      Patient location during evaluation: PACU  Patient participation: Yes- Able to Participate  Level of consciousness: awake and alert and oriented  Post-procedure vital signs: reviewed and stable  Pain management: adequate  Airway patency: patent    PONV status at discharge: No PONV  Anesthetic complications: no      Cardiovascular status: hemodynamically stable  Respiratory status: unassisted, spontaneous ventilation and room air  Hydration status: euvolemic  Follow-up not needed.          Vitals Value Taken Time   /78 10/16/23 1300   Temp 36.9 °C (98.4 °F) 10/16/23 1140   Pulse 88 10/16/23 1300   Resp 17 10/16/23 1300   SpO2 95 % 10/16/23 1300         No case tracking events are documented in the log.      Pain/Natalie Score: Pain Rating Prior to Med Admin: 6 (10/16/2023 12:30 PM)  Natalie Score: 10 (10/16/2023  1:00 PM)  Modified Natalie Score: 19 (10/16/2023  1:00 PM)

## 2023-10-16 NOTE — DISCHARGE SUMMARY
[facility], [place] [facility]  Discharge Note  Short Stay    Procedure(s) (LRB):  EXCISION, MASS, HAND (Left)  TENODESIS, FINGER EXTENSOR, AT WRIST (Left)      OUTCOME: Patient tolerated treatment/procedure well without complication and is now ready for discharge.    DISPOSITION: Home or Self Care    FINAL DIAGNOSIS:  Severe erosive hemorrhagic tophaceous gout causing 3 cm long by 2 cm wide by 2 cm deep cyst extensor digiti minimi tendon, dorsum left hand.    FOLLOWUP: In clinic    DISCHARGE INSTRUCTIONS:    Discharge Procedure Orders   Diet Adult Regular     Keep surgical extremity elevated     Ice to affected area     Lifting restrictions   Order Comments: One-handed activities with the right hand only no lifting/pushing/pulling/climbing requiring the use of the left hand.  No activities on ladders/scaffolding/stairs.     Other restrictions (specify):   Order Comments: 1. Elevate and ice left hand.    2. Do not remove dressing, keep dressing clean and dry.  3. One-handed activities with the right hand only no lifting/pushing/pulling/climbing requiring the use of the left hand.  No activities on ladders/scaffolding/stairs.  Must work in a clean dry environment.     Notify your health care provider if you experience any of the following:  temperature >100.4     Leave dressing on - Keep it clean, dry, and intact until clinic visit   Order Comments: Do not remove dressing, keep dressing clean and dry.        TIME SPENT ON DISCHARGE: 20 minutes
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
## 2023-10-26 LAB
FINAL PATHOLOGIC DIAGNOSIS: NORMAL
GROSS: NORMAL
Lab: NORMAL

## 2023-11-15 ENCOUNTER — HOSPITAL ENCOUNTER (INPATIENT)
Facility: HOSPITAL | Age: 63
LOS: 6 days | Discharge: HOME HEALTH | DRG: 906 | End: 2023-11-21
Attending: EMERGENCY MEDICINE | Admitting: STUDENT IN AN ORGANIZED HEALTH CARE EDUCATION/TRAINING PROGRAM
Payer: MEDICARE

## 2023-11-15 DIAGNOSIS — R47.01 MUTE: ICD-10-CM

## 2023-11-15 DIAGNOSIS — R74.8 ELEVATED LIVER ENZYMES: ICD-10-CM

## 2023-11-15 DIAGNOSIS — I96 SKIN FLAP NECROSIS: Primary | ICD-10-CM

## 2023-11-15 DIAGNOSIS — S42.291A: ICD-10-CM

## 2023-11-15 DIAGNOSIS — Z78.9 LANGUAGE BARRIER TO COMMUNICATION: ICD-10-CM

## 2023-11-15 DIAGNOSIS — R07.9 CHEST PAIN: ICD-10-CM

## 2023-11-15 DIAGNOSIS — M87.00 AVASCULAR NECROSIS: ICD-10-CM

## 2023-11-15 DIAGNOSIS — H91.90 DEAFNESS, UNSPECIFIED LATERALITY: ICD-10-CM

## 2023-11-15 DIAGNOSIS — E87.1 HYPONATREMIA: ICD-10-CM

## 2023-11-15 DIAGNOSIS — N17.9 ACUTE KIDNEY INJURY: ICD-10-CM

## 2023-11-15 DIAGNOSIS — R97.0 ELEVATED CEA: ICD-10-CM

## 2023-11-15 DIAGNOSIS — T86.828 SKIN FLAP NECROSIS: Primary | ICD-10-CM

## 2023-11-15 DIAGNOSIS — K59.00 CONSTIPATION, UNSPECIFIED CONSTIPATION TYPE: ICD-10-CM

## 2023-11-15 DIAGNOSIS — K21.9 GASTROESOPHAGEAL REFLUX DISEASE, UNSPECIFIED WHETHER ESOPHAGITIS PRESENT: ICD-10-CM

## 2023-11-15 DIAGNOSIS — E87.20 METABOLIC ACIDOSIS: ICD-10-CM

## 2023-11-15 DIAGNOSIS — E44.0 MALNUTRITION OF MODERATE DEGREE: ICD-10-CM

## 2023-11-15 DIAGNOSIS — L08.9 INFECTION OF HAND: ICD-10-CM

## 2023-11-15 DIAGNOSIS — D69.6 THROMBOCYTOPENIA: ICD-10-CM

## 2023-11-15 DIAGNOSIS — R52 PAIN: ICD-10-CM

## 2023-11-15 DIAGNOSIS — I10 HYPERTENSION, UNSPECIFIED TYPE: ICD-10-CM

## 2023-11-15 DIAGNOSIS — Z86.19 HISTORY OF HEPATITIS C: ICD-10-CM

## 2023-11-15 DIAGNOSIS — D64.9 POSTOPERATIVE ANEMIA: ICD-10-CM

## 2023-11-15 DIAGNOSIS — E83.42 HYPOMAGNESEMIA: ICD-10-CM

## 2023-11-15 DIAGNOSIS — S42.202D: ICD-10-CM

## 2023-11-15 DIAGNOSIS — Q76.6 ABNORMALITY OF RIB DETERMINED BY X-RAY: ICD-10-CM

## 2023-11-15 DIAGNOSIS — L08.9 INFECTION OF LEFT HAND: ICD-10-CM

## 2023-11-15 DIAGNOSIS — K74.00 LIVER FIBROSIS: ICD-10-CM

## 2023-11-15 LAB
ALBUMIN SERPL BCP-MCNC: 3 G/DL (ref 3.5–5.2)
ALP SERPL-CCNC: 65 U/L (ref 55–135)
ALT SERPL W/O P-5'-P-CCNC: 11 U/L (ref 10–44)
AMPHET+METHAMPHET UR QL: NEGATIVE
ANION GAP SERPL CALC-SCNC: 16 MMOL/L (ref 8–16)
AST SERPL-CCNC: 17 U/L (ref 10–40)
BACTERIA #/AREA URNS HPF: ABNORMAL /HPF
BARBITURATES UR QL SCN>200 NG/ML: NEGATIVE
BASOPHILS # BLD AUTO: 0.02 K/UL (ref 0–0.2)
BASOPHILS NFR BLD: 0.1 % (ref 0–1.9)
BENZODIAZ UR QL SCN>200 NG/ML: NEGATIVE
BILIRUB SERPL-MCNC: 0.8 MG/DL (ref 0.1–1)
BILIRUB UR QL STRIP: ABNORMAL
BUN SERPL-MCNC: 16 MG/DL (ref 8–23)
BZE UR QL SCN: NEGATIVE
CALCIUM SERPL-MCNC: 9.7 MG/DL (ref 8.7–10.5)
CANNABINOIDS UR QL SCN: ABNORMAL
CHLORIDE SERPL-SCNC: 97 MMOL/L (ref 95–110)
CLARITY UR: ABNORMAL
CO2 SERPL-SCNC: 23 MMOL/L (ref 23–29)
COLOR UR: ABNORMAL
CREAT SERPL-MCNC: 1.1 MG/DL (ref 0.5–1.4)
CREAT UR-MCNC: 262.6 MG/DL (ref 23–375)
DIFFERENTIAL METHOD: ABNORMAL
EOSINOPHIL # BLD AUTO: 0 K/UL (ref 0–0.5)
EOSINOPHIL NFR BLD: 0 % (ref 0–8)
ERYTHROCYTE [DISTWIDTH] IN BLOOD BY AUTOMATED COUNT: 16.4 % (ref 11.5–14.5)
EST. GFR  (NO RACE VARIABLE): >60 ML/MIN/1.73 M^2
ETHANOL SERPL-MCNC: <10 MG/DL (ref 0–10)
GLUCOSE SERPL-MCNC: 81 MG/DL (ref 70–110)
GLUCOSE UR QL STRIP: ABNORMAL
HCT VFR BLD AUTO: 36.8 % (ref 40–54)
HGB BLD-MCNC: 11.8 G/DL (ref 14–18)
HGB UR QL STRIP: ABNORMAL
IMM GRANULOCYTES # BLD AUTO: 0.11 K/UL (ref 0–0.04)
IMM GRANULOCYTES NFR BLD AUTO: 0.8 % (ref 0–0.5)
KETONES UR QL STRIP: ABNORMAL
LACTATE SERPL-SCNC: 1.5 MMOL/L (ref 0.5–2.2)
LEUKOCYTE ESTERASE UR QL STRIP: ABNORMAL
LYMPHOCYTES # BLD AUTO: 1.1 K/UL (ref 1–4.8)
LYMPHOCYTES NFR BLD: 7.5 % (ref 18–48)
MCH RBC QN AUTO: 29.6 PG (ref 27–31)
MCHC RBC AUTO-ENTMCNC: 32.1 G/DL (ref 32–36)
MCV RBC AUTO: 93 FL (ref 82–98)
METHADONE UR QL SCN>300 NG/ML: NEGATIVE
MICROSCOPIC COMMENT: ABNORMAL
MONOCYTES # BLD AUTO: 1.2 K/UL (ref 0.3–1)
MONOCYTES NFR BLD: 8 % (ref 4–15)
NEUTROPHILS # BLD AUTO: 12.1 K/UL (ref 1.8–7.7)
NEUTROPHILS NFR BLD: 83.6 % (ref 38–73)
NITRITE UR QL STRIP: ABNORMAL
NRBC BLD-RTO: 0 /100 WBC
OPIATES UR QL SCN: NEGATIVE
PCP UR QL SCN>25 NG/ML: NEGATIVE
PH UR STRIP: ABNORMAL [PH] (ref 5–8)
PLATELET # BLD AUTO: 84 K/UL (ref 150–450)
PMV BLD AUTO: 11.4 FL (ref 9.2–12.9)
POTASSIUM SERPL-SCNC: 4.6 MMOL/L (ref 3.5–5.1)
PROCALCITONIN SERPL IA-MCNC: 0.2 NG/ML
PROT SERPL-MCNC: 8.4 G/DL (ref 6–8.4)
PROT UR QL STRIP: ABNORMAL
RBC # BLD AUTO: 3.98 M/UL (ref 4.6–6.2)
RBC #/AREA URNS HPF: >100 /HPF (ref 0–4)
SODIUM SERPL-SCNC: 136 MMOL/L (ref 136–145)
SP GR UR STRIP: ABNORMAL (ref 1–1.03)
TOXICOLOGY INFORMATION: ABNORMAL
TSH SERPL DL<=0.005 MIU/L-ACNC: 2.27 UIU/ML (ref 0.4–4)
URN SPEC COLLECT METH UR: ABNORMAL
UROBILINOGEN UR STRIP-ACNC: ABNORMAL EU/DL
WBC # BLD AUTO: 14.45 K/UL (ref 3.9–12.7)
WBC #/AREA URNS HPF: 30 /HPF (ref 0–5)

## 2023-11-15 PROCEDURE — 96361 HYDRATE IV INFUSION ADD-ON: CPT

## 2023-11-15 PROCEDURE — 99285 EMERGENCY DEPT VISIT HI MDM: CPT | Mod: 25

## 2023-11-15 PROCEDURE — 85025 COMPLETE CBC W/AUTO DIFF WBC: CPT | Performed by: EMERGENCY MEDICINE

## 2023-11-15 PROCEDURE — 99223 PR INITIAL HOSPITAL CARE,LEVL III: ICD-10-PCS | Mod: 57,,, | Performed by: ORTHOPAEDIC SURGERY

## 2023-11-15 PROCEDURE — 87040 BLOOD CULTURE FOR BACTERIA: CPT | Mod: 59 | Performed by: EMERGENCY MEDICINE

## 2023-11-15 PROCEDURE — 81000 URINALYSIS NONAUTO W/SCOPE: CPT | Mod: 59 | Performed by: EMERGENCY MEDICINE

## 2023-11-15 PROCEDURE — 80053 COMPREHEN METABOLIC PANEL: CPT | Performed by: EMERGENCY MEDICINE

## 2023-11-15 PROCEDURE — 36415 COLL VENOUS BLD VENIPUNCTURE: CPT | Performed by: EMERGENCY MEDICINE

## 2023-11-15 PROCEDURE — 25000003 PHARM REV CODE 250: Performed by: STUDENT IN AN ORGANIZED HEALTH CARE EDUCATION/TRAINING PROGRAM

## 2023-11-15 PROCEDURE — 71045 X-RAY EXAM CHEST 1 VIEW: CPT | Mod: 26,,, | Performed by: RADIOLOGY

## 2023-11-15 PROCEDURE — 99223 1ST HOSP IP/OBS HIGH 75: CPT | Mod: 57,,, | Performed by: ORTHOPAEDIC SURGERY

## 2023-11-15 PROCEDURE — 83605 ASSAY OF LACTIC ACID: CPT | Performed by: EMERGENCY MEDICINE

## 2023-11-15 PROCEDURE — 63600175 PHARM REV CODE 636 W HCPCS: Performed by: STUDENT IN AN ORGANIZED HEALTH CARE EDUCATION/TRAINING PROGRAM

## 2023-11-15 PROCEDURE — 99223 1ST HOSP IP/OBS HIGH 75: CPT | Mod: AI,,, | Performed by: STUDENT IN AN ORGANIZED HEALTH CARE EDUCATION/TRAINING PROGRAM

## 2023-11-15 PROCEDURE — 11000001 HC ACUTE MED/SURG PRIVATE ROOM

## 2023-11-15 PROCEDURE — 93005 ELECTROCARDIOGRAM TRACING: CPT

## 2023-11-15 PROCEDURE — 73130 X-RAY EXAM OF HAND: CPT | Mod: 26,LT,, | Performed by: RADIOLOGY

## 2023-11-15 PROCEDURE — 71045 X-RAY EXAM CHEST 1 VIEW: CPT | Mod: TC

## 2023-11-15 PROCEDURE — 80307 DRUG TEST PRSMV CHEM ANLYZR: CPT | Performed by: EMERGENCY MEDICINE

## 2023-11-15 PROCEDURE — 25000003 PHARM REV CODE 250: Performed by: EMERGENCY MEDICINE

## 2023-11-15 PROCEDURE — 87186 SC STD MICRODIL/AGAR DIL: CPT | Performed by: EMERGENCY MEDICINE

## 2023-11-15 PROCEDURE — 87088 URINE BACTERIA CULTURE: CPT | Performed by: EMERGENCY MEDICINE

## 2023-11-15 PROCEDURE — 84145 PROCALCITONIN (PCT): CPT | Performed by: EMERGENCY MEDICINE

## 2023-11-15 PROCEDURE — 93010 EKG 12-LEAD: ICD-10-PCS | Mod: ,,, | Performed by: INTERNAL MEDICINE

## 2023-11-15 PROCEDURE — 87086 URINE CULTURE/COLONY COUNT: CPT | Performed by: EMERGENCY MEDICINE

## 2023-11-15 PROCEDURE — 73130 XR HAND COMPLETE 3 VIEW LEFT: ICD-10-PCS | Mod: 26,LT,, | Performed by: RADIOLOGY

## 2023-11-15 PROCEDURE — 63600175 PHARM REV CODE 636 W HCPCS: Performed by: EMERGENCY MEDICINE

## 2023-11-15 PROCEDURE — 93010 ELECTROCARDIOGRAM REPORT: CPT | Mod: ,,, | Performed by: INTERNAL MEDICINE

## 2023-11-15 PROCEDURE — 73130 X-RAY EXAM OF HAND: CPT | Mod: TC,LT

## 2023-11-15 PROCEDURE — 87077 CULTURE AEROBIC IDENTIFY: CPT | Performed by: EMERGENCY MEDICINE

## 2023-11-15 PROCEDURE — 84443 ASSAY THYROID STIM HORMONE: CPT | Performed by: EMERGENCY MEDICINE

## 2023-11-15 PROCEDURE — 82077 ASSAY SPEC XCP UR&BREATH IA: CPT | Performed by: EMERGENCY MEDICINE

## 2023-11-15 PROCEDURE — 71045 XR CHEST AP PORTABLE: ICD-10-PCS | Mod: 26,,, | Performed by: RADIOLOGY

## 2023-11-15 PROCEDURE — 99223 PR INITIAL HOSPITAL CARE,LEVL III: ICD-10-PCS | Mod: AI,,, | Performed by: STUDENT IN AN ORGANIZED HEALTH CARE EDUCATION/TRAINING PROGRAM

## 2023-11-15 PROCEDURE — 96374 THER/PROPH/DIAG INJ IV PUSH: CPT

## 2023-11-15 RX ORDER — ONDANSETRON 2 MG/ML
4 INJECTION INTRAMUSCULAR; INTRAVENOUS EVERY 8 HOURS PRN
Status: DISCONTINUED | OUTPATIENT
Start: 2023-11-15 | End: 2023-11-21 | Stop reason: HOSPADM

## 2023-11-15 RX ORDER — GLUCAGON 1 MG
1 KIT INJECTION
Status: DISCONTINUED | OUTPATIENT
Start: 2023-11-15 | End: 2023-11-21 | Stop reason: HOSPADM

## 2023-11-15 RX ORDER — THIAMINE HCL 100 MG
100 TABLET ORAL DAILY
Status: DISCONTINUED | OUTPATIENT
Start: 2023-11-16 | End: 2023-11-21 | Stop reason: HOSPADM

## 2023-11-15 RX ORDER — FOLIC ACID 1 MG/1
1 TABLET ORAL DAILY
Status: DISCONTINUED | OUTPATIENT
Start: 2023-11-16 | End: 2023-11-21 | Stop reason: HOSPADM

## 2023-11-15 RX ORDER — SODIUM CHLORIDE 0.9 % (FLUSH) 0.9 %
10 SYRINGE (ML) INJECTION EVERY 12 HOURS PRN
Status: DISCONTINUED | OUTPATIENT
Start: 2023-11-15 | End: 2023-11-21 | Stop reason: HOSPADM

## 2023-11-15 RX ORDER — IBUPROFEN 200 MG
16 TABLET ORAL
Status: DISCONTINUED | OUTPATIENT
Start: 2023-11-15 | End: 2023-11-21 | Stop reason: HOSPADM

## 2023-11-15 RX ORDER — HYDROCODONE BITARTRATE AND ACETAMINOPHEN 7.5; 325 MG/1; MG/1
1 TABLET ORAL EVERY 6 HOURS PRN
Status: ON HOLD | COMMUNITY
Start: 2023-10-20 | End: 2023-11-21 | Stop reason: HOSPADM

## 2023-11-15 RX ORDER — OXYCODONE HYDROCHLORIDE 5 MG/1
5 TABLET ORAL EVERY 6 HOURS PRN
Status: DISCONTINUED | OUTPATIENT
Start: 2023-11-15 | End: 2023-11-21 | Stop reason: HOSPADM

## 2023-11-15 RX ORDER — NALOXONE HCL 0.4 MG/ML
0.02 VIAL (ML) INJECTION
Status: DISCONTINUED | OUTPATIENT
Start: 2023-11-15 | End: 2023-11-21 | Stop reason: HOSPADM

## 2023-11-15 RX ORDER — VANCOMYCIN 2 GRAM/500 ML IN 0.9 % SODIUM CHLORIDE INTRAVENOUS
2000
Status: COMPLETED | OUTPATIENT
Start: 2023-11-15 | End: 2023-11-15

## 2023-11-15 RX ORDER — PROCHLORPERAZINE EDISYLATE 5 MG/ML
5 INJECTION INTRAMUSCULAR; INTRAVENOUS EVERY 6 HOURS PRN
Status: DISCONTINUED | OUTPATIENT
Start: 2023-11-15 | End: 2023-11-21 | Stop reason: HOSPADM

## 2023-11-15 RX ORDER — IBUPROFEN 200 MG
24 TABLET ORAL
Status: DISCONTINUED | OUTPATIENT
Start: 2023-11-15 | End: 2023-11-21 | Stop reason: HOSPADM

## 2023-11-15 RX ORDER — MORPHINE SULFATE 2 MG/ML
2 INJECTION, SOLUTION INTRAMUSCULAR; INTRAVENOUS EVERY 4 HOURS PRN
Status: DISCONTINUED | OUTPATIENT
Start: 2023-11-15 | End: 2023-11-21 | Stop reason: HOSPADM

## 2023-11-15 RX ORDER — LORAZEPAM 2 MG/ML
1 INJECTION INTRAMUSCULAR EVERY 4 HOURS PRN
Status: DISCONTINUED | OUTPATIENT
Start: 2023-11-15 | End: 2023-11-21 | Stop reason: HOSPADM

## 2023-11-15 RX ADMIN — SODIUM CHLORIDE 1974 ML: 9 INJECTION, SOLUTION INTRAVENOUS at 11:11

## 2023-11-15 RX ADMIN — PIPERACILLIN AND TAZOBACTAM 4.5 G: 4; .5 INJECTION, POWDER, LYOPHILIZED, FOR SOLUTION INTRAVENOUS; PARENTERAL at 09:11

## 2023-11-15 RX ADMIN — PIPERACILLIN AND TAZOBACTAM 4.5 G: 4; .5 INJECTION, POWDER, LYOPHILIZED, FOR SOLUTION INTRAVENOUS; PARENTERAL at 02:11

## 2023-11-15 RX ADMIN — VANCOMYCIN HYDROCHLORIDE 2000 MG: 1 INJECTION, POWDER, LYOPHILIZED, FOR SOLUTION INTRAVENOUS at 11:11

## 2023-11-15 RX ADMIN — MORPHINE SULFATE 2 MG: 2 INJECTION, SOLUTION INTRAMUSCULAR; INTRAVENOUS at 08:11

## 2023-11-15 NOTE — SUBJECTIVE & OBJECTIVE
Past Medical History:   Diagnosis Date    Advanced liver fibrosis     FibroScan 1/2020 - F3    Coronary artery disease     Gout     History of hepatitis C, treated / cured (SVR12 - 8/2020)     Language barrier     MI (myocardial infarction)     Ulcer        Past Surgical History:   Procedure Laterality Date    ABDOMINAL SURGERY      CARDIAC SURGERY      COLONOSCOPY  09/02/2013    ESOPHAGOGASTRODUODENOSCOPY  08/31/2013    Dr Anjelica Lemos[facility]    EXCISION OF MASS OF HAND Left 10/16/2023    Procedure: EXCISION, MASS, HAND;  Surgeon: Lakhwinder Frank DO;  Location: [facility] OR;  Service: Orthopedics;  Laterality: Left;  dorsal tendon mass    EXTENSOR TENDON OF FOREARM / WRIST REPAIR      INTRAMEDULLARY RODDING OF HUMERUS Left 10/20/2019    Procedure: INSERTION, INTRAMEDULLARY SISSY, HUMERUS;  Surgeon: Jose Alejandro Funes MD;  Location: [facility] OR;  Service: Orthopedics;  Laterality: Left;    OPEN REDUCTION AND INTERNAL FIXATION (ORIF) OF FRACTURE OF PROXIMAL HUMERUS Right 6/15/2023    Procedure: Reduction and Internal Fixation Right Proximal Humerus;  Surgeon: Lakhwinder Frank DO;  Location: [facility] OR;  Service: Orthopedics;  Laterality: Right;    TENODESIS, FINGER EXTENSOR, AT WRIST Left 10/16/2023    Procedure: TENODESIS, FINGER EXTENSOR, AT WRIST;  Surgeon: Lakhwinder Frank DO;  Location: [facility] OR;  Service: Orthopedics;  Laterality: Left;  left small finger       Review of patient's allergies indicates:  No Known Allergies    No current facility-administered medications on file prior to encounter.     Current Outpatient Medications on File Prior to Encounter   Medication Sig    HYDROcodone-acetaminophen (NORCO) 7.5-325 mg per tablet Take 1 tablet by mouth every 6 (six) hours as needed.    allopurinoL (ZYLOPRIM) 100 MG tablet Take 1 tablet (100 mg total) by mouth once daily. (Patient not taking: Reported on 7/26/2023)    cephALEXin (KEFLEX) 500 MG capsule Take 1 capsule (500 mg total) by mouth 4 (four)  times daily. (Patient not taking: Reported on 7/26/2023)    colchicine (COLCRYS) 0.6 mg tablet Take 1 tablet (0.6 mg total) by mouth once daily.    ibuprofen (ADVIL,MOTRIN) 800 MG tablet Take 1 tablet (800 mg total) by mouth every 6 (six) hours as needed for Pain. (Patient not taking: Reported on 7/26/2023)    ketoconazole (NIZORAL) 2 % shampoo Apply topically twice a week. (Patient not taking: Reported on 7/26/2023)    lisinopriL (PRINIVIL,ZESTRIL) 40 MG tablet Take 1 tablet (40 mg total) by mouth once daily.    ondansetron (ZOFRAN) 4 MG tablet Take 1 tablet (4 mg total) by mouth every 6 (six) hours.    polyethylene glycol (GOLYTELY,NULYTELY) 236-22.74-6.74 -5.86 gram suspension Take by mouth once.     Family History    None       Tobacco Use    Smoking status: Every Day     Current packs/day: 1.00     Types: Cigarettes    Smokeless tobacco: Never   Substance and Sexual Activity    Alcohol use: Yes     Alcohol/week: 1.0 standard drink of alcohol     Types: 1 Cans of beer per week    Drug use: No    Sexual activity: Never     Review of Systems   All other systems reviewed and are negative.    Objective:     Vital Signs (Most Recent):  Temp: 98.3 °F (36.8 °C) (11/15/23 1625)  Pulse: 93 (11/15/23 1625)  Resp: 16 (11/15/23 1625)  BP: (!) 146/72 (11/15/23 1625)  SpO2: (!) 92 % (11/15/23 1625) Vital Signs (24h Range):  Temp:  [98.3 °F (36.8 °C)-99.5 °F (37.5 °C)] 98.3 °F (36.8 °C)  Pulse:  [85-93] 93  Resp:  [16-22] 16  SpO2:  [92 %-100 %] 92 %  BP: (133-152)/(71-92) 146/72     Weight: 63.4 kg (139 lb 12.4 oz)  Body mass index is 18.96 kg/m².     Physical Exam     Vitals reviewed  General: NAD, Thin, Frial, Elderly, Disheveled  Head: NC/AT  Eyes: EOMI, ILANA  Cardiovascular: Pulses intact distally, Regular Rate and rhythm  Pulmonary: Normal Respiratory Rate, No respiratory distress  Gi: Soft, Non-tender  Extremities: Warm, No edema present, left hand wrapped with dressing. Clean/Dry/Intact. Able to move fingers.  Significant pain with palpation of hand  Skin: Warm, dry  Neuro: Alert, Oriented x3, No focal Deficit  Psych: Appropriate mood and affect          Significant Labs: All pertinent labs within the past 24 hours have been reviewed.    Significant Imaging: I have reviewed all pertinent imaging results/findings within the past 24 hours.

## 2023-11-15 NOTE — HPI
61 yo w/ hx of deafness, HTN ,GERD, CAD presenting with left hand pain and swelling. Patient is s/p Irrigation and debridement left hand extensor tendons on 10/16 with Dr. Frank. Patient did not follow up in clinic. History obtained using . Per patient 2 days ago he started having swelling, redness, and pain in the hand. Endorses subjective fevers. Endorses Nausea but no vomiting. He has been having diarrhea 2-3 times per day for the past 2 days. No recent abx. No blood in stool.

## 2023-11-15 NOTE — ED PROVIDER NOTES
Encounter Date: 11/15/2023       History     Chief Complaint   Patient presents with    Hand Pain     EMS states patient was found laying next to a house about two streets over from his house.  EMS states patient keeps pointing to his left hand.  Patient states having left hand pain and dysuria.  While assisting patient with urinal, noticed patient has bloody discharge.      Dysuria     62-year-old male who is deaf had a hand surgery on the 16th of this past October.  This was for a soft tissue hand mass.  Was done by Dr. Lakhwinder santana.  The patient was then discharged home.  The patient failed to follow up with any of his postop visits.  He kept the same dressing and splint on until today.  The patient comes emergency room for evaluation of this.  He has a very foul smelling splint on his left hand.  Patient complains of pain in his hand which was his reasons to come in here.  Patient has a past medical history of advanced liver fibrosis, coronary artery disease, gout, hepatitis-C, language barrier because of deafness, heart attack, and ulcers.      Review of patient's allergies indicates:  No Known Allergies  Past Medical History:   Diagnosis Date    Advanced liver fibrosis     FibroScan 1/2020 - F3    Coronary artery disease     Gout     History of hepatitis C, treated / cured (SVR12 - 8/2020)     Language barrier     MI (myocardial infarction)     Ulcer      Past Surgical History:   Procedure Laterality Date    ABDOMINAL SURGERY      CARDIAC SURGERY      COLONOSCOPY  09/02/2013    ESOPHAGOGASTRODUODENOSCOPY  08/31/2013    Dr Anjelica Lemos-[facility]    EXCISION OF MASS OF HAND Left 10/16/2023    Procedure: EXCISION, MASS, HAND;  Surgeon: Lakhwinder Santana DO;  Location: [facility] OR;  Service: Orthopedics;  Laterality: Left;  dorsal tendon mass    EXTENSOR TENDON OF FOREARM / WRIST REPAIR      INTRAMEDULLARY RODDING OF HUMERUS Left 10/20/2019    Procedure: INSERTION, INTRAMEDULLARY SISSY, HUMERUS;  Surgeon:  Jose Alejandro Funes MD;  Location: [facility] OR;  Service: Orthopedics;  Laterality: Left;    OPEN REDUCTION AND INTERNAL FIXATION (ORIF) OF FRACTURE OF PROXIMAL HUMERUS Right 6/15/2023    Procedure: Reduction and Internal Fixation Right Proximal Humerus;  Surgeon: Lakhwinder Frank DO;  Location: [facility] OR;  Service: Orthopedics;  Laterality: Right;    TENODESIS, FINGER EXTENSOR, AT WRIST Left 10/16/2023    Procedure: TENODESIS, FINGER EXTENSOR, AT WRIST;  Surgeon: Lakhwinder Frank DO;  Location: [facility] OR;  Service: Orthopedics;  Laterality: Left;  left small finger     History reviewed. No pertinent family history.  Social History     Tobacco Use    Smoking status: Every Day     Current packs/day: 1.00     Types: Cigarettes    Smokeless tobacco: Never   Substance Use Topics    Alcohol use: Yes     Alcohol/week: 1.0 standard drink of alcohol     Types: 1 Cans of beer per week    Drug use: No     Review of Systems   Constitutional:  Negative for fever.   HENT:  Negative for sore throat.         Deaf, however read lips.   Respiratory:  Negative for shortness of breath.    Cardiovascular:  Negative for chest pain.   Gastrointestinal:  Negative for nausea.   Genitourinary:  Negative for dysuria.   Musculoskeletal:  Negative for back pain.        Patient is left-hand with same surgical splint for greater than a month and a half after surgery to remove a soft tissue mass.   Skin:  Negative for rash.   Neurological:  Negative for weakness.   Hematological:  Does not bruise/bleed easily.   All other systems reviewed and are negative.      Physical Exam     Initial Vitals [11/15/23 0918]   BP Pulse Resp Temp SpO2   (!) 145/92 89 16 98.9 °F (37.2 °C) 100 %      MAP       --         Physical Exam    Nursing note and vitals reviewed.  Constitutional: He appears well-developed and well-nourished.   HENT:   Head: Normocephalic and atraumatic.   Eyes: EOM are normal. Pupils are equal, round, and reactive to light. No scleral  icterus.   Neck: Neck supple.   Normal range of motion.  Cardiovascular:  Normal rate, regular rhythm and normal heart sounds.           No murmur heard.  Pulmonary/Chest: Breath sounds normal. He has no wheezes. He has no rales. He exhibits no tenderness.   Abdominal: Abdomen is soft. Bowel sounds are normal.   Musculoskeletal:      Cervical back: Normal range of motion and neck supple.      Comments: The patient left-hand is very foul-smelling.  Somewhat contracted.  Has necrotic tissue on the top of it.  Open draining wound on the 5th metacarpal dorsally.     Neurological: He is alert and oriented to person, place, and time. He has normal strength. GCS score is 15. GCS eye subscore is 4. GCS verbal subscore is 5. GCS motor subscore is 6.   Skin: Skin is warm and dry.   Psychiatric: He has a normal mood and affect. His behavior is normal. Judgment and thought content normal.         ED Course   Procedures  Labs Reviewed   URINALYSIS, REFLEX TO URINE CULTURE - Abnormal; Notable for the following components:       Result Value    Color, UA Brown (*)     Appearance, UA Cloudy (*)     All other components within normal limits    Narrative:     Preferred Collection Type->Urine, Clean Catch  Specimen Source->Urine   URINALYSIS MICROSCOPIC - Abnormal; Notable for the following components:    RBC, UA >100 (*)     WBC, UA 30 (*)     Bacteria Few (*)     All other components within normal limits    Narrative:     Preferred Collection Type->Urine, Clean Catch  Specimen Source->Urine   CBC W/ AUTO DIFFERENTIAL - Abnormal; Notable for the following components:    WBC 14.45 (*)     RBC 3.98 (*)     Hemoglobin 11.8 (*)     Hematocrit 36.8 (*)     RDW 16.4 (*)     Platelets 84 (*)     Immature Granulocytes 0.8 (*)     Gran # (ANC) 12.1 (*)     Immature Grans (Abs) 0.11 (*)     Mono # 1.2 (*)     Gran % 83.6 (*)     Lymph % 7.5 (*)     All other components within normal limits   COMPREHENSIVE METABOLIC PANEL - Abnormal; Notable  for the following components:    Albumin 3.0 (*)     All other components within normal limits   CULTURE, URINE   CULTURE, BLOOD   CULTURE, BLOOD   LACTIC ACID, PLASMA   TSH   ALCOHOL,MEDICAL (ETHANOL)   DRUG SCREEN PANEL, URINE EMERGENCY   ALCOHOL,MEDICAL (ETHANOL)   TSH   DRUG SCREEN PANEL, URINE EMERGENCY     EKG Readings: (Independently Interpreted)   Rate 94, normal sinus rhythm, there is left axis deviation, QRS is somewhat enlarged.  There is pulmonary disease pattern noted.  Incomplete right bundle-branch block.  There is an old left anterior fascicular block.  QTC of 455 milliseconds.  Abnormal EKG.       Imaging Results              X-Ray Chest AP Portable (Final result)  Result time 11/15/23 11:52:33      Final result by Maya Roldan MD (11/15/23 11:52:33)                   Impression:      No acute abnormality      Electronically signed by: Maya Roldan  Date:    11/15/2023  Time:    11:52               Narrative:    EXAMINATION:  XR CHEST AP PORTABLE    CLINICAL HISTORY:  Sepsis;    TECHNIQUE:  Single frontal view of the chest was performed.    COMPARISON:  06/30/2021    FINDINGS:  Heart size is borderline status post median sternotomy.  Marked enlargement of the left pulmonary artery is unchanged on studies dating back to at least 2019.  Lungs are clear and well inflated.  No pleural effusion.  Postsurgical changes left humerus.  Old, healed rib fractures bilaterally.                                       X-Ray Hand 3 view Left (Final result)  Result time 11/15/23 11:55:35      Final result by Maya Roldan MD (11/15/23 11:55:35)                   Impression:      Marked soft tissue swelling over the dorsum of the hand and wrist with no radiographic evidence of osteomyelitis.  Chronic arthritic changes hand and wrist.      Electronically signed by: Maya Roldan  Date:    11/15/2023  Time:    11:55               Narrative:    EXAMINATION:  XR HAND COMPLETE 3 VIEW  LEFT    CLINICAL HISTORY:  infection;.    TECHNIQUE:  PA, lateral, and oblique views of the left hand were performed.    COMPARISON:  09/15/2023    FINDINGS:  There is no fracture and no radiographic evidence of osteomyelitis.  There is a chronic deformity of the distal ulna along with chronic narrowing of the radiocarpal joint and intercarpal articulations and carpal-metacarpal joints.  There is chronic joint space narrowing at the 1st MCP joint.  There is soft tissue swelling over the dorsum of the hand and wrist which appears more diffuse when compared to the previous films.  There is no air in the soft tissues.                                       Medications   sodium chloride 0.9% bolus 1,974 mL 1,974 mL (1,974 mLs Intravenous New Bag 11/15/23 1129)   vancomycin 2 g in 0.9% sodium chloride 500 mL IVPB (2,000 mg Intravenous New Bag 11/15/23 1148)   sodium chloride 0.9% flush 10 mL (has no administration in time range)   naloxone 0.4 mg/mL injection 0.02 mg (has no administration in time range)   glucose chewable tablet 16 g (has no administration in time range)   glucose chewable tablet 24 g (has no administration in time range)   glucagon (human recombinant) injection 1 mg (has no administration in time range)   oxyCODONE immediate release tablet 5 mg (has no administration in time range)   morphine injection 2 mg (has no administration in time range)   ondansetron injection 4 mg (has no administration in time range)   prochlorperazine injection Soln 5 mg (has no administration in time range)   dextrose 10% bolus 125 mL 125 mL (has no administration in time range)   dextrose 10% bolus 250 mL 250 mL (has no administration in time range)   piperacillin-tazobactam (ZOSYN) 4.5 g in dextrose 5 % in water (D5W) 100 mL IVPB (MB+) (has no administration in time range)     Medical Decision Making  Osteomyelitis, deep space infection, necrosis, pressure necrosis, dry gangrene.    Amount and/or Complexity of Data  Reviewed  Labs: ordered.  Radiology: ordered.  Discussion of management or test interpretation with external provider(s): The patient has not changed the dressing since his initial surgical visitation.  Likely due to his communication deficit, being deaf and not understanding patient's information or instructions to follow up after surgery the patient simply failed to get this wound dressing changed.  The patient has been seen by Dr. Frank.  The patient will be admitted to the hospitalist service.  IV antibiotics.  Scheduled for surgery in the morning.  NPO after midnight.  The patient takes no blood thinners.  The patient understands the situation as it is.    Risk  Prescription drug management.  Decision regarding hospitalization.                               Clinical Impression:   Final diagnoses:  [R52] Pain  [L08.9] Infection of left hand (Primary)        ED Disposition Condition    Admit                 Aric Banerjee MD  11/15/23 1204       Aric Banerjee MD  11/15/23 1221

## 2023-11-15 NOTE — H&P
[facility] Medicine  History & Physical    Patient Name: Jesus Mccabe  MRN: [id number]  Patient Class: IP- Inpatient  Admission Date: 11/15/2023  Attending Physician: Corey Macias MD   Primary Care Provider: Jose Prater MD         Patient information was obtained from patient, past medical records, and ER records.     Subjective:     Principal Problem:Infection of hand    Chief Complaint:   Chief Complaint   Patient presents with    Hand Pain     EMS states patient was found laying next to a house about two streets over from his house.  EMS states patient keeps pointing to his left hand.  Patient states having left hand pain and dysuria.  While assisting patient with urinal, noticed patient has bloody discharge.      Dysuria        HPI: 61 yo w/ hx of deafness, HTN ,GERD, CAD presenting with left hand pain and swelling. Patient is s/p Irrigation and debridement left hand extensor tendons on 10/16 with Dr. Frank. Patient did not follow up in clinic. History obtained using . Per patient 2 days ago he started having swelling, redness, and pain in the hand. Endorses subjective fevers. Endorses Nausea but no vomiting. He has been having diarrhea 2-3 times per day for the past 2 days. No recent abx. No blood in stool.     Past Medical History:   Diagnosis Date    Advanced liver fibrosis     FibroScan 1/2020 - F3    Coronary artery disease     Gout     History of hepatitis C, treated / cured (SVR12 - 8/2020)     Language barrier     MI (myocardial infarction)     Ulcer        Past Surgical History:   Procedure Laterality Date    ABDOMINAL SURGERY      CARDIAC SURGERY      COLONOSCOPY  09/02/2013    ESOPHAGOGASTRODUODENOSCOPY  08/31/2013    Dr Anjelica Lemos-[facility]    EXCISION OF MASS OF HAND Left 10/16/2023    Procedure: EXCISION, MASS, HAND;  Surgeon: Lakhwinder Frank DO;  Location: [facility] OR;  Service: Orthopedics;  Laterality: Left;   dorsal tendon mass    EXTENSOR TENDON OF FOREARM / WRIST REPAIR      INTRAMEDULLARY RODDING OF HUMERUS Left 10/20/2019    Procedure: INSERTION, INTRAMEDULLARY SISSY, HUMERUS;  Surgeon: Jose Alejandro Funes MD;  Location: [facility] OR;  Service: Orthopedics;  Laterality: Left;    OPEN REDUCTION AND INTERNAL FIXATION (ORIF) OF FRACTURE OF PROXIMAL HUMERUS Right 6/15/2023    Procedure: Reduction and Internal Fixation Right Proximal Humerus;  Surgeon: Lakhwinder Frank DO;  Location: [facility] OR;  Service: Orthopedics;  Laterality: Right;    TENODESIS, FINGER EXTENSOR, AT WRIST Left 10/16/2023    Procedure: TENODESIS, FINGER EXTENSOR, AT WRIST;  Surgeon: Lakhwinder Frank DO;  Location: [facility] OR;  Service: Orthopedics;  Laterality: Left;  left small finger       Review of patient's allergies indicates:  No Known Allergies    No current facility-administered medications on file prior to encounter.     Current Outpatient Medications on File Prior to Encounter   Medication Sig    HYDROcodone-acetaminophen (NORCO) 7.5-325 mg per tablet Take 1 tablet by mouth every 6 (six) hours as needed.    allopurinoL (ZYLOPRIM) 100 MG tablet Take 1 tablet (100 mg total) by mouth once daily. (Patient not taking: Reported on 7/26/2023)    cephALEXin (KEFLEX) 500 MG capsule Take 1 capsule (500 mg total) by mouth 4 (four) times daily. (Patient not taking: Reported on 7/26/2023)    colchicine (COLCRYS) 0.6 mg tablet Take 1 tablet (0.6 mg total) by mouth once daily.    ibuprofen (ADVIL,MOTRIN) 800 MG tablet Take 1 tablet (800 mg total) by mouth every 6 (six) hours as needed for Pain. (Patient not taking: Reported on 7/26/2023)    ketoconazole (NIZORAL) 2 % shampoo Apply topically twice a week. (Patient not taking: Reported on 7/26/2023)    lisinopriL (PRINIVIL,ZESTRIL) 40 MG tablet Take 1 tablet (40 mg total) by mouth once daily.    ondansetron (ZOFRAN) 4 MG tablet Take 1 tablet (4 mg total) by mouth every 6 (six) hours.    polyethylene glycol  (KALPANA DASILVA) 236-22.74-6.74 -5.86 gram suspension Take by mouth once.     Family History    None       Tobacco Use    Smoking status: Every Day     Current packs/day: 1.00     Types: Cigarettes    Smokeless tobacco: Never   Substance and Sexual Activity    Alcohol use: Yes     Alcohol/week: 1.0 standard drink of alcohol     Types: 1 Cans of beer per week    Drug use: No    Sexual activity: Never     Review of Systems   All other systems reviewed and are negative.    Objective:     Vital Signs (Most Recent):  Temp: 98.3 °F (36.8 °C) (11/15/23 1625)  Pulse: 93 (11/15/23 1625)  Resp: 16 (11/15/23 1625)  BP: (!) 146/72 (11/15/23 1625)  SpO2: (!) 92 % (11/15/23 1625) Vital Signs (24h Range):  Temp:  [98.3 °F (36.8 °C)-99.5 °F (37.5 °C)] 98.3 °F (36.8 °C)  Pulse:  [85-93] 93  Resp:  [16-22] 16  SpO2:  [92 %-100 %] 92 %  BP: (133-152)/(71-92) 146/72     Weight: 63.4 kg (139 lb 12.4 oz)  Body mass index is 18.96 kg/m².     Physical Exam     Vitals reviewed  General: NAD, Thin, Frial, Elderly, Disheveled  Head: NC/AT  Eyes: EOMI, ILANA  Cardiovascular: Pulses intact distally, Regular Rate and rhythm  Pulmonary: Normal Respiratory Rate, No respiratory distress  Gi: Soft, Non-tender  Extremities: Warm, No edema present, left hand wrapped with dressing. Clean/Dry/Intact. Able to move fingers. Significant pain with palpation of hand  Skin: Warm, dry  Neuro: Alert, Oriented x3, No focal Deficit  Psych: Appropriate mood and affect          Significant Labs: All pertinent labs within the past 24 hours have been reviewed.    Significant Imaging: I have reviewed all pertinent imaging results/findings within the past 24 hours.  Assessment/Plan:     * Infection of hand  Post op infection to left hand. Did not make follow up appt.  Ortho consulted- planning to take to OR tomorrow  Not meeting sepsis criteria currently- holding on IVF bolus  IV abx  Blood cultures and Procal pending  Lactate wnl  NPO MN  PRN pain and nausea  medications        GERD (gastroesophageal reflux disease)  Protonix daily      History of hepatitis C, treated / cured (SVR12 - 8/2020)  Noted  Needs outpatient GI followup  Hep C and Viral Load in am      Deafness   as needed       HTN (hypertension)  Continue home BP medications as tolerated. Titrate as needed    Coronary artery disease involving coronary bypass graft with angina pectoris  Unclear if patient is on ASA/Statin      VTE Risk Mitigation (From admission, onward)           Ordered     Reason for No Pharmacological VTE Prophylaxis  Once        Question:  Reasons:  Answer:  Physician Provided (leave comment)    11/15/23 1149     IP VTE HIGH RISK PATIENT  Once         11/15/23 1149     Place sequential compression device  Until discontinued         11/15/23 1149                                   Corey Macias MD  Department of Hospital Medicine  [place] - Intensive Care

## 2023-11-15 NOTE — ASSESSMENT & PLAN NOTE
Post op infection to left hand. Did not make follow up appt.  Ortho consulted- planning to take to OR tomorrow  Not meeting sepsis criteria currently- holding on IVF bolus  IV abx  Blood cultures and Procal pending  Lactate wnl  NPO MN  PRN pain and nausea medications

## 2023-11-15 NOTE — CONSULTS
Complaint:  Flap necrosis dorsum left hand     HPI:  Mr. Mccabe is a 62-year-old right-hand-dominant male who presented to the ER today with complaints of dysuria and of painful left hand.  He had an excision of a mass of the dorsal aspect of his left hand on 10/16/2023 and never returned for any postop care.  He remained in his postop dressing and splint and did not remove them.  Today he had pain in his hand.  He originally had several months of painful swelling at the dorsal ulnar aspect of his left hand of which he underwent an excision of a mass on 10/16/2023..  He did not remove his splint nor cleanse his hand postoperatively.             Past Medical History:   Diagnosis Date    Coronary artery disease      Language barrier      MI (myocardial infarction)       *  *  *  *  *  *  *  *  *  *  * Ulcer  Hypertension  Depression  Anxiety  Mitral stenosis as child treated with surgery  Deafness  GERD  Headaches  Liver fibrosis  Coronary artery disease   Hepatitis C  Gout                  Past Surgical History:   Procedure Laterality Date    ABDOMINAL SURGERY        CARDIAC SURGERY mitral heart valve repair as child        EXTENSOR TENDON OF FOREARM / WRIST REPAIR        INTRAMEDULLARY RODDING OF HUMERUS Left 10/20/2019    *    * REDUCTION AND INTRAMEDULLARY SISSY FIXATION RIGHT PROXIMAL HUMERUS  EXCISION MASS DORSUM LEFT HAND         Review of patient's allergies indicates:  No Known Allergies     Social History                Occupational History    Disabled   Tobacco Use    Smoking status: Current Every Day Smoker       Packs/day: 1.00    Smokeless tobacco: Never Used   Substance and Sexual Activity    Alcohol use: Yes       Alcohol/week: 1.0 standard drinks       Types: 1 Cans of beer per week    Drug use: Marijuana    Sexual activity: Never      Family history:  Father:  , heart failure.  Mother:  , MI.  Brother:  3, 1 , drowning  Sister:  2, 1 , leukemia.     Previous  Hospitalizations:  Heart valve repair     ROS:     Constitution: Negative for chills and fever.   HENT: Positive for hearing loss. Negative for congestion.    Eyes: Negative for blurred vision.   Cardiovascular: Negative for chest pain.   Respiratory: Negative for cough.    Endocrine: Negative for polydipsia.   Hematologic/Lymphatic: Negative for adenopathy.   Skin: Negative for flushing and itching.   Musculoskeletal: Negative for gout.   Gastrointestinal: Positive for heartburn. Negative for constipation and diarrhea.   Genitourinary: Negative for nocturia.   Neurological: Positive for headaches. Negative for seizures.   Psychiatric/Behavioral: Positive for depression and substance abuse. The patient is nervous/anxious.    Allergic/Immunologic: Negative for environmental allergies.       Objective:   Physical Exam:   General:  No  available today.  Malodorous body and left hand.  AAOx3.  No acute distress  HEENT: Normocephalic, PEARLA EOMI.  Poor dentition with missing teeth.  Neck: Supple, No JVD  Chest: Symetric, equal excursion on inspiration  Abdomen: Soft NTND  Vascular:  Pulses intact and equal bilaterally.  Capillary refill less than 3 seconds and equal bilaterally  Neurologic:  Pinprick and soft touch over both deltoids intact and equal bilaterally  Integment:  Dorsal ulcer over 2nd metacarpal and eschar over 4th metacarpal left hand.  Ulcer dimensions 6 cm x 2 cm by 0.5 cm deep.  Eschar dimensions 3.5 cm circumference.  Extremity:  Wrist/hand:  Splint in poor condition, with splint removed:  Malodor left hand  Flexion contracture fingers right hand consistent with a previous injury from the 1980s.  Pronation/supination right hand 45/45 degrees, left hand 50/50 degrees.  Dorsiflexion/volar flexion left hand 45/45 degrees degrees, right hand 45/45 degrees.  Minimal motion fingers right hand.  Nontender in the anatomic snuffbox bilaterally.  Nontender at the 1st dorsal compartment bilaterally.   Moderate swelling left hand.  Nontender at the scapholunate interval bilaterally.  Higgins's test negative bilaterally. Wartenberg sign positive right hand.  Interosseous atrophy right hand mild thenar atrophy left hand with extensive right hand.  Decreased motion fingers of left hand   Radiography:  Personally reviewed x-rays of left hand completed on radiocarpal and carpal carpal arthritis with 1st CMC arthritis and MCP arthritis of the thumb.  Laboratory:  WBC 14.45; hemoglobin 11.8; hematocrit 36.8; platelets 84      Assessment:       Impression:       1. Flap necrosis dorsum left hand   2. Left hand arthritis.   3.   Left wrist arthritis.        Plan:       1.  Discussed physical examination and radiographic findings with the patient. Jesus understands that he has necrosis the flap on the dorsal aspect of his left hand.  He understands it is not advantageous that he did not show up for postoperative follow-ups and now has developed a dehiscence and flap necrosis.  He also has a malodorous hand and body.  Treatment alternatives and outcomes were discussed with the patient he understands he could be treated conservatively with observation, activity modification, NSAIDs, bracing, or he could consider surgical intervention such as irrigation debridement of his hand.  Recommend he proceed with surgery.  2. Possible complications of surgery to include bleeding, infection, scarring, nerve/blood vessel/tendon damage, need for further surgery, failed surgery, failure to improve, possible skin slough possible persistent pain, and possible amputation were discussed with the patient.  The patient was permitted to ask questions and all concerns were addressed to his satisfaction.    3. Consent for irrigation and debridement of the left hand   4. Place patient on surgery schedule for tomorrow 11/16/2023    5. ER to cleanse the patient's hand with warm soapy water and place a fresh wet-to-dry dressing.    6. NPO after  midnight tonight.    7. Admit to hospitalist service.    8. All antibiotics per hospitalist.    9. Patient to shower tonight and cleanse his hand with warm soapy water pat dry and place a fresh wet-to-dry dressing.    10. To OR tomorrow

## 2023-11-16 ENCOUNTER — ANESTHESIA EVENT (OUTPATIENT)
Dept: SURGERY | Facility: HOSPITAL | Age: 63
DRG: 906 | End: 2023-11-16
Payer: MEDICARE

## 2023-11-16 ENCOUNTER — ANESTHESIA (OUTPATIENT)
Dept: SURGERY | Facility: HOSPITAL | Age: 63
DRG: 906 | End: 2023-11-16
Payer: MEDICARE

## 2023-11-16 PROBLEM — F10.10 ALCOHOL ABUSE: Status: ACTIVE | Noted: 2023-11-16

## 2023-11-16 LAB
ALBUMIN SERPL BCP-MCNC: 2.5 G/DL (ref 3.5–5.2)
ALP SERPL-CCNC: 59 U/L (ref 55–135)
ALT SERPL W/O P-5'-P-CCNC: 10 U/L (ref 10–44)
ANION GAP SERPL CALC-SCNC: 12 MMOL/L (ref 8–16)
AST SERPL-CCNC: 20 U/L (ref 10–40)
BASOPHILS # BLD AUTO: 0.04 K/UL (ref 0–0.2)
BASOPHILS NFR BLD: 0.4 % (ref 0–1.9)
BILIRUB SERPL-MCNC: 0.9 MG/DL (ref 0.1–1)
BUN SERPL-MCNC: 13 MG/DL (ref 8–23)
CALCIUM SERPL-MCNC: 8.8 MG/DL (ref 8.7–10.5)
CHLORIDE SERPL-SCNC: 101 MMOL/L (ref 95–110)
CO2 SERPL-SCNC: 22 MMOL/L (ref 23–29)
CREAT SERPL-MCNC: 1 MG/DL (ref 0.5–1.4)
DIFFERENTIAL METHOD: ABNORMAL
EOSINOPHIL # BLD AUTO: 0 K/UL (ref 0–0.5)
EOSINOPHIL NFR BLD: 0.4 % (ref 0–8)
ERYTHROCYTE [DISTWIDTH] IN BLOOD BY AUTOMATED COUNT: 16.2 % (ref 11.5–14.5)
EST. GFR  (NO RACE VARIABLE): >60 ML/MIN/1.73 M^2
GLUCOSE SERPL-MCNC: 91 MG/DL (ref 70–110)
HCT VFR BLD AUTO: 34.6 % (ref 40–54)
HGB BLD-MCNC: 11.4 G/DL (ref 14–18)
IMM GRANULOCYTES # BLD AUTO: 0.09 K/UL (ref 0–0.04)
IMM GRANULOCYTES NFR BLD AUTO: 0.9 % (ref 0–0.5)
LYMPHOCYTES # BLD AUTO: 1.3 K/UL (ref 1–4.8)
LYMPHOCYTES NFR BLD: 12.4 % (ref 18–48)
MAGNESIUM SERPL-MCNC: 1.5 MG/DL (ref 1.6–2.6)
MCH RBC QN AUTO: 30.3 PG (ref 27–31)
MCHC RBC AUTO-ENTMCNC: 32.9 G/DL (ref 32–36)
MCV RBC AUTO: 92 FL (ref 82–98)
MONOCYTES # BLD AUTO: 1.1 K/UL (ref 0.3–1)
MONOCYTES NFR BLD: 10.2 % (ref 4–15)
NEUTROPHILS # BLD AUTO: 7.8 K/UL (ref 1.8–7.7)
NEUTROPHILS NFR BLD: 75.7 % (ref 38–73)
NRBC BLD-RTO: 0 /100 WBC
PLATELET # BLD AUTO: 80 K/UL (ref 150–450)
PMV BLD AUTO: 10.8 FL (ref 9.2–12.9)
POTASSIUM SERPL-SCNC: 3.9 MMOL/L (ref 3.5–5.1)
PROT SERPL-MCNC: 7.3 G/DL (ref 6–8.4)
RBC # BLD AUTO: 3.76 M/UL (ref 4.6–6.2)
SODIUM SERPL-SCNC: 135 MMOL/L (ref 136–145)
VANCOMYCIN SERPL-MCNC: 14.7 UG/ML
WBC # BLD AUTO: 10.3 K/UL (ref 3.9–12.7)

## 2023-11-16 PROCEDURE — 25500020 PHARM REV CODE 255: Performed by: STUDENT IN AN ORGANIZED HEALTH CARE EDUCATION/TRAINING PROGRAM

## 2023-11-16 PROCEDURE — D9220A PRA ANESTHESIA: ICD-10-PCS | Mod: CRNA,,, | Performed by: NURSE ANESTHETIST, CERTIFIED REGISTERED

## 2023-11-16 PROCEDURE — 80053 COMPREHEN METABOLIC PANEL: CPT | Performed by: STUDENT IN AN ORGANIZED HEALTH CARE EDUCATION/TRAINING PROGRAM

## 2023-11-16 PROCEDURE — 92610 EVALUATE SWALLOWING FUNCTION: CPT

## 2023-11-16 PROCEDURE — 25000003 PHARM REV CODE 250: Performed by: STUDENT IN AN ORGANIZED HEALTH CARE EDUCATION/TRAINING PROGRAM

## 2023-11-16 PROCEDURE — 11043 PR DEBRIDEMENT, SKIN, SUB-Q TISSUE,MUSCLE,=<20 SQ CM: ICD-10-PCS | Mod: 78,,, | Performed by: ORTHOPAEDIC SURGERY

## 2023-11-16 PROCEDURE — 11000001 HC ACUTE MED/SURG PRIVATE ROOM

## 2023-11-16 PROCEDURE — A9698 NON-RAD CONTRAST MATERIALNOC: HCPCS | Performed by: STUDENT IN AN ORGANIZED HEALTH CARE EDUCATION/TRAINING PROGRAM

## 2023-11-16 PROCEDURE — 25000003 PHARM REV CODE 250: Performed by: NURSE ANESTHETIST, CERTIFIED REGISTERED

## 2023-11-16 PROCEDURE — 85025 COMPLETE CBC W/AUTO DIFF WBC: CPT | Performed by: STUDENT IN AN ORGANIZED HEALTH CARE EDUCATION/TRAINING PROGRAM

## 2023-11-16 PROCEDURE — D9220A PRA ANESTHESIA: ICD-10-PCS | Mod: ANES,,, | Performed by: ANESTHESIOLOGY

## 2023-11-16 PROCEDURE — 36000704 HC OR TIME LEV I 1ST 15 MIN: Performed by: ORTHOPAEDIC SURGERY

## 2023-11-16 PROCEDURE — 92611 MOTION FLUOROSCOPY/SWALLOW: CPT

## 2023-11-16 PROCEDURE — 63600175 PHARM REV CODE 636 W HCPCS: Performed by: NURSE ANESTHETIST, CERTIFIED REGISTERED

## 2023-11-16 PROCEDURE — 83735 ASSAY OF MAGNESIUM: CPT | Performed by: STUDENT IN AN ORGANIZED HEALTH CARE EDUCATION/TRAINING PROGRAM

## 2023-11-16 PROCEDURE — 63600175 PHARM REV CODE 636 W HCPCS: Performed by: ORTHOPAEDIC SURGERY

## 2023-11-16 PROCEDURE — 36000705 HC OR TIME LEV I EA ADD 15 MIN: Performed by: ORTHOPAEDIC SURGERY

## 2023-11-16 PROCEDURE — 63600175 PHARM REV CODE 636 W HCPCS: Performed by: STUDENT IN AN ORGANIZED HEALTH CARE EDUCATION/TRAINING PROGRAM

## 2023-11-16 PROCEDURE — 25000003 PHARM REV CODE 250: Performed by: ORTHOPAEDIC SURGERY

## 2023-11-16 PROCEDURE — D9220A PRA ANESTHESIA: Mod: CRNA,,, | Performed by: NURSE ANESTHETIST, CERTIFIED REGISTERED

## 2023-11-16 PROCEDURE — 37000008 HC ANESTHESIA 1ST 15 MINUTES: Performed by: ORTHOPAEDIC SURGERY

## 2023-11-16 PROCEDURE — 37000009 HC ANESTHESIA EA ADD 15 MINS: Performed by: ORTHOPAEDIC SURGERY

## 2023-11-16 PROCEDURE — D9220A PRA ANESTHESIA: Mod: ANES,,, | Performed by: ANESTHESIOLOGY

## 2023-11-16 PROCEDURE — 94799 UNLISTED PULMONARY SVC/PX: CPT | Mod: XB

## 2023-11-16 PROCEDURE — 80074 ACUTE HEPATITIS PANEL: CPT | Performed by: STUDENT IN AN ORGANIZED HEALTH CARE EDUCATION/TRAINING PROGRAM

## 2023-11-16 PROCEDURE — 63600175 PHARM REV CODE 636 W HCPCS: Performed by: ANESTHESIOLOGY

## 2023-11-16 PROCEDURE — 71000033 HC RECOVERY, INTIAL HOUR: Performed by: ORTHOPAEDIC SURGERY

## 2023-11-16 PROCEDURE — 36415 COLL VENOUS BLD VENIPUNCTURE: CPT | Performed by: STUDENT IN AN ORGANIZED HEALTH CARE EDUCATION/TRAINING PROGRAM

## 2023-11-16 PROCEDURE — 87522 HEPATITIS C REVRS TRNSCRPJ: CPT | Performed by: STUDENT IN AN ORGANIZED HEALTH CARE EDUCATION/TRAINING PROGRAM

## 2023-11-16 PROCEDURE — 80202 ASSAY OF VANCOMYCIN: CPT | Performed by: STUDENT IN AN ORGANIZED HEALTH CARE EDUCATION/TRAINING PROGRAM

## 2023-11-16 PROCEDURE — 99233 PR SUBSEQUENT HOSPITAL CARE,LEVL III: ICD-10-PCS | Mod: ,,, | Performed by: STUDENT IN AN ORGANIZED HEALTH CARE EDUCATION/TRAINING PROGRAM

## 2023-11-16 PROCEDURE — 99233 SBSQ HOSP IP/OBS HIGH 50: CPT | Mod: ,,, | Performed by: STUDENT IN AN ORGANIZED HEALTH CARE EDUCATION/TRAINING PROGRAM

## 2023-11-16 PROCEDURE — 11043 DBRDMT MUSC&/FSCA 1ST 20/<: CPT | Mod: 78,,, | Performed by: ORTHOPAEDIC SURGERY

## 2023-11-16 RX ORDER — MORPHINE SULFATE 2 MG/ML
6 INJECTION, SOLUTION INTRAMUSCULAR; INTRAVENOUS
Status: DISCONTINUED | OUTPATIENT
Start: 2023-11-16 | End: 2023-11-21 | Stop reason: HOSPADM

## 2023-11-16 RX ORDER — OXYCODONE HYDROCHLORIDE 5 MG/1
5 TABLET ORAL ONCE AS NEEDED
Status: DISCONTINUED | OUTPATIENT
Start: 2023-11-16 | End: 2023-11-16 | Stop reason: HOSPADM

## 2023-11-16 RX ORDER — SODIUM CHLORIDE, SODIUM LACTATE, POTASSIUM CHLORIDE, CALCIUM CHLORIDE 600; 310; 30; 20 MG/100ML; MG/100ML; MG/100ML; MG/100ML
INJECTION, SOLUTION INTRAVENOUS CONTINUOUS
Status: DISCONTINUED | OUTPATIENT
Start: 2023-11-16 | End: 2023-11-16

## 2023-11-16 RX ORDER — LIDOCAINE HYDROCHLORIDE 20 MG/ML
INJECTION, SOLUTION EPIDURAL; INFILTRATION; INTRACAUDAL; PERINEURAL
Status: DISCONTINUED | OUTPATIENT
Start: 2023-11-16 | End: 2023-11-16

## 2023-11-16 RX ORDER — HYDROCODONE BITARTRATE AND ACETAMINOPHEN 10; 325 MG/1; MG/1
1 TABLET ORAL EVERY 6 HOURS PRN
Status: DISCONTINUED | OUTPATIENT
Start: 2023-11-16 | End: 2023-11-21 | Stop reason: HOSPADM

## 2023-11-16 RX ORDER — ACETAMINOPHEN 325 MG/1
650 TABLET ORAL EVERY 4 HOURS PRN
Status: DISCONTINUED | OUTPATIENT
Start: 2023-11-16 | End: 2023-11-21 | Stop reason: HOSPADM

## 2023-11-16 RX ORDER — ENOXAPARIN SODIUM 100 MG/ML
30 INJECTION SUBCUTANEOUS EVERY 24 HOURS
Status: DISCONTINUED | OUTPATIENT
Start: 2023-11-16 | End: 2023-11-21 | Stop reason: HOSPADM

## 2023-11-16 RX ORDER — MAGNESIUM SULFATE HEPTAHYDRATE 40 MG/ML
2 INJECTION, SOLUTION INTRAVENOUS ONCE
Status: COMPLETED | OUTPATIENT
Start: 2023-11-16 | End: 2023-11-16

## 2023-11-16 RX ORDER — PROPOFOL 10 MG/ML
VIAL (ML) INTRAVENOUS
Status: DISCONTINUED | OUTPATIENT
Start: 2023-11-16 | End: 2023-11-16

## 2023-11-16 RX ORDER — MORPHINE SULFATE 10 MG/ML
INJECTION INTRAMUSCULAR; INTRAVENOUS; SUBCUTANEOUS
Status: DISCONTINUED | OUTPATIENT
Start: 2023-11-16 | End: 2023-11-16

## 2023-11-16 RX ORDER — SODIUM CHLORIDE, SODIUM LACTATE, POTASSIUM CHLORIDE, CALCIUM CHLORIDE 600; 310; 30; 20 MG/100ML; MG/100ML; MG/100ML; MG/100ML
125 INJECTION, SOLUTION INTRAVENOUS CONTINUOUS
Status: DISCONTINUED | OUTPATIENT
Start: 2023-11-16 | End: 2023-11-16

## 2023-11-16 RX ORDER — ONDANSETRON 2 MG/ML
4 INJECTION INTRAMUSCULAR; INTRAVENOUS EVERY 8 HOURS PRN
Status: DISCONTINUED | OUTPATIENT
Start: 2023-11-16 | End: 2023-11-21 | Stop reason: HOSPADM

## 2023-11-16 RX ORDER — HYDROMORPHONE HYDROCHLORIDE 1 MG/ML
0.5 INJECTION, SOLUTION INTRAMUSCULAR; INTRAVENOUS; SUBCUTANEOUS EVERY 5 MIN PRN
Status: DISCONTINUED | OUTPATIENT
Start: 2023-11-16 | End: 2023-11-16 | Stop reason: HOSPADM

## 2023-11-16 RX ORDER — ONDANSETRON 2 MG/ML
INJECTION INTRAMUSCULAR; INTRAVENOUS
Status: DISCONTINUED | OUTPATIENT
Start: 2023-11-16 | End: 2023-11-16

## 2023-11-16 RX ORDER — ONDANSETRON 2 MG/ML
4 INJECTION INTRAMUSCULAR; INTRAVENOUS DAILY PRN
Status: DISCONTINUED | OUTPATIENT
Start: 2023-11-16 | End: 2023-11-16 | Stop reason: HOSPADM

## 2023-11-16 RX ORDER — MIDAZOLAM HYDROCHLORIDE 1 MG/ML
INJECTION, SOLUTION INTRAMUSCULAR; INTRAVENOUS
Status: DISCONTINUED | OUTPATIENT
Start: 2023-11-16 | End: 2023-11-16

## 2023-11-16 RX ADMIN — SODIUM CHLORIDE, POTASSIUM CHLORIDE, SODIUM LACTATE AND CALCIUM CHLORIDE: 600; 310; 30; 20 INJECTION, SOLUTION INTRAVENOUS at 08:11

## 2023-11-16 RX ADMIN — MAGNESIUM SULFATE HEPTAHYDRATE 2 G: 40 INJECTION, SOLUTION INTRAVENOUS at 08:11

## 2023-11-16 RX ADMIN — PIPERACILLIN AND TAZOBACTAM 4.5 G: 4; .5 INJECTION, POWDER, LYOPHILIZED, FOR SOLUTION INTRAVENOUS; PARENTERAL at 08:11

## 2023-11-16 RX ADMIN — MORPHINE SULFATE 2 MG: 10 INJECTION INTRAVENOUS at 10:11

## 2023-11-16 RX ADMIN — BARIUM SULFATE 50 ML: 0.81 POWDER, FOR SUSPENSION ORAL at 02:11

## 2023-11-16 RX ADMIN — ONDANSETRON 4 MG: 2 INJECTION INTRAMUSCULAR; INTRAVENOUS at 10:11

## 2023-11-16 RX ADMIN — HYDROMORPHONE HYDROCHLORIDE 0.5 MG: 1 INJECTION, SOLUTION INTRAMUSCULAR; INTRAVENOUS; SUBCUTANEOUS at 11:11

## 2023-11-16 RX ADMIN — MIDAZOLAM HYDROCHLORIDE 2 MG: 1 INJECTION, SOLUTION INTRAMUSCULAR; INTRAVENOUS at 10:11

## 2023-11-16 RX ADMIN — PROPOFOL 200 MG: 10 INJECTION, EMULSION INTRAVENOUS at 10:11

## 2023-11-16 RX ADMIN — VANCOMYCIN HYDROCHLORIDE 1500 MG: 1.5 INJECTION, POWDER, LYOPHILIZED, FOR SOLUTION INTRAVENOUS at 02:11

## 2023-11-16 RX ADMIN — PIPERACILLIN AND TAZOBACTAM 4.5 G: 4; .5 INJECTION, POWDER, LYOPHILIZED, FOR SOLUTION INTRAVENOUS; PARENTERAL at 02:11

## 2023-11-16 RX ADMIN — ENOXAPARIN SODIUM 30 MG: 30 INJECTION SUBCUTANEOUS at 04:11

## 2023-11-16 RX ADMIN — PIPERACILLIN AND TAZOBACTAM 4.5 G: 4; .5 INJECTION, POWDER, LYOPHILIZED, FOR SOLUTION INTRAVENOUS; PARENTERAL at 05:11

## 2023-11-16 RX ADMIN — ACETAMINOPHEN 650 MG: 325 TABLET ORAL at 04:11

## 2023-11-16 RX ADMIN — LIDOCAINE HYDROCHLORIDE 100 MG: 20 INJECTION, SOLUTION EPIDURAL; INFILTRATION; INTRACAUDAL; PERINEURAL at 10:11

## 2023-11-16 RX ADMIN — HYDROCODONE BITARTRATE AND ACETAMINOPHEN 1 TABLET: 10; 325 TABLET ORAL at 04:11
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
## 2023-11-16 NOTE — OP NOTE
[facility]  Orthopedic Surgery    11/16/2023    Jesus Mccabe  [id number]      PREOPERATIVE DIAGNOSIS: Skin flap necrosis [T86.828, I96]    POSTOPERATIVE DIAGNOSIS:  Skin flap necrosis, left hand.    PROCEDURE:  Excisional irrigation and debridement, left hand    SURGEON: Lakhwinder Frank D.O.    ASSISTANT: Orton Grinnell, CFA    ANESTHESIA:  General.    BLOOD LOSS:  Less than 20 cc.    TOURNIQUET:  None.    DRAINS:  Kerlix packing.    PATHOLOGY:  Debrided necrotic skin and subcutaneous tissue.  Debrided necrotic muscle.  Hematoma.    COMPLICATION:  None.    INDICATIONS FOR PROCEDURE:   Mr. Mccabe is a 62-year-old right-hand-dominant male who presented to the ER yesterday with complaints of painful swelling of his left hand.  He had an excision of a mass of the dorsal aspect of his left hand on 10/16/2023 and never returned for postop care.  He remained in his postop dressing and splint and did not remove them.  In the ER he had malodorous swelling of his left hand with dorsal ulcer and dehiscence of his incision site.  He originally had several months of painful swelling at the dorsal ulnar aspect of his left hand of which he underwent an excision of a mass on 10/16/2023..  He did not remove his splint nor cleanse his hand postoperatively.    He elected to proceed with surgery after complications to include bleeding, infection, scarring, nerve/blood vessel/tendon damage, need for further surgery, failed surgery, failure to improve, stiffness, skin slough, and possible amputation were discussed through tele .  He signed a consent.    PROCEDURE IN DETAIL:   The patient was brought to the operating room and was transferred to the operating bed where all bony prominences were well padded.  General anesthesia was then administered by the Anesthesiology Department.  After general anesthesia was administered a tourniquet was applied to the upper part of the patient's left upper extremity, this was not  inflated throughout the procedure.  The patient's left hand was then prepped with Betadine solution and draped in the normal sterile fashion.         After prepping and draping the patient's ulcer/previous incision site measuring 6 x 2 cm by 1 cm deep into tendon and muscle was inspected.  There was necrotic skin which was debrided sharply with tenotomy scissors and a #15 blade.  Chronic tendon was removed with a rongeur.  Hematoma was present which was removed with a curette.  Further curettage was done removing necrotic muscle and subcutaneous tissue.  The patient's hand was then extensively irrigated.  After extensive irrigation his hand was reinspected and further necrotic subcutaneous tissue was removed with tenotomy scissors and curette.  The patient's hand was again copiously irrigated.  It was reinspected and clean viable tissue was present.         His hand was then packed with moistened Kerlix packing.  It was then dressed with 4x4s an ABD with an Ace wrap.  He was then awakened by anesthesia and transferred from the operating room to the recovery room in stable condition.  He tolerated the procedure well without complication.

## 2023-11-16 NOTE — PLAN OF CARE
1100 received  care of patient from OR.  Report received from PABLITO Heller RN and CORBY Morocho CRNA.  Patient resting quietly, and V/S stable.  Dressing in place and dry and intact.
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
## 2023-11-16 NOTE — PLAN OF CARE
11/16/23 1603   Medicare Message   Important Message from Medicare regarding Discharge Appeal Rights Given to patient/caregiver;Explained to patient/caregiver;Signed/date by patient/caregiver   Date IMM was signed 11/16/23   Time IMM was signed 1605

## 2023-11-16 NOTE — INTERVAL H&P NOTE
The patient has been examined and the H&P has been reviewed:    I concur with the findings and no changes have occurred since H&P was written.    Surgery risks, benefits and alternative options discussed and understood by patient/family.          Active Hospital Problems    Diagnosis  POA    *Infection of hand [L08.9]  Yes    GERD (gastroesophageal reflux disease) [K21.9]  Yes    Skin flap necrosis [T86.828, I96]  Unknown    History of hepatitis C, treated / cured (SVR12 - 8/2020) [Z86.19]  Yes    Deafness [H91.90]  Yes    HTN (hypertension) [I10]  Yes    Coronary artery disease involving coronary bypass graft with angina pectoris [I25.709]  Yes      Resolved Hospital Problems   No resolved problems to display.

## 2023-11-16 NOTE — PLAN OF CARE
Problem: Adult Inpatient Plan of Care  Goal: Plan of Care Review  Outcome: Ongoing, Progressing  Goal: Patient-Specific Goal (Individualized)  Outcome: Ongoing, Progressing  Goal: Absence of Hospital-Acquired Illness or Injury  Outcome: Ongoing, Progressing  Goal: Optimal Comfort and Wellbeing  Outcome: Ongoing, Progressing  Goal: Readiness for Transition of Care  Outcome: Ongoing, Progressing     Problem: Fluid and Electrolyte Imbalance (Acute Kidney Injury/Impairment)  Goal: Fluid and Electrolyte Balance  Outcome: Ongoing, Progressing     Problem: Oral Intake Inadequate (Acute Kidney Injury/Impairment)  Goal: Optimal Nutrition Intake  Outcome: Ongoing, Progressing     Problem: Renal Function Impairment (Acute Kidney Injury/Impairment)  Goal: Effective Renal Function  Outcome: Ongoing, Progressing     Problem: Impaired Wound Healing  Goal: Optimal Wound Healing  Outcome: Ongoing, Progressing     Problem: Skin Injury Risk Increased  Goal: Skin Health and Integrity  Outcome: Ongoing, Progressing     Problem: Breathing Pattern Ineffective  Goal: Effective Breathing Pattern  Outcome: Ongoing, Progressing

## 2023-11-16 NOTE — ASSESSMENT & PLAN NOTE
Post op infection to left hand. Did not make follow up appt.  Ortho consulted- s/p I&D left hand on 11/16  IV abx  F/U Blood and wound cultures  Lactate wnl  PRN pain and nausea medications

## 2023-11-16 NOTE — SUBJECTIVE & OBJECTIVE
Interval History: NPO for procedure today. Patient states he spoke with ortho about procedure.    Review of Systems   All other systems reviewed and are negative.    Objective:     Vital Signs (Most Recent):  Temp: 97.9 °F (36.6 °C) (11/16/23 1200)  Pulse: 80 (11/16/23 1200)  Resp: 16 (11/16/23 1200)  BP: 123/69 (11/16/23 1200)  SpO2: 95 % (11/16/23 1200) Vital Signs (24h Range):  Temp:  [97.9 °F (36.6 °C)-100.1 °F (37.8 °C)] 97.9 °F (36.6 °C)  Pulse:  [72-93] 80  Resp:  [14-31] 16  SpO2:  [92 %-100 %] 95 %  BP: (121-159)/(61-83) 123/69     Weight: 63 kg (139 lb)  Body mass index is 18.85 kg/m².    Intake/Output Summary (Last 24 hours) at 11/16/2023 1226  Last data filed at 11/16/2023 1008  Gross per 24 hour   Intake 800 ml   Output 1000 ml   Net -200 ml         Physical Exam      Vitals reviewed  General: NAD, Thin, Frial, Elderly, Disheveled  Head: NC/AT  Eyes: EOMI, ILANA  Cardiovascular: Pulses intact distally, Regular Rate and rhythm  Pulmonary: Normal Respiratory Rate, No respiratory distress  Gi: Soft, Non-tender  Extremities: Warm, No edema present, left hand wrapped with dressing. Clean/Dry/Intact. Able to move fingers. Significant pain with palpation of hand  Skin: Warm, dry  Neuro: Alert, Oriented x3, No focal Deficit  Psych: Appropriate mood and affect    Significant Labs: All pertinent labs within the past 24 hours have been reviewed.    Significant Imaging: I have reviewed all pertinent imaging results/findings within the past 24 hours.

## 2023-11-16 NOTE — PT/OT/SLP EVAL
Speech Language Pathology Evaluation  Bedside Swallow    Patient Name:  Jesus Mccabe   MRN:  [id number]  Admitting Diagnosis: Skin flap necrosis    Recommendations:                 General Recommendations:  Dysphagia therapy  Diet recommendations:  NPO, NPO   Aspiration Precautions: Strict aspiration precautions   General Precautions: Standard,    Communication strategies:   Patient is deaf. He is able to read and shake head yes/no.      Assessment:     Jesus Mccabe is a 62 y.o. male who is hospitalized for skin flap necrosis. Patient demonstrated overt s/s of aspiration with thin and mildly-thick liquids as well as pureed and soft texture trials. Patient shook his head yes when ST asked if he has ever undergone a MBSS in the past. He shook his head yes but unable to tell me results or how long ago. Due to patient's overt s/s of aspiration at bedside, ST recommends MBSS to further investigate current pharyngeal swallowing function. Will attempt as 1330 today. Patient in agreement with study.     History:     Past Medical History:   Diagnosis Date    Advanced liver fibrosis     FibroScan 1/2020 - F3    Coronary artery disease     Gout     History of hepatitis C, treated / cured (SVR12 - 8/2020)     Language barrier     MI (myocardial infarction)     Ulcer        Past Surgical History:   Procedure Laterality Date    ABDOMINAL SURGERY      CARDIAC SURGERY      COLONOSCOPY  09/02/2013    [facility]OPHAGOGASTRODUOD[facility]OS[facility]  [facility]    EXCISION OF MASS OF HAND Left 10/16/2023    Procedure: EXCISION, MASS, HAND;  Surgeon: Lakhwinder Frank DO;  Location: [facility] OR;  Service: Orthopedics;  Laterality: Left;  dorsal tendon mass    EXTENSOR TENDON OF FOREARM / WRIST REPAIR      INTRAMEDULLARY RODDING OF HUMERUS Left 10/20/2019    Procedure: INSERTION, INTRAMEDULLARY SISSY, HUMERUS;  Surgeon: Jose Alejandro Funes MD;  Location: [facility] OR;  Service: Orthopedics;  Laterality: Left;    OPEN  REDUCTION AND INTERNAL FIXATION (ORIF) OF FRACTURE OF PROXIMAL HUMERUS Right 6/15/2023    Procedure: Reduction and Internal Fixation Right Proximal Humerus;  Surgeon: Lakhwinder Frank DO;  Location: [facility] OR;  Service: Orthopedics;  Laterality: Right;    TENODESIS, FINGER EXTENSOR, AT WRIST Left 10/16/2023    Procedure: TENODESIS, FINGER EXTENSOR, AT WRIST;  Surgeon: Lakhwinder Frank DO;  Location: [facility] OR;  Service: Orthopedics;  Laterality: Left;  left small finger       Social History: Patient lives with sister.    Prior Intubation HX:  None during this hospitalization.    Modified Barium Swallow: yes but unknown date or results of study.    Chest X-Rays:   EXAMINATION:  XR CHEST AP PORTABLE     CLINICAL HISTORY:  Sepsis;     TECHNIQUE:  Single frontal view of the chest was performed.     COMPARISON:  06/30/2021     FINDINGS:  Heart size is borderline status post median sternotomy.  Marked enlargement of the left pulmonary artery is unchanged on studies dating back to at least 2019.  Lungs are clear and well inflated.  No pleural effusion.  Postsurgical changes left humerus.  Old, healed rib fractures bilaterally.     Impression:     No acute abnormality        Electronically signed by: Maya Roldan  Date:                                            11/15/2023  Time:                                           11:52    Prior diet: unknown. However, patient was on a regular textured diet and thin liquids prior to evaluation.    Occupation/hobbies/homemaking: .none disclosed.    Subjective     Patient in bed, being bed by RN.     Patient goals: none disclosed.     Pain/Comfort:       Respiratory Status: Room air    Objective:     Oral Musculature Evaluation  Oral Musculature: facial asymmetry present  Dentition: teeth in poor condition    Bedside Swallow Eval:   Consistencies Assessed:  Thin liquids juice via straw.  Nectar thick liquids juice via straw  Puree mashed potatoes  Soft solids carrots      Oral  Phase:   Slow oral transit time    Pharyngeal Phase:   coughing/choking    Compensatory Strategies  None    Treatment: Impressions and recommendations given to patient, RN and MD.     Goals:   Multidisciplinary Problems       SLP Goals          Problem: SLP    Goal Priority Disciplines Outcome   SLP Goal     SLP Ongoing, Progressing   Description: 1) Patient will tolerate least restrictive diet without s/s of aspiration.     2) Patient  and family education will be provided regarding current swallow function as well as aspiration precautions.                       Plan:     Patient to be seen:  2 x/week   Plan of Care expires:     Plan of Care reviewed with:  patient, other (see comments) (RN and MD)   SLP Follow-Up:  Yes       Discharge recommendations:      Barriers to Discharge:  None    Time Tracking:     SLP Treatment Date:   11/16/23  Speech Start Time:  1300  Speech Stop Time:  1310     Speech Total Time (min):  10 min    Billable Minutes: Eval Swallow and Oral Function 10 minutes    11/16/2023          Detail Level: Zone Hide Additional Notes?: No Include Location In Plan?: Yes

## 2023-11-16 NOTE — ANESTHESIA POSTPROCEDURE EVALUATION
Anesthesia Post Evaluation    Patient: Jesus Mccabe    Procedure(s) Performed: Procedure(s) (LRB):  INCISION AND DRAINAGE, HAND (Left)    Final Anesthesia Type: general      Patient location during evaluation: PACU  Patient participation: Yes- Able to Participate  Level of consciousness: awake and alert, awake and oriented  Post-procedure vital signs: reviewed and stable  Pain management: adequate  Airway patency: patent    PONV status at discharge: No PONV  Anesthetic complications: no      Cardiovascular status: blood pressure returned to baseline and hemodynamically stable  Respiratory status: unassisted, spontaneous ventilation and room air  Hydration status: euvolemic  Follow-up not needed.          Vitals Value Taken Time   /76 11/16/23 1112   Temp 98.4 11/16/23 1116   Pulse 74 11/16/23 1116   Resp 17 11/16/23 1116   SpO2 97 % 11/16/23 1116   Vitals shown include unvalidated device data.      No case tracking events are documented in the log.      Pain/Natalie Score: Pain Rating Prior to Med Admin: 7 (11/15/2023  8:54 PM)  Pain Rating Post Med Admin: 3 (11/15/2023  9:24 PM)

## 2023-11-16 NOTE — PROGRESS NOTES
[facility] Medicine  Progress Note    Patient Name: Jesus Mccabe  MRN: [id number]  Patient Class: IP- Inpatient   Admission Date: 11/15/2023  Length of Stay: 1 days  Attending Physician: Corey Macias MD  Primary Care Provider: Jose Prater MD        Subjective:     Principal Problem:Skin flap necrosis        HPI:  63 yo w/ hx of deafness, HTN ,GERD, CAD presenting with left hand pain and swelling. Patient is s/p Irrigation and debridement left hand extensor tendons on 10/16 with Dr. Frank. Patient did not follow up in clinic. History obtained using . Per patient 2 days ago he started having swelling, redness, and pain in the hand. Endorses subjective fevers. Endorses Nausea but no vomiting. He has been having diarrhea 2-3 times per day for the past 2 days. No recent abx. No blood in stool.     Overview/Hospital Course:  No notes on file    Interval History: NPO for procedure today. Patient states he spoke with ortho about procedure.    Review of Systems   All other systems reviewed and are negative.    Objective:     Vital Signs (Most Recent):  Temp: 97.9 °F (36.6 °C) (11/16/23 1200)  Pulse: 80 (11/16/23 1200)  Resp: 16 (11/16/23 1200)  BP: 123/69 (11/16/23 1200)  SpO2: 95 % (11/16/23 1200) Vital Signs (24h Range):  Temp:  [97.9 °F (36.6 °C)-100.1 °F (37.8 °C)] 97.9 °F (36.6 °C)  Pulse:  [72-93] 80  Resp:  [14-31] 16  SpO2:  [92 %-100 %] 95 %  BP: (121-159)/(61-83) 123/69     Weight: 63 kg (139 lb)  Body mass index is 18.85 kg/m².    Intake/Output Summary (Last 24 hours) at 11/16/2023 1226  Last data filed at 11/16/2023 1008  Gross per 24 hour   Intake 800 ml   Output 1000 ml   Net -200 ml         Physical Exam      Vitals reviewed  General: NAD, Thin, Frial, Elderly, Disheveled  Head: NC/AT  Eyes: EOMI, ILANA  Cardiovascular: Pulses intact distally, Regular Rate and rhythm  Pulmonary: Normal Respiratory Rate, No respiratory distress  Gi: Soft,  Non-tender  Extremities: Warm, No edema present, left hand wrapped with dressing. Clean/Dry/Intact. Able to move fingers. Significant pain with palpation of hand  Skin: Warm, dry  Neuro: Alert, Oriented x3, No focal Deficit  Psych: Appropriate mood and affect    Significant Labs: All pertinent labs within the past 24 hours have been reviewed.    Significant Imaging: I have reviewed all pertinent imaging results/findings within the past 24 hours.    Assessment/Plan:      Alcohol abuse   on cessation  CIWA q4h with PRN ativan  Thiamine and folic acid daily      GERD (gastroesophageal reflux disease)  Protonix daily      Infection of hand  Post op infection to left hand. Did not make follow up appt.  Ortho consulted- s/p I&D left hand on 11/16  IV abx  F/U Blood and wound cultures  Lactate wnl  PRN pain and nausea medications        History of hepatitis C, treated / cured (SVR12 - 8/2020)  Noted  Needs outpatient GI followup  Hep C and Viral Load in am      Deafness   as needed       HTN (hypertension)  Continue home BP medications as tolerated. Titrate as needed    Coronary artery disease involving coronary bypass graft with angina pectoris  Unclear if patient is on ASA/Statin      VTE Risk Mitigation (From admission, onward)           Ordered     enoxaparin injection 30 mg  Daily         11/16/23 1220     Place sequential compression device  Until discontinued         11/16/23 1220     Place KATHIE hose  Until discontinued         11/16/23 1220     Reason for No Pharmacological VTE Prophylaxis  Once        Question:  Reasons:  Answer:  Physician Provided (leave comment)    11/15/23 1149     IP VTE HIGH RISK PATIENT  Once         11/15/23 1149     Place sequential compression device  Until discontinued         11/15/23 1149                    Discharge Planning   MARIA FERNANDA:      Code Status: Full Code   Is the patient medically ready for discharge?:     Reason for patient still in hospital (select all that  apply): Treatment                     Corey Macias MD  Department of [facility] Medicine   [place] - Intensive Care

## 2023-11-16 NOTE — PLAN OF CARE
Problem: Breathing Pattern Ineffective  Goal: Effective Breathing Pattern  Outcome: Ongoing, Progressing  Intervention: Promote Improved Breathing Pattern  Flowsheets (Taken 11/16/2023 1247)  Breathing Techniques/Airway Clearance:   diaphragmatic breathing promoted   deep/controlled cough encouraged  Head of Bed (HOB) Positioning: HOB elevated   Patient in no apparent distress. Sat's 97  % on room air. IS done . Will continue to monitor.

## 2023-11-16 NOTE — TRANSFER OF CARE
Anesthesia Transfer of Care Note    Patient: Jesus Mccabe    Procedure(s) Performed: Procedure(s) (LRB):  INCISION AND DRAINAGE, HAND (Left)    Patient location: PACU    Anesthesia Type: general    Transport from OR: Transported from OR on room air with adequate spontaneous ventilation    Post pain: adequate analgesia    Post assessment: no apparent anesthetic complications and tolerated procedure well    Post vital signs: stable    Level of consciousness: awake, alert and oriented    Nausea/Vomiting: no nausea/vomiting    Complications: none    Transfer of care protocol was followed      Last vitals: Visit Vitals  BP (!) 147/83 (BP Location: Right arm, Patient Position: Lying)   Pulse 89   Temp 37.1 °C (98.7 °F) (Tympanic)   Resp 17   Ht 6' (1.829 m)   Wt 63 kg (139 lb)   SpO2 100%   BMI 18.85 kg/m²

## 2023-11-16 NOTE — ANESTHESIA PROCEDURE NOTES
Intubation    Date/Time: 11/16/2023 10:13 AM    Performed by: Wayne Morocho CRNA  Authorized by: Basil Serrato MD    Intubation:     Induction:  Intravenous    Intubated:  Postinduction    Mask Ventilation:  Easy mask    Attempts:  1    Attempted By:  CRNA    Difficult Airway Encountered?: No      Complications:  None    Airway Device:  Supraglottic airway/LMA    Airway Device Size:  4.0    Secured at:  The lips    Placement Verified By:  Capnometry    Complicating Factors:  None    Findings Post-Intubation:  BS equal bilateral

## 2023-11-16 NOTE — PROGRESS NOTES
Pharmacokinetic Initial Assessment: IV Vancomycin    Assessment/Plan:    Initiate intravenous vancomycin with loading dose of 2000 mg once followed by a maintenance dose of vancomycin 1500mg IV every 24 hours  Desired empiric serum trough concentration is 15 to 20 mcg/mL  Draw vancomycin random level on 11/17/23 at 1330.  Pharmacy will continue to follow and monitor vancomycin.      Please contact pharmacy at extension [phone] with any questions regarding this assessment.     Thank you for the consult,   Franklin Rucker       Patient brief summary:  Jesus Mccabe is a 62 y.o. male initiated on antimicrobial therapy with IV Vancomycin for treatment of suspected sepsis    Drug Allergies:   Review of patient's allergies indicates:  No Known Allergies    Actual Body Weight:   63.4 Kg    Renal Function:   Estimated Creatinine Clearance: 67.5 mL/min (based on SCr of 1 mg/dL).,     Dialysis Method (if applicable):  N/A    CBC (last 72 hours):  Recent Labs   Lab Result Units 11/15/23  1127 11/16/23  0430   WBC K/uL 14.45* 10.30   Hemoglobin g/dL 11.8* 11.4*   Hematocrit % 36.8* 34.6*   Platelets K/uL 84* 80*   Gran % % 83.6* 75.7*   Lymph % % 7.5* 12.4*   Mono % % 8.0 10.2   Eosinophil % % 0.0 0.4   Basophil % % 0.1 0.4   Differential Method  Automated Automated       Metabolic Panel (last 72 hours):  Recent Labs   Lab Result Units 11/15/23  0955 11/15/23  1127 11/15/23  1236 11/16/23  0430   Sodium mmol/L  --  136  --  135*   Potassium mmol/L  --  4.6  --  3.9   Chloride mmol/L  --  97  --  101   CO2 mmol/L  --  23  --  22*   Glucose mg/dL  --  81  --  91   Glucose, UA  SEE COMMENT  --   --   --    BUN mg/dL  --  16  --  13   Creatinine mg/dL  --  1.1  --  1.0   Creatinine, Urine mg/dL  --   --  262.6  --    Albumin g/dL  --  3.0*  --  2.5*   Total Bilirubin mg/dL  --  0.8  --  0.9   Alkaline Phosphatase U/L  --  65  --  59   AST U/L  --  17  --  20   ALT U/L  --  11  --  10   Magnesium mg/dL  --   --   --  1.5*       Drug  levels (last 3 results):  Recent Labs   Lab Result Units 11/16/23  0430   Vancomycin, Random ug/mL 14.7       Microbiologic Results:  Microbiology Results (last 7 days)       Procedure Component Value Units Date/Time    Blood culture x two cultures. Draw prior to antibiotics. [[id number]] Collected: 11/15/23 1120    Order Status: Completed Specimen: Blood from Peripheral, Antecubital, Right Updated: 11/16/23 0145     Blood Culture, Routine No Growth to date    Narrative:      Aerobic and anaerobic    Blood culture x two cultures. Draw prior to antibiotics. [[id number]] Collected: 11/15/23 1134    Order Status: Completed Specimen: Blood from Peripheral, Forearm, Right Updated: 11/16/23 0145     Blood Culture, Routine No Growth to date    Narrative:      Aerobic and anaerobic    Urine culture [[id number]] Collected: 11/15/23 0955    Order Status: No result Specimen: Urine Updated: 11/15/23 1023

## 2023-11-17 PROBLEM — R13.10 DYSPHAGIA: Status: ACTIVE | Noted: 2023-11-17

## 2023-11-17 LAB
ALBUMIN SERPL BCP-MCNC: 2.2 G/DL (ref 3.5–5.2)
ALP SERPL-CCNC: 49 U/L (ref 55–135)
ALT SERPL W/O P-5'-P-CCNC: 10 U/L (ref 10–44)
ANION GAP SERPL CALC-SCNC: 11 MMOL/L (ref 8–16)
AST SERPL-CCNC: 20 U/L (ref 10–40)
BACTERIA UR CULT: ABNORMAL
BASOPHILS # BLD AUTO: 0.02 K/UL (ref 0–0.2)
BASOPHILS NFR BLD: 0.5 % (ref 0–1.9)
BILIRUB SERPL-MCNC: 0.5 MG/DL (ref 0.1–1)
BUN SERPL-MCNC: 9 MG/DL (ref 8–23)
CALCIUM SERPL-MCNC: 8.2 MG/DL (ref 8.7–10.5)
CHLORIDE SERPL-SCNC: 100 MMOL/L (ref 95–110)
CO2 SERPL-SCNC: 21 MMOL/L (ref 23–29)
CREAT SERPL-MCNC: 1 MG/DL (ref 0.5–1.4)
DIFFERENTIAL METHOD: ABNORMAL
EOSINOPHIL # BLD AUTO: 0 K/UL (ref 0–0.5)
EOSINOPHIL NFR BLD: 0.5 % (ref 0–8)
ERYTHROCYTE [DISTWIDTH] IN BLOOD BY AUTOMATED COUNT: 15.9 % (ref 11.5–14.5)
EST. GFR  (NO RACE VARIABLE): >60 ML/MIN/1.73 M^2
GLUCOSE SERPL-MCNC: 117 MG/DL (ref 70–110)
HAV IGM SERPL QL IA: ABNORMAL
HBV CORE IGM SERPL QL IA: ABNORMAL
HBV SURFACE AG SERPL QL IA: ABNORMAL
HCT VFR BLD AUTO: 29.8 % (ref 40–54)
HCV AB SERPL QL IA: REACTIVE
HCV RNA SERPL QL NAA+PROBE: NOT DETECTED
HCV RNA SPEC NAA+PROBE-ACNC: NOT DETECTED IU/ML
HGB BLD-MCNC: 9.5 G/DL (ref 14–18)
IMM GRANULOCYTES # BLD AUTO: 0.02 K/UL (ref 0–0.04)
IMM GRANULOCYTES NFR BLD AUTO: 0.5 % (ref 0–0.5)
LYMPHOCYTES # BLD AUTO: 0.6 K/UL (ref 1–4.8)
LYMPHOCYTES NFR BLD: 13.3 % (ref 18–48)
MAGNESIUM SERPL-MCNC: 1.9 MG/DL (ref 1.6–2.6)
MCH RBC QN AUTO: 29.2 PG (ref 27–31)
MCHC RBC AUTO-ENTMCNC: 31.9 G/DL (ref 32–36)
MCV RBC AUTO: 92 FL (ref 82–98)
MONOCYTES # BLD AUTO: 0.8 K/UL (ref 0.3–1)
MONOCYTES NFR BLD: 18.3 % (ref 4–15)
NEUTROPHILS # BLD AUTO: 2.9 K/UL (ref 1.8–7.7)
NEUTROPHILS NFR BLD: 66.9 % (ref 38–73)
NRBC BLD-RTO: 0 /100 WBC
PLATELET # BLD AUTO: 87 K/UL (ref 150–450)
PMV BLD AUTO: 12 FL (ref 9.2–12.9)
POTASSIUM SERPL-SCNC: 3.5 MMOL/L (ref 3.5–5.1)
PROT SERPL-MCNC: 6.5 G/DL (ref 6–8.4)
RBC # BLD AUTO: 3.25 M/UL (ref 4.6–6.2)
SODIUM SERPL-SCNC: 132 MMOL/L (ref 136–145)
VANCOMYCIN SERPL-MCNC: 12.8 UG/ML
WBC # BLD AUTO: 4.36 K/UL (ref 3.9–12.7)

## 2023-11-17 PROCEDURE — 63600175 PHARM REV CODE 636 W HCPCS: Performed by: STUDENT IN AN ORGANIZED HEALTH CARE EDUCATION/TRAINING PROGRAM

## 2023-11-17 PROCEDURE — 80053 COMPREHEN METABOLIC PANEL: CPT | Performed by: STUDENT IN AN ORGANIZED HEALTH CARE EDUCATION/TRAINING PROGRAM

## 2023-11-17 PROCEDURE — 63600175 PHARM REV CODE 636 W HCPCS: Performed by: ORTHOPAEDIC SURGERY

## 2023-11-17 PROCEDURE — 80202 ASSAY OF VANCOMYCIN: CPT | Performed by: STUDENT IN AN ORGANIZED HEALTH CARE EDUCATION/TRAINING PROGRAM

## 2023-11-17 PROCEDURE — 94799 UNLISTED PULMONARY SVC/PX: CPT | Mod: XB

## 2023-11-17 PROCEDURE — 99233 PR SUBSEQUENT HOSPITAL CARE,LEVL III: ICD-10-PCS | Mod: ,,, | Performed by: STUDENT IN AN ORGANIZED HEALTH CARE EDUCATION/TRAINING PROGRAM

## 2023-11-17 PROCEDURE — 99900035 HC TECH TIME PER 15 MIN (STAT)

## 2023-11-17 PROCEDURE — 25000003 PHARM REV CODE 250: Performed by: ORTHOPAEDIC SURGERY

## 2023-11-17 PROCEDURE — 94761 N-INVAS EAR/PLS OXIMETRY MLT: CPT

## 2023-11-17 PROCEDURE — 97530 THERAPEUTIC ACTIVITIES: CPT

## 2023-11-17 PROCEDURE — 99233 SBSQ HOSP IP/OBS HIGH 50: CPT | Mod: ,,, | Performed by: STUDENT IN AN ORGANIZED HEALTH CARE EDUCATION/TRAINING PROGRAM

## 2023-11-17 PROCEDURE — 83735 ASSAY OF MAGNESIUM: CPT | Performed by: STUDENT IN AN ORGANIZED HEALTH CARE EDUCATION/TRAINING PROGRAM

## 2023-11-17 PROCEDURE — 85025 COMPLETE CBC W/AUTO DIFF WBC: CPT | Performed by: STUDENT IN AN ORGANIZED HEALTH CARE EDUCATION/TRAINING PROGRAM

## 2023-11-17 PROCEDURE — 11000001 HC ACUTE MED/SURG PRIVATE ROOM

## 2023-11-17 PROCEDURE — 25000003 PHARM REV CODE 250: Performed by: STUDENT IN AN ORGANIZED HEALTH CARE EDUCATION/TRAINING PROGRAM

## 2023-11-17 PROCEDURE — 97166 OT EVAL MOD COMPLEX 45 MIN: CPT

## 2023-11-17 RX ORDER — MUPIROCIN 20 MG/G
OINTMENT TOPICAL 2 TIMES DAILY
Status: DISCONTINUED | OUTPATIENT
Start: 2023-11-17 | End: 2023-11-21 | Stop reason: HOSPADM

## 2023-11-17 RX ADMIN — PIPERACILLIN AND TAZOBACTAM 4.5 G: 4; .5 INJECTION, POWDER, LYOPHILIZED, FOR SOLUTION INTRAVENOUS; PARENTERAL at 05:11

## 2023-11-17 RX ADMIN — THIAMINE HCL TAB 100 MG 100 MG: 100 TAB at 09:11

## 2023-11-17 RX ADMIN — ENOXAPARIN SODIUM 30 MG: 30 INJECTION SUBCUTANEOUS at 05:11

## 2023-11-17 RX ADMIN — VANCOMYCIN HYDROCHLORIDE 1500 MG: 1.5 INJECTION, POWDER, LYOPHILIZED, FOR SOLUTION INTRAVENOUS at 05:11

## 2023-11-17 RX ADMIN — HYDROCODONE BITARTRATE AND ACETAMINOPHEN 1 TABLET: 10; 325 TABLET ORAL at 03:11

## 2023-11-17 RX ADMIN — HYDROCODONE BITARTRATE AND ACETAMINOPHEN 1 TABLET: 10; 325 TABLET ORAL at 09:11

## 2023-11-17 RX ADMIN — PIPERACILLIN AND TAZOBACTAM 4.5 G: 4; .5 INJECTION, POWDER, LYOPHILIZED, FOR SOLUTION INTRAVENOUS; PARENTERAL at 09:11

## 2023-11-17 RX ADMIN — PIPERACILLIN AND TAZOBACTAM 4.5 G: 4; .5 INJECTION, POWDER, LYOPHILIZED, FOR SOLUTION INTRAVENOUS; PARENTERAL at 01:11

## 2023-11-17 RX ADMIN — FOLIC ACID 1 MG: 1 TABLET ORAL at 09:11

## 2023-11-17 NOTE — ASSESSMENT & PLAN NOTE
Post op infection to left hand. Did not make follow up appt.  Ortho consulted- s/p I&D left hand on 11/16  IV abx  F/U Blood cultures  No wound cultures obtained  Lactate wnl  PRN pain and nausea medications

## 2023-11-17 NOTE — PROGRESS NOTES
Pharmacokinetic Assessment Follow Up: IV Vancomycin    Vancomycin serum concentration assessment(s):    The random level was drawn correctly and can be used to guide therapy at this time. The measurement is below the desired definitive target range of 15 to 20 mcg/mL.    Vancomycin Regimen Plan:    Change regimen to Vancomycin 1500 mg IV every 24 hours with next serum trough concentration measured at 16:00 prior to third dose on 11/19/23.    Drug levels (last 3 results):  Recent Labs   Lab Result Units 11/16/23  0430 11/17/23  1304   Vancomycin, Random ug/mL 14.7 12.8       Pharmacy will continue to follow and monitor vancomycin.    Please contact pharmacy at extension [phone] for questions regarding this assessment.    Thank you for the consult,   Prosper Herrera, CarolineD       Patient brief summary:  Jesus Mccabe is a 62 y.o. male initiated on antimicrobial therapy with IV Vancomycin for treatment of sepsis      Drug Allergies:   Review of patient's allergies indicates:  No Known Allergies    Actual Body Weight:   63 kg    Renal Function:   Estimated Creatinine Clearance: 67.5 mL/min (based on SCr of 1 mg/dL).,     Dialysis Method (if applicable):  N/A    CBC (last 72 hours):  Recent Labs   Lab Result Units 11/15/23  1127 11/16/23 0430 11/17/23  0439   WBC K/uL 14.45* 10.30 4.36   Hemoglobin g/dL 11.8* 11.4* 9.5*   Hematocrit % 36.8* 34.6* 29.8*   Platelets K/uL 84* 80* 87*   Gran % % 83.6* 75.7* 66.9   Lymph % % 7.5* 12.4* 13.3*   Mono % % 8.0 10.2 18.3*   Eosinophil % % 0.0 0.4 0.5   Basophil % % 0.1 0.4 0.5   Differential Method  Automated Automated Automated       Metabolic Panel (last 72 hours):  Recent Labs   Lab Result Units 11/15/23  0955 11/15/23  1127 11/15/23  1236 11/16/23  0430 11/17/23  0439   Sodium mmol/L  --  136  --  135* 132*   Potassium mmol/L  --  4.6  --  3.9 3.5   Chloride mmol/L  --  97  --  101 100   CO2 mmol/L  --  23  --  22* 21*   Glucose mg/dL  --  81  --  91 117*   Glucose, UA  SEE COMMENT   --   --   --   --    BUN mg/dL  --  16  --  13 9   Creatinine mg/dL  --  1.1  --  1.0 1.0   Creatinine, Urine mg/dL  --   --  262.6  --   --    Albumin g/dL  --  3.0*  --  2.5* 2.2*   Total Bilirubin mg/dL  --  0.8  --  0.9 0.5   Alkaline Phosphatase U/L  --  65  --  59 49*   AST U/L  --  17  --  20 20   ALT U/L  --  11  --  10 10   Magnesium mg/dL  --   --   --  1.5* 1.9       Vancomycin Administrations:  vancomycin given in the last 96 hours                     vancomycin (VANCOCIN) 1,500 mg in dextrose 5 % (D5W) 250 mL IVPB (mg) 1,500 mg New Bag 11/16/23 1432    vancomycin 2 g in 0.9% sodium chloride 500 mL IVPB (mg) 2,000 mg New Bag 11/15/23 1148                    Microbiologic Results:  Microbiology Results (last 7 days)       Procedure Component Value Units Date/Time    Blood culture x two cultures. Draw prior to antibiotics. [[id number]] Collected: 11/15/23 1120    Order Status: Completed Specimen: Blood from Peripheral, Antecubital, Right Updated: 11/16/23 2012     Blood Culture, Routine No Growth to date      No Growth to date    Narrative:      Aerobic and anaerobic    Blood culture x two cultures. Draw prior to antibiotics. [[id number]] Collected: 11/15/23 1134    Order Status: Completed Specimen: Blood from Peripheral, Forearm, Right Updated: 11/16/23 2012     Blood Culture, Routine No Growth to date      No Growth to date    Narrative:      Aerobic and anaerobic    Urine culture [[id number]]  (Abnormal) Collected: 11/15/23 0955    Order Status: Completed Specimen: Urine Updated: 11/16/23 1557     Urine Culture, Routine GRAM NEGATIVE SISSY  >100,000 cfu/ml  Identification and susceptibility pending      Narrative:      Preferred Collection Type->Urine, Clean Catch  Specimen Source->Urine

## 2023-11-17 NOTE — ASSESSMENT & PLAN NOTE
Speech consulted. Recommended NPO and alternative nutrition  Spoke with patient using  and .  Patient does not want g-tube or NG tube with tube feeds  Understands risks/benefits

## 2023-11-17 NOTE — PROGRESS NOTES
[facility] Medicine  Progress Note    Patient Name: Jesus Mccabe  MRN: [id number]  Patient Class: IP- Inpatient   Admission Date: 11/15/2023  Length of Stay: 2 days  Attending Physician: Corey Macias MD  Primary Care Provider: Jose Prater MD        Subjective:     Principal Problem:Skin flap necrosis        HPI:  63 yo w/ hx of deafness, HTN ,GERD, CAD presenting with left hand pain and swelling. Patient is s/p Irrigation and debridement left hand extensor tendons on 10/16 with Dr. Frank. Patient did not follow up in clinic. History obtained using . Per patient 2 days ago he started having swelling, redness, and pain in the hand. Endorses subjective fevers. Endorses Nausea but no vomiting. He has been having diarrhea 2-3 times per day for the past 2 days. No recent abx. No blood in stool.     Overview/Hospital Course:  No notes on file    Interval History: NAEON. Speech recommending alternative nutrition. Patient declined.    Review of Systems   All other systems reviewed and are negative.    Objective:     Vital Signs (Most Recent):  Temp: 98.1 °F (36.7 °C) (11/17/23 0733)  Pulse: 66 (11/17/23 0733)  Resp: 18 (11/17/23 0906)  BP: (!) 140/66 (11/17/23 0733)  SpO2: 96 % (11/17/23 0733) Vital Signs (24h Range):  Temp:  [97.9 °F (36.6 °C)-103 °F (39.4 °C)] 98.1 °F (36.7 °C)  Pulse:  [66-89] 66  Resp:  [14-27] 18  SpO2:  [93 %-100 %] 96 %  BP: (119-172)/(57-92) 140/66     Weight: 63 kg (139 lb)  Body mass index is 18.85 kg/m².    Intake/Output Summary (Last 24 hours) at 11/17/2023 1116  Last data filed at 11/16/2023 1805  Gross per 24 hour   Intake 909.58 ml   Output --   Net 909.58 ml         Physical Exam      Vitals reviewed  General: NAD, Thin, Frial, Elderly, Disheveled  Head: NC/AT  Eyes: EOMI, ILANA  Cardiovascular: Pulses intact distally, Regular Rate and rhythm  Pulmonary: Normal Respiratory Rate, No respiratory distress  Gi: Soft,  Non-tender  Extremities: Warm, No edema present, left hand wrapped with dressing. Clean/Dry/Intact. Able to move fingers. Significant pain with palpation of hand  Skin: Warm, dry  Neuro: Alert, Oriented x3, No focal Deficit  Psych: Appropriate mood and affect       Significant Labs: All pertinent labs within the past 24 hours have been reviewed.    Significant Imaging: I have reviewed all pertinent imaging results/findings within the past 24 hours.    Assessment/Plan:      Dysphagia  Speech consulted. Recommended NPO and alternative nutrition  Spoke with patient using  and .  Patient does not want g-tube or NG tube with tube feeds  Understands risks/benefits      Alcohol abuse   on cessation  CIWA q4h with PRN ativan  Thiamine and folic acid daily      GERD (gastroesophageal reflux disease)  Protonix daily      Infection of hand  Post op infection to left hand. Did not make follow up appt.  Ortho consulted- s/p I&D left hand on 11/16  IV abx  F/U Blood cultures  No wound cultures obtained  Lactate wnl  PRN pain and nausea medications        History of hepatitis C, treated / cured (SVR12 - 8/2020)  Noted  Needs outpatient GI followup  Hep C and Viral Load in am      Deafness   as needed       HTN (hypertension)  Continue home BP medications as tolerated. Titrate as needed    Coronary artery disease involving coronary bypass graft with angina pectoris  Unclear if patient is on ASA/Statin      VTE Risk Mitigation (From admission, onward)           Ordered     enoxaparin injection 30 mg  Daily         11/16/23 1220     Place sequential compression device  Until discontinued         11/16/23 1220     Place KATHIE hose  Until discontinued         11/16/23 1220     Reason for No Pharmacological VTE Prophylaxis  Once        Question:  Reasons:  Answer:  Physician Provided (leave comment)    11/15/23 1149     IP VTE HIGH RISK PATIENT  Once         11/15/23 1149      Place sequential compression device  Until discontinued         11/15/23 1149                    Discharge Planning   MARIA FERNANDA: 11/21/2023     Code Status: Full Code   Is the patient medically ready for discharge?:     Reason for patient still in hospital (select all that apply): Treatment  Discharge Plan A: Home Health                  Corey Macias MD  Department of Hospital Medicine   [facility]

## 2023-11-17 NOTE — PT/OT/SLP EVAL
Occupational Therapy   Evaluation    Name: Jesus Mccabe  MRN: [id number]  Admitting Diagnosis: Skin flap necrosis  Recent Surgery: Procedure(s) (LRB):  INCISION AND DRAINAGE, HAND (Left) 1 Day Post-Op    Recommendations:     Discharge Recommendations:  Skilled nursing facility vs home with home health  Discharge Equipment Recommendations:   Will continue to assess  Barriers to discharge:   Deaf    Assessment:   63 yo w/ hx of deafness, HTN ,GERD, CAD presenting with left hand pain and swelling. Patient is s/p Irrigation and debridement left hand extensor tendons on 10/16 with Dr. Frank. Patient did not follow up in clinic. History obtained using . Per patient 2 days ago he started having swelling, redness, and pain in the hand. Endorses subjective fevers. Endorses Nausea but no vomiting. He has been having diarrhea 2-3 times per day for the past 2 days. No recent abx. No blood in stool.         Rehab Prognosis: Good .       Plan:     Patient to be seen   to address the above listed problems via    Plan of Care Expires:  When patient is discharged.  Plan of Care Reviewed with:  Patient     Subjective   63 yo w/ hx of deafness, HTN ,GERD, CAD presenting with left hand pain and swelling. Patient is s/p Irrigation and debridement left hand extensor tendons on 10/16 with Dr. Frank. Patient did not follow up in clinic. History obtained using . Per patient 2 days ago he started having swelling, redness, and pain in the hand. Endorses subjective fevers. Endorses Nausea but no vomiting. He has been having diarrhea 2-3 times per day for the past 2 days. No recent abx. No blood in stool.        Occupational Profile:  Living Environment: Patient lives with his sister in a single story home.  Previous level of function: Patient requires assistance  Equipment Used at Home:  None  Assistance upon Discharge: Sister    Pain/Comfort:   No pain    Objective:     Communicated with:  OT spoke with patient's nurse prior to entering patient's room for evaluation.    General Precautions: Standard,    Orthopedic Precautions:    Braces:    Respiratory Status: Room air    Occupational Performance:    Bed Mobility:    Patient requires modA with bed mobility.    Functional Mobility/Transfers:  Patient requires modA with functional transfer    Activities of Daily Living:  Patient requires modA with ADLs.    Cognitive/Visual Perceptual:  Unable to assess due to patient being deaf.    Physical Exam:  No deficits noted with BUE      GOALS:   Patient will require Chidi with LE dressing.  Patient will require CGA with UE dressing.  Patient will require SBA with grooming.      History:     Past Medical History:   Diagnosis Date    Advanced liver fibrosis     FibroScan 1/2020 - F3    Coronary artery disease     Gout     History of hepatitis C, treated / cured (SVR12 - 8/2020)     Language barrier     MI (myocardial infarction)     Ulcer          Past Surgical History:   Procedure Laterality Date    ABDOMINAL SURGERY      CARDIAC SURGERY      COLONOSCOPY  09/02/2013    ESOPHAGOGASTRODUODENOSCOPY  08/31/2013    Dr Anjelica Lemos-[facility]    EXCISION OF MASS OF HAND Left 10/16/2023    Procedure: EXCISION, MASS, HAND;  Surgeon: Lakhwinder Frank DO;  Location: [facility] OR;  Service: Orthopedics;  Laterality: Left;  dorsal tendon mass    EXTENSOR TENDON OF FOREARM / WRIST REPAIR      INCISION AND DRAINAGE OF HAND Left 11/16/2023    Procedure: INCISION AND DRAINAGE, HAND;  Surgeon: Lakhwinder Frank DO;  Location: [facility] OR;  Service: Orthopedics;  Laterality: Left;    INTRAMEDULLARY RODDING OF HUMERUS Left 10/20/2019    Procedure: INSERTION, INTRAMEDULLARY SISSY, HUMERUS;  Surgeon: Jose Alejandro Funes MD;  Location: [facility] OR;  Service: Orthopedics;  Laterality: Left;    OPEN REDUCTION AND INTERNAL FIXATION (ORIF) OF FRACTURE OF PROXIMAL HUMERUS Right 6/15/2023    Procedure: Reduction and Internal Fixation  Right Proximal Humerus;  Surgeon: Lakhwinder Frank DO;  Location: [facility] OR;  Service: Orthopedics;  Laterality: Right;    TENODESIS, FINGER EXTENSOR, AT WRIST Left 10/16/2023    Procedure: TENODESIS, FINGER EXTENSOR, AT WRIST;  Surgeon: Lakhwinder Frank DO;  Location: [facility] OR;  Service: Orthopedics;  Laterality: Left;  left small finger       Time Tracking:     OT Date of Treatment:  11/17/2023  OT Start Time:  8:20  OT Stop Time:  8:38  OT Total Time (min):  18 minutes       Wander Smith, OTR/L    11/17/2023

## 2023-11-17 NOTE — PLAN OF CARE
Problem: Breathing Pattern Ineffective  Goal: Effective Breathing Pattern  Outcome: Ongoing, Progressing  Intervention: Promote Improved Breathing Pattern  Flowsheets (Taken 11/17/2023 8105)  Airway/Ventilation Management: airway patency maintained  Breathing Techniques/Airway Clearance: diaphragmatic breathing promoted  Head of Bed (HOB) Positioning: HOB elevated   Patient in no apparent distress. Sat's 97  % on room air. IS done . Will continue to monitor.

## 2023-11-17 NOTE — PROGRESS NOTES
S:  Mr. Mccabe was seen examined.  He was resting comfortably in bed.  Irrigation and debridement of his left hand yesterday 11/16/2023 after he presented to the emergency room on 11/15/2023 with skin flap necrosis and malodorous hand.  He had an excision of a mass of the dorsal aspect of his left hand on 10/16/2023 and never returned for postop care.  He remained in his postop dressing and splint and did not remove it.      O:  Vital signs stable, reviewed temperature 98.1°  No change in neurovascular status from preop.    Dressing clean dry and intact without blood tinging.    Active motion the patient's fingers intact.    Mild swelling left hand.    Laboratory:  WBC 4.36; hemoglobin 9.5; hematocrit 29.8; platelets 87.    A:  Status post irrigation and debridement left hand, POD #1    P:  1. Continue with antibiotics per hospitalist.    2. Continue to elevate left hand.    3. Continue with K-pad to left hand set at 101° F.  4. Encourage patient to move fingers.    5. Continue with pain control per hospitalist.    6. Discussed with the patient that he needs serial irrigation and debridement he is currently scheduled for a 2nd look irrigation debridement for Monday 11/20/2023 he understands he will most likely have his skin flap closed.  7. Will make him NPO after midnight on Sunday night in preparation for Monday surgery.    8. Will consent him for a 2nd look irrigation and debridement with possible closure.  9. Will continue to follow up

## 2023-11-18 PROBLEM — N39.0 URINARY TRACT INFECTION WITHOUT HEMATURIA: Status: ACTIVE | Noted: 2023-11-18

## 2023-11-18 LAB
ALBUMIN SERPL BCP-MCNC: 2.1 G/DL (ref 3.5–5.2)
ALP SERPL-CCNC: 49 U/L (ref 55–135)
ALT SERPL W/O P-5'-P-CCNC: 11 U/L (ref 10–44)
ANION GAP SERPL CALC-SCNC: 13 MMOL/L (ref 8–16)
AST SERPL-CCNC: 36 U/L (ref 10–40)
BASOPHILS # BLD AUTO: 0.03 K/UL (ref 0–0.2)
BASOPHILS NFR BLD: 0.6 % (ref 0–1.9)
BILIRUB SERPL-MCNC: 0.4 MG/DL (ref 0.1–1)
BUN SERPL-MCNC: 7 MG/DL (ref 8–23)
CALCIUM SERPL-MCNC: 8.3 MG/DL (ref 8.7–10.5)
CHLORIDE SERPL-SCNC: 100 MMOL/L (ref 95–110)
CO2 SERPL-SCNC: 21 MMOL/L (ref 23–29)
CREAT SERPL-MCNC: 1 MG/DL (ref 0.5–1.4)
DIFFERENTIAL METHOD: ABNORMAL
EOSINOPHIL # BLD AUTO: 0 K/UL (ref 0–0.5)
EOSINOPHIL NFR BLD: 0.4 % (ref 0–8)
ERYTHROCYTE [DISTWIDTH] IN BLOOD BY AUTOMATED COUNT: 15.7 % (ref 11.5–14.5)
EST. GFR  (NO RACE VARIABLE): >60 ML/MIN/1.73 M^2
GLUCOSE SERPL-MCNC: 100 MG/DL (ref 70–110)
HCT VFR BLD AUTO: 30.9 % (ref 40–54)
HGB BLD-MCNC: 9.9 G/DL (ref 14–18)
IMM GRANULOCYTES # BLD AUTO: 0.03 K/UL (ref 0–0.04)
IMM GRANULOCYTES NFR BLD AUTO: 0.6 % (ref 0–0.5)
LYMPHOCYTES # BLD AUTO: 1.3 K/UL (ref 1–4.8)
LYMPHOCYTES NFR BLD: 24.7 % (ref 18–48)
MAGNESIUM SERPL-MCNC: 1.7 MG/DL (ref 1.6–2.6)
MCH RBC QN AUTO: 29.2 PG (ref 27–31)
MCHC RBC AUTO-ENTMCNC: 32 G/DL (ref 32–36)
MCV RBC AUTO: 91 FL (ref 82–98)
MONOCYTES # BLD AUTO: 1.5 K/UL (ref 0.3–1)
MONOCYTES NFR BLD: 29.8 % (ref 4–15)
NEUTROPHILS # BLD AUTO: 2.2 K/UL (ref 1.8–7.7)
NEUTROPHILS NFR BLD: 43.9 % (ref 38–73)
NRBC BLD-RTO: 0 /100 WBC
PLATELET # BLD AUTO: 105 K/UL (ref 150–450)
PMV BLD AUTO: 11.2 FL (ref 9.2–12.9)
POTASSIUM SERPL-SCNC: 3.5 MMOL/L (ref 3.5–5.1)
PROT SERPL-MCNC: 6.8 G/DL (ref 6–8.4)
RBC # BLD AUTO: 3.39 M/UL (ref 4.6–6.2)
SODIUM SERPL-SCNC: 134 MMOL/L (ref 136–145)
WBC # BLD AUTO: 5.06 K/UL (ref 3.9–12.7)

## 2023-11-18 PROCEDURE — 25000003 PHARM REV CODE 250: Performed by: HOSPITALIST

## 2023-11-18 PROCEDURE — 25000003 PHARM REV CODE 250: Performed by: STUDENT IN AN ORGANIZED HEALTH CARE EDUCATION/TRAINING PROGRAM

## 2023-11-18 PROCEDURE — 25000003 PHARM REV CODE 250: Performed by: ORTHOPAEDIC SURGERY

## 2023-11-18 PROCEDURE — 11000001 HC ACUTE MED/SURG PRIVATE ROOM

## 2023-11-18 PROCEDURE — 99232 SBSQ HOSP IP/OBS MODERATE 35: CPT | Mod: ,,, | Performed by: HOSPITALIST

## 2023-11-18 PROCEDURE — 80053 COMPREHEN METABOLIC PANEL: CPT | Performed by: STUDENT IN AN ORGANIZED HEALTH CARE EDUCATION/TRAINING PROGRAM

## 2023-11-18 PROCEDURE — 63600175 PHARM REV CODE 636 W HCPCS: Performed by: HOSPITALIST

## 2023-11-18 PROCEDURE — 63600175 PHARM REV CODE 636 W HCPCS: Performed by: ORTHOPAEDIC SURGERY

## 2023-11-18 PROCEDURE — 27000207 HC ISOLATION

## 2023-11-18 PROCEDURE — 99232 PR SUBSEQUENT HOSPITAL CARE,LEVL II: ICD-10-PCS | Mod: ,,, | Performed by: HOSPITALIST

## 2023-11-18 PROCEDURE — 87070 CULTURE OTHR SPECIMN AEROBIC: CPT | Performed by: HOSPITALIST

## 2023-11-18 PROCEDURE — 83735 ASSAY OF MAGNESIUM: CPT | Performed by: STUDENT IN AN ORGANIZED HEALTH CARE EDUCATION/TRAINING PROGRAM

## 2023-11-18 PROCEDURE — 85025 COMPLETE CBC W/AUTO DIFF WBC: CPT | Performed by: STUDENT IN AN ORGANIZED HEALTH CARE EDUCATION/TRAINING PROGRAM

## 2023-11-18 PROCEDURE — 63600175 PHARM REV CODE 636 W HCPCS: Performed by: STUDENT IN AN ORGANIZED HEALTH CARE EDUCATION/TRAINING PROGRAM

## 2023-11-18 PROCEDURE — 87205 SMEAR GRAM STAIN: CPT | Performed by: HOSPITALIST

## 2023-11-18 RX ADMIN — THIAMINE HCL TAB 100 MG 100 MG: 100 TAB at 08:11

## 2023-11-18 RX ADMIN — ENOXAPARIN SODIUM 30 MG: 30 INJECTION SUBCUTANEOUS at 04:11

## 2023-11-18 RX ADMIN — PIPERACILLIN AND TAZOBACTAM 4.5 G: 4; .5 INJECTION, POWDER, LYOPHILIZED, FOR SOLUTION INTRAVENOUS; PARENTERAL at 04:11

## 2023-11-18 RX ADMIN — HYDROCODONE BITARTRATE AND ACETAMINOPHEN 1 TABLET: 10; 325 TABLET ORAL at 04:11

## 2023-11-18 RX ADMIN — MORPHINE SULFATE 2 MG: 2 INJECTION, SOLUTION INTRAMUSCULAR; INTRAVENOUS at 09:11

## 2023-11-18 RX ADMIN — FOLIC ACID 1 MG: 1 TABLET ORAL at 08:11

## 2023-11-18 RX ADMIN — VANCOMYCIN HYDROCHLORIDE 1000 MG: 1.5 INJECTION, POWDER, LYOPHILIZED, FOR SOLUTION INTRAVENOUS at 05:11

## 2023-11-18 RX ADMIN — MEROPENEM 1 G: 1 INJECTION, POWDER, FOR SOLUTION INTRAVENOUS at 08:11

## 2023-11-18 RX ADMIN — LORAZEPAM 1 MG: 2 INJECTION INTRAMUSCULAR; INTRAVENOUS at 09:11

## 2023-11-18 RX ADMIN — MEROPENEM 1 G: 1 INJECTION, POWDER, FOR SOLUTION INTRAVENOUS at 04:11

## 2023-11-18 RX ADMIN — MORPHINE SULFATE 2 MG: 2 INJECTION, SOLUTION INTRAMUSCULAR; INTRAVENOUS at 12:11

## 2023-11-18 RX ADMIN — MUPIROCIN: 20 OINTMENT TOPICAL at 09:11

## 2023-11-18 RX ADMIN — HYDROCODONE BITARTRATE AND ACETAMINOPHEN 1 TABLET: 10; 325 TABLET ORAL at 07:11

## 2023-11-18 NOTE — PROGRESS NOTES
[facility] Medicine  Progress Note    Patient Name: Jesus Mccabe  MRN: [id number]  Patient Class: IP- Inpatient   Admission Date: 11/15/2023  Length of Stay: 3 days  Attending Physician: Corey Macias MD  Primary Care Provider: Jose Prater MD        Subjective:     Principal Problem:Skin flap necrosis        HPI:  61 yo w/ hx of deafness, HTN ,GERD, CAD presenting with left hand pain and swelling. Patient is s/p Irrigation and debridement left hand extensor tendons on 10/16 with Dr. Frank. Patient did not follow up in clinic. History obtained using . Per patient 2 days ago he started having swelling, redness, and pain in the hand. Endorses subjective fevers. Endorses Nausea but no vomiting. He has been having diarrhea 2-3 times per day for the past 2 days. No recent abx. No blood in stool.     Overview/Hospital Course:  No notes on file    Interval History:  He is still having some pain in his left hand and wrist area.  Pain is 6/10 on a pain intensity scale.  His blood pressure went up this morning but came down to receiving pain meds.  He denies fever, chills, chest pain, shortness for breath or dizziness.    Review of Systems   Constitutional: Negative.    HENT:  Positive for trouble swallowing (He is able to tolerate full liquid diet without any evidence or aspiration).    Eyes: Negative.    Respiratory: Negative.     Cardiovascular: Negative.    Gastrointestinal: Negative.    Endocrine: Negative.    Genitourinary: Negative.    Musculoskeletal:  Positive for myalgias.   Skin:  Positive for wound (Left hand wound).   Allergic/Immunologic: Negative.    Neurological: Negative.    Hematological: Negative.    Psychiatric/Behavioral: Negative.       Objective:     Vital Signs (Most Recent):  Temp: 99.4 °F (37.4 °C) (11/18/23 1229)  Pulse: 74 (11/18/23 1229)  Resp: 18 (11/18/23 1235)  BP: (!) 179/83 (11/18/23 1229)  SpO2: (!) 90 % (11/18/23 1229)  Vital Signs (24h Range):  Temp:  [97.8 °F (36.6 °C)-99.4 °F (37.4 °C)] 99.4 °F (37.4 °C)  Pulse:  [74-85] 74  Resp:  [18-20] 18  SpO2:  [90 %-96 %] 90 %  BP: (118-179)/(59-83) 179/83     Weight: 63 kg (139 lb)  Body mass index is 18.85 kg/m².    Intake/Output Summary (Last 24 hours) at 11/18/2023 1313  Last data filed at 11/18/2023 1245  Gross per 24 hour   Intake 902.01 ml   Output 1550 ml   Net -647.99 ml         Physical Exam  Vitals and nursing note reviewed.   Constitutional:       Appearance: Normal appearance.   HENT:      Head: Normocephalic and atraumatic.      Nose: Nose normal.      Mouth/Throat:      Mouth: Mucous membranes are moist.   Eyes:      Extraocular Movements: Extraocular movements intact.   Cardiovascular:      Rate and Rhythm: Normal rate and regular rhythm.      Pulses: Normal pulses.      Heart sounds: Normal heart sounds. No murmur (Grade 1/6 systolic ejection murmur at apex) heard.  Pulmonary:      Effort: Pulmonary effort is normal.      Breath sounds: Normal breath sounds.   Abdominal:      General: Abdomen is flat. Bowel sounds are normal.      Palpations: Abdomen is soft.   Musculoskeletal:      Cervical back: Neck supple.      Right lower leg: No edema.      Left lower leg: No edema.      Comments: Tenderness of left hand on the dorsal surface   Skin:     General: Skin is warm.      Capillary Refill: Capillary refill takes 2 to 3 seconds.   Neurological:      General: No focal deficit present.      Mental Status: He is alert and oriented to person, place, and time. Mental status is at baseline.      Motor: Weakness (Weakness of both lower extremities) present.   Psychiatric:         Mood and Affect: Mood normal.         Behavior: Behavior normal.         Thought Content: Thought content normal.             Significant Labs: All pertinent labs within the past 24 hours have been reviewed.    Significant Imaging: I have reviewed all pertinent imaging results/findings within the past 24  hours.    Assessment/Plan:      Urinary tract infection without hematuria  Will DC Zosyn.  Start Merrem pending IV.  .      Dysphagia  Speech consulted. Recommended NPO and alternative nutrition  Spoke with patient using  and .  Patient does not want g-tube or NG tube with tube feeds  Understands risks/benefits  Continue full liquid diet for now      Alcohol abuse   on cessation  CIWA q4h with PRN ativan  Thiamine and folic acid daily      GERD (gastroesophageal reflux disease)  Protonix daily      Infection of hand  Post op infection to left hand. Did not make follow up appt.  Ortho consulted- s/p I&D left hand on 11/16  IV abx  F/U Blood cultures  No wound cultures obtained  Lactate wnl  PRN pain and nausea medications  Blood cultures are negative.  Will discontinue Zosyn because patient has a urinary tract infection which is resistant to this antibiotic        History of hepatitis C, treated / cured (SVR12 - 8/2020)  Noted  Needs outpatient GI followup  Hep C and Viral Load in am      Deafness   as needed       HTN (hypertension)  Continue home BP medications as tolerated. Titrate as needed  May have to increase his blood pressure medicines but will continue to monitor    Coronary artery disease involving coronary bypass graft with angina pectoris  Unclear if patient is on ASA/Statin      VTE Risk Mitigation (From admission, onward)           Ordered     enoxaparin injection 30 mg  Daily         11/16/23 1220     Place sequential compression device  Until discontinued         11/16/23 1220     Place KATHIE hose  Until discontinued         11/16/23 1220     Reason for No Pharmacological VTE Prophylaxis  Once        Question:  Reasons:  Answer:  Physician Provided (leave comment)    11/15/23 1149     IP VTE HIGH RISK PATIENT  Once         11/15/23 1149     Place sequential compression device  Until discontinued         11/15/23 1149                     Discharge Planning   MARIA FERNANDA: 11/21/2023     Code Status: Full Code   Is the patient medically ready for discharge?:     Reason for patient still in hospital (select all that apply): Treatment  Discharge Plan A: Home Health                  Ad Espino MD  Department of [facility] Medicine   [facility]

## 2023-11-18 NOTE — ASSESSMENT & PLAN NOTE
Continue home BP medications as tolerated. Titrate as needed  May have to increase his blood pressure medicines but will continue to monitor

## 2023-11-18 NOTE — PLAN OF CARE
Problem: Adult Inpatient Plan of Care  Goal: Plan of Care Review  Outcome: Ongoing, Progressing     Problem: Adult Inpatient Plan of Care  Goal: Patient-Specific Goal (Individualized)  Outcome: Ongoing, Progressing     Problem: Adult Inpatient Plan of Care  Goal: Absence of Hospital-Acquired Illness or Injury  Outcome: Ongoing, Progressing     Problem: Adult Inpatient Plan of Care  Goal: Optimal Comfort and Wellbeing  Outcome: Ongoing, Progressing     Problem: Adult Inpatient Plan of Care  Goal: Readiness for Transition of Care  Outcome: Ongoing, Progressing     Problem: Fluid and Electrolyte Imbalance (Acute Kidney Injury/Impairment)  Goal: Fluid and Electrolyte Balance  Outcome: Ongoing, Progressing     Problem: Impaired Wound Healing  Goal: Optimal Wound Healing  Outcome: Ongoing, Progressing     Problem: Skin Injury Risk Increased  Goal: Skin Health and Integrity  Outcome: Ongoing, Progressing     Problem: Fall Injury Risk  Goal: Absence of Fall and Fall-Related Injury  Outcome: Ongoing, Progressing

## 2023-11-18 NOTE — ASSESSMENT & PLAN NOTE
Speech consulted. Recommended NPO and alternative nutrition  Spoke with patient using  and .  Patient does not want g-tube or NG tube with tube feeds  Understands risks/benefits  Continue full liquid diet for now

## 2023-11-18 NOTE — ASSESSMENT & PLAN NOTE
Post op infection to left hand. Did not make follow up appt.  Ortho consulted- s/p I&D left hand on 11/16  IV abx  F/U Blood cultures  No wound cultures obtained  Lactate wnl  PRN pain and nausea medications  Blood cultures are negative.  Will discontinue Zosyn because patient has a urinary tract infection which is resistant to this antibiotic

## 2023-11-18 NOTE — PROGRESS NOTES
S:  Mr. Mccabe was seen examined.  He was resting comfortably in bed.  He had an Irrigation and debridement of his left hand on 11/16/2023 after he presented to the emergency room on 11/15/2023 with skin flap necrosis and malodorous hand.  Today stating he is doing well and his pain is well controlled..       O:  Vital signs stable, reviewed temperature 99.4°  No change in neurovascular status from preop.    Dressing clean dry and intact without blood tinging.    Active motion the patient's fingers intact.    Mild swelling left hand.    Laboratory:  WBC 5.06; hemoglobin 9.9; hematocrit 30.9; platelets 105.     A:  Status post irrigation and debridement left hand, POD #2     P:  1. Continue with antibiotics per hospitalist.    2. Continue to elevate left hand.    3. Continue with K-pad to left hand set at 101° F.  4. Encourage patient to move fingers.    5. Continue with pain control per hospitalist.    6. Discussed with the patient that he needs serial irrigation and debridement he is currently scheduled for a 2nd look irrigation debridement for Monday 11/20/2023 he understands he will most likely have his skin flap closed.  7. An order was placed in his chart to make him NPO after midnight on Sunday night in preparation for Monday surgery.    8. He has been consented for a 2nd look irrigation and debridement with possible closure.  9. Possible complications of surgery to include bleeding, infection, scarring, nerve/blood vessel/tendon damage, need for further surgery, failed surgery, failure to improve, possible persistent open wound, possible persistent pain, possible stiffness, and possible amputation were discussed with the patient.  The patient was permitted to ask questions and all concerns were addressed to his satisfaction.    10. Will continue to follow up

## 2023-11-19 LAB
ALBUMIN SERPL BCP-MCNC: 1.9 G/DL (ref 3.5–5.2)
ANION GAP SERPL CALC-SCNC: 10 MMOL/L (ref 8–16)
BUN SERPL-MCNC: 8 MG/DL (ref 8–23)
C DIFF GDH STL QL: NEGATIVE
C DIFF TOX A+B STL QL IA: NEGATIVE
CALCIUM SERPL-MCNC: 8.5 MG/DL (ref 8.7–10.5)
CHLORIDE SERPL-SCNC: 102 MMOL/L (ref 95–110)
CO2 SERPL-SCNC: 23 MMOL/L (ref 23–29)
CREAT SERPL-MCNC: 1 MG/DL (ref 0.5–1.4)
EST. GFR  (NO RACE VARIABLE): >60 ML/MIN/1.73 M^2
GLUCOSE SERPL-MCNC: 132 MG/DL (ref 70–110)
PHOSPHATE SERPL-MCNC: 2.7 MG/DL (ref 2.7–4.5)
POTASSIUM SERPL-SCNC: 3.5 MMOL/L (ref 3.5–5.1)
SODIUM SERPL-SCNC: 135 MMOL/L (ref 136–145)
VANCOMYCIN TROUGH SERPL-MCNC: 15.2 UG/ML (ref 10–22)

## 2023-11-19 PROCEDURE — 25000003 PHARM REV CODE 250: Performed by: HOSPITALIST

## 2023-11-19 PROCEDURE — 80202 ASSAY OF VANCOMYCIN: CPT | Performed by: STUDENT IN AN ORGANIZED HEALTH CARE EDUCATION/TRAINING PROGRAM

## 2023-11-19 PROCEDURE — 99232 SBSQ HOSP IP/OBS MODERATE 35: CPT | Mod: ,,, | Performed by: HOSPITALIST

## 2023-11-19 PROCEDURE — 25000003 PHARM REV CODE 250: Performed by: ORTHOPAEDIC SURGERY

## 2023-11-19 PROCEDURE — 11000001 HC ACUTE MED/SURG PRIVATE ROOM

## 2023-11-19 PROCEDURE — 87045 FECES CULTURE AEROBIC BACT: CPT | Performed by: HOSPITALIST

## 2023-11-19 PROCEDURE — 87324 CLOSTRIDIUM AG IA: CPT | Performed by: HOSPITALIST

## 2023-11-19 PROCEDURE — 87449 NOS EACH ORGANISM AG IA: CPT | Mod: 91 | Performed by: HOSPITALIST

## 2023-11-19 PROCEDURE — 80069 RENAL FUNCTION PANEL: CPT | Performed by: STUDENT IN AN ORGANIZED HEALTH CARE EDUCATION/TRAINING PROGRAM

## 2023-11-19 PROCEDURE — S4991 NICOTINE PATCH NONLEGEND: HCPCS | Performed by: HOSPITALIST

## 2023-11-19 PROCEDURE — 27000207 HC ISOLATION

## 2023-11-19 PROCEDURE — 94761 N-INVAS EAR/PLS OXIMETRY MLT: CPT

## 2023-11-19 PROCEDURE — 27000221 HC OXYGEN, UP TO 24 HOURS

## 2023-11-19 PROCEDURE — 99232 PR SUBSEQUENT HOSPITAL CARE,LEVL II: ICD-10-PCS | Mod: ,,, | Performed by: HOSPITALIST

## 2023-11-19 PROCEDURE — 87427 SHIGA-LIKE TOXIN AG IA: CPT | Performed by: HOSPITALIST

## 2023-11-19 PROCEDURE — 63600175 PHARM REV CODE 636 W HCPCS: Performed by: ORTHOPAEDIC SURGERY

## 2023-11-19 PROCEDURE — 63600175 PHARM REV CODE 636 W HCPCS: Performed by: HOSPITALIST

## 2023-11-19 PROCEDURE — 63600175 PHARM REV CODE 636 W HCPCS: Performed by: STUDENT IN AN ORGANIZED HEALTH CARE EDUCATION/TRAINING PROGRAM

## 2023-11-19 PROCEDURE — 25000003 PHARM REV CODE 250: Performed by: STUDENT IN AN ORGANIZED HEALTH CARE EDUCATION/TRAINING PROGRAM

## 2023-11-19 PROCEDURE — 36415 COLL VENOUS BLD VENIPUNCTURE: CPT | Performed by: STUDENT IN AN ORGANIZED HEALTH CARE EDUCATION/TRAINING PROGRAM

## 2023-11-19 PROCEDURE — 87046 STOOL CULTR AEROBIC BACT EA: CPT | Performed by: HOSPITALIST

## 2023-11-19 RX ORDER — HYDRALAZINE HYDROCHLORIDE 20 MG/ML
10 INJECTION INTRAMUSCULAR; INTRAVENOUS EVERY 6 HOURS PRN
Status: DISCONTINUED | OUTPATIENT
Start: 2023-11-19 | End: 2023-11-21 | Stop reason: HOSPADM

## 2023-11-19 RX ORDER — IBUPROFEN 200 MG
1 TABLET ORAL DAILY
Status: DISCONTINUED | OUTPATIENT
Start: 2023-11-19 | End: 2023-11-21 | Stop reason: HOSPADM

## 2023-11-19 RX ADMIN — VANCOMYCIN HYDROCHLORIDE 1000 MG: 1.5 INJECTION, POWDER, LYOPHILIZED, FOR SOLUTION INTRAVENOUS at 05:11

## 2023-11-19 RX ADMIN — NICOTINE 1 PATCH: 21 PATCH, EXTENDED RELEASE TRANSDERMAL at 10:11

## 2023-11-19 RX ADMIN — MEROPENEM 1 G: 1 INJECTION, POWDER, FOR SOLUTION INTRAVENOUS at 04:11

## 2023-11-19 RX ADMIN — THIAMINE HCL TAB 100 MG 100 MG: 100 TAB at 10:11

## 2023-11-19 RX ADMIN — ACETAMINOPHEN 650 MG: 325 TABLET ORAL at 09:11

## 2023-11-19 RX ADMIN — MEROPENEM 1 G: 1 INJECTION, POWDER, FOR SOLUTION INTRAVENOUS at 10:11

## 2023-11-19 RX ADMIN — MEROPENEM 1 G: 1 INJECTION, POWDER, FOR SOLUTION INTRAVENOUS at 01:11

## 2023-11-19 RX ADMIN — MUPIROCIN: 20 OINTMENT TOPICAL at 09:11

## 2023-11-19 RX ADMIN — OXYCODONE HYDROCHLORIDE 5 MG: 5 TABLET ORAL at 09:11

## 2023-11-19 RX ADMIN — FOLIC ACID 1 MG: 1 TABLET ORAL at 10:11

## 2023-11-19 RX ADMIN — HYDROCODONE BITARTRATE AND ACETAMINOPHEN 1 TABLET: 10; 325 TABLET ORAL at 04:11

## 2023-11-19 RX ADMIN — ENOXAPARIN SODIUM 30 MG: 30 INJECTION SUBCUTANEOUS at 04:11

## 2023-11-19 RX ADMIN — HYDROCODONE BITARTRATE AND ACETAMINOPHEN 1 TABLET: 10; 325 TABLET ORAL at 10:11

## 2023-11-19 NOTE — PROGRESS NOTES
[facility] Medicine  Progress Note    Patient Name: Jesus Mccabe  MRN: [id number]  Patient Class: IP- Inpatient   Admission Date: 11/15/2023  Length of Stay: 4 days  Attending Physician: Corey Macias MD  Primary Care Provider: Jose Prater MD        Subjective:     Principal Problem:Skin flap necrosis        HPI:  61 yo w/ hx of deafness, HTN ,GERD, CAD presenting with left hand pain and swelling. Patient is s/p Irrigation and debridement left hand extensor tendons on 10/16 with Dr. Frank. Patient did not follow up in clinic. History obtained using . Per patient 2 days ago he started having swelling, redness, and pain in the hand. Endorses subjective fevers. Endorses Nausea but no vomiting. He has been having diarrhea 2-3 times per day for the past 2 days. No recent abx. No blood in stool.     Overview/Hospital Course:  No notes on file    Interval History:  He developed some cough and shortness of breath on yesterday.  Is believed that he aspirated on his full liquid diet.  He has dysphagia but refused to have NG tube or PEG tube.    Review of Systems   Constitutional: Negative.    HENT:  Positive for trouble swallowing.    Eyes: Negative.    Respiratory:  Positive for cough and shortness of breath.    Cardiovascular: Negative.    Gastrointestinal:  Positive for nausea. Negative for constipation and rectal pain.   Endocrine: Negative.    Genitourinary: Negative.    Musculoskeletal:  Positive for arthralgias and myalgias.        Left hand pain   Skin:  Positive for wound (Left hand).   Allergic/Immunologic: Negative.    Neurological: Negative.    Hematological: Negative.    Psychiatric/Behavioral: Negative.       Objective:     Vital Signs (Most Recent):  Temp: 99.7 °F (37.6 °C) (11/19/23 0803)  Pulse: 73 (11/19/23 0803)  Resp: 18 (11/19/23 1008)  BP: (!) 194/76 (11/19/23 0803)  SpO2: (!) 92 % (11/19/23 0803) Vital Signs (24h Range):  Temp:  [99.4  °F (37.4 °C)-101.3 °F (38.5 °C)] 99.7 °F (37.6 °C)  Pulse:  [70-81] 73  Resp:  [16-20] 18  SpO2:  [83 %-95 %] 92 %  BP: (150-194)/(70-93) 194/76     Weight: 63 kg (139 lb)  Body mass index is 18.85 kg/m².    Intake/Output Summary (Last 24 hours) at 11/19/2023 1219  Last data filed at 11/19/2023 0830  Gross per 24 hour   Intake 1384.86 ml   Output 300 ml   Net 1084.86 ml         Physical Exam  Vitals and nursing note reviewed.   Constitutional:       Appearance: Normal appearance.   HENT:      Head: Normocephalic and atraumatic.      Right Ear: External ear normal.      Left Ear: External ear normal.      Nose: Nose normal.      Mouth/Throat:      Mouth: Mucous membranes are moist.   Eyes:      Extraocular Movements: Extraocular movements intact.   Cardiovascular:      Rate and Rhythm: Normal rate and regular rhythm.      Heart sounds: Murmur (Grade 1/6 systolic ejection murmur) heard.   Abdominal:      General: Abdomen is flat. Bowel sounds are normal.      Palpations: Abdomen is soft.   Musculoskeletal:         General: Deformity (Right hand with flexion contracture) present.      Cervical back: Neck supple.   Skin:     Findings: Lesion (Left hand) present.   Neurological:      General: No focal deficit present.      Mental Status: He is alert.   Psychiatric:         Mood and Affect: Mood normal.         Behavior: Behavior normal.         Thought Content: Thought content normal.             Significant Labs: All pertinent labs within the past 24 hours have been reviewed.    Significant Imaging: I have reviewed all pertinent imaging results/findings within the past 24 hours.    Assessment/Plan:      Urinary tract infection without hematuria  This is secondary to E coli  Will DC Zosyn.  Start Merrem pending IV.  .      Dysphagia  Speech consulted. Recommended NPO and alternative nutrition  Spoke with patient using  and .  Patient does not want g-tube or NG tube with tube  feeds  Understands risks/benefits  Continue full liquid diet for now  NPO status      Alcohol abuse   on cessation  CIWA q4h with PRN ativan  Thiamine and folic acid daily      GERD (gastroesophageal reflux disease)  Protonix daily      Infection of hand  Post op infection to left hand. Did not make follow up appt.  Ortho consulted- s/p I&D left hand on 11/16  IV abx  F/U Blood cultures  No wound cultures obtained  Lactate wnl  PRN pain and nausea medications  Blood cultures are negative.  Will discontinue Zosyn because patient has a urinary tract infection which is resistant to this antibiotic  Surgery in a.m.        History of hepatitis C, treated / cured (SVR12 - 8/2020)  Noted  Needs outpatient GI followup  Hep C and Viral Load in am      Deafness   as needed       HTN (hypertension)  Continue home BP medications as tolerated. Titrate as needed  May have to increase his blood pressure medicines but will continue to monitor  Will add 10 mg of hydralazine IV as needed for systolic hypertension    Coronary artery disease involving coronary bypass graft with angina pectoris  Unclear if patient is on ASA/Statin      VTE Risk Mitigation (From admission, onward)           Ordered     enoxaparin injection 30 mg  Daily         11/16/23 1220     Place sequential compression device  Until discontinued         11/16/23 1220     Place KATHIE hose  Until discontinued         11/16/23 1220     Reason for No Pharmacological VTE Prophylaxis  Once        Question:  Reasons:  Answer:  Physician Provided (leave comment)    11/15/23 1149     IP VTE HIGH RISK PATIENT  Once         11/15/23 1149     Place sequential compression device  Until discontinued         11/15/23 1149                    Discharge Planning   MARIA FERNANDA: 11/21/2023     Code Status: Full Code   Is the patient medically ready for discharge?:     Reason for patient still in hospital (select all that apply): Treatment  Discharge Plan A: Home Health                   Ad Espino MD  Department of [facility] - Intensive Care

## 2023-11-19 NOTE — PROGRESS NOTES
[facility] Medicine  Progress Note    Patient Name: Jesus Mccabe  MRN: [id number]  Patient Class: IP- Inpatient   Admission Date: 11/15/2023  Length of Stay: 4 days  Attending Physician: Corey Macias MD  Primary Care Provider: Jose Prater MD        Subjective:     Principal Problem:Skin flap necrosis        HPI:  61 yo w/ hx of deafness, HTN ,GERD, CAD presenting with left hand pain and swelling. Patient is s/p Irrigation and debridement left hand extensor tendons on 10/16 with Dr. Frank. Patient did not follow up in clinic. History obtained using . Per patient 2 days ago he started having swelling, redness, and pain in the hand. Endorses subjective fevers. Endorses Nausea but no vomiting. He has been having diarrhea 2-3 times per day for the past 2 days. No recent abx. No blood in stool.     Overview/Hospital Course:  No notes on file    No new subjective & objective note has been filed under this hospital service since the last note was generated.      Assessment/Plan:      Urinary tract infection without hematuria  This is secondary to E coli  Will DC Zosyn.  Start Merrem pending IV.  .      Dysphagia  Speech consulted. Recommended NPO and alternative nutrition  Spoke with patient using  and .  Patient does not want g-tube or NG tube with tube feeds  Understands risks/benefits  Continue full liquid diet for now  NPO status      Alcohol abuse   on cessation  CIWA q4h with PRN ativan  Thiamine and folic acid daily      GERD (gastroesophageal reflux disease)  Protonix daily      Infection of hand  Post op infection to left hand. Did not make follow up appt.  Ortho consulted- s/p I&D left hand on 11/16  IV abx  F/U Blood cultures  No wound cultures obtained  Lactate wnl  PRN pain and nausea medications  Blood cultures are negative.  Will discontinue Zosyn because patient has a urinary tract infection  which is resistant to this antibiotic  Surgery in a.m.        History of hepatitis C, treated / cured (SVR12 - 8/2020)  Noted  Needs outpatient GI followup  Hep C and Viral Load in am      Deafness   as needed       HTN (hypertension)  Continue home BP medications as tolerated. Titrate as needed  May have to increase his blood pressure medicines but will continue to monitor  Will add 10 mg of hydralazine IV as needed for systolic hypertension    Coronary artery disease involving coronary bypass graft with angina pectoris  Unclear if patient is on ASA/Statin      VTE Risk Mitigation (From admission, onward)           Ordered     enoxaparin injection 30 mg  Daily         11/16/23 1220     Place sequential compression device  Until discontinued         11/16/23 1220     Place KATHIE hose  Until discontinued         11/16/23 1220     Reason for No Pharmacological VTE Prophylaxis  Once        Question:  Reasons:  Answer:  Physician Provided (leave comment)    11/15/23 1149     IP VTE HIGH RISK PATIENT  Once         11/15/23 1149     Place sequential compression device  Until discontinued         11/15/23 1149                    Discharge Planning   MARIA FERNANDA: 11/21/2023     Code Status: Full Code   Is the patient medically ready for discharge?:     Reason for patient still in hospital (select all that apply): Treatment  Discharge Plan A: Home Health                  Ad Espino MD  Department of Hospital Medicine   [facility]

## 2023-11-19 NOTE — PLAN OF CARE
Problem: Breathing Pattern Ineffective  Goal: Effective Breathing Pattern  Outcome: Ongoing, Progressing  Intervention: Promote Improved Breathing Pattern  Flowsheets (Taken 11/19/2023 0804)  Airway/Ventilation Management: airway patency maintained  Breathing Techniques/Airway Clearance:   deep/controlled cough encouraged   diaphragmatic breathing promoted  Head of Bed (HOB) Positioning: HOB elevated   Patient in no apparent distress. Sat's  91 % on 2 lpm. Congested cough noted. Deep breathing and coughing encouraged . Will continue to monitor.

## 2023-11-19 NOTE — ASSESSMENT & PLAN NOTE
Post op infection to left hand. Did not make follow up appt.  Ortho consulted- s/p I&D left hand on 11/16  IV abx  F/U Blood cultures  No wound cultures obtained  Lactate wnl  PRN pain and nausea medications  Blood cultures are negative.  Will discontinue Zosyn because patient has a urinary tract infection which is resistant to this antibiotic  Surgery in a.m.

## 2023-11-19 NOTE — ASSESSMENT & PLAN NOTE
Continue home BP medications as tolerated. Titrate as needed  May have to increase his blood pressure medicines but will continue to monitor  Will add 10 mg of hydralazine IV as needed for systolic hypertension

## 2023-11-19 NOTE — PROGRESS NOTES
Pharmacokinetic Assessment Follow Up: IV Vancomycin    Vancomycin serum concentration assessment(s):    The trough level was drawn correctly and can be used to guide therapy at this time. The measurement is within the desired definitive target range of 15 to 20 mcg/mL.    Vancomycin Regimen Plan:    Continue regimen to Vancomycin 1500 mg IV every 24 hours with next serum trough concentration measured at 1600 prior to third dose on 11/21    Drug levels (last 3 results):  Recent Labs   Lab Result Units 11/17/23  1304 11/19/23  1632   Vancomycin, Random ug/mL 12.8  --    Vancomycin-Trough ug/mL  --  15.2       Pharmacy will continue to follow and monitor vancomycin.    Please contact pharmacy at extension [phone] for questions regarding this assessment.    Thank you for the consult,   Prosper Herrera, PharmD       Patient brief summary:  Jesus Mccabe is a 62 y.o. male initiated on antimicrobial therapy with IV Vancomycin for treatment of sepsis    Drug Allergies:   Review of patient's allergies indicates:  No Known Allergies    Actual Body Weight:   63 kg    Renal Function:   Estimated Creatinine Clearance: 67.5 mL/min (based on SCr of 1 mg/dL).,     Dialysis Method (if applicable):  N/A    CBC (last 72 hours):  Recent Labs   Lab Result Units 11/17/23  0439 11/18/23  0420   WBC K/uL 4.36 5.06   Hemoglobin g/dL 9.5* 9.9*   Hematocrit % 29.8* 30.9*   Platelets K/uL 87* 105*   Gran % % 66.9 43.9   Lymph % % 13.3* 24.7   Mono % % 18.3* 29.8*   Eosinophil % % 0.5 0.4   Basophil % % 0.5 0.6   Differential Method  Automated Automated       Metabolic Panel (last 72 hours):  Recent Labs   Lab Result Units 11/17/23  0439 11/18/23  0420 11/19/23  1632   Sodium mmol/L 132* 134* 135*   Potassium mmol/L 3.5 3.5 3.5   Chloride mmol/L 100 100 102   CO2 mmol/L 21* 21* 23   Glucose mg/dL 117* 100 132*   BUN mg/dL 9 7* 8   Creatinine mg/dL 1.0 1.0 1.0   Albumin g/dL 2.2* 2.1* 1.9*   Total Bilirubin mg/dL 0.5 0.4  --    Alkaline Phosphatase U/L  49* 49*  --    AST U/L 20 36  --    ALT U/L 10 11  --    Magnesium mg/dL 1.9 1.7  --    Phosphorus mg/dL  --   --  2.7       Vancomycin Administrations:  vancomycin given in the last 96 hours                     vancomycin (VANCOCIN) 1,500 mg in dextrose 5 % (D5W) 250 mL IVPB (mg) 1,000 mg New Bag 11/18/23 1748     1,500 mg New Bag 11/17/23 1736    vancomycin (VANCOCIN) 1,500 mg in dextrose 5 % (D5W) 250 mL IVPB (mg) 1,500 mg New Bag 11/16/23 1432                    Microbiologic Results:  Microbiology Results (last 7 days)       Procedure Component Value Units Date/Time    Stool culture [[id number]] Collected: 11/19/23 0549    Order Status: Sent Specimen: Stool Updated: 11/19/23 1413    E. coli 0157 antigen [[id number]] Collected: 11/19/23 0549    Order Status: No result Specimen: Stool Updated: 11/19/23 1412    Clostridium difficile EIA [[id number]] Collected: 11/19/23 0549    Order Status: Completed Specimen: Stool Updated: 11/19/23 0735     C. diff Antigen Negative     C difficile Toxins A+B, EIA Negative     Comment: Testing not recommended for children <24 months old.       Culture, Respiratory with Gram Stain [[id number]] Collected: 11/18/23 1810    Order Status: Completed Specimen: Respiratory from Sputum, Expectorated Updated: 11/18/23 2226     Gram Stain (Respiratory) <10 epithelial cells per low power field.     Gram Stain (Respiratory) Rare WBC's     Gram Stain (Respiratory) Few Gram positive rods     Gram Stain (Respiratory) Few Gram positive cocci    Blood culture x two cultures. Draw prior to antibiotics. [[id number]] Collected: 11/15/23 1120    Order Status: Completed Specimen: Blood from Peripheral, Antecubital, Right Updated: 11/18/23 2012     Blood Culture, Routine No Growth to date      No Growth to date      No Growth to date      No Growth to date    Narrative:      Aerobic and anaerobic    Blood culture x two cultures. Draw prior to antibiotics. [[id number]] Collected: 11/15/23 1134    Order  Status: Completed Specimen: Blood from Peripheral, Forearm, Right Updated: 11/18/23 2012     Blood Culture, Routine No Growth to date      No Growth to date      No Growth to date      No Growth to date    Narrative:      Aerobic and anaerobic    Urine culture [[id number]]  (Abnormal)  (Susceptibility) Collected: 11/15/23 0955    Order Status: Completed Specimen: Urine Updated: 11/17/23 2124     Urine Culture, Routine ESCHERICHIA COLI ESBL  >100,000 cfu/ml      Narrative:      Preferred Collection Type->Urine, Clean Catch  Specimen Source->Urine

## 2023-11-19 NOTE — ASSESSMENT & PLAN NOTE
Speech consulted. Recommended NPO and alternative nutrition  Spoke with patient using  and .  Patient does not want g-tube or NG tube with tube feeds  Understands risks/benefits  Continue full liquid diet for now  NPO status

## 2023-11-19 NOTE — SUBJECTIVE & OBJECTIVE
Interval History:  He developed some cough and shortness of breath on yesterday.  Is believed that he aspirated on his full liquid diet.  He has dysphagia but refused to have NG tube or PEG tube.    Review of Systems   Constitutional: Negative.    HENT:  Positive for trouble swallowing.    Eyes: Negative.    Respiratory:  Positive for cough and shortness of breath.    Cardiovascular: Negative.    Gastrointestinal:  Positive for nausea. Negative for constipation and rectal pain.   Endocrine: Negative.    Genitourinary: Negative.    Musculoskeletal:  Positive for arthralgias and myalgias.        Left hand pain   Skin:  Positive for wound (Left hand).   Allergic/Immunologic: Negative.    Neurological: Negative.    Hematological: Negative.    Psychiatric/Behavioral: Negative.       Objective:     Vital Signs (Most Recent):  Temp: 99.7 °F (37.6 °C) (11/19/23 0803)  Pulse: 73 (11/19/23 0803)  Resp: 18 (11/19/23 1008)  BP: (!) 194/76 (11/19/23 0803)  SpO2: (!) 92 % (11/19/23 0803) Vital Signs (24h Range):  Temp:  [99.4 °F (37.4 °C)-101.3 °F (38.5 °C)] 99.7 °F (37.6 °C)  Pulse:  [70-81] 73  Resp:  [16-20] 18  SpO2:  [83 %-95 %] 92 %  BP: (150-194)/(70-93) 194/76     Weight: 63 kg (139 lb)  Body mass index is 18.85 kg/m².    Intake/Output Summary (Last 24 hours) at 11/19/2023 1219  Last data filed at 11/19/2023 0830  Gross per 24 hour   Intake 1384.86 ml   Output 300 ml   Net 1084.86 ml         Physical Exam  Vitals and nursing note reviewed.   Constitutional:       Appearance: Normal appearance.   HENT:      Head: Normocephalic and atraumatic.      Right Ear: External ear normal.      Left Ear: External ear normal.      Nose: Nose normal.      Mouth/Throat:      Mouth: Mucous membranes are moist.   Eyes:      Extraocular Movements: Extraocular movements intact.   Cardiovascular:      Rate and Rhythm: Normal rate and regular rhythm.      Heart sounds: Murmur (Grade 1/6 systolic ejection murmur) heard.   Abdominal:       General: Abdomen is flat. Bowel sounds are normal.      Palpations: Abdomen is soft.   Musculoskeletal:         General: Deformity (Right hand with flexion contracture) present.      Cervical back: Neck supple.   Skin:     Findings: Lesion (Left hand) present.   Neurological:      General: No focal deficit present.      Mental Status: He is alert.   Psychiatric:         Mood and Affect: Mood normal.         Behavior: Behavior normal.         Thought Content: Thought content normal.             Significant Labs: All pertinent labs within the past 24 hours have been reviewed.    Significant Imaging: I have reviewed all pertinent imaging results/findings within the past 24 hours.

## 2023-11-20 ENCOUNTER — ANESTHESIA (OUTPATIENT)
Dept: SURGERY | Facility: HOSPITAL | Age: 63
DRG: 906 | End: 2023-11-20
Payer: MEDICARE

## 2023-11-20 ENCOUNTER — ANESTHESIA EVENT (OUTPATIENT)
Dept: SURGERY | Facility: HOSPITAL | Age: 63
DRG: 906 | End: 2023-11-20
Payer: MEDICARE

## 2023-11-20 PROBLEM — J96.01 ACUTE RESPIRATORY FAILURE WITH HYPOXIA: Status: ACTIVE | Noted: 2023-11-20

## 2023-11-20 LAB
BACTERIA BLD CULT: NORMAL
BACTERIA BLD CULT: NORMAL

## 2023-11-20 PROCEDURE — 63600175 PHARM REV CODE 636 W HCPCS: Performed by: HOSPITALIST

## 2023-11-20 PROCEDURE — 25000003 PHARM REV CODE 250: Performed by: HOSPITALIST

## 2023-11-20 PROCEDURE — 11043 DBRDMT MUSC&/FSCA 1ST 20/<: CPT | Mod: 78,,, | Performed by: ORTHOPAEDIC SURGERY

## 2023-11-20 PROCEDURE — 99233 PR SUBSEQUENT HOSPITAL CARE,LEVL III: ICD-10-PCS | Mod: ,,, | Performed by: STUDENT IN AN ORGANIZED HEALTH CARE EDUCATION/TRAINING PROGRAM

## 2023-11-20 PROCEDURE — 63600175 PHARM REV CODE 636 W HCPCS: Performed by: NURSE ANESTHETIST, CERTIFIED REGISTERED

## 2023-11-20 PROCEDURE — 63600175 PHARM REV CODE 636 W HCPCS: Performed by: STUDENT IN AN ORGANIZED HEALTH CARE EDUCATION/TRAINING PROGRAM

## 2023-11-20 PROCEDURE — 36000707: Performed by: ORTHOPAEDIC SURGERY

## 2023-11-20 PROCEDURE — 63600175 PHARM REV CODE 636 W HCPCS: Performed by: ORTHOPAEDIC SURGERY

## 2023-11-20 PROCEDURE — D9220A PRA ANESTHESIA: Mod: ANES,,, | Performed by: ANESTHESIOLOGY

## 2023-11-20 PROCEDURE — 37000008 HC ANESTHESIA 1ST 15 MINUTES: Performed by: ORTHOPAEDIC SURGERY

## 2023-11-20 PROCEDURE — 76937 US GUIDE VASCULAR ACCESS: CPT

## 2023-11-20 PROCEDURE — 36000706: Performed by: ORTHOPAEDIC SURGERY

## 2023-11-20 PROCEDURE — 11043 PR DEBRIDEMENT, SKIN, SUB-Q TISSUE,MUSCLE,=<20 SQ CM: ICD-10-PCS | Mod: 78,,, | Performed by: ORTHOPAEDIC SURGERY

## 2023-11-20 PROCEDURE — 25000003 PHARM REV CODE 250: Performed by: ORTHOPAEDIC SURGERY

## 2023-11-20 PROCEDURE — D9220A PRA ANESTHESIA: ICD-10-PCS | Mod: CRNA,,, | Performed by: NURSE ANESTHETIST, CERTIFIED REGISTERED

## 2023-11-20 PROCEDURE — 36410 VNPNXR 3YR/> PHY/QHP DX/THER: CPT

## 2023-11-20 PROCEDURE — 27000207 HC ISOLATION

## 2023-11-20 PROCEDURE — 25000003 PHARM REV CODE 250: Performed by: STUDENT IN AN ORGANIZED HEALTH CARE EDUCATION/TRAINING PROGRAM

## 2023-11-20 PROCEDURE — 11000001 HC ACUTE MED/SURG PRIVATE ROOM

## 2023-11-20 PROCEDURE — 99233 SBSQ HOSP IP/OBS HIGH 50: CPT | Mod: ,,, | Performed by: STUDENT IN AN ORGANIZED HEALTH CARE EDUCATION/TRAINING PROGRAM

## 2023-11-20 PROCEDURE — 25000003 PHARM REV CODE 250: Performed by: NURSE ANESTHETIST, CERTIFIED REGISTERED

## 2023-11-20 PROCEDURE — D9220A PRA ANESTHESIA: ICD-10-PCS | Mod: ANES,,, | Performed by: ANESTHESIOLOGY

## 2023-11-20 PROCEDURE — 37000009 HC ANESTHESIA EA ADD 15 MINS: Performed by: ORTHOPAEDIC SURGERY

## 2023-11-20 PROCEDURE — 71000033 HC RECOVERY, INTIAL HOUR: Performed by: ORTHOPAEDIC SURGERY

## 2023-11-20 PROCEDURE — D9220A PRA ANESTHESIA: Mod: CRNA,,, | Performed by: NURSE ANESTHETIST, CERTIFIED REGISTERED

## 2023-11-20 RX ORDER — DEXAMETHASONE SODIUM PHOSPHATE 4 MG/ML
INJECTION, SOLUTION INTRA-ARTICULAR; INTRALESIONAL; INTRAMUSCULAR; INTRAVENOUS; SOFT TISSUE
Status: DISCONTINUED | OUTPATIENT
Start: 2023-11-20 | End: 2023-11-20

## 2023-11-20 RX ORDER — MIDAZOLAM HYDROCHLORIDE 1 MG/ML
INJECTION INTRAMUSCULAR; INTRAVENOUS
Status: DISCONTINUED | OUTPATIENT
Start: 2023-11-20 | End: 2023-11-20

## 2023-11-20 RX ORDER — LISINOPRIL 20 MG/1
40 TABLET ORAL DAILY
Status: DISCONTINUED | OUTPATIENT
Start: 2023-11-21 | End: 2023-11-21 | Stop reason: HOSPADM

## 2023-11-20 RX ORDER — LISINOPRIL 20 MG/1
20 TABLET ORAL ONCE
Status: COMPLETED | OUTPATIENT
Start: 2023-11-20 | End: 2023-11-20

## 2023-11-20 RX ORDER — VANCOMYCIN HYDROCHLORIDE 1 G/20ML
INJECTION, POWDER, LYOPHILIZED, FOR SOLUTION INTRAVENOUS
Status: DISCONTINUED | OUTPATIENT
Start: 2023-11-20 | End: 2023-11-20 | Stop reason: HOSPADM

## 2023-11-20 RX ORDER — PROPOFOL 10 MG/ML
VIAL (ML) INTRAVENOUS
Status: DISCONTINUED | OUTPATIENT
Start: 2023-11-20 | End: 2023-11-20

## 2023-11-20 RX ORDER — SUCCINYLCHOLINE CHLORIDE 20 MG/ML
INJECTION INTRAMUSCULAR; INTRAVENOUS
Status: DISCONTINUED | OUTPATIENT
Start: 2023-11-20 | End: 2023-11-20

## 2023-11-20 RX ORDER — ONDANSETRON 2 MG/ML
INJECTION INTRAMUSCULAR; INTRAVENOUS
Status: DISCONTINUED | OUTPATIENT
Start: 2023-11-20 | End: 2023-11-20

## 2023-11-20 RX ORDER — SODIUM CHLORIDE, SODIUM LACTATE, POTASSIUM CHLORIDE, CALCIUM CHLORIDE 600; 310; 30; 20 MG/100ML; MG/100ML; MG/100ML; MG/100ML
INJECTION, SOLUTION INTRAVENOUS CONTINUOUS
Status: DISCONTINUED | OUTPATIENT
Start: 2023-11-20 | End: 2023-11-20

## 2023-11-20 RX ORDER — LIDOCAINE HYDROCHLORIDE 10 MG/ML
1 INJECTION, SOLUTION EPIDURAL; INFILTRATION; INTRACAUDAL; PERINEURAL ONCE
Status: DISCONTINUED | OUTPATIENT
Start: 2023-11-20 | End: 2023-11-20

## 2023-11-20 RX ORDER — LIDOCAINE HYDROCHLORIDE 20 MG/ML
INJECTION INTRAVENOUS
Status: DISCONTINUED | OUTPATIENT
Start: 2023-11-20 | End: 2023-11-20

## 2023-11-20 RX ORDER — FENTANYL CITRATE 50 UG/ML
INJECTION, SOLUTION INTRAMUSCULAR; INTRAVENOUS
Status: DISCONTINUED | OUTPATIENT
Start: 2023-11-20 | End: 2023-11-20

## 2023-11-20 RX ORDER — BACITRACIN 500 [USP'U]/G
OINTMENT TOPICAL
Status: DISCONTINUED | OUTPATIENT
Start: 2023-11-20 | End: 2023-11-20 | Stop reason: HOSPADM

## 2023-11-20 RX ORDER — LISINOPRIL 20 MG/1
20 TABLET ORAL DAILY
Status: DISCONTINUED | OUTPATIENT
Start: 2023-11-20 | End: 2023-11-20

## 2023-11-20 RX ORDER — EPHEDRINE SULFATE 50 MG/ML
INJECTION, SOLUTION INTRAVENOUS
Status: DISCONTINUED | OUTPATIENT
Start: 2023-11-20 | End: 2023-11-20

## 2023-11-20 RX ORDER — PHENYLEPHRINE HYDROCHLORIDE 10 MG/ML
INJECTION INTRAVENOUS
Status: DISCONTINUED | OUTPATIENT
Start: 2023-11-20 | End: 2023-11-20

## 2023-11-20 RX ORDER — ACETAMINOPHEN 10 MG/ML
INJECTION, SOLUTION INTRAVENOUS
Status: DISCONTINUED | OUTPATIENT
Start: 2023-11-20 | End: 2023-11-20

## 2023-11-20 RX ADMIN — EPHEDRINE SULFATE 10 MG: 50 INJECTION INTRAVENOUS at 02:11

## 2023-11-20 RX ADMIN — FENTANYL CITRATE 50 MCG: 50 INJECTION, SOLUTION INTRAMUSCULAR; INTRAVENOUS at 02:11

## 2023-11-20 RX ADMIN — SUCCINYLCHOLINE CHLORIDE 120 MG: 20 INJECTION, SOLUTION INTRAMUSCULAR; INTRAVENOUS at 02:11

## 2023-11-20 RX ADMIN — MEROPENEM 1 G: 1 INJECTION, POWDER, FOR SOLUTION INTRAVENOUS at 01:11

## 2023-11-20 RX ADMIN — MORPHINE SULFATE 2 MG: 2 INJECTION, SOLUTION INTRAMUSCULAR; INTRAVENOUS at 08:11

## 2023-11-20 RX ADMIN — LISINOPRIL 20 MG: 20 TABLET ORAL at 09:11

## 2023-11-20 RX ADMIN — SODIUM CHLORIDE, POTASSIUM CHLORIDE, SODIUM LACTATE AND CALCIUM CHLORIDE: 600; 310; 30; 20 INJECTION, SOLUTION INTRAVENOUS at 02:11

## 2023-11-20 RX ADMIN — PROPOFOL 50 MG: 10 INJECTION, EMULSION INTRAVENOUS at 03:11

## 2023-11-20 RX ADMIN — LIDOCAINE HYDROCHLORIDE 50 MG: 20 INJECTION, SOLUTION INTRAVENOUS at 02:11

## 2023-11-20 RX ADMIN — ONDANSETRON 8 MG: 2 INJECTION INTRAMUSCULAR; INTRAVENOUS at 03:11

## 2023-11-20 RX ADMIN — HYDRALAZINE HYDROCHLORIDE 10 MG: 20 INJECTION INTRAMUSCULAR; INTRAVENOUS at 04:11

## 2023-11-20 RX ADMIN — MORPHINE SULFATE 2 MG: 2 INJECTION, SOLUTION INTRAMUSCULAR; INTRAVENOUS at 11:11

## 2023-11-20 RX ADMIN — EPHEDRINE SULFATE 10 MG: 50 INJECTION INTRAVENOUS at 03:11

## 2023-11-20 RX ADMIN — PROPOFOL 150 MG: 10 INJECTION, EMULSION INTRAVENOUS at 02:11

## 2023-11-20 RX ADMIN — DEXAMETHASONE SODIUM PHOSPHATE 4 MG: 4 INJECTION, SOLUTION INTRAMUSCULAR; INTRAVENOUS at 02:11

## 2023-11-20 RX ADMIN — PROPOFOL 40 MG: 10 INJECTION, EMULSION INTRAVENOUS at 02:11

## 2023-11-20 RX ADMIN — ACETAMINOPHEN 1000 MG: 10 INJECTION, SOLUTION INTRAVENOUS at 03:11

## 2023-11-20 RX ADMIN — MORPHINE SULFATE 2 MG: 2 INJECTION, SOLUTION INTRAMUSCULAR; INTRAVENOUS at 12:11

## 2023-11-20 RX ADMIN — ERTAPENEM 1 G: 1 INJECTION INTRAMUSCULAR; INTRAVENOUS at 04:11

## 2023-11-20 RX ADMIN — PHENYLEPHRINE HYDROCHLORIDE 100 MCG: 10 INJECTION INTRAVENOUS at 03:11

## 2023-11-20 RX ADMIN — HYDRALAZINE HYDROCHLORIDE 10 MG: 20 INJECTION INTRAMUSCULAR; INTRAVENOUS at 08:11

## 2023-11-20 RX ADMIN — LISINOPRIL 20 MG: 20 TABLET ORAL at 12:11

## 2023-11-20 RX ADMIN — MEROPENEM 1 G: 1 INJECTION, POWDER, FOR SOLUTION INTRAVENOUS at 08:11

## 2023-11-20 RX ADMIN — MIDAZOLAM HYDROCHLORIDE 2 MG: 1 INJECTION, SOLUTION INTRAMUSCULAR; INTRAVENOUS at 02:11

## 2023-11-20 NOTE — OP NOTE
[facility]  Orthopedic Surgery     11/20/2023     Jesus Mccabe  [id number]        PREOPERATIVE DIAGNOSIS: Skin flap necrosis [T86.828, I96]     POSTOPERATIVE DIAGNOSIS:  Skin flap necrosis, dorsal ulnar, left hand, status post I&D residual wound 8 cm by 2.5 cm by 1 cm deep to tendon.     PROCEDURE:  2nd look excisional irrigation and debridement, left hand with partial closure.     SURGEON: Lakhwinder Frank D.O.     ASSISTANT:  None     ANESTHESIA:  General.     BLOOD LOSS:  Less than 20 cc.     TOURNIQUET:  Esmarch for 20 minutes     DRAINS:  Non     PATHOLOGY:  Debrided necrotic skin and subcutaneous tissue.      COMPLICATION:  None.     INDICATIONS FOR PROCEDURE:   Mr. Mccabe is a 62-year-old right-hand-dominant male who had an irrigation and debridement of his left hand on 11/16/2023 after he presented to the ER with complaints of painful swelling of his left hand.  He had an excision of a mass of the dorsal aspect of his left hand on 10/16/2023 and never returned for postop care.  He remained in his postop dressing and splint and did not remove them.   He elected to proceed with surgery after complications to include bleeding, infection, scarring, nerve/blood vessel/tendon damage, need for further surgery, failed surgery, failure to improve, stiffness, skin slough, and possible amputation were discussed through tele .  He signed a consent.     PROCEDURE IN DETAIL:   The patient was brought to the operating room and was transferred to the operating bed where all bony prominences were well padded.  General anesthesia was then administered by the Anesthesiology Department.  After general anesthesia was administered a tourniquet was applied to the upper part of the patient's left upper extremity, this was not inflated throughout the procedure.  The patient's left hand was then prepped with Betadine solution and draped in the normal sterile fashion.         After prepping and draping, the  patient's hand was elevated and exsanguinated with an Esmarch this was left in place as a tourniquet.  The patient's hand dorsal incision/flap necrosis site was inspected and flap edges were necrotic these were freshened to bleeding tissue with a #15 blade.  Also subcutaneous necrotic tissue was debrided sharply with a #15 blade.  Necrotic tendon was freshened with tenotomy scissors to clean viable tendon.  The patient's hand was then extensively irrigated.  After extensive irrigation his hand was reinspected and no further necrotic tissue was present..  It was reinspected and clean viable tissue was present.         The Esmarch was then released and full hemostasis was ensured.  The incision was then partially closed loosely with nylon suture in a trauma stitch type of fashion leaving a residual 1 cm circumference area at the distal and proximal aspect of the incision.  Bacitracin ointment was placed in the residual openings and then his hand was then dressed with 4x4s an Kerlix as fluffs and an Ace wrap.  He was then awakened by anesthesia and transferred from the operating room to the recovery room in stable condition.  He tolerated the procedure well without complication.

## 2023-11-20 NOTE — ANESTHESIA POSTPROCEDURE EVALUATION
Anesthesia Post Evaluation    Patient: Jesus Mccabe    Procedure(s) Performed: Procedure(s) (LRB):  IRRIGATION AND DEBRIDEMENT, UPPER EXTREMITY (Left)    Final Anesthesia Type: general      Patient location during evaluation: PACU  Patient participation: Yes- Able to Participate  Level of consciousness: awake and alert and oriented  Post-procedure vital signs: reviewed and stable  Pain management: adequate  Airway patency: patent    PONV status at discharge: No PONV  Anesthetic complications: no      Cardiovascular status: hemodynamically stable  Respiratory status: unassisted, spontaneous ventilation and room air  Hydration status: euvolemic  Follow-up not needed.          Vitals Value Taken Time   /81 11/20/23 1538   Temp 36.5 °C (97.7 °F) 11/20/23 1528   Pulse 96 11/20/23 1538   Resp 20 11/20/23 1538   SpO2 97 % 11/20/23 1538         No case tracking events are documented in the log.      Pain/Natalie Score: Pain Rating Prior to Med Admin: 10 (11/20/2023 12:05 PM)  Pain Rating Post Med Admin: 5 (11/20/2023  8:41 AM)  Natalie Score: 9 (11/20/2023  3:38 PM)

## 2023-11-20 NOTE — ASSESSMENT & PLAN NOTE
Speech consulted. Recommended NPO and alternative nutrition  Spoke with patient using  and .  Patient does not want g-tube or NG tube with tube feeds  Understands risks/benefits  Patient likely aspirating food with each meal.

## 2023-11-20 NOTE — SUBJECTIVE & OBJECTIVE
Interval History: NPO for procedure today. Hypoxic and febrile over the weekend. Patient likely aspirating. Declines G tube. Had prolonged conversation with patient about this last week. On broad abx currenlty for hand and UTI.     Review of Systems   All other systems reviewed and are negative.    Objective:     Vital Signs (Most Recent):  Temp: 99.3 °F (37.4 °C) (11/20/23 1200)  Pulse: 80 (11/20/23 1200)  Resp: 18 (11/20/23 1205)  BP: (!) 185/76 (11/20/23 1254)  SpO2: (!) 93 % (11/20/23 1200) Vital Signs (24h Range):  Temp:  [98.5 °F (36.9 °C)-100.7 °F (38.2 °C)] 99.3 °F (37.4 °C)  Pulse:  [64-80] 80  Resp:  [14-19] 18  SpO2:  [90 %-96 %] 93 %  BP: (133-198)/(68-92) 185/76     Weight: 63 kg (139 lb)  Body mass index is 18.85 kg/m².    Intake/Output Summary (Last 24 hours) at 11/20/2023 1344  Last data filed at 11/20/2023 0621  Gross per 24 hour   Intake 240 ml   Output 5 ml   Net 235 ml         Physical Exam    Vitals reviewed  General: NAD, Thin, Frial, Elderly, Disheveled  Head: NC/AT  Eyes: EOMI, ILANA  Cardiovascular: Pulses intact distally, Regular Rate and rhythm  Pulmonary: Normal Respiratory Rate, No respiratory distress  Gi: Soft, Non-tender  Extremities: Warm, No edema present, left hand wrapped with dressing. Clean/Dry/Intact. Able to move fingers. Significant pain with palpation of hand  Skin: Warm, dry  Neuro: Alert, Oriented x3, No focal Deficit  Psych: Appropriate mood and affect       Significant Labs: All pertinent labs within the past 24 hours have been reviewed.    Significant Imaging: I have reviewed all pertinent imaging results/findings within the past 24 hours.

## 2023-11-20 NOTE — PLAN OF CARE
Dicussed home ivab over the phone with pt's sister. In agreement with services and has no preference for agency.    Referral sent to [facility] via [employer]       11/20/23 7250   Post-Acute Status   Post-Acute Authorization IV Infusion   IV Infusion Status Referral(s) sent

## 2023-11-20 NOTE — ASSESSMENT & PLAN NOTE
Patient with Hypoxic Respiratory failure which is Acute.  he is not on home oxygen. Supplemental oxygen was provided and noted-      .   Signs/symptoms of respiratory failure include-  Hypoxia, . Contributing diagnoses includes - Aspiration Labs and images were reviewed. Patient Has not had a recent ABG. Will treat underlying causes and adjust management of respiratory failure as follows-     Aspiration precautions  Patient not willing to pursue alternative means of nutrition.

## 2023-11-20 NOTE — PLAN OF CARE
Problem: Adult Inpatient Plan of Care  Goal: Plan of Care Review  Outcome: Ongoing, Progressing     Problem: Adult Inpatient Plan of Care  Goal: Patient-Specific Goal (Individualized)  Outcome: Ongoing, Progressing     Problem: Adult Inpatient Plan of Care  Goal: Absence of Hospital-Acquired Illness or Injury  Outcome: Ongoing, Progressing     Problem: Adult Inpatient Plan of Care  Goal: Optimal Comfort and Wellbeing  Outcome: Ongoing, Progressing     Problem: Adult Inpatient Plan of Care  Goal: Readiness for Transition of Care  Outcome: Ongoing, Progressing     Problem: Fluid and Electrolyte Imbalance (Acute Kidney Injury/Impairment)  Goal: Fluid and Electrolyte Balance  Outcome: Ongoing, Progressing     Problem: Impaired Wound Healing  Goal: Optimal Wound Healing  Outcome: Ongoing, Progressing

## 2023-11-20 NOTE — PLAN OF CARE
Problem: Adult Inpatient Plan of Care  Goal: Plan of Care Review  Outcome: Ongoing, Progressing  Goal: Patient-Specific Goal (Individualized)  Outcome: Ongoing, Progressing  Goal: Absence of Hospital-Acquired Illness or Injury  Outcome: Ongoing, Progressing  Goal: Optimal Comfort and Wellbeing  Outcome: Ongoing, Progressing  Goal: Readiness for Transition of Care  Outcome: Ongoing, Progressing     Problem: Fluid and Electrolyte Imbalance (Acute Kidney Injury/Impairment)  Goal: Fluid and Electrolyte Balance  Outcome: Ongoing, Progressing     Problem: Oral Intake Inadequate (Acute Kidney Injury/Impairment)  Goal: Optimal Nutrition Intake  Outcome: Ongoing, Progressing     Problem: Renal Function Impairment (Acute Kidney Injury/Impairment)  Goal: Effective Renal Function  Outcome: Ongoing, Progressing     Problem: Impaired Wound Healing  Goal: Optimal Wound Healing  Outcome: Ongoing, Progressing     Problem: Skin Injury Risk Increased  Goal: Skin Health and Integrity  Outcome: Ongoing, Progressing     Problem: Breathing Pattern Ineffective  Goal: Effective Breathing Pattern  Outcome: Ongoing, Progressing     Problem: Fall Injury Risk  Goal: Absence of Fall and Fall-Related Injury  Outcome: Ongoing, Progressing     Problem: Infection  Goal: Absence of Infection Signs and Symptoms  Outcome: Ongoing, Progressing

## 2023-11-20 NOTE — PLAN OF CARE
Transferred to ICU 6 via hospital bed eyes closed. Awakens easily.  Resp even and unlabored on room air. No distress noted. IV site RH remains without noted problems to site.  Postop dsg clean, dry and intact. Tele monitor reapplied. Personal belongings sent to pts room when transferred. SR up x2. Bed in lowest position with brakes on. Door open. Call light placed within reach.

## 2023-11-20 NOTE — TRANSFER OF CARE
Anesthesia Transfer of Care Note    Patient: Jesus Mccabe    Procedure(s) Performed: Procedure(s) (LRB):  IRRIGATION AND DEBRIDEMENT, UPPER EXTREMITY (Left)    Patient location: PACU    Anesthesia Type: general    Transport from OR: Transported from OR on room air with adequate spontaneous ventilation    Post pain: adequate analgesia    Post assessment: no apparent anesthetic complications and tolerated procedure well    Post vital signs: stable    Level of consciousness: awake and responds to stimulation    Nausea/Vomiting: no nausea/vomiting    Complications: none    Transfer of care protocol was followed      Last vitals: Visit Vitals  BP (!) 185/76   Pulse 80   Temp 37.4 °C (99.3 °F)   Resp 18   Ht 6' (1.829 m)   Wt 63 kg (139 lb)   SpO2 (!) 93%   BMI 18.85 kg/m²

## 2023-11-20 NOTE — INTERVAL H&P NOTE
The patient has been examined and the H&P has been reviewed:    I concur with the findings and changes have been noted since the H&P was written:  Irrigation and debridement left hand    Surgery risks, benefits and alternative options discussed and understood by patient/family.          Active Hospital Problems    Diagnosis  POA    *Skin flap necrosis [T86.828, I96]  Yes    Urinary tract infection without hematuria [N39.0]  Yes    Dysphagia [R13.10]  Yes    Alcohol abuse [F10.10]  Yes    Infection of hand [L08.9]  Yes    GERD (gastroesophageal reflux disease) [K21.9]  Yes    History of hepatitis C, treated / cured (SVR12 - 8/2020) [Z86.19]  Yes    Deafness [H91.90]  Yes    HTN (hypertension) [I10]  Yes    Coronary artery disease involving coronary bypass graft with angina pectoris [I25.709]  Yes      Resolved Hospital Problems   No resolved problems to display.

## 2023-11-20 NOTE — NURSING
Called pharmacy to clarify order for vancomycin 1.5 g at 1700. Patient received 1g intra-op. Pharmacist unavailable at this time. Let message to have call back.

## 2023-11-20 NOTE — PT/OT/SLP PROGRESS
OT session not performed on this date secondary to patient being scheduled to go to the OR on this date.  OT will continue to follow up and provide skilled OT intervention when appropriate.      Wander mSith, OTR/L

## 2023-11-20 NOTE — PROGRESS NOTES
[facility] Medicine  Progress Note    Patient Name: Jesus Mccabe  MRN: [id number]  Patient Class: IP- Inpatient   Admission Date: 11/15/2023  Length of Stay: 5 days  Attending Physician: Corey Macias MD  Primary Care Provider: Jose Prater MD        Subjective:     Principal Problem:Skin flap necrosis        HPI:  61 yo w/ hx of deafness, HTN ,GERD, CAD presenting with left hand pain and swelling. Patient is s/p Irrigation and debridement left hand extensor tendons on 10/16 with Dr. Frank. Patient did not follow up in clinic. History obtained using . Per patient 2 days ago he started having swelling, redness, and pain in the hand. Endorses subjective fevers. Endorses Nausea but no vomiting. He has been having diarrhea 2-3 times per day for the past 2 days. No recent abx. No blood in stool.     Overview/Hospital Course:  No notes on file    Interval History: NPO for procedure today. Hypoxic and febrile over the weekend. Patient likely aspirating. Declines G tube. Had prolonged conversation with patient about this last week. On broad abx currenlty for hand and UTI.     Review of Systems   All other systems reviewed and are negative.    Objective:     Vital Signs (Most Recent):  Temp: 99.3 °F (37.4 °C) (11/20/23 1200)  Pulse: 80 (11/20/23 1200)  Resp: 18 (11/20/23 1205)  BP: (!) 185/76 (11/20/23 1254)  SpO2: (!) 93 % (11/20/23 1200) Vital Signs (24h Range):  Temp:  [98.5 °F (36.9 °C)-100.7 °F (38.2 °C)] 99.3 °F (37.4 °C)  Pulse:  [64-80] 80  Resp:  [14-19] 18  SpO2:  [90 %-96 %] 93 %  BP: (133-198)/(68-92) 185/76     Weight: 63 kg (139 lb)  Body mass index is 18.85 kg/m².    Intake/Output Summary (Last 24 hours) at 11/20/2023 1344  Last data filed at 11/20/2023 0621  Gross per 24 hour   Intake 240 ml   Output 5 ml   Net 235 ml         Physical Exam    Vitals reviewed  General: NAD, Thin, Frial, Elderly, Disheveled  Head: NC/AT  Eyes: EOMI,  ILANA  Cardiovascular: Pulses intact distally, Regular Rate and rhythm  Pulmonary: Normal Respiratory Rate, No respiratory distress  Gi: Soft, Non-tender  Extremities: Warm, No edema present, left hand wrapped with dressing. Clean/Dry/Intact. Able to move fingers. Significant pain with palpation of hand  Skin: Warm, dry  Neuro: Alert, Oriented x3, No focal Deficit  Psych: Appropriate mood and affect       Significant Labs: All pertinent labs within the past 24 hours have been reviewed.    Significant Imaging: I have reviewed all pertinent imaging results/findings within the past 24 hours.    Assessment/Plan:      Acute respiratory failure with hypoxia  Patient with Hypoxic Respiratory failure which is Acute.  he is not on home oxygen. Supplemental oxygen was provided and noted-      .   Signs/symptoms of respiratory failure include-  Hypoxia, . Contributing diagnoses includes - Aspiration Labs and images were reviewed. Patient Has not had a recent ABG. Will treat underlying causes and adjust management of respiratory failure as follows-     Aspiration precautions  Patient not willing to pursue alternative means of nutrition.    Urinary tract infection without hematuria  ESBL Ecoli UTI  On meropenem. Switch to Ertapenem for ease of dosing.  .      Dysphagia  Speech consulted. Recommended NPO and alternative nutrition  Spoke with patient using  and .  Patient does not want g-tube or NG tube with tube feeds  Understands risks/benefits  Patient likely aspirating food with each meal.      Alcohol abuse   on cessation  CIWA q4h with PRN ativan  Thiamine and folic acid daily      GERD (gastroesophageal reflux disease)  Protonix daily      Infection of hand  Post op infection to left hand. Did not make follow up appt.  Ortho consulted- s/p I&D left hand on 11/16  IV abx  F/U Blood cultures  No wound cultures obtained  Lactate wnl  PRN pain and nausea medications  Blood cultures  are negative.  Will discontinue Zosyn because patient has a urinary tract infection which is resistant to this antibiotic  Surgery in a.m.        History of hepatitis C, treated / cured (SVR12 - 8/2020)  Noted  Needs outpatient GI followup  Hep C and Viral Load in am      Deafness   as needed       HTN (hypertension)  Continue home BP medications as tolerated. Titrate as needed  May have to increase his blood pressure medicines but will continue to monitor  Will add 10 mg of hydralazine IV as needed for systolic hypertension    Coronary artery disease involving coronary bypass graft with angina pectoris  Unclear if patient is on ASA/Statin      VTE Risk Mitigation (From admission, onward)           Ordered     enoxaparin injection 30 mg  Daily         11/16/23 1220     Place sequential compression device  Until discontinued         11/16/23 1220     Place KATHIE hose  Until discontinued         11/16/23 1220     Reason for No Pharmacological VTE Prophylaxis  Once        Question:  Reasons:  Answer:  Physician Provided (leave comment)    11/15/23 1149     IP VTE HIGH RISK PATIENT  Once         11/15/23 1149     Place sequential compression device  Until discontinued         11/15/23 1149                    Discharge Planning   MARIA FERNANDA: 11/21/2023     Code Status: Full Code   Is the patient medically ready for discharge?:     Reason for patient still in hospital (select all that apply): Treatment  Discharge Plan A: Home Health                  Corey Macias MD  Department of Hospital Medicine   [facility]ensive [facility]

## 2023-11-21 VITALS
BODY MASS INDEX: 18.83 KG/M2 | HEIGHT: 72 IN | HEART RATE: 77 BPM | WEIGHT: 139 LBS | TEMPERATURE: 98 F | SYSTOLIC BLOOD PRESSURE: 126 MMHG | RESPIRATION RATE: 18 BRPM | OXYGEN SATURATION: 89 % | DIASTOLIC BLOOD PRESSURE: 60 MMHG

## 2023-11-21 LAB
ANION GAP SERPL CALC-SCNC: 12 MMOL/L (ref 8–16)
BACTERIA SPEC AEROBE CULT: NORMAL
BACTERIA SPEC AEROBE CULT: NORMAL
BASOPHILS # BLD AUTO: ABNORMAL K/UL (ref 0–0.2)
BASOPHILS NFR BLD: 0 % (ref 0–1.9)
BUN SERPL-MCNC: 12 MG/DL (ref 8–23)
CALCIUM SERPL-MCNC: 8.9 MG/DL (ref 8.7–10.5)
CHLORIDE SERPL-SCNC: 100 MMOL/L (ref 95–110)
CO2 SERPL-SCNC: 25 MMOL/L (ref 23–29)
CREAT SERPL-MCNC: 0.9 MG/DL (ref 0.5–1.4)
DIFFERENTIAL METHOD: ABNORMAL
EOSINOPHIL # BLD AUTO: ABNORMAL K/UL (ref 0–0.5)
EOSINOPHIL NFR BLD: 0 % (ref 0–8)
ERYTHROCYTE [DISTWIDTH] IN BLOOD BY AUTOMATED COUNT: 15.9 % (ref 11.5–14.5)
EST. GFR  (NO RACE VARIABLE): >60 ML/MIN/1.73 M^2
GLUCOSE SERPL-MCNC: 149 MG/DL (ref 70–110)
GRAM STN SPEC: NORMAL
HCT VFR BLD AUTO: 33.2 % (ref 40–54)
HGB BLD-MCNC: 11.1 G/DL (ref 14–18)
IMM GRANULOCYTES # BLD AUTO: ABNORMAL K/UL (ref 0–0.04)
IMM GRANULOCYTES NFR BLD AUTO: ABNORMAL % (ref 0–0.5)
LYMPHOCYTES # BLD AUTO: ABNORMAL K/UL (ref 1–4.8)
LYMPHOCYTES NFR BLD: 12 % (ref 18–48)
MCH RBC QN AUTO: 29.1 PG (ref 27–31)
MCHC RBC AUTO-ENTMCNC: 33.4 G/DL (ref 32–36)
MCV RBC AUTO: 87 FL (ref 82–98)
MONOCYTES # BLD AUTO: ABNORMAL K/UL (ref 0.3–1)
MONOCYTES NFR BLD: 8 % (ref 4–15)
NEUTROPHILS NFR BLD: 80 % (ref 38–73)
NRBC BLD-RTO: 0 /100 WBC
PLATELET # BLD AUTO: 274 K/UL (ref 150–450)
PMV BLD AUTO: 11.6 FL (ref 9.2–12.9)
POTASSIUM SERPL-SCNC: 3.7 MMOL/L (ref 3.5–5.1)
RBC # BLD AUTO: 3.81 M/UL (ref 4.6–6.2)
SODIUM SERPL-SCNC: 137 MMOL/L (ref 136–145)
WBC # BLD AUTO: 7.23 K/UL (ref 3.9–12.7)

## 2023-11-21 PROCEDURE — 63600175 PHARM REV CODE 636 W HCPCS: Performed by: ORTHOPAEDIC SURGERY

## 2023-11-21 PROCEDURE — 27000221 HC OXYGEN, UP TO 24 HOURS

## 2023-11-21 PROCEDURE — 85007 BL SMEAR W/DIFF WBC COUNT: CPT | Performed by: STUDENT IN AN ORGANIZED HEALTH CARE EDUCATION/TRAINING PROGRAM

## 2023-11-21 PROCEDURE — C1751 CATH, INF, PER/CENT/MIDLINE: HCPCS

## 2023-11-21 PROCEDURE — 25000003 PHARM REV CODE 250: Performed by: ORTHOPAEDIC SURGERY

## 2023-11-21 PROCEDURE — 94761 N-INVAS EAR/PLS OXIMETRY MLT: CPT

## 2023-11-21 PROCEDURE — 85027 COMPLETE CBC AUTOMATED: CPT | Performed by: STUDENT IN AN ORGANIZED HEALTH CARE EDUCATION/TRAINING PROGRAM

## 2023-11-21 PROCEDURE — 80048 BASIC METABOLIC PNL TOTAL CA: CPT | Performed by: STUDENT IN AN ORGANIZED HEALTH CARE EDUCATION/TRAINING PROGRAM

## 2023-11-21 PROCEDURE — 99238 PR HOSPITAL DISCHARGE DAY,<30 MIN: ICD-10-PCS | Mod: ,,, | Performed by: STUDENT IN AN ORGANIZED HEALTH CARE EDUCATION/TRAINING PROGRAM

## 2023-11-21 PROCEDURE — S4991 NICOTINE PATCH NONLEGEND: HCPCS | Performed by: ORTHOPAEDIC SURGERY

## 2023-11-21 PROCEDURE — 36410 VNPNXR 3YR/> PHY/QHP DX/THER: CPT

## 2023-11-21 PROCEDURE — 99238 HOSP IP/OBS DSCHRG MGMT 30/<: CPT | Mod: ,,, | Performed by: STUDENT IN AN ORGANIZED HEALTH CARE EDUCATION/TRAINING PROGRAM

## 2023-11-21 RX ORDER — IBUPROFEN 200 MG
1 TABLET ORAL DAILY
Qty: 28 PATCH | Refills: 0 | Status: SHIPPED | OUTPATIENT
Start: 2023-11-21 | End: 2023-12-01

## 2023-11-21 RX ADMIN — FOLIC ACID 1 MG: 1 TABLET ORAL at 09:11

## 2023-11-21 RX ADMIN — ERTAPENEM 1 G: 1 INJECTION INTRAMUSCULAR; INTRAVENOUS at 03:11

## 2023-11-21 RX ADMIN — ENOXAPARIN SODIUM 30 MG: 30 INJECTION SUBCUTANEOUS at 04:11

## 2023-11-21 RX ADMIN — NICOTINE 1 PATCH: 21 PATCH, EXTENDED RELEASE TRANSDERMAL at 09:11

## 2023-11-21 RX ADMIN — LISINOPRIL 40 MG: 20 TABLET ORAL at 09:11

## 2023-11-21 RX ADMIN — THIAMINE HCL TAB 100 MG 100 MG: 100 TAB at 09:11

## 2023-11-21 NOTE — PLAN OF CARE
[facility] accepted pt. SOC 11/22 11/21/23 154   Post-Acute Status   Post-Acute Authorization Home Health   Home Health Status Set-up Complete/Auth obtained

## 2023-11-21 NOTE — PROGRESS NOTES
S:  Mr. Mccabe was seen and examined.  He was resting comfortably in bed.  He had a 2nd look irrigation and debridement of his left hand with partial closure yesterday.  He stated he is doing well and his pain is well controlled.    O:  Vital signs stable, reviewed.  Temperature 98°.    Neurovascular no change from preop.    Dressing clean dry and intact without blood tinging.    Mild swelling fingers left hand.    Good motion of the fingers of the left hand.  Flexion contracture fingers of the right hand consistent with preadmission.  Relatively nontender with palpation left hand.  Relatively nontender with finger motion left hand.  Laboratory:  WBC 7.23; hemoglobin 11.1; hematocrit 33.2; platelets 274.    A:  Status post 2nd look irrigation and debridement with partial closure left hand, POD #1.    P:  1. Maintain current dressing beginning tomorrow can start every other day wet-to-dry dressing changes may use bacitracin ointment in open areas apply fresh dressing and fluffs with an Ace wrap.  2. Continue to elevate left hand.    3. Continue with K-pad to left hand set at 101° F.  4. All antibiotics per hospitalist.  5. All pain management per hospital neck  6. May use left hand for activities of daily living but no heavy lifting pushing pulling climbing requiring the use of the left hand.    7. Orthopedically stable for discharge home once home health has been arranged to do every other day wet-to-dry dressing changes.    8. Discharge home when okay with hospitalist and home health has been arranged for wound care.    9. Orthopedic discharge instructions:     A. Elevate left hand.      B. Apply heating pad to left hand set at 101° F.      C. Change dressing every other day place wet-to-dry dressing or place bacitracin ointment in open areas followed by fresh gauze, fluffs, and Ace wrap.      D. When dressing is changed may shower may allow water to run over hand cleanse hand with warm soapy water pat dry and  place fresh dressing.      E. Activities with the right hand no heavy lifting/pushing/pulling/climbing requiring the use of the left hand.  May use left hand for gentle activities of daily living such as feeding oneself or dressing.      F. Must stay in a clean dry environment    G. Follow-up in office in 7 days.

## 2023-11-21 NOTE — NURSING
Discharge instructions given to patient/family. Patient/family verbalized understanding. Verbalized need for refill of lisinopril. Relayed to Dr. Macias. Telemetry monitor removed. Midline to remain in place. Patient transported off unit via WC.

## 2023-11-21 NOTE — PLAN OF CARE
Problem: Adult Inpatient Plan of Care  Goal: Plan of Care Review  Outcome: Ongoing, Progressing  Goal: Patient-Specific Goal (Individualized)  Outcome: Ongoing, Progressing  Goal: Absence of Hospital-Acquired Illness or Injury  Outcome: Ongoing, Progressing  Goal: Optimal Comfort and Wellbeing  Outcome: Ongoing, Progressing  Goal: Readiness for Transition of Care  Outcome: Ongoing, Progressing     Problem: Fluid and Electrolyte Imbalance (Acute Kidney Injury/Impairment)  Goal: Fluid and Electrolyte Balance  Outcome: Ongoing, Progressing     Problem: Oral Intake Inadequate (Acute Kidney Injury/Impairment)  Goal: Optimal Nutrition Intake  Outcome: Ongoing, Progressing     Problem: Renal Function Impairment (Acute Kidney Injury/Impairment)  Goal: Effective Renal Function  Outcome: Ongoing, Progressing     Problem: Impaired Wound Healing  Goal: Optimal Wound Healing  Outcome: Ongoing, Progressing     Problem: Skin Injury Risk Increased  Goal: Skin Health and Integrity  Outcome: Ongoing, Progressing     Problem: Breathing Pattern Ineffective  Goal: Effective Breathing Pattern  Outcome: Ongoing, Progressing     Problem: Fall Injury Risk  Goal: Absence of Fall and Fall-Related Injury  Outcome: Ongoing, Progressing     Problem: Infection  Goal: Absence of Infection Signs and Symptoms  Outcome: Ongoing, Progressing   POC reviewed at bedside. Questions and concerns addressed. VSS. Placed bed in low and locked position. Call light within reach. Side rails up x2. Instructed to call for any needs. Verbalized understanding of all instructions. Frequent rounds.

## 2023-11-21 NOTE — PLAN OF CARE
Joleen with [facility]) meeting with patient and sister for home IVAB education at 1 PM today       11/21/23 1036   Post-Acute Status   Post-Acute Authorization IV Infusion

## 2023-11-21 NOTE — PLAN OF CARE
[facility] accepted pt. Will send orders once available       11/21/23 5978   Post-Acute Status   Post-Acute Authorization Home Health   Home Health Status Pending medical clearance/testing

## 2023-11-21 NOTE — PLAN OF CARE
11/20/23 1540   Medicare Message   Important Message from Medicare regarding Discharge Appeal Rights Given to patient/caregiver;Signed/date by patient/caregiver;Explained to patient/caregiver   Date IMM was signed 11/20/23   Time IMM was signed [id number]

## 2023-11-21 NOTE — PLAN OF CARE
Problem: Breathing Pattern Ineffective  Goal: Effective Breathing Pattern  Outcome: Ongoing, Progressing  Intervention: Promote Improved Breathing Pattern  Flowsheets (Taken 11/21/2023 7649)  Airway/Ventilation Management: airway patency maintained  Breathing Techniques/Airway Clearance: diaphragmatic breathing promoted  Head of Bed (HOB) Positioning: HOB elevated   Patient in no apparent distress. Patient weaned to room air.  Sat's   91% on room air . Will continue to monitor.

## 2023-11-21 NOTE — PLAN OF CARE
Pt clear for DC from case management standpoint. Discharging to home with [facility] (SOC 11/22) and home IVAB with [facility]       11/21/23 2937   Final Note   Assessment Type Final Discharge Note   Anticipated Discharge Disposition Home-Health   Post-Acute Status   Post-Acute Authorization Home Health   Home Health Status Set-up Complete/Auth obtained

## 2023-11-22 LAB
BACTERIA STL CULT: NORMAL
BACTERIA STL CULT: NORMAL

## 2023-11-26 NOTE — HOSPITAL COURSE
Patient admitted for post-operative infection following s/p Irrigation and debridement left hand extensor tendons on 11/16 w/ subsequent debridement on 11/20. No cultures obtained from either surgery. Hospital course complicated by ESBL Ecoli UTI that was present on admit. Treated with IV ertapenem. Discharged with home health abx to complete course. Patient also with evidence of significant swallowing difficulty. Speech recommending alternative nutrition but patient declined multiple times with  present. Patient medically clear for discharge and provided discharge instructions and return precautions.

## 2023-11-29 ENCOUNTER — OFFICE VISIT (OUTPATIENT)
Dept: ORTHOPEDICS | Facility: CLINIC | Age: 63
End: 2023-11-29
Payer: MEDICARE

## 2023-11-29 VITALS — HEIGHT: 72 IN | HEART RATE: 76 BPM | OXYGEN SATURATION: 97 % | WEIGHT: 139 LBS | BODY MASS INDEX: 18.83 KG/M2

## 2023-11-29 DIAGNOSIS — I10 PRIMARY HYPERTENSION: ICD-10-CM

## 2023-11-29 DIAGNOSIS — T86.828 SKIN FLAP NECROSIS: Primary | ICD-10-CM

## 2023-11-29 DIAGNOSIS — I96 SKIN FLAP NECROSIS: Primary | ICD-10-CM

## 2023-11-29 PROCEDURE — 99212 OFFICE O/P EST SF 10 MIN: CPT | Mod: PBBFAC | Performed by: ORTHOPAEDIC SURGERY

## 2023-11-29 PROCEDURE — 99999 PR PBB SHADOW E&M-EST. PATIENT-LVL II: CPT | Mod: PBBFAC,,, | Performed by: ORTHOPAEDIC SURGERY

## 2023-11-29 PROCEDURE — 99999 PR PBB SHADOW E&M-EST. PATIENT-LVL II: ICD-10-PCS | Mod: PBBFAC,,, | Performed by: ORTHOPAEDIC SURGERY

## 2023-11-29 PROCEDURE — 99024 PR POST-OP FOLLOW-UP VISIT: ICD-10-PCS | Mod: POP,,, | Performed by: ORTHOPAEDIC SURGERY

## 2023-11-29 PROCEDURE — 99024 POSTOP FOLLOW-UP VISIT: CPT | Mod: POP,,, | Performed by: ORTHOPAEDIC SURGERY

## 2023-11-29 NOTE — PROGRESS NOTES
Subjective:      Patient ID: Jesus Mccabe is a 62 y.o. male.    Chief Complaint: Injury of the Left Hand      HPI:  Mr. Mccabe returned today for re-evaluation of his left hand.  He was admitted to the hospital on 11/15/2023 with flap necrosis of a previous incision site on the dorsal aspect of his left hand after a excision mass of the dorsal aspect of his left hand on 10/16/2023 where he did not return to the office for postop follow-ups.  He presented to the emergency room on 11/15/2023 with malodorous hand.  He subsequently underwent an irrigation debridement on 11/16/2023 and a 2nd look irrigation debridement with closure on 11/20/2023.  He has been getting care through home health/home PT since he was discharged home on 11/21/2023.  His pain is well controlled.  He does have some stiffness in the fingers of his left hand.  This office visit was interpreted with a teleprompter signing .    ROS:  No new diagnosis/surgery/prescriptions since last last being seen in the hospital on 11/21/2023  Constitution: Negative for chills and fever.   HENT: Positive for hearing loss. Negative for congestion.    Eyes: Negative for blurred vision.   Cardiovascular: Negative for chest pain.   Respiratory: Negative for cough.    Endocrine: Negative for polydipsia.   Hematologic/Lymphatic: Negative for adenopathy.   Skin: Negative for flushing and itching.   Musculoskeletal: Negative for gout.   Gastrointestinal: Positive for heartburn. Negative for constipation and diarrhea.   Genitourinary: Negative for nocturia.   Neurological: Positive for headaches. Negative for seizures.   Psychiatric/Behavioral: Positive for depression and substance abuse. The patient is nervous/anxious.    Allergic/Immunologic: Negative for environmental allergies.       Objective:      Physical Exam:   General: AAOx3.  No acute distress  Vascular:  Pulses intact and equal bilaterally.  Capillary refill less than 3 seconds and equal  bilaterally  Neurologic:  Pinprick and soft touch intact and equal bilaterally  Integment:  Incisions well approximated with sutures in place.  0.7 cm opening at the distal and proximal extent of the incision with good granulation tissue.  Extremity:  Hand: Pronation/supination left hand 85/85 degrees.  Dorsiflexion/volar flexion left hand 55/50 degrees.  Right hand with severe flexion contracture consistent with preop.  Decreased motion fingers of left hand but active motion is intact.  No swelling.  Nontender with palpation.  No purulence.  Radiography:  No new x-rays done today.      Assessment:       Impression:     1. Status post irrigation debridement/2nd look irrigation debridement left hand secondary to skin flap necrosis.         Plan:       1.  Discussed physical examination with the patient. Jesus understands that he appears to be progressing well in his healing his closure.    2. Place a fresh sterile dressing with bacitracin ointment over granulation areas fresh gauze and Ace wraps.  Supplies were given to the patient he was also shown how to changes dressing.  3. Cleanse hand with warm soapy water on a daily basis pat dry and place fresh dressing.  4. Any pain can be treated with over-the-counter medications dosed per box instructions.    5. He understands he needs to start doing more aggressive finger motion exercises these were shown to him with flexion and extension exercises.  6. Continue doing occupational therapy with aggressive finger motion exercises.    7. Follow up in 1 week for suture removal.

## 2023-12-01 ENCOUNTER — LAB VISIT (OUTPATIENT)
Dept: LAB | Facility: HOSPITAL | Age: 63
End: 2023-12-01
Attending: STUDENT IN AN ORGANIZED HEALTH CARE EDUCATION/TRAINING PROGRAM
Payer: MEDICARE

## 2023-12-01 ENCOUNTER — DOCUMENTATION ONLY (OUTPATIENT)
Dept: FAMILY MEDICINE | Facility: CLINIC | Age: 63
End: 2023-12-01

## 2023-12-01 ENCOUNTER — OFFICE VISIT (OUTPATIENT)
Dept: FAMILY MEDICINE | Facility: CLINIC | Age: 63
End: 2023-12-01
Payer: MEDICARE

## 2023-12-01 VITALS
HEART RATE: 72 BPM | BODY MASS INDEX: 19.5 KG/M2 | OXYGEN SATURATION: 99 % | DIASTOLIC BLOOD PRESSURE: 92 MMHG | RESPIRATION RATE: 18 BRPM | HEIGHT: 72 IN | SYSTOLIC BLOOD PRESSURE: 170 MMHG | WEIGHT: 144 LBS

## 2023-12-01 DIAGNOSIS — Z86.19 HISTORY OF HEPATITIS C: ICD-10-CM

## 2023-12-01 DIAGNOSIS — F17.218 NICOTINE DEPENDENCE, CIGARETTES, WITH OTHER NICOTINE-INDUCED DISORDERS: ICD-10-CM

## 2023-12-01 DIAGNOSIS — Z12.5 ENCOUNTER FOR PROSTATE CANCER SCREENING: ICD-10-CM

## 2023-12-01 DIAGNOSIS — Z98.890 H/O HAND SURGERY: ICD-10-CM

## 2023-12-01 DIAGNOSIS — N39.3 STRESS INCONTINENCE OF URINE: ICD-10-CM

## 2023-12-01 DIAGNOSIS — I10 PRIMARY HYPERTENSION: Primary | ICD-10-CM

## 2023-12-01 DIAGNOSIS — J40 BRONCHITIS: ICD-10-CM

## 2023-12-01 LAB
BACTERIA #/AREA URNS HPF: ABNORMAL /HPF
BILIRUB UR QL STRIP: ABNORMAL
CLARITY UR: CLEAR
COLOR UR: YELLOW
COMPLEXED PSA SERPL-MCNC: 1.6 NG/ML (ref 0–4)
GLUCOSE UR QL STRIP: NEGATIVE
HGB UR QL STRIP: ABNORMAL
HYALINE CASTS #/AREA URNS LPF: 0 /LPF
KETONES UR QL STRIP: ABNORMAL
LEUKOCYTE ESTERASE UR QL STRIP: NEGATIVE
MICROSCOPIC COMMENT: ABNORMAL
NITRITE UR QL STRIP: NEGATIVE
PH UR STRIP: 6 [PH] (ref 5–8)
PROT UR QL STRIP: ABNORMAL
RBC #/AREA URNS HPF: 60 /HPF (ref 0–4)
SP GR UR STRIP: 1.02 (ref 1–1.03)
URN SPEC COLLECT METH UR: ABNORMAL
UROBILINOGEN UR STRIP-ACNC: NEGATIVE EU/DL
WBC #/AREA URNS HPF: 4 /HPF (ref 0–5)
YEAST URNS QL MICRO: ABNORMAL

## 2023-12-01 PROCEDURE — 99999 PR PBB SHADOW E&M-EST. PATIENT-LVL IV: CPT | Mod: PBBFAC,,, | Performed by: STUDENT IN AN ORGANIZED HEALTH CARE EDUCATION/TRAINING PROGRAM

## 2023-12-01 PROCEDURE — 99999 PR PBB SHADOW E&M-EST. PATIENT-LVL IV: ICD-10-PCS | Mod: PBBFAC,,, | Performed by: STUDENT IN AN ORGANIZED HEALTH CARE EDUCATION/TRAINING PROGRAM

## 2023-12-01 PROCEDURE — 84153 ASSAY OF PSA TOTAL: CPT | Performed by: STUDENT IN AN ORGANIZED HEALTH CARE EDUCATION/TRAINING PROGRAM

## 2023-12-01 PROCEDURE — 99214 OFFICE O/P EST MOD 30 MIN: CPT | Mod: S$PBB,,, | Performed by: STUDENT IN AN ORGANIZED HEALTH CARE EDUCATION/TRAINING PROGRAM

## 2023-12-01 PROCEDURE — 81000 URINALYSIS NONAUTO W/SCOPE: CPT | Performed by: STUDENT IN AN ORGANIZED HEALTH CARE EDUCATION/TRAINING PROGRAM

## 2023-12-01 PROCEDURE — 36415 COLL VENOUS BLD VENIPUNCTURE: CPT | Performed by: STUDENT IN AN ORGANIZED HEALTH CARE EDUCATION/TRAINING PROGRAM

## 2023-12-01 PROCEDURE — 99214 OFFICE O/P EST MOD 30 MIN: CPT | Mod: PBBFAC | Performed by: STUDENT IN AN ORGANIZED HEALTH CARE EDUCATION/TRAINING PROGRAM

## 2023-12-01 PROCEDURE — 99214 PR OFFICE/OUTPT VISIT, EST, LEVL IV, 30-39 MIN: ICD-10-PCS | Mod: S$PBB,,, | Performed by: STUDENT IN AN ORGANIZED HEALTH CARE EDUCATION/TRAINING PROGRAM

## 2023-12-01 RX ORDER — LISINOPRIL 40 MG/1
40 TABLET ORAL
Qty: 30 TABLET | Refills: 0 | OUTPATIENT
Start: 2023-12-01

## 2023-12-01 RX ORDER — LISINOPRIL 40 MG/1
40 TABLET ORAL DAILY
Qty: 30 TABLET | Refills: 11 | Status: SHIPPED | OUTPATIENT
Start: 2023-12-01 | End: 2024-11-30

## 2023-12-01 RX ORDER — MELOXICAM 15 MG/1
15 TABLET ORAL DAILY
Qty: 30 TABLET | Refills: 0 | Status: SHIPPED | OUTPATIENT
Start: 2023-12-01

## 2023-12-01 RX ORDER — ALBUTEROL SULFATE 90 UG/1
2 AEROSOL, METERED RESPIRATORY (INHALATION) EVERY 6 HOURS PRN
Qty: 18 G | Refills: 0 | Status: SHIPPED | OUTPATIENT
Start: 2023-12-01 | End: 2024-11-30

## 2023-12-01 NOTE — PROGRESS NOTES
[facility] Note    Subjective:    The HPI and pertinent ROS is included in the Diagnostic Impression Remarks section at the end of the note. Please see below for further details. Chief complaint is at end of note.     Jesus is a pleasant intelligent patient who is here for evaluation.     Modified Medications    Modified Medication Previous Medication    LISINOPRIL (PRINIVIL,ZESTRIL) 40 MG TABLET lisinopriL (PRINIVIL,ZESTRIL) 40 MG tablet       Take 1 tablet (40 mg total) by mouth once daily.    Take 1 tablet (40 mg total) by mouth once daily.       Data reviewed 274}  Previous medical records reviewed and summarized in plan section at end of note.      If you are due for any health screening(s) below please notify me so we can arrange them to be ordered and scheduled. Most healthy patients at your age complete them, but you are free to accept or refuse. If you can't do it, I'll definitely understand. If you can, I'd certainly appreciate it!     Tests to Keep You Healthy    Colon Cancer Screening: DUE  Last Blood Pressure <= 139/89 (12/1/2023): NO      The following portions of the patient's history were reviewed and updated as appropriate: allergies, current medications, past family history, past medical history, past social history, past surgical history and problem list.    He  has a past medical history of Advanced liver fibrosis, Coronary artery disease, Gout, History of hepatitis C, treated / cured (SVR12 - 8/2020), Language barrier, MI (myocardial infarction), and Ulcer.  He  has a past surgical history that includes Abdominal surgery; Extensor tendon of forearm / wrist repair; Cardiac surgery; Intramedullary rodding of humerus (Left, 10/20/2019); Esophagogastroduodenoscopy (08/31/2013); Colonoscopy (09/02/2013); Open reduction and internal fixation (ORIF) of fracture of proximal humerus (Right, 6/15/2023); Excision of mass of hand (Left, 10/16/2023); tenodesis, finger extensor, at wrist  (Left, 10/16/2023); Incision and drainage of hand (Left, 11/16/2023); and Irrigation and debridement of upper extremity (Left, 11/20/2023).    He  reports that he has been smoking cigarettes. He has never used smokeless tobacco. He reports current alcohol use of about 1.0 standard drink of alcohol per week. He reports that he does not use drugs.  He family history is not on file.    Review of patient's allergies indicates:  No Known Allergies    Tobacco Use: High Risk (12/1/2023)    Patient History     Smoking Tobacco Use: Every Day     Smokeless Tobacco Use: Never     Passive Exposure: Not on file     Physical Examination  General appearance: alert, cooperative, no distress  Neck: no thyromegaly, no neck stiffness  Lungs: coughing on exam  Heart: normal rate, regular rhythm, normal S1, S2, no murmurs, rubs, clicks or gallops  Abdomen: soft, nontender, nondistended, no rigidity, rebound, or guarding.   Back: no point tenderness over spine  Extremities: there is deformity on the right hand. The left hand is appropriately bandaged  Neurological:alert, oriented, patient is unable to talk but answered in sign language appropriately    BP Readings from Last 3 Encounters:   12/01/23 (!) 170/92   11/21/23 126/60   10/16/23 133/78     Wt Readings from Last 3 Encounters:   12/01/23 65.3 kg (144 lb)   11/29/23 63 kg (139 lb)   11/16/23 63 kg (139 lb)     BP (!) 170/92 (BP Location: Left arm, Patient Position: Sitting, BP Method: Large (Manual))   Pulse 72   Resp 18   Ht 6' (1.829 m)   Wt 65.3 kg (144 lb)   SpO2 99%   BMI 19.53 kg/m²    274}  Laboratory: I have reviewed old labs below:    274}    Lab Results   Component Value Date    WBC 7.23 11/21/2023    HGB 11.1 (L) 11/21/2023    HCT 33.2 (L) 11/21/2023    MCV 87 11/21/2023     11/21/2023     11/21/2023    K 3.7 11/21/2023     11/21/2023    CALCIUM 8.9 11/21/2023    PHOS 2.7 11/19/2023    CO2 25 11/21/2023     (H) 11/21/2023    BUN 12  "11/21/2023    CREATININE 0.9 11/21/2023    EGFRNORACEVR >60.0 11/21/2023    ANIONGAP 12 11/21/2023    PROT 6.8 11/18/2023    ALBUMIN 1.9 (L) 11/19/2023    BILITOT 0.4 11/18/2023    ALKPHOS 49 (L) 11/18/2023    ALT 11 11/18/2023    AST 36 11/18/2023    INR 1.0 10/12/2023    TSH 2.269 11/15/2023    PSA 0.60 10/20/2019    HGBA1C 5.6 01/24/2020      Lab reviewed by me: Particular labs of significance that I will monitor, workup, or treat to improve are mentioned below in diagnostic impression remarks.    Imaging/EKG: I have reviewed the pertinent results and my findings are noted in remarks.  274}    CC:   Chief Complaint   Patient presents with    [facility]    Follow-up     Hospital follow up sepsis and home health, alcoholic, high BP. Not on his BP medication. Deaf        274}    Assessment/Plan  Jesus Mccabe is a 62 y.o. male who presents to clinic with:  1. Primary hypertension    2. Bronchitis    3. Nicotine dependence, cigarettes, with other nicotine-induced disorders    4. Stress incontinence of urine    5. Encounter for prostate cancer screening    6. H/O hand surgery       274}  Diagnostic Impression Remarks + HPI     Documentation entered by me for this encounter may have been done in part using speech-recognition technology. Although I have made an effort to ensure accuracy, "sound like" errors may exist and should be interpreted in context.     Recently hospitalization d/t hand infection as a result of poor follow up from recent hand surgery. Patient was septic as a result of the wound and received abx and s/p Irrigation and debridement left hand. Patient experienced dysphagia but speech was consulted and recommend NPO and recommended alternative nutrition but patient declined. There is suspicion of aspiration of food. Patient is also a long time smoker. Will discuss hepatitis C at follow up. Has close f/u with surgeon for the wound.     This is the extent of this pleasant patient's concerns at this " present time. He did not feel chest pain upon exertion, dyspnea, nausea, vomiting, diaphoresis, or syncope. No pleuritic chest pain, unilateral leg swelling, calf tenderness, or calf pain. Negative for unintentional weight loss night sweats, hematuria, and fevers. Jesus will return to clinic in a few months for further workup and reassessment or sooner as needed. He was instructed to call the clinic or go to the emergency department or urgent care immediately if his symptoms do not improve, worsens, or if any new symptoms develop. As we discussed that symptoms could worsen over the next 24 hours he was advised that if any increased swelling, pain, or numbness arise to go immediately to the ED. Patient knows to call any time if an emergency arises. Shared decision making occurred and he verbalized understanding in agreement with this plan. I discussed imaging findings, diagnosis, possibilities, treatment options, medications, risks, and benefits. He had many questions regarding the options and long-term effects. All questions were answered. He expressed understanding after counseling regarding the diagnosis and recommendations. He was capable and demonstrated competence with understanding of these options. Shared decision making was performed resulting in him choosing the current treatment plan. Patient handout was given with instructions and recommendations. Advised the patient that if they become pregnant to alert us immediately to assess for medication changes. Having a healthy weight can decrease the risk of 13 cancers and is an important goal. I also discussed the importance of close follow up to discuss labs, change or modify his medications if needed, monitor side effects, and further evaluation of medical problems.     Additional workup planned: see labs ordered below.    See below for labs and meds ordered with associated diagnosis      1. Primary hypertension  - lisinopriL (PRINIVIL,ZESTRIL) 40 MG tablet;  Take 1 tablet (40 mg total) by mouth once daily.  Dispense: 30 tablet; Refill: 11    2. Bronchitis  - albuterol (VENTOLIN HFA) 90 mcg/actuation inhaler; Inhale 2 puffs into the lungs every 6 (six) hours as needed for Wheezing. Rescue  Dispense: 18 g; Refill: 0  - Complete PFT w/ bronchodilator; Future    3. Nicotine dependence, cigarettes, with other nicotine-induced disorders  - Ambulatory referral/consult to Smoking Cessation Program; Future  - CT Chest Lung Screening Low Dose; Future    4. Stress incontinence of urine  - PSA, Screening; Future  - Urinalysis, Reflex to Urine Culture Urine, Clean Catch; Future  - Urinalysis; Future  - Ambulatory referral/consult to Urology; Future    5. Encounter for prostate cancer screening  - PSA, Screening; Future    6. H/O hand surgery  - meloxicam (MOBIC) 15 MG tablet; Take 1 tablet (15 mg total) by mouth once daily.  Dispense: 30 tablet; Refill: 0      Richa Velarde MD   274}    If you are due for any health screening(s) below please notify me so we can arrange them to be ordered and scheduled. Most healthy patients at your age complete them, but you are free to accept or refuse.     If you can't do it, I'll definitely understand. If you can, I'd certainly appreciate it!   Tests to Keep You Healthy    Colon Cancer Screening: DUE  Last Blood Pressure <= 139/89 (12/1/2023): NO

## 2023-12-01 NOTE — PROGRESS NOTES
Due to language barrier, an  was present during the history-taking and subsequent discussion (and for part of the physical exam) with this patient. USED MARY with Dr. VERA. ASL.     Thank you,   Rachel Martinez MA

## 2023-12-04 ENCOUNTER — TELEPHONE (OUTPATIENT)
Dept: FAMILY MEDICINE | Facility: CLINIC | Age: 63
End: 2023-12-04
Payer: MEDICARE

## 2023-12-04 NOTE — DISCHARGE SUMMARY
[facility] Medicine  Discharge Summary      Patient Name: Jesus Mccabe  MRN: [id number]  [facility]: [id number]  Patient Class: IP- Inpatient  Admission Date: 11/15/2023  Hospital Length of Stay: 6 days  Discharge Date and Time: 11/21/2023  2:00 PM  Attending Physician: No att. providers found   Discharging Provider: Corey Macias MD  Primary Care Provider: Richa Velarde MD    Primary Care Team: Networked reference to record PCT     HPI:   61 yo w/ hx of deafness, HTN ,GERD, CAD presenting with left hand pain and swelling. Patient is s/p Irrigation and debridement left hand extensor tendons on 10/16 with Dr. Frank. Patient did not follow up in clinic. History obtained using . Per patient 2 days ago he started having swelling, redness, and pain in the hand. Endorses subjective fevers. Endorses Nausea but no vomiting. He has been having diarrhea 2-3 times per day for the past 2 days. No recent abx. No blood in stool.     Procedure(s) (LRB):  IRRIGATION AND DEBRIDEMENT, UPPER EXTREMITY (Left)      Hospital Course:   Patient admitted for post-operative infection following s/p Irrigation and debridement left hand extensor tendons on 11/16 w/ subsequent debridement on 11/20. No cultures obtained from either surgery. Hospital course complicated by ESBL Ecoli UTI that was present on admit. Treated with IV ertapenem. Discharged with home health abx to complete course. Patient also with evidence of significant swallowing difficulty. Speech recommending alternative nutrition but patient declined multiple times with  present. Patient medically clear for discharge and provided discharge instructions and return precautions.     Goals of Care Treatment Preferences:  Code Status: Full Code      Consults:   Consults (From admission, onward)          Status Ordering Provider     Inpatient consult to   Once        Provider:  (Not yet assigned)    Completed  SHILA DEE consult to case management  Once        Provider:  (Not yet assigned)    Completed LAKHWINDER FRANK            No new Assessment & Plan notes have been filed under this hospital service since the last note was generated.  Service: Hospital Medicine    Final Active Diagnoses:    Diagnosis Date Noted POA    PRINCIPAL PROBLEM:  Skin flap necrosis [T86.828, I96] 11/15/2023 Yes    Acute respiratory failure with hypoxia [J96.01] 11/20/2023 No    Urinary tract infection without hematuria [N39.0] 11/18/2023 Yes    Dysphagia [R13.10] 11/17/2023 Yes    Alcohol abuse [F10.10] 11/16/2023 Yes    Infection of hand [L08.9] 11/15/2023 Yes    GERD (gastroesophageal reflux disease) [K21.9] 11/15/2023 Yes    History of hepatitis C, treated / cured (SVR12 - 8/2020) [Z86.19]  Yes    Deafness [H91.90] 10/20/2019 Yes    HTN (hypertension) [I10] 10/19/2019 Yes    Coronary artery disease involving coronary bypass graft with angina pectoris [I25.709] 10/19/2019 Yes      Problems Resolved During this Admission:       Discharged Condition: good    Disposition: [place]-Health Care [facility] [place]    Follow Up:   Follow-up Information       Mk Velarde MD Follow up on 12/1/2023.    Why: at 1 PM for hospital follow up/est care  Contact information:  [address] [place] [address]  [phone]             Lakhwinder Frank,  Follow up on 11/22/2023.    Specialty: Orthopedic Surgery  Why: at 1 PM for post op visit  Contact information:  [address] [facility] [address]  [phone]               [facility] Follow up.    Specialty: Home Health Services  Why: where your home IVAB is from  Contact information:  [address] [facility] [address]  [phone]               [facility] Follow up.    Contact information:  [address] [address]  [phone]                         Patient Instructions:      Ambulatory referral/consult to  Smoking Cessation Program   Standing Status: Future   Referral Priority: Routine Referral Type: Consultation   Referral Reason: Specialty Services Required   Requested Specialty: CTTS   Number of Visits Requested: 1     Ambulatory referral/consult to Home Health   Standing Status: Future   Referral Priority: Routine Referral Type: Home Health   Referral Reason: Specialty Services Required   Requested Specialty: Home Health Services   Number of Visits Requested: 1     Diet full liquid     Activity as tolerated       Significant Diagnostic Studies:   Recent Results (from the past 504 hour(s))   Urinalysis, Reflex to Urine Culture Urine, Clean Catch    Collection Time: 11/15/23  9:55 AM    Specimen: Urine, Clean Catch   Result Value Ref Range    Specimen UA Urine, Unspecified     Color, UA Brown (A) Yellow, Straw, Azalea    Appearance, UA Cloudy (A) Clear    pH, UA SEE COMMENT 5.0 - 8.0    Specific Gravity, UA SEE COMMENT 1.005 - 1.030    Protein, UA SEE COMMENT Negative    Glucose, UA SEE COMMENT Negative    Ketones, UA SEE COMMENT Negative    Bilirubin (UA) SEE COMMENT Negative    Occult Blood UA SEE COMMENT Negative    Nitrite, UA SEE COMMENT Negative    Urobilinogen, UA SEE COMMENT Negative EU/dL    Leukocytes, UA SEE COMMENT Negative   Urinalysis Microscopic    Collection Time: 11/15/23  9:55 AM   Result Value Ref Range    RBC, UA >100 (H) 0 - 4 /hpf    WBC, UA 30 (H) 0 - 5 /hpf    Bacteria Few (A) None-Occ /hpf    Microscopic Comment SEE COMMENT    Urine culture    Collection Time: 11/15/23  9:55 AM    Specimen: Urine   Result Value Ref Range    Urine Culture, Routine ESCHERICHIA COLI ESBL  >100,000 cfu/ml   (A)        Susceptibility    Escherichia coli ESBL - CULTURE, URINE     Amp/Sulbactam 16/8 Resistant mcg/mL     Amikacin <=16 Sensitive mcg/mL     Ampicillin >16 Resistant mcg/mL     Amox/K Clav'ate <=8/4 Resistant mcg/mL     Ceftriaxone >32 Resistant mcg/mL     Cefazolin >16 Resistant mcg/mL     Ciprofloxacin  >2 Resistant mcg/mL     Cefepime >16 Resistant mcg/mL     Ertapenem <=0.5 Sensitive mcg/mL     Nitrofurantoin <=32 Sensitive mcg/mL     Gentamicin >8 Resistant mcg/mL     Levofloxacin >4 Resistant mcg/mL     Meropenem <=1 Sensitive mcg/mL     Piperacillin/Tazo <=16 Resistant mcg/mL     Trimeth/Sulfa >2/38 Resistant mcg/mL     Tobramycin 8 Intermediate mcg/mL   Blood culture x two cultures. Draw prior to antibiotics.    Collection Time: 11/15/23 11:20 AM    Specimen: Peripheral, Antecubital, Right; Blood   Result Value Ref Range    Blood Culture, Routine No growth after 5 days.    CBC auto differential    Collection Time: 11/15/23 11:27 AM   Result Value Ref Range    WBC 14.45 (H) 3.90 - 12.70 K/uL    RBC 3.98 (L) 4.60 - 6.20 M/uL    Hemoglobin 11.8 (L) 14.0 - 18.0 g/dL    Hematocrit 36.8 (L) 40.0 - 54.0 %    MCV 93 82 - 98 fL    MCH 29.6 27.0 - 31.0 pg    MCHC 32.1 32.0 - 36.0 g/dL    RDW 16.4 (H) 11.5 - 14.5 %    Platelets 84 (L) 150 - 450 K/uL    MPV 11.4 9.2 - 12.9 fL    Immature Granulocytes 0.8 (H) 0.0 - 0.5 %    Gran # (ANC) 12.1 (H) 1.8 - 7.7 K/uL    Immature Grans (Abs) 0.11 (H) 0.00 - 0.04 K/uL    Lymph # 1.1 1.0 - 4.8 K/uL    Mono # 1.2 (H) 0.3 - 1.0 K/uL    Eos # 0.0 0.0 - 0.5 K/uL    Baso # 0.02 0.00 - 0.20 K/uL    nRBC 0 0 /100 WBC    Gran % 83.6 (H) 38.0 - 73.0 %    Lymph % 7.5 (L) 18.0 - 48.0 %    Mono % 8.0 4.0 - 15.0 %    Eosinophil % 0.0 0.0 - 8.0 %    Basophil % 0.1 0.0 - 1.9 %    Differential Method Automated    Comprehensive metabolic panel    Collection Time: 11/15/23 11:27 AM   Result Value Ref Range    Sodium 136 136 - 145 mmol/L    Potassium 4.6 3.5 - 5.1 mmol/L    Chloride 97 95 - 110 mmol/L    CO2 23 23 - 29 mmol/L    Glucose 81 70 - 110 mg/dL    BUN 16 8 - 23 mg/dL    Creatinine 1.1 0.5 - 1.4 mg/dL    Calcium 9.7 8.7 - 10.5 mg/dL    Total Protein 8.4 6.0 - 8.4 g/dL    Albumin 3.0 (L) 3.5 - 5.2 g/dL    Total Bilirubin 0.8 0.1 - 1.0 mg/dL    Alkaline Phosphatase 65 55 - 135 U/L    AST 17 10  - 40 U/L    ALT 11 10 - 44 U/L    eGFR >60.0 >60 mL/min/1.73 m^2    Anion Gap 16 8 - 16 mmol/L   Lactic acid, plasma #1    Collection Time: 11/15/23 11:27 AM   Result Value Ref Range    Lactate (Lactic Acid) 1.5 0.5 - 2.2 mmol/L   Ethanol    Collection Time: 11/15/23 11:27 AM   Result Value Ref Range    Alcohol, Serum <10 0 - 10 mg/dL   TSH    Collection Time: 11/15/23 11:27 AM   Result Value Ref Range    TSH 2.269 0.400 - 4.000 uIU/mL   Blood culture x two cultures. Draw prior to antibiotics.    Collection Time: 11/15/23 11:34 AM    Specimen: Peripheral, Forearm, Right; Blood   Result Value Ref Range    Blood Culture, Routine No growth after 5 days.    Procalcitonin    Collection Time: 11/15/23 11:56 AM   Result Value Ref Range    Procalcitonin 0.20 <0.25 ng/mL   Drug screen panel, in-house    Collection Time: 11/15/23 12:36 PM   Result Value Ref Range    Benzodiazepines Negative Negative    Methadone metabolites Negative Negative    Cocaine (Metab.) Negative Negative    Opiate Scrn, Ur Negative Negative    Barbiturate Screen, Ur Negative Negative    Amphetamine Screen, Ur Negative Negative    THC Presumptive Positive (A) Negative    Phencyclidine Negative Negative    Creatinine, Urine 262.6 23.0 - 375.0 mg/dL    Toxicology Information SEE COMMENT    CBC Auto Differential    Collection Time: 11/16/23  4:30 AM   Result Value Ref Range    WBC 10.30 3.90 - 12.70 K/uL    RBC 3.76 (L) 4.60 - 6.20 M/uL    Hemoglobin 11.4 (L) 14.0 - 18.0 g/dL    Hematocrit 34.6 (L) 40.0 - 54.0 %    MCV 92 82 - 98 fL    MCH 30.3 27.0 - 31.0 pg    MCHC 32.9 32.0 - 36.0 g/dL    RDW 16.2 (H) 11.5 - 14.5 %    Platelets 80 (L) 150 - 450 K/uL    MPV 10.8 9.2 - 12.9 fL    Immature Granulocytes 0.9 (H) 0.0 - 0.5 %    Gran # (ANC) 7.8 (H) 1.8 - 7.7 K/uL    Immature Grans (Abs) 0.09 (H) 0.00 - 0.04 K/uL    Lymph # 1.3 1.0 - 4.8 K/uL    Mono # 1.1 (H) 0.3 - 1.0 K/uL    Eos # 0.0 0.0 - 0.5 K/uL    Baso # 0.04 0.00 - 0.20 K/uL    nRBC 0 0 /100 WBC     Gran % 75.7 (H) 38.0 - 73.0 %    Lymph % 12.4 (L) 18.0 - 48.0 %    Mono % 10.2 4.0 - 15.0 %    Eosinophil % 0.4 0.0 - 8.0 %    Basophil % 0.4 0.0 - 1.9 %    Differential Method Automated    Comprehensive Metabolic Panel    Collection Time: 11/16/23  4:30 AM   Result Value Ref Range    Sodium 135 (L) 136 - 145 mmol/L    Potassium 3.9 3.5 - 5.1 mmol/L    Chloride 101 95 - 110 mmol/L    CO2 22 (L) 23 - 29 mmol/L    Glucose 91 70 - 110 mg/dL    BUN 13 8 - 23 mg/dL    Creatinine 1.0 0.5 - 1.4 mg/dL    Calcium 8.8 8.7 - 10.5 mg/dL    Total Protein 7.3 6.0 - 8.4 g/dL    Albumin 2.5 (L) 3.5 - 5.2 g/dL    Total Bilirubin 0.9 0.1 - 1.0 mg/dL    Alkaline Phosphatase 59 55 - 135 U/L    AST 20 10 - 40 U/L    ALT 10 10 - 44 U/L    eGFR >60.0 >60 mL/min/1.73 m^2    Anion Gap 12 8 - 16 mmol/L   Magnesium    Collection Time: 11/16/23  4:30 AM   Result Value Ref Range    Magnesium 1.5 (L) 1.6 - 2.6 mg/dL   Vancomycin, random    Collection Time: 11/16/23  4:30 AM   Result Value Ref Range    Vancomycin, Random 14.7 ug/mL   Hepatitis panel, acute    Collection Time: 11/16/23  2:12 PM   Result Value Ref Range    Hepatitis B Surface Ag Non-reactive Non-reactive    Hep B C IgM Non-reactive Non-reactive    Hep A IgM Non-reactive Non-reactive    Hepatitis C Ab Reactive (A) Non-reactive   Hepatitis C RNA, quantitative, PCR    Collection Time: 11/16/23  2:12 PM   Result Value Ref Range    HCV Quantitative Result Not Detected <12 IU/mL    HCV, Qualitative Not Detected Not Detected   CBC Auto Differential    Collection Time: 11/17/23  4:39 AM   Result Value Ref Range    WBC 4.36 3.90 - 12.70 K/uL    RBC 3.25 (L) 4.60 - 6.20 M/uL    Hemoglobin 9.5 (L) 14.0 - 18.0 g/dL    Hematocrit 29.8 (L) 40.0 - 54.0 %    MCV 92 82 - 98 fL    MCH 29.2 27.0 - 31.0 pg    MCHC 31.9 (L) 32.0 - 36.0 g/dL    RDW 15.9 (H) 11.5 - 14.5 %    Platelets 87 (L) 150 - 450 K/uL    MPV 12.0 9.2 - 12.9 fL    Immature Granulocytes 0.5 0.0 - 0.5 %    Gran # (ANC) 2.9 1.8 - 7.7  K/uL    Immature Grans (Abs) 0.02 0.00 - 0.04 K/uL    Lymph # 0.6 (L) 1.0 - 4.8 K/uL    Mono # 0.8 0.3 - 1.0 K/uL    Eos # 0.0 0.0 - 0.5 K/uL    Baso # 0.02 0.00 - 0.20 K/uL    nRBC 0 0 /100 WBC    Gran % 66.9 38.0 - 73.0 %    Lymph % 13.3 (L) 18.0 - 48.0 %    Mono % 18.3 (H) 4.0 - 15.0 %    Eosinophil % 0.5 0.0 - 8.0 %    Basophil % 0.5 0.0 - 1.9 %    Differential Method Automated    Comprehensive Metabolic Panel    Collection Time: 11/17/23  4:39 AM   Result Value Ref Range    Sodium 132 (L) 136 - 145 mmol/L    Potassium 3.5 3.5 - 5.1 mmol/L    Chloride 100 95 - 110 mmol/L    CO2 21 (L) 23 - 29 mmol/L    Glucose 117 (H) 70 - 110 mg/dL    BUN 9 8 - 23 mg/dL    Creatinine 1.0 0.5 - 1.4 mg/dL    Calcium 8.2 (L) 8.7 - 10.5 mg/dL    Total Protein 6.5 6.0 - 8.4 g/dL    Albumin 2.2 (L) 3.5 - 5.2 g/dL    Total Bilirubin 0.5 0.1 - 1.0 mg/dL    Alkaline Phosphatase 49 (L) 55 - 135 U/L    AST 20 10 - 40 U/L    ALT 10 10 - 44 U/L    eGFR >60.0 >60 mL/min/1.73 m^2    Anion Gap 11 8 - 16 mmol/L   Magnesium    Collection Time: 11/17/23  4:39 AM   Result Value Ref Range    Magnesium 1.9 1.6 - 2.6 mg/dL   Vancomycin, random    Collection Time: 11/17/23  1:04 PM   Result Value Ref Range    Vancomycin, Random 12.8 ug/mL   CBC Auto Differential    Collection Time: 11/18/23  4:20 AM   Result Value Ref Range    WBC 5.06 3.90 - 12.70 K/uL    RBC 3.39 (L) 4.60 - 6.20 M/uL    Hemoglobin 9.9 (L) 14.0 - 18.0 g/dL    Hematocrit 30.9 (L) 40.0 - 54.0 %    MCV 91 82 - 98 fL    MCH 29.2 27.0 - 31.0 pg    MCHC 32.0 32.0 - 36.0 g/dL    RDW 15.7 (H) 11.5 - 14.5 %    Platelets 105 (L) 150 - 450 K/uL    MPV 11.2 9.2 - 12.9 fL    Immature Granulocytes 0.6 (H) 0.0 - 0.5 %    Gran # (ANC) 2.2 1.8 - 7.7 K/uL    Immature Grans (Abs) 0.03 0.00 - 0.04 K/uL    Lymph # 1.3 1.0 - 4.8 K/uL    Mono # 1.5 (H) 0.3 - 1.0 K/uL    Eos # 0.0 0.0 - 0.5 K/uL    Baso # 0.03 0.00 - 0.20 K/uL    nRBC 0 0 /100 WBC    Gran % 43.9 38.0 - 73.0 %    Lymph % 24.7 18.0 - 48.0 %     Mono % 29.8 (H) 4.0 - 15.0 %    Eosinophil % 0.4 0.0 - 8.0 %    Basophil % 0.6 0.0 - 1.9 %    Differential Method Automated    Comprehensive Metabolic Panel    Collection Time: 11/18/23  4:20 AM   Result Value Ref Range    Sodium 134 (L) 136 - 145 mmol/L    Potassium 3.5 3.5 - 5.1 mmol/L    Chloride 100 95 - 110 mmol/L    CO2 21 (L) 23 - 29 mmol/L    Glucose 100 70 - 110 mg/dL    BUN 7 (L) 8 - 23 mg/dL    Creatinine 1.0 0.5 - 1.4 mg/dL    Calcium 8.3 (L) 8.7 - 10.5 mg/dL    Total Protein 6.8 6.0 - 8.4 g/dL    Albumin 2.1 (L) 3.5 - 5.2 g/dL    Total Bilirubin 0.4 0.1 - 1.0 mg/dL    Alkaline Phosphatase 49 (L) 55 - 135 U/L    AST 36 10 - 40 U/L    ALT 11 10 - 44 U/L    eGFR >60.0 >60 mL/min/1.73 m^2    Anion Gap 13 8 - 16 mmol/L   Magnesium    Collection Time: 11/18/23  4:20 AM   Result Value Ref Range    Magnesium 1.7 1.6 - 2.6 mg/dL   Culture, Respiratory with Gram Stain    Collection Time: 11/18/23  6:10 PM    Specimen: Sputum, Expectorated; Respiratory   Result Value Ref Range    Respiratory Culture Normal respiratory reynaldo     Respiratory Culture No S aureus or Pseudomonas isolated.     Gram Stain (Respiratory) <10 epithelial cells per low power field.     Gram Stain (Respiratory) Rare WBC's     Gram Stain (Respiratory) Few Gram positive rods     Gram Stain (Respiratory) Few Gram positive cocci    Clostridium difficile EIA    Collection Time: 11/19/23  5:49 AM    Specimen: Stool   Result Value Ref Range    C. diff Antigen Negative Negative    C difficile Toxins A+B, EIA Negative Negative   Stool culture    Collection Time: 11/19/23  5:49 AM    Specimen: Stool   Result Value Ref Range    Stool Culture No enteric reynaldo     Stool Culture       No Salmonella,Shigella,Vibrio,Campylobacter,Yersinia isolated.   VANCOMYCIN, TROUGH    Collection Time: 11/19/23  4:32 PM   Result Value Ref Range    Vancomycin-Trough 15.2 10.0 - 22.0 ug/mL   Renal function panel    Collection Time: 11/19/23  4:32 PM   Result Value Ref  Range    Glucose 132 (H) 70 - 110 mg/dL    Sodium 135 (L) 136 - 145 mmol/L    Potassium 3.5 3.5 - 5.1 mmol/L    Chloride 102 95 - 110 mmol/L    CO2 23 23 - 29 mmol/L    BUN 8 8 - 23 mg/dL    Calcium 8.5 (L) 8.7 - 10.5 mg/dL    Creatinine 1.0 0.5 - 1.4 mg/dL    Albumin 1.9 (L) 3.5 - 5.2 g/dL    Phosphorus 2.7 2.7 - 4.5 mg/dL    eGFR >60.0 >60 mL/min/1.73 m^2    Anion Gap 10 8 - 16 mmol/L   CBC Auto Differential    Collection Time: 11/21/23  3:39 AM   Result Value Ref Range    WBC 7.23 3.90 - 12.70 K/uL    RBC 3.81 (L) 4.60 - 6.20 M/uL    Hemoglobin 11.1 (L) 14.0 - 18.0 g/dL    Hematocrit 33.2 (L) 40.0 - 54.0 %    MCV 87 82 - 98 fL    MCH 29.1 27.0 - 31.0 pg    MCHC 33.4 32.0 - 36.0 g/dL    RDW 15.9 (H) 11.5 - 14.5 %    Platelets 274 150 - 450 K/uL    MPV 11.6 9.2 - 12.9 fL    Immature Granulocytes CANCELED 0.0 - 0.5 %    Immature Grans (Abs) CANCELED 0.00 - 0.04 K/uL    Lymph # CANCELED 1.0 - 4.8 K/uL    Mono # CANCELED 0.3 - 1.0 K/uL    Eos # CANCELED 0.0 - 0.5 K/uL    Baso # CANCELED 0.00 - 0.20 K/uL    nRBC 0 0 /100 WBC    Gran % 80.0 (H) 38.0 - 73.0 %    Lymph % 12.0 (L) 18.0 - 48.0 %    Mono % 8.0 4.0 - 15.0 %    Eosinophil % 0.0 0.0 - 8.0 %    Basophil % 0.0 0.0 - 1.9 %    Differential Method Manual    Basic Metabolic Panel    Collection Time: 11/21/23  3:39 AM   Result Value Ref Range    Sodium 137 136 - 145 mmol/L    Potassium 3.7 3.5 - 5.1 mmol/L    Chloride 100 95 - 110 mmol/L    CO2 25 23 - 29 mmol/L    Glucose 149 (H) 70 - 110 mg/dL    BUN 12 8 - 23 mg/dL    Creatinine 0.9 0.5 - 1.4 mg/dL    Calcium 8.9 8.7 - 10.5 mg/dL    Anion Gap 12 8 - 16 mmol/L    eGFR >60.0 >60 mL/min/1.73 m^2   PSA, Screening    Collection Time: 12/01/23  4:42 PM   Result Value Ref Range    PSA, Screen 1.6 0.00 - 4.00 ng/mL   Urinalysis, Reflex to Urine Culture Urine, Clean Catch    Collection Time: 12/01/23  4:43 PM    Specimen: Urine   Result Value Ref Range    Specimen UA Urine, Clean Catch     Color, UA Yellow Yellow, Straw,  Azalea    Appearance, UA Clear Clear    pH, UA 6.0 5.0 - 8.0    Specific Gravity, UA 1.025 1.005 - 1.030    Protein, UA 3+ (A) Negative    Glucose, UA Negative Negative    Ketones, UA Trace (A) Negative    Bilirubin (UA) 1+ (A) Negative    Occult Blood UA 3+ (A) Negative    Nitrite, UA Negative Negative    Urobilinogen, UA Negative Negative EU/dL    Leukocytes, UA Negative Negative   Urinalysis Microscopic    Collection Time: 12/01/23  4:43 PM   Result Value Ref Range    RBC, UA 60 (H) 0 - 4 /hpf    WBC, UA 4 0 - 5 /hpf    Bacteria Moderate (A) None-Occ /hpf    Yeast, UA Occasional (A) None    Hyaline Casts, UA 0 0-1/lpf /lpf    Microscopic Comment SEE COMMENT      Imaging Results              X-Ray Chest AP Portable (Final result)  Result time 11/15/23 11:52:33      Final result by Maya Roldan MD (11/15/23 11:52:33)                   Impression:      No acute abnormality      Electronically signed by: Maya Roldan  Date:    11/15/2023  Time:    11:52               Narrative:    EXAMINATION:  XR CHEST AP PORTABLE    CLINICAL HISTORY:  Sepsis;    TECHNIQUE:  Single frontal view of the chest was performed.    COMPARISON:  06/30/2021    FINDINGS:  Heart size is borderline status post median sternotomy.  Marked enlargement of the left pulmonary artery is unchanged on studies dating back to at least 2019.  Lungs are clear and well inflated.  No pleural effusion.  Postsurgical changes left humerus.  Old, healed rib fractures bilaterally.                                       X-Ray Hand 3 view Left (Final result)  Result time 11/15/23 11:55:35      Final result by Maya Roldan MD (11/15/23 11:55:35)                   Impression:      Marked soft tissue swelling over the dorsum of the hand and wrist with no radiographic evidence of osteomyelitis.  Chronic arthritic changes hand and wrist.      Electronically signed by: Maya Roldan  Date:    11/15/2023  Time:    11:55               Narrative:     EXAMINATION:  XR HAND COMPLETE 3 VIEW LEFT    CLINICAL HISTORY:  infection;.    TECHNIQUE:  PA, lateral, and oblique views of the left hand were performed.    COMPARISON:  09/15/2023    FINDINGS:  There is no fracture and no radiographic evidence of osteomyelitis.  There is a chronic deformity of the distal ulna along with chronic narrowing of the radiocarpal joint and intercarpal articulations and carpal-metacarpal joints.  There is chronic joint space narrowing at the 1st MCP joint.  There is soft tissue swelling over the dorsum of the hand and wrist which appears more diffuse when compared to the previous films.  There is no air in the soft tissues.                                    Microbiology Results (last 7 days)       Procedure Component Value Units Date/Time    Culture, Respiratory with Gram Stain [[id number]] Collected: 11/18/23 1810    Order Status: Completed Specimen: Respiratory from Sputum, Expectorated Updated: 11/21/23 1204     Respiratory Culture Normal respiratory reynaldo      No S aureus or Pseudomonas isolated.     Gram Stain (Respiratory) <10 epithelial cells per low power field.     Gram Stain (Respiratory) Rare WBC's     Gram Stain (Respiratory) Few Gram positive rods     Gram Stain (Respiratory) Few Gram positive cocci    Blood culture x two cultures. Draw prior to antibiotics. [[id number]] Collected: 11/15/23 1120    Order Status: Completed Specimen: Blood from Peripheral, Antecubital, Right Updated: 11/20/23 2012     Blood Culture, Routine No growth after 5 days.    Narrative:      Aerobic and anaerobic    Blood culture x two cultures. Draw prior to antibiotics. [[id number]] Collected: 11/15/23 1134    Order Status: Completed Specimen: Blood from Peripheral, Forearm, Right Updated: 11/20/23 2012     Blood Culture, Routine No growth after 5 days.    Narrative:      Aerobic and anaerobic    Clostridium difficile EIA [[id number]] Collected: 11/19/23 0549    Order Status: Completed Specimen:  Stool Updated: 11/19/23 0735     C. diff Antigen Negative     C difficile Toxins A+B, EIA Negative     Comment: Testing not recommended for children <24 months old.       E. coli 0157 antigen [[id number]] Collected: 11/19/23 0549    Order Status: Canceled Specimen: Stool     Urine culture [[id number]]  (Abnormal)  (Susceptibility) Collected: 11/15/23 0955    Order Status: Completed Specimen: Urine Updated: 11/17/23 2124     Urine Culture, Routine ESCHERICHIA COLI ESBL  >100,000 cfu/ml      Narrative:      Preferred Collection Type->Urine, Clean Catch  Specimen Source->Urine              Pending Diagnostic Studies:       None           Medications:  Reconciled Home Medications:      Medication List        STOP taking these medications      allopurinoL 100 MG tablet  Commonly known as: ZYLOPRIM     cephALEXin 500 MG capsule  Commonly known as: KEFLEX     colchicine 0.6 mg tablet  Commonly known as: COLCRYS     HYDROcodone-acetaminophen 7.5-325 mg per tablet  Commonly known as: NORCO     ibuprofen 800 MG tablet  Commonly known as: ADVIL,MOTRIN     ketoconazole 2 % shampoo  Commonly known as: NIZORAL     ondansetron 4 MG tablet  Commonly known as: ZOFRAN     polyethylene glycol 236-22.74-6.74 -5.86 gram suspension  Commonly known as: GoLYTELY              Indwelling Lines/Drains at time of discharge:   Lines/Drains/Airways       None                   Time spent on the discharge of patient: 15 minutes         Corey Macias MD  Department of Hospital Medicine  [facility]

## 2023-12-06 ENCOUNTER — OFFICE VISIT (OUTPATIENT)
Dept: ORTHOPEDICS | Facility: CLINIC | Age: 63
End: 2023-12-06
Payer: MEDICARE

## 2023-12-06 ENCOUNTER — TELEPHONE (OUTPATIENT)
Dept: FAMILY MEDICINE | Facility: CLINIC | Age: 63
End: 2023-12-06
Payer: MEDICARE

## 2023-12-06 VITALS — BODY MASS INDEX: 19.5 KG/M2 | HEIGHT: 72 IN | RESPIRATION RATE: 18 BRPM | WEIGHT: 144 LBS

## 2023-12-06 DIAGNOSIS — I96 SKIN FLAP NECROSIS: Primary | ICD-10-CM

## 2023-12-06 DIAGNOSIS — T86.828 SKIN FLAP NECROSIS: Primary | ICD-10-CM

## 2023-12-06 PROCEDURE — 99212 OFFICE O/P EST SF 10 MIN: CPT | Mod: PBBFAC | Performed by: ORTHOPAEDIC SURGERY

## 2023-12-06 PROCEDURE — 99024 PR POST-OP FOLLOW-UP VISIT: ICD-10-PCS | Mod: POP,,, | Performed by: ORTHOPAEDIC SURGERY

## 2023-12-06 PROCEDURE — 99999 PR PBB SHADOW E&M-EST. PATIENT-LVL II: ICD-10-PCS | Mod: PBBFAC,,, | Performed by: ORTHOPAEDIC SURGERY

## 2023-12-06 PROCEDURE — 99024 POSTOP FOLLOW-UP VISIT: CPT | Mod: POP,,, | Performed by: ORTHOPAEDIC SURGERY

## 2023-12-06 PROCEDURE — 99999 PR PBB SHADOW E&M-EST. PATIENT-LVL II: CPT | Mod: PBBFAC,,, | Performed by: ORTHOPAEDIC SURGERY

## 2023-12-06 NOTE — PROGRESS NOTES
Patient ID: Jesus Mccabe is a 62 y.o. male.     Chief Complaint: Injury of the Left Hand        HPI:  Mr. Mccabe returned today for re-evaluation of his left hand.  He stated he is doing well.  He denied fever or chills.  He denies swelling in his left hand.  He was admitted to the hospital on 11/15/2023 with flap necrosis of a previous incision site on the dorsal aspect of his left hand after a excision mass of the dorsal aspect of his left hand on 10/16/2023 where he did not return to the office for postop follow-ups.  He presented to the emergency room on 11/15/2023 with malodorous hand.  He subsequently underwent an irrigation debridement on 11/16/2023 and a 2nd look irrigation debridement with closure on 11/20/2023.  He is scheduled to start occupational therapy in the next day or 2..  His pain is well controlled.  He does have some stiffness in the fingers of his left hand.  This office visit was interpreted with a teleprompter signing .     ROS:  No new diagnosis/surgery/prescriptions since last last being seen in the office on 11/29/2023.  Constitution: Negative for chills and fever.   HENT: Positive for hearing loss. Negative for congestion.    Eyes: Negative for blurred vision.   Cardiovascular: Negative for chest pain.   Respiratory: Negative for cough.    Endocrine: Negative for polydipsia.   Hematologic/Lymphatic: Negative for adenopathy.   Skin: Negative for flushing and itching.   Musculoskeletal: Negative for gout.   Gastrointestinal: Positive for heartburn. Negative for constipation and diarrhea.   Genitourinary: Negative for nocturia.   Neurological: Positive for headaches. Negative for seizures.   Psychiatric/Behavioral: Positive for depression and substance abuse. The patient is nervous/anxious.    Allergic/Immunologic: Negative for environmental allergies.      Objective:      Physical Exam:   General: AAOx3.  No acute distress  Vascular:  Pulses intact and equal bilaterally.   Capillary refill less than 3 seconds and equal bilaterally  Neurologic:  Pinprick and soft touch intact and equal bilaterally  Integment:  Incisions well approximated with sutures in place.  0.5 cm opening at the distal and proximal extent of the incision with good granulation tissue.  Extremity:  Hand: Pronation/supination left hand 85/85 degrees.  Dorsiflexion/volar flexion left hand 55/50 degrees.  Right hand with severe flexion contracture consistent with preop.  Decreased motion fingers of left hand but active motion is intact.  No swelling.  Nontender with palpation.  No purulence.  Radiography:  No new x-rays done today.      Assessment:      Impression:      1. Status post irrigation debridement/2nd look irrigation debridement left hand secondary to skin flap necrosis.      Plan:       1.  Discussed physical examination with the patient. Jesus understands that he appears to be progressing well in his healing his closure.    2. Remove sutures and place Steri-Strips.    3. Place a fresh wet-to-dry dressing.  4. The patient understands he needs to cleanse his hand every day with warm soapy water place bacitracin ointment in the openings after padding his hand dry and place fresh Band-Aids over the openings.    5. Refer to occupational therapy the patient stated he already has an appointment to be seen by occupational therapy as an outpatient.  6. Continue doing home exercises such as finger motion exercises.  7. Any pain can be treated with over-the-counter medications dosed per box instructions.    8. Follow up in 2 weeks for a wound check

## 2023-12-06 NOTE — TELEPHONE ENCOUNTER
----- Message from Richa Velarde MD sent at 12/1/2023  8:11 PM CST -----  Results reviewed.     Please let patient know that his urine is abnormal for blood which is concerning and I advise him to go to the specialist I recommend.     Richa Velarde MD  12/01/2023

## 2023-12-07 ENCOUNTER — EXTERNAL HOME HEALTH (OUTPATIENT)
Dept: HOME HEALTH SERVICES | Facility: HOSPITAL | Age: 63
End: 2023-12-07
Payer: MEDICARE

## 2023-12-21 ENCOUNTER — HOSPITAL ENCOUNTER (OUTPATIENT)
Dept: RADIOLOGY | Facility: HOSPITAL | Age: 63
Discharge: HOME | End: 2023-12-21
Attending: STUDENT IN AN ORGANIZED HEALTH CARE EDUCATION/TRAINING PROGRAM
Payer: MEDICARE

## 2023-12-21 DIAGNOSIS — F17.218 NICOTINE DEPENDENCE, CIGARETTES, WITH OTHER NICOTINE-INDUCED DISORDERS: ICD-10-CM

## 2023-12-21 PROCEDURE — 71271 CT CHEST LUNG SCREENING LOW DOSE: ICD-10-PCS | Mod: 26,,, | Performed by: RADIOLOGY

## 2023-12-21 PROCEDURE — 71271 CT THORAX LUNG CANCER SCR C-: CPT | Mod: 26,,, | Performed by: RADIOLOGY

## 2023-12-21 PROCEDURE — 71271 CT THORAX LUNG CANCER SCR C-: CPT | Mod: TC

## 2023-12-28 ENCOUNTER — DOCUMENT SCAN (OUTPATIENT)
Dept: HOME HEALTH SERVICES | Facility: HOSPITAL | Age: 63
End: 2023-12-28
Payer: MEDICARE

## 2024-02-01 ENCOUNTER — DOCUMENT SCAN (OUTPATIENT)
Dept: HOME HEALTH SERVICES | Facility: HOSPITAL | Age: 64
End: 2024-02-01
Payer: MEDICARE

## 2024-02-02 ENCOUNTER — DOCUMENT SCAN (OUTPATIENT)
Dept: HOME HEALTH SERVICES | Facility: HOSPITAL | Age: 64
End: 2024-02-02
Payer: MEDICARE

## 2024-02-07 ENCOUNTER — HOSPITAL ENCOUNTER (EMERGENCY)
Facility: HOSPITAL | Age: 64
Discharge: HOME | End: 2024-02-07
Attending: STUDENT IN AN ORGANIZED HEALTH CARE EDUCATION/TRAINING PROGRAM
Payer: MEDICARE

## 2024-02-07 VITALS
TEMPERATURE: 98 F | SYSTOLIC BLOOD PRESSURE: 160 MMHG | HEIGHT: 72 IN | RESPIRATION RATE: 18 BRPM | OXYGEN SATURATION: 96 % | WEIGHT: 144 LBS | DIASTOLIC BLOOD PRESSURE: 86 MMHG | HEART RATE: 72 BPM | BODY MASS INDEX: 19.5 KG/M2

## 2024-02-07 DIAGNOSIS — S22.42XA CLOSED FRACTURE OF MULTIPLE RIBS OF LEFT SIDE, INITIAL ENCOUNTER: Primary | ICD-10-CM

## 2024-02-07 PROCEDURE — 25000003 PHARM REV CODE 250: Performed by: NURSE PRACTITIONER

## 2024-02-07 PROCEDURE — 99283 EMERGENCY DEPT VISIT LOW MDM: CPT | Mod: 25

## 2024-02-07 PROCEDURE — 71101 X-RAY EXAM UNILAT RIBS/CHEST: CPT | Mod: TC,LT

## 2024-02-07 PROCEDURE — 71101 X-RAY EXAM UNILAT RIBS/CHEST: CPT | Mod: 26,LT,, | Performed by: RADIOLOGY

## 2024-02-07 RX ORDER — TRAMADOL HYDROCHLORIDE 50 MG/1
50 TABLET ORAL
Status: COMPLETED | OUTPATIENT
Start: 2024-02-07 | End: 2024-02-07

## 2024-02-07 RX ORDER — TRAMADOL HYDROCHLORIDE 50 MG/1
50 TABLET ORAL EVERY 8 HOURS PRN
Qty: 12 TABLET | Refills: 0 | Status: SHIPPED | OUTPATIENT
Start: 2024-02-07 | End: 2024-05-28

## 2024-02-07 RX ADMIN — TRAMADOL HYDROCHLORIDE 50 MG: 50 TABLET, COATED ORAL at 04:02

## 2024-02-07 NOTE — DISCHARGE INSTRUCTIONS
Please practice incentive spirometer every hour.Take the medications as prescribed. Return for any worsening or new symptoms. Follow up with Primary Care Provider in the next 2-3 days.

## 2024-02-07 NOTE — ED PROVIDER NOTES
CHIEF COMPLAINT  Chief Complaint   Patient presents with    Rib Injury     Unwitnessed fall 6 days ago. C/o L side chest/rib pain       HISTORY OF PRESENT ILLNESS  Jesus Mccabe is a 63 y.o. male pmh of deaf, alcohol abuse, multiple falls presenting with left side rib pain after he fell 1 week ago. Patient was deaf, pt's sister accompanied patient, he was guarding left side ribs, told his sister he was in pain. He used sign language. Pt's sister states he was on liver transplant list, got removed due to alcohol abuse, large oz 6 packs of beers daily, he had rib fx from before. No other specific aggravating or relieving factors otherwise.      PAST MEDICAL HISTORY  Past Medical History:   Diagnosis Date    Advanced liver fibrosis     FibroScan 1/2020 - F3    Coronary artery disease     Gout     History of hepatitis C, treated / cured (SVR12 - 8/2020)     Language barrier     MI (myocardial infarction)     Ulcer        CURRENT MEDICATIONS    No current facility-administered medications for this encounter.    Current Outpatient Medications:     albuterol (VENTOLIN HFA) 90 mcg/actuation inhaler, Inhale 2 puffs into the lungs every 6 (six) hours as needed for Wheezing. Rescue, Disp: 18 g, Rfl: 0    lisinopriL (PRINIVIL,ZESTRIL) 40 MG tablet, Take 1 tablet (40 mg total) by mouth once daily., Disp: 30 tablet, Rfl: 11    meloxicam (MOBIC) 15 MG tablet, Take 1 tablet (15 mg total) by mouth once daily., Disp: 30 tablet, Rfl: 0    traMADoL (ULTRAM) 50 mg tablet, Take 1 tablet (50 mg total) by mouth every 8 (eight) hours as needed for Pain., Disp: 12 tablet, Rfl: 0    ALLERGIES    Review of patient's allergies indicates:  No Known Allergies    SURGICAL HISTORY    Past Surgical History:   Procedure Laterality Date    ABDOMINAL SURGERY      CARDIAC SURGERY      COLONOSCOPY  09/02/2013    ESOPHAGOGASTRODUODENOSCOPY  08/31/2013    Dr Anjelica Lemos-[facility]    EXCISION OF MASS OF HAND Left 10/16/2023    Procedure:  EXCISION, MASS, HAND;  Surgeon: Lakhwinder Frank DO;  Location: [facility] OR;  Service: Orthopedics;  Laterality: Left;  dorsal tendon mass    EXTENSOR TENDON OF FOREARM / WRIST REPAIR      INCISION AND DRAINAGE OF HAND Left 11/16/2023    Procedure: INCISION AND DRAINAGE, HAND;  Surgeon: Lakhwinder Frank DO;  Location: [facility] OR;  Service: Orthopedics;  Laterality: Left;    INTRAMEDULLARY RODDING OF HUMERUS Left 10/20/2019    Procedure: INSERTION, INTRAMEDULLARY SISSY, HUMERUS;  Surgeon: Jose Alejandro Funes MD;  Location: [facility] OR;  Service: Orthopedics;  Laterality: Left;    IRRIGATION AND DEBRIDEMENT OF UPPER EXTREMITY Left 11/20/2023    Procedure: IRRIGATION AND DEBRIDEMENT, UPPER EXTREMITY;  Surgeon: Lakhwinder Frank DO;  Location: [facility] OR;  Service: Orthopedics;  Laterality: Left;  2nd look irrigation debridement with possible closure left hand    OPEN REDUCTION AND INTERNAL FIXATION (ORIF) OF FRACTURE OF PROXIMAL HUMERUS Right 6/15/2023    Procedure: Reduction and Internal Fixation Right Proximal Humerus;  Surgeon: Lakhwinder Frank DO;  Location: [facility];  Service: Orthopedics;  Laterality: Right;    TENODESIS, FINGER EXTENSOR, AT WRIST Left 10/16/2023    Procedure: TENODESIS, FINGER EXTENSOR, AT WRIST;  Surgeon: Lakhwinder Frank DO;  Location: [facility];  Service: Orthopedics;  Laterality: Left;  left small finger       SOCIAL HISTORY    Social History     Socioeconomic History    Marital status: Single   Tobacco Use    Smoking status: Every Day     Current packs/day: 1.00     Types: Cigarettes    Smokeless tobacco: Never   Substance and Sexual Activity    Alcohol use: Yes     Alcohol/week: 1.0 standard drink of alcohol     Types: 1 Cans of beer per week    Drug use: No    Sexual activity: Never     Social Determinants of Health     Financial Resource Strain: Low Risk  (11/16/2023)    Overall Financial Resource Strain (CARDIA)     Difficulty of Paying Living Expenses: Not hard at all   Food Insecurity: No  Food Insecurity (11/16/2023)    Hunger Vital Sign     Worried About Running Out of Food in the Last Year: Never true     Ran Out of Food in the Last Year: Never true   Transportation Needs: No Transportation Needs (11/16/2023)    PRAPARE - Transportation     Lack of Transportation (Medical): No     Lack of Transportation (Non-Medical): No   Physical Activity: Inactive (11/16/2023)    Exercise Vital Sign     Days of Exercise per Week: 0 days     Minutes of Exercise per Session: 0 min   Stress: No Stress Concern Present (11/16/2023)    [employer] - Occupational Stress Questionnaire     Feeling of Stress : Not at all   Social Connections: Socially Isolated (11/16/2023)    Social Connection and Isolation Panel [NHANES]     Frequency of Communication with Friends and Family: Never     Frequency of Social Gatherings with Friends and Family: Once a week     Attends Scientologist Services: More than 4 times per year     Active Member of Clubs or Organizations: No     Marital Status: Never    Housing Stability: Low Risk  (11/16/2023)    Housing Stability Vital Sign     Unable to Pay for Housing in the Last Year: No     Number of Places Lived in the Last Year: 1     Unstable Housing in the Last Year: No       FAMILY HISTORY    No family history on file.    REVIEW OF SYSTEMS    Review of Systems   Musculoskeletal:         Left side chest wall pain     All other systems reviewed and are negative    VITAL SIGNS:   BP (!) 160/86   Pulse 72   Temp 97.8 °F (36.6 °C)   Resp 18   Ht 6' (1.829 m)   Wt 65.3 kg (144 lb)   SpO2 96%   BMI 19.53 kg/m²      Physical Exam  Constitutional:       Appearance: Normal appearance.   HENT:      Head: Normocephalic.   Cardiovascular:      Rate and Rhythm: Normal rate.   Pulmonary:      Effort: Pulmonary effort is normal. No respiratory distress.      Breath sounds: Normal breath sounds.   Chest:       Abdominal:      Palpations: Abdomen is soft.    Musculoskeletal:         General: Normal range of motion.   Skin:     General: Skin is warm.      Capillary Refill: Capillary refill takes less than 2 seconds.   Neurological:      General: No focal deficit present.      Mental Status: He is alert.      GCS: GCS eye subscore is 4. GCS verbal subscore is 5. GCS motor subscore is 6.   Psychiatric:         Attention and Perception: Attention normal.         Mood and Affect: Mood normal.         Speech: Speech normal.       Vitals and nursing note reviewed.     LABS    Labs Reviewed - No data to display      EKG        RADIOLOGY    XR Ribs Min 3 views w/PA Chest Left   Final Result   Abnormal      1. Acute fractures of the left 6th through 10th ribs.   2. Suspected chronic changes of arterial hypertension.   3. Prior median sternotomy.   This report was flagged in Epic as abnormal.         Electronically signed by: Colton Mon   Date:    02/07/2024   Time:    16:03            PROCEDURES    Procedures    Medications   traMADoL tablet 50 mg (50 mg Oral Given 2/7/24 2308)                Medical Decision Making  Jesus Mccabe is a 63 y.o. male pmh of deaf, alcohol abuse, multiple falls presenting with left side rib pain after he fell 1 week ago. Patient was deaf, pt's sister accompanied patient, he was guarding left side ribs, told his sister he was in pain. He used sign language. Pt's sister states he was on liver transplant list, got removed due to alcohol abuse, large oz 6 packs of beers daily, he had rib fx from before. No other specific aggravating or relieving factors otherwise.    Differential Diagnosis includes but is not limited to: rib fracture, rib contusion. Differentials I have considered and consider less likely: Pneumothorax, Pneumonia    The patient is presenting to the emergency department for evaluation of a fall/chest wall trauma.  The patient has likely sustained  rib fractures.  Patient denies any head trauma loss consciousness.  On exam there is  no noted midline neck or back pain.  The patient was noted to be in pain while in the emergency department.  Lung sounds are clear bilateral, the patient was able to take adequate deep full breaths.  The patient was given pain control in the emergency department and did improve.  The patient already has incentive spirometry with past rib fractures.  I did discuss with the patient and his sister the risk factors of a rib fracture/contusion.  I did discuss the possibility of developing pneumonia that can be serious.  I have recommended pain control regimen on outpatient basis.  I have urged a importance of very close primary care for reassessment and follow-up.  We discussed detailed return precautions regarding rib fractures/contusions.       Problems Addressed:  Closed fracture of multiple ribs of left side, initial encounter: acute illness or injury    Amount and/or Complexity of Data Reviewed  Independent Historian:      Details: Deaf  History received from his sister  Radiology: ordered. Decision-making details documented in ED Course.    Risk  Prescription drug management.           Discharge Medication List as of 2/7/2024  4:19 PM          Discharge Medication List as of 2/7/2024  4:19 PM        START taking these medications    Details   traMADoL (ULTRAM) 50 mg tablet Take 1 tablet (50 mg total) by mouth every 8 (eight) hours as needed for Pain., Starting Wed 2/7/2024, Normal               Patient agrees with plan of care.    DISPOSITION  Patient discharged to home in stable condition.        FINAL IMPRESSION    1. Closed fracture of multiple ribs of left side, initial encounter         Kat Mcknight NP  02/08/24 1545

## 2024-02-19 PROBLEM — J96.01 ACUTE RESPIRATORY FAILURE WITH HYPOXIA: Status: RESOLVED | Noted: 2023-11-20 | Resolved: 2024-02-19

## 2024-02-19 PROBLEM — N39.0 URINARY TRACT INFECTION WITHOUT HEMATURIA: Status: RESOLVED | Noted: 2023-11-18 | Resolved: 2024-02-19

## 2024-05-20 ENCOUNTER — HOSPITAL ENCOUNTER (EMERGENCY)
Facility: HOSPITAL | Age: 64
Discharge: HOME | End: 2024-05-21
Attending: EMERGENCY MEDICINE
Payer: MEDICARE

## 2024-05-20 DIAGNOSIS — N30.01 ACUTE CYSTITIS WITH HEMATURIA: Primary | ICD-10-CM

## 2024-05-20 DIAGNOSIS — R06.02 SHORTNESS OF BREATH: ICD-10-CM

## 2024-05-20 DIAGNOSIS — R07.9 CHEST PAIN: ICD-10-CM

## 2024-05-20 DIAGNOSIS — J18.9 PNEUMONIA OF LEFT UPPER LOBE DUE TO INFECTIOUS ORGANISM: ICD-10-CM

## 2024-05-20 LAB
ALBUMIN SERPL BCP-MCNC: 2.8 G/DL (ref 3.5–5.2)
ALP SERPL-CCNC: 59 U/L (ref 55–135)
ALT SERPL W/O P-5'-P-CCNC: 13 U/L (ref 10–44)
AMPHET+METHAMPHET UR QL: NEGATIVE
ANION GAP SERPL CALC-SCNC: 17 MMOL/L (ref 8–16)
AST SERPL-CCNC: 19 U/L (ref 10–40)
BACTERIA #/AREA URNS HPF: ABNORMAL /HPF
BARBITURATES UR QL SCN>200 NG/ML: NEGATIVE
BASOPHILS # BLD AUTO: 0.03 K/UL (ref 0–0.2)
BASOPHILS NFR BLD: 0.2 % (ref 0–1.9)
BENZODIAZ UR QL SCN>200 NG/ML: NEGATIVE
BILIRUB SERPL-MCNC: 0.7 MG/DL (ref 0.1–1)
BILIRUB UR QL STRIP: ABNORMAL
BNP SERPL-MCNC: 45 PG/ML (ref 0–99)
BUN SERPL-MCNC: 22 MG/DL (ref 8–23)
BZE UR QL SCN: NEGATIVE
CALCIUM SERPL-MCNC: 10.3 MG/DL (ref 8.7–10.5)
CANNABINOIDS UR QL SCN: ABNORMAL
CHLORIDE SERPL-SCNC: 91 MMOL/L (ref 95–110)
CLARITY UR: CLEAR
CO2 SERPL-SCNC: 20 MMOL/L (ref 23–29)
COLOR UR: YELLOW
CREAT SERPL-MCNC: 1.6 MG/DL (ref 0.5–1.4)
CREAT UR-MCNC: 290.7 MG/DL (ref 23–375)
DIFFERENTIAL METHOD BLD: ABNORMAL
EOSINOPHIL # BLD AUTO: 0 K/UL (ref 0–0.5)
EOSINOPHIL NFR BLD: 0 % (ref 0–8)
ERYTHROCYTE [DISTWIDTH] IN BLOOD BY AUTOMATED COUNT: 16 % (ref 11.5–14.5)
EST. GFR  (NO RACE VARIABLE): 47.8 ML/MIN/1.73 M^2
ETHANOL SERPL-MCNC: <10 MG/DL (ref 0–10)
GLUCOSE SERPL-MCNC: 93 MG/DL (ref 70–110)
GLUCOSE UR QL STRIP: NEGATIVE
GRAN CASTS #/AREA URNS LPF: 2 /LPF
HCT VFR BLD AUTO: 35.7 % (ref 40–54)
HGB BLD-MCNC: 12.2 G/DL (ref 14–18)
HGB UR QL STRIP: ABNORMAL
HIV 1+2 AB+HIV1 P24 AG SERPL QL IA: NORMAL
HYALINE CASTS #/AREA URNS LPF: 3 /LPF
IMM GRANULOCYTES # BLD AUTO: 0.19 K/UL (ref 0–0.04)
IMM GRANULOCYTES NFR BLD AUTO: 1.3 % (ref 0–0.5)
INFLUENZA A, MOLECULAR: NEGATIVE
INFLUENZA B, MOLECULAR: NEGATIVE
KETONES UR QL STRIP: ABNORMAL
LEUKOCYTE ESTERASE UR QL STRIP: NEGATIVE
LIPASE SERPL-CCNC: 22 U/L (ref 4–60)
LYMPHOCYTES # BLD AUTO: 1.2 K/UL (ref 1–4.8)
LYMPHOCYTES NFR BLD: 8.3 % (ref 18–48)
MCH RBC QN AUTO: 29.3 PG (ref 27–31)
MCHC RBC AUTO-ENTMCNC: 34.2 G/DL (ref 32–36)
MCV RBC AUTO: 86 FL (ref 82–98)
METHADONE UR QL SCN>300 NG/ML: NEGATIVE
MICROSCOPIC COMMENT: ABNORMAL
MONOCYTES # BLD AUTO: 1.8 K/UL (ref 0.3–1)
MONOCYTES NFR BLD: 12.2 % (ref 4–15)
NEUTROPHILS # BLD AUTO: 11.6 K/UL (ref 1.8–7.7)
NEUTROPHILS NFR BLD: 78 % (ref 38–73)
NITRITE UR QL STRIP: NEGATIVE
NRBC BLD-RTO: 0 /100 WBC
OPIATES UR QL SCN: NEGATIVE
PCP UR QL SCN>25 NG/ML: NEGATIVE
PH UR STRIP: 5 [PH] (ref 5–8)
PLATELET # BLD AUTO: 211 K/UL (ref 150–450)
PMV BLD AUTO: 10.2 FL (ref 9.2–12.9)
POTASSIUM SERPL-SCNC: 4.6 MMOL/L (ref 3.5–5.1)
PROT SERPL-MCNC: 9.3 G/DL (ref 6–8.4)
PROT UR QL STRIP: ABNORMAL
RBC # BLD AUTO: 4.17 M/UL (ref 4.6–6.2)
RBC #/AREA URNS HPF: 35 /HPF (ref 0–4)
RBC CASTS #/AREA URNS LPF: 5 /LPF
SARS-COV-2 RDRP RESP QL NAA+PROBE: NEGATIVE
SODIUM SERPL-SCNC: 128 MMOL/L (ref 136–145)
SP GR UR STRIP: 1.02 (ref 1–1.03)
SPECIMEN SOURCE: NORMAL
SQUAMOUS #/AREA URNS HPF: 12 /HPF
TOXICOLOGY INFORMATION: ABNORMAL
TROPONIN I SERPL DL<=0.01 NG/ML-MCNC: 0.01 NG/ML (ref 0–0.03)
URN SPEC COLLECT METH UR: ABNORMAL
UROBILINOGEN UR STRIP-ACNC: 1 EU/DL
WBC # BLD AUTO: 14.87 K/UL (ref 3.9–12.7)
WBC #/AREA URNS HPF: 10 /HPF (ref 0–5)

## 2024-05-20 PROCEDURE — 74176 CT ABD & PELVIS W/O CONTRAST: CPT | Mod: TC

## 2024-05-20 PROCEDURE — 87502 INFLUENZA DNA AMP PROBE: CPT | Performed by: EMERGENCY MEDICINE

## 2024-05-20 PROCEDURE — 99285 EMERGENCY DEPT VISIT HI MDM: CPT | Mod: 25

## 2024-05-20 PROCEDURE — 93005 ELECTROCARDIOGRAM TRACING: CPT

## 2024-05-20 PROCEDURE — 83880 ASSAY OF NATRIURETIC PEPTIDE: CPT | Performed by: EMERGENCY MEDICINE

## 2024-05-20 PROCEDURE — 80307 DRUG TEST PRSMV CHEM ANLYZR: CPT | Performed by: EMERGENCY MEDICINE

## 2024-05-20 PROCEDURE — 82077 ASSAY SPEC XCP UR&BREATH IA: CPT | Performed by: EMERGENCY MEDICINE

## 2024-05-20 PROCEDURE — 74176 CT ABD & PELVIS W/O CONTRAST: CPT | Mod: 26,,, | Performed by: RADIOLOGY

## 2024-05-20 PROCEDURE — 71045 X-RAY EXAM CHEST 1 VIEW: CPT | Mod: TC

## 2024-05-20 PROCEDURE — 96365 THER/PROPH/DIAG IV INF INIT: CPT

## 2024-05-20 PROCEDURE — U0002 COVID-19 LAB TEST NON-CDC: HCPCS | Performed by: EMERGENCY MEDICINE

## 2024-05-20 PROCEDURE — 87389 HIV-1 AG W/HIV-1&-2 AB AG IA: CPT | Performed by: EMERGENCY MEDICINE

## 2024-05-20 PROCEDURE — 84484 ASSAY OF TROPONIN QUANT: CPT | Performed by: EMERGENCY MEDICINE

## 2024-05-20 PROCEDURE — 36415 COLL VENOUS BLD VENIPUNCTURE: CPT | Performed by: EMERGENCY MEDICINE

## 2024-05-20 PROCEDURE — 96366 THER/PROPH/DIAG IV INF ADDON: CPT

## 2024-05-20 PROCEDURE — 85025 COMPLETE CBC W/AUTO DIFF WBC: CPT | Performed by: EMERGENCY MEDICINE

## 2024-05-20 PROCEDURE — 71045 X-RAY EXAM CHEST 1 VIEW: CPT | Mod: 26,,, | Performed by: RADIOLOGY

## 2024-05-20 PROCEDURE — 84145 PROCALCITONIN (PCT): CPT | Performed by: EMERGENCY MEDICINE

## 2024-05-20 PROCEDURE — 80053 COMPREHEN METABOLIC PANEL: CPT | Performed by: EMERGENCY MEDICINE

## 2024-05-20 PROCEDURE — 83690 ASSAY OF LIPASE: CPT | Performed by: EMERGENCY MEDICINE

## 2024-05-20 PROCEDURE — 25000003 PHARM REV CODE 250: Performed by: STUDENT IN AN ORGANIZED HEALTH CARE EDUCATION/TRAINING PROGRAM

## 2024-05-20 PROCEDURE — 81000 URINALYSIS NONAUTO W/SCOPE: CPT | Mod: 59 | Performed by: EMERGENCY MEDICINE

## 2024-05-20 PROCEDURE — 93010 ELECTROCARDIOGRAM REPORT: CPT | Mod: ,,, | Performed by: INTERNAL MEDICINE

## 2024-05-20 PROCEDURE — 63600175 PHARM REV CODE 636 W HCPCS: Performed by: STUDENT IN AN ORGANIZED HEALTH CARE EDUCATION/TRAINING PROGRAM

## 2024-05-20 PROCEDURE — 25000003 PHARM REV CODE 250: Performed by: EMERGENCY MEDICINE

## 2024-05-20 PROCEDURE — 87040 BLOOD CULTURE FOR BACTERIA: CPT | Mod: 59 | Performed by: STUDENT IN AN ORGANIZED HEALTH CARE EDUCATION/TRAINING PROGRAM

## 2024-05-20 RX ORDER — LEVOFLOXACIN 5 MG/ML
750 INJECTION, SOLUTION INTRAVENOUS
Status: COMPLETED | OUTPATIENT
Start: 2024-05-20 | End: 2024-05-20

## 2024-05-20 RX ORDER — ACETAMINOPHEN 325 MG/1
650 TABLET ORAL
Status: COMPLETED | OUTPATIENT
Start: 2024-05-20 | End: 2024-05-20

## 2024-05-20 RX ADMIN — LEVOFLOXACIN 750 MG: 750 INJECTION, SOLUTION INTRAVENOUS at 08:05

## 2024-05-20 RX ADMIN — ACETAMINOPHEN 650 MG: 325 TABLET ORAL at 06:05

## 2024-05-20 RX ADMIN — SODIUM CHLORIDE 1000 ML: 9 INJECTION, SOLUTION INTRAVENOUS at 08:05

## 2024-05-20 NOTE — ED PROVIDER NOTES
Encounter Date: 5/20/2024       History     Chief Complaint   Patient presents with    Shortness of Breath     Reports drooling (visible drooling in triage) incontinence of bowel and bladder with blood to urine. Right sided weakness, sob and chest pain for 3-4 days. Hasn't taken his regular medications after d/c previously from hospital      63-year-old male, history of fibrotic liver, coronary artery disease, gout, treated hepatitis-C, myocardial infarction, gastric ulcer?, and inability to hear, is brought here from home by his nephew with whom he lives.  Nephew states that starting on Friday, 4 days ago, he noticed that his uncle seemed to be drooling, which is unusual.  He also noted that his uncle had incontinence of bowel and bladder on 2 occasions over the weekend.  On 1 occasion there was blood in his urine.  The patient has complained of shortness of breath and chest pain to the nephew.  Here, he complains of lower abdominal pain.  He denies any chest pain or palpitations.  No sore throat, no rhinorrhea.  Nephew states that the patient has not taken any of his regular medications for many days.  He states that his uncle is a daily drinker.  There was a questioned about possible right-sided weakness, but patient denies this, and has no obvious focal neurologic deficits.  A  was used.  Triage vital signs show blood pressure 144/71, temperature 101.7°, pulse 110 beats per minute, O2 saturations 95%, respiratory rate 20 per minute.  A  was used during my initial interview and examination of this patient.        Review of patient's allergies indicates:  No Known Allergies  Past Medical History:   Diagnosis Date    Advanced liver fibrosis     FibroScan 1/2020 - F3    Coronary artery disease     Gout     History of hepatitis C, treated / cured (SVR12 - 8/2020)     Language barrier     MI (myocardial infarction)     Ulcer      Past Surgical History:   Procedure Laterality  Date    ABDOMINAL SURGERY      CARDIAC SURGERY      COLONOSCOPY  09/02/2013    ESOPHAGOGASTRODUODENOSCOPY  08/31/2013    [facility]    EXCISION OF MASS OF HAND Left 10/16/2023    Procedure: EXCISION, MASS, HAND;  Surgeon: Lakhwinder Frank DO;  Location: [facility] OR;  Service: Orthopedics;  Laterality: Left;  dorsal tendon mass    EXTENSOR TENDON OF FOREARM / WRIST REPAIR      INCISION AND DRAINAGE OF HAND Left 11/16/2023    Procedure: INCISION AND DRAINAGE, HAND;  Surgeon: Lakhwinder Frank DO;  Location: [facility] OR;  Service: Orthopedics;  Laterality: Left;    INTRAMEDULLARY RODDING OF HUMERUS Left 10/20/2019    Procedure: INSERTION, INTRAMEDULLARY SISSY, HUMERUS;  Surgeon: Jose Alejandro Funes MD;  Location: [facility] OR;  Service: Orthopedics;  Laterality: Left;    IRRIGATION AND DEBRIDEMENT OF UPPER EXTREMITY Left 11/20/2023    Procedure: IRRIGATION AND DEBRIDEMENT, UPPER EXTREMITY;  Surgeon: Lakhwinder Frank DO;  Location: [facility] OR;  Service: Orthopedics;  Laterality: Left;  2nd look irrigation debridement with possible closure left hand    OPEN REDUCTION AND INTERNAL FIXATION (ORIF) OF FRACTURE OF PROXIMAL HUMERUS Right 6/15/2023    Procedure: Reduction and Internal Fixation Right Proximal Humerus;  Surgeon: Lakhwinder Frank DO;  Location: [facility] OR;  Service: Orthopedics;  Laterality: Right;    TENODESIS, FINGER EXTENSOR, AT WRIST Left 10/16/2023    Procedure: TENODESIS, FINGER EXTENSOR, AT WRIST;  Surgeon: Lakhwinder Frank DO;  Location: [facility] OR;  Service: Orthopedics;  Laterality: Left;  left small finger     No family history on file.  Social History     Tobacco Use    Smoking status: Every Day     Current packs/day: 1.00     Types: Cigarettes    Smokeless tobacco: Never   Substance Use Topics    Alcohol use: Yes     Alcohol/week: 6.0 standard drinks of alcohol     Types: 6 Cans of beer per week    Drug use: Yes     Types: Marijuana     Review of Systems   Constitutional:  Positive for  fever.   HENT:  Negative for congestion, rhinorrhea and sore throat.    Eyes:  Negative for photophobia and pain.   Respiratory:  Positive for shortness of breath. Negative for cough, chest tightness and wheezing.    Cardiovascular:  Negative for chest pain and palpitations.   Gastrointestinal:  Positive for abdominal pain and diarrhea. Negative for blood in stool, constipation, nausea and vomiting.   Genitourinary:  Positive for hematuria. Negative for decreased urine volume, difficulty urinating, dysuria, flank pain and frequency.   Musculoskeletal:  Negative for arthralgias, myalgias, neck pain and neck stiffness.   Skin:  Negative for pallor and rash.   Neurological:  Negative for dizziness, seizures, syncope, light-headedness and headaches.   Psychiatric/Behavioral:  Negative for confusion. The patient is not nervous/anxious.        Physical Exam     Initial Vitals [05/20/24 1611]   BP Pulse Resp Temp SpO2   (!) 144/71 (!) 11 20 (!) 101.7 °F (38.7 °C) 95 %      MAP       --         Physical Exam    Nursing note and vitals reviewed.  Constitutional: He appears well-developed. He is not diaphoretic. No distress.   Patient is thin,He appears to have some contractures versus arthritic changes to the right hand. This does not appear to be acute.  Patient does not appear to be in any acute distress.  Other than the lower abdominal pain, he voices no complaints   HENT:   Head: Normocephalic and atraumatic.   Nose: Nose normal.   Mouth/Throat: Oropharynx is clear and moist. No oropharyngeal exudate.   Eyes: Conjunctivae and EOM are normal. Pupils are equal, round, and reactive to light. No scleral icterus.   Neck: Neck supple. No JVD present.   Normal range of motion.  Cardiovascular:  Normal rate, regular rhythm, normal heart sounds and intact distal pulses.           No murmur heard.  Slight tachycardia   Pulmonary/Chest: Breath sounds normal. No stridor. No respiratory distress. He has no wheezes.   Abdominal:  Abdomen is soft. Bowel sounds are normal. He exhibits no distension. There is no abdominal tenderness.   Genitourinary:    Genitourinary Comments: Patient refused rectal exam     Musculoskeletal:         General: No tenderness or edema. Normal range of motion.      Cervical back: Normal range of motion and neck supple.     Neurological: He is alert and oriented to person, place, and time. He has normal strength. No cranial nerve deficit or sensory deficit. GCS score is 15. GCS eye subscore is 4. GCS verbal subscore is 5. GCS motor subscore is 6.   Neurologic exam is difficult, but patient does not appear to be confused.  He denies any headache or numbness.  Denies any extremity weakness.  Denies any vision changes patient will lift each leg from the bed on command and can hold it in the air without any difficulty.  There is no pronator drift 20 holds his arms in front of him.   Skin: Skin is warm and dry. Capillary refill takes less than 2 seconds. No rash noted. No erythema.   Psychiatric: He has a normal mood and affect. His behavior is normal.         ED Course   Procedures  Labs Reviewed   CBC W/ AUTO DIFFERENTIAL - Abnormal; Notable for the following components:       Result Value    WBC 14.87 (*)     RBC 4.17 (*)     Hemoglobin 12.2 (*)     Hematocrit 35.7 (*)     RDW 16.0 (*)     Immature Granulocytes 1.3 (*)     Gran # (ANC) 11.6 (*)     Immature Grans (Abs) 0.19 (*)     Mono # 1.8 (*)     Gran % 78.0 (*)     Lymph % 8.3 (*)     All other components within normal limits   COMPREHENSIVE METABOLIC PANEL - Abnormal; Notable for the following components:    Sodium 128 (*)     Chloride 91 (*)     CO2 20 (*)     Creatinine 1.6 (*)     Total Protein 9.3 (*)     Albumin 2.8 (*)     eGFR 47.8 (*)     Anion Gap 17 (*)     All other components within normal limits   URINALYSIS, REFLEX TO URINE CULTURE - Abnormal; Notable for the following components:    Protein, UA 2+ (*)     Ketones, UA 1+ (*)     Bilirubin (UA) 2+  (*)     Occult Blood UA 3+ (*)     All other components within normal limits    Narrative:     Preferred Collection Type->Urine, Clean Catch  Specimen Source->Urine   DRUG SCREEN PANEL, URINE EMERGENCY - Abnormal; Notable for the following components:    THC Presumptive Positive (*)     All other components within normal limits    Narrative:     Preferred Collection Type->Urine, Clean Catch  Specimen Source->Urine   URINALYSIS MICROSCOPIC - Abnormal; Notable for the following components:    RBC, UA 35 (*)     WBC, UA 10 (*)     Hyaline Casts, UA 3 (*)     RBC Casts, UA 5 (*)     Granular Casts, UA 2 (*)     All other components within normal limits    Narrative:     Preferred Collection Type->Urine, Clean Catch  Specimen Source->Urine   INFLUENZA A & B BY MOLECULAR   CULTURE, BLOOD   CULTURE, BLOOD   HIV 1 / 2 ANTIBODY    Narrative:     Release to patient->Immediate   LIPASE   ALCOHOL,MEDICAL (ETHANOL)   SARS-COV-2 RNA AMPLIFICATION, QUAL   B-TYPE NATRIURETIC PEPTIDE   TROPONIN I   B-TYPE NATRIURETIC PEPTIDE   PROCALCITONIN   PROCALCITONIN     EKG Readings: (Independently Interpreted)   EKG personally reviewed by me shows sinus tachycardia, left atrial enlargement, left anterior fascicular block, left ventricular hypertrophy.  111 beats per minute, TX interval 144, .  No obvious ischemic changes.  No arrhythmia.       Imaging Results              CT Abdomen Pelvis  Without Contrast (Final result)  Result time 05/20/24 22:31:31      Final result by Bal Quinones MD (05/20/24 22:31:31)                   Impression:      Partially visualized left-sided pleural effusion with associated left lower lobe compressive atelectasis and/or consolidation.    Hepatomegaly and hepatic steatosis.    Cholelithiasis without definite CT evidence of acute cholecystitis.    Advanced degenerative changes of the right hip.    Additional findings as above.      Electronically signed by: Bal Quinones  MD  Date:    05/20/2024  Time:    22:31               Narrative:    EXAMINATION:  CT ABDOMEN PELVIS WITHOUT CONTRAST    CLINICAL HISTORY:  Abdominal pain, acute, nonlocalized;    TECHNIQUE:  Low dose axial images, sagittal and coronal reformations were obtained from the lung bases to the pubic symphysis without IV contrast.  Oral contrast was not administered.    COMPARISON:  CT 06/13/2021    FINDINGS:  There is partially visualized left-sided pleural effusion with associated left lower lobe atelectasis and/or consolidation.  There are scattered coronary artery calcifications present.    There is diffuse decreased attenuation of the hepatic parenchyma suggesting hepatic steatosis.  There is cholelithiasis.  There is no intra-or extrahepatic biliary ductal dilatation.    The stomach, spleen, pancreas, and adrenal glands appear within normal limits for non-contrast technique.    The kidneys are normal in size and location. There is no evidence of hydronephrosis or nephrolithiasis.  The urinary bladder demonstrates no significant abnormality. Prostate is unremarkable.    The abdominal aorta is normal in course and caliber with moderate atherosclerotic calcification along its course.  No retroperitoneal hematoma.    The visualized loops of small and large bowel show no evidence of obstruction or inflammation. The appendix is not definitively visualized, however no localized inflammatory change is seen at its expected location. There is no ascites, free fluid, or intraperitoneal free air. There is a small fat containing umbilical hernia.    Osseous structures demonstrate degenerative changes of the spine most pronounced at L5-S1.  There are advanced degenerative changes of the right hip with severe joint space narrowing as well as subchondral sclerosis and cystic change.  There is remodeling of the right femoral head possibly secondary to osteonecrosis.  The extraperitoneal soft tissues are unremarkable.                                        X-Ray Chest AP Portable (Final result)  Result time 05/20/24 18:16:32      Final result by Bal Quinones MD (05/20/24 18:16:32)                   Impression:      As above.      Electronically signed by: Bal Quinones MD  Date:    05/20/2024  Time:    18:16               Narrative:    EXAMINATION:  XR CHEST AP PORTABLE    CLINICAL HISTORY:  Shortness of breath    TECHNIQUE:  Single frontal view of the chest was performed.    COMPARISON:  CT chest 12/21/2023, chest radiograph 11/18/2023    FINDINGS:  There is postsurgical change of prior median sternotomy.  The cardiac silhouette is not significantly enlarged.  There is atherosclerotic calcification of the thoracic aorta.  There is mild elevation of the left hemidiaphragm.  There is asymmetric opacification of the left lung base which may relate to infection, aspiration, and/or atelectasis.  Small volume left-sided pleural fluid suspected.  The right hemithorax appears well aerated without evidence of confluent airspace consolidation or significant pleural fluid.  No evidence of pneumothorax.  There are multiple remote appearing bilateral rib deformities.  There are remote deformities of the right and left humerus status post prior internal fixation.                                       Medications   acetaminophen tablet 650 mg (650 mg Oral Given 5/20/24 1859)   levoFLOXacin 750 mg/150 mL IVPB 750 mg (0 mg Intravenous Stopped 5/20/24 2145)   sodium chloride 0.9% bolus 1,000 mL 1,000 mL (0 mLs Intravenous Stopped 5/20/24 2130)     Medical Decision Making  Differential includes COVID, influenza, pneumonia, urinary tract infection, infectious diarrhea, food-borne illness, other viral illness, etc.    Labs, including urine, and chest x-ray has been ordered.  Patient is a very poor historian, even more so because he was deaf and there is a language barrier.  But he does not seem to want to participate in the questioning and gives very short  answers to the .  It appears that he is suffering from some lower abdominal pain, he has a fever, and he has had some loose stools and incontinence of bladder, with hematuria.  I do not suspect a CVA as the cause of the patient's symptoms.  Labs are currently being drawn and collected.  At shift change, oncoming emergency physician will assume care.    Dr. Page addendum  Patient was care signed out to me at shift change at 6:00 p.m.  Plan was to follow up CT abdomen, and then make disposition.  Patient was seen in ED bed, labs reviewed vital signs reviewed.  Febrile 101.7 COVID negative, flu negative.  H&H 12/35, WBC 14, UA shows blood, pyuria concerning for UTI, also hyaline casts suggestive of dehydration, lab shows hyponatremia sodium 128, ANDREWS BUN/creatinine 22/1.6 early decreased serum bicarb 20.  CT abdomen pelvis results shows cholelithiasis without cholecystitis, hepatomegaly, hepatic steatosis.  No other acute findings.  Patient was received Levaquin for UTI as well as pneumonia in the ED.  Rx Levaquin x5 days.  Patient was instructed to follow up with PCP and was given strict return precautions to the ED. The patient voiced understanding and agreed with the plan        Amount and/or Complexity of Data Reviewed  External Data Reviewed: labs and radiology.  Labs: ordered.  Radiology: ordered.    Risk  OTC drugs.  Prescription drug management.                                      Clinical Impression:  Final diagnoses:  [R07.9] Chest pain  [R06.02] Shortness of breath  [N30.01] Acute cystitis with hematuria (Primary)  [J18.9] Pneumonia of left upper lobe due to infectious organism                 Bryce Page MD  05/21/24 0112

## 2024-05-21 VITALS
RESPIRATION RATE: 30 BRPM | SYSTOLIC BLOOD PRESSURE: 139 MMHG | OXYGEN SATURATION: 96 % | DIASTOLIC BLOOD PRESSURE: 70 MMHG | HEART RATE: 73 BPM | TEMPERATURE: 98 F

## 2024-05-21 LAB
OHS QRS DURATION: 84 MS
OHS QTC CALCULATION: 440 MS
PROCALCITONIN SERPL IA-MCNC: 0.59 NG/ML

## 2024-05-21 RX ORDER — LEVOFLOXACIN 750 MG/1
750 TABLET ORAL DAILY
Qty: 5 TABLET | Refills: 0 | Status: SHIPPED | OUTPATIENT
Start: 2024-05-21 | End: 2024-05-28

## 2024-05-21 NOTE — DISCHARGE INSTRUCTIONS
Discharge Summary  Discharge Summary from Physical Therapy Report      Dates  PT visit: 5/6/2019 to 5/28/2019  Number of Visits: 4     Discharge Status of Patient: See Progress Note dated 5/28/2019    Goals: All Met    Discharge Plan: Continue with current home exercise program as instructed    Comments with this pathology, symptoms typically eventually return.    Date of Discharge 05/29/2019        Vega Umana, PT  Physical Therapist                     Take antibiotics as prescribed  Follow up with your primary care physician  May return to the ED at any time if any new or worsening symptoms

## 2024-05-26 LAB
BACTERIA BLD CULT: NORMAL
BACTERIA BLD CULT: NORMAL

## 2024-05-28 ENCOUNTER — LAB VISIT (OUTPATIENT)
Dept: LAB | Facility: HOSPITAL | Age: 64
End: 2024-05-28
Attending: STUDENT IN AN ORGANIZED HEALTH CARE EDUCATION/TRAINING PROGRAM
Payer: MEDICARE

## 2024-05-28 ENCOUNTER — OFFICE VISIT (OUTPATIENT)
Dept: FAMILY MEDICINE | Facility: CLINIC | Age: 64
End: 2024-05-28
Payer: MEDICARE

## 2024-05-28 VITALS
WEIGHT: 123.81 LBS | HEART RATE: 100 BPM | DIASTOLIC BLOOD PRESSURE: 70 MMHG | SYSTOLIC BLOOD PRESSURE: 110 MMHG | HEIGHT: 72 IN | OXYGEN SATURATION: 96 % | BODY MASS INDEX: 16.77 KG/M2

## 2024-05-28 DIAGNOSIS — F17.218 NICOTINE DEPENDENCE, CIGARETTES, WITH OTHER NICOTINE-INDUCED DISORDERS: ICD-10-CM

## 2024-05-28 DIAGNOSIS — R53.81 DEBILITY: ICD-10-CM

## 2024-05-28 DIAGNOSIS — R63.0 LOSS OF APPETITE: Primary | ICD-10-CM

## 2024-05-28 DIAGNOSIS — Z13.6 ENCOUNTER FOR LIPID SCREENING FOR CARDIOVASCULAR DISEASE: ICD-10-CM

## 2024-05-28 DIAGNOSIS — Z13.220 ENCOUNTER FOR LIPID SCREENING FOR CARDIOVASCULAR DISEASE: ICD-10-CM

## 2024-05-28 DIAGNOSIS — M16.10 HIP ARTHRITIS: ICD-10-CM

## 2024-05-28 DIAGNOSIS — Z12.11 ENCOUNTER FOR SCREENING FOR MALIGNANT NEOPLASM OF COLON: ICD-10-CM

## 2024-05-28 DIAGNOSIS — J40 BRONCHITIS: ICD-10-CM

## 2024-05-28 DIAGNOSIS — F10.10 EXCESSIVE DRINKING ALCOHOL: ICD-10-CM

## 2024-05-28 LAB
CHOLEST SERPL-MCNC: 130 MG/DL (ref 120–199)
CHOLEST/HDLC SERPL: 6.2 {RATIO} (ref 2–5)
HDLC SERPL-MCNC: 21 MG/DL (ref 40–75)
HDLC SERPL: 16.2 % (ref 20–50)
LDLC SERPL CALC-MCNC: 88.6 MG/DL (ref 63–159)
NONHDLC SERPL-MCNC: 109 MG/DL
TRIGL SERPL-MCNC: 102 MG/DL (ref 30–150)

## 2024-05-28 PROCEDURE — 99214 OFFICE O/P EST MOD 30 MIN: CPT | Mod: S$PBB,,, | Performed by: STUDENT IN AN ORGANIZED HEALTH CARE EDUCATION/TRAINING PROGRAM

## 2024-05-28 PROCEDURE — 99999 PR PBB SHADOW E&M-EST. PATIENT-LVL IV: CPT | Mod: PBBFAC,,, | Performed by: STUDENT IN AN ORGANIZED HEALTH CARE EDUCATION/TRAINING PROGRAM

## 2024-05-28 PROCEDURE — 99214 OFFICE O/P EST MOD 30 MIN: CPT | Mod: PBBFAC | Performed by: STUDENT IN AN ORGANIZED HEALTH CARE EDUCATION/TRAINING PROGRAM

## 2024-05-28 PROCEDURE — 36415 COLL VENOUS BLD VENIPUNCTURE: CPT | Performed by: STUDENT IN AN ORGANIZED HEALTH CARE EDUCATION/TRAINING PROGRAM

## 2024-05-28 PROCEDURE — 80061 LIPID PANEL: CPT | Performed by: STUDENT IN AN ORGANIZED HEALTH CARE EDUCATION/TRAINING PROGRAM

## 2024-05-28 RX ORDER — BENZONATATE 200 MG/1
200 CAPSULE ORAL 3 TIMES DAILY PRN
Qty: 30 CAPSULE | Refills: 0 | Status: SHIPPED | OUTPATIENT
Start: 2024-05-28 | End: 2024-06-07

## 2024-05-28 RX ORDER — NALTREXONE HYDROCHLORIDE 50 MG/1
50 TABLET, FILM COATED ORAL DAILY
Qty: 90 TABLET | Refills: 3 | Status: SHIPPED | OUTPATIENT
Start: 2024-05-28

## 2024-05-28 RX ORDER — NALTREXONE HYDROCHLORIDE 50 MG/1
50 TABLET, FILM COATED ORAL DAILY
COMMUNITY
End: 2024-05-28 | Stop reason: SDUPTHER

## 2024-05-28 RX ORDER — ALBUTEROL SULFATE 90 UG/1
2 AEROSOL, METERED RESPIRATORY (INHALATION) EVERY 6 HOURS PRN
Qty: 18 G | Refills: 0 | Status: SHIPPED | OUTPATIENT
Start: 2024-05-28 | End: 2025-05-28

## 2024-05-28 RX ORDER — MIRTAZAPINE 15 MG/1
15 TABLET, FILM COATED ORAL NIGHTLY
Qty: 30 TABLET | Refills: 11 | Status: SHIPPED | OUTPATIENT
Start: 2024-05-28 | End: 2025-05-28

## 2024-05-28 NOTE — PROGRESS NOTES
Patient signed an involvement in care with his sister, Thomas,Sada (Sister)  [phone] (Mobile)

## 2024-05-28 NOTE — PROGRESS NOTES
[facility] Note    Subjective:    The HPI and pertinent ROS is included in the Diagnostic Impression Remarks section at the end of the note. Please see below for further details. Chief complaint is at end of note.     Jesus is a pleasant intelligent patient who is here for evaluation.     Modified Medications    Modified Medication Previous Medication    ALBUTEROL (VENTOLIN HFA) 90 MCG/ACTUATION INHALER albuterol (VENTOLIN HFA) 90 mcg/actuation inhaler       Inhale 2 puffs into the lungs every 6 (six) hours as needed for Wheezing. Rescue    Inhale 2 puffs into the lungs every 6 (six) hours as needed for Wheezing. Rescue    NALTREXONE (DEPADE) 50 MG TABLET naltrexone (DEPADE) 50 mg tablet       Take 1 tablet (50 mg total) by mouth once daily.    Take 50 mg by mouth once daily.       Data reviewed 274}  Previous medical records reviewed and summarized in plan section at end of note.      If you are due for any health screening(s) below please notify me so we can arrange them to be ordered and scheduled. Most healthy patients at your age complete them, but you are free to accept or refuse. If you can't do it, I'll definitely understand. If you can, I'd certainly appreciate it!     Tests to Keep You Healthy    Colon Cancer Screening: DUE  Last Blood Pressure <= 139/89 (5/28/2024): NO      The following portions of the patient's history were reviewed and updated as appropriate: allergies, current medications, past family history, past medical history, past social history, past surgical history and problem list.    He  has a past medical history of Advanced liver fibrosis, Coronary artery disease, Gout, History of hepatitis C, treated / cured (SVR12 - 8/2020), Language barrier, MI (myocardial infarction), and Ulcer.  He  has a past surgical history that includes Abdominal surgery; Extensor tendon of forearm / wrist repair; Cardiac surgery; Intramedullary rodding of humerus (Left, 10/20/2019);  Esophagogastroduodenoscopy (08/31/2013); Colonoscopy (09/02/2013); Open reduction and internal fixation (ORIF) of fracture of proximal humerus (Right, 6/15/2023); Excision of mass of hand (Left, 10/16/2023); tenodesis, finger extensor, at wrist (Left, 10/16/2023); Incision and drainage of hand (Left, 11/16/2023); and Irrigation and debridement of upper extremity (Left, 11/20/2023).    He  reports that he has been smoking cigarettes. He has never used smokeless tobacco. He reports current alcohol use of about 6.0 standard drinks of alcohol per week. He reports current drug use. Drug: Marijuana.  He family history is not on file.    Review of patient's allergies indicates:  No Known Allergies    Tobacco Use: High Risk (5/28/2024)    Patient History     Smoking Tobacco Use: Every Day     Smokeless Tobacco Use: Never     Passive Exposure: Not on file     Physical Examination  General appearance: alert, cooperative, no distress  Neck: no thyromegaly, no neck stiffness  Lungs: clear to auscultation, no wheezes, rales or rhonchi, symmetric air entry  Heart: normal rate, regular rhythm, normal S1, S2, no murmurs, rubs, clicks or gallops  Abdomen: soft, nontender, nondistended, no rigidity, rebound, or guarding.   Back: no point tenderness over spine  Extremities: peripheral pulses normal, no unilateral leg swelling or calf tenderness   Neurological:alert, oriented, normal speech, no new focal findings or movement disorder noted from baseline    BP Readings from Last 3 Encounters:   05/28/24 110/70   05/21/24 139/70   02/07/24 (!) 160/86     Wt Readings from Last 3 Encounters:   05/28/24 56.2 kg (123 lb 12.8 oz)   02/07/24 65.3 kg (144 lb)   12/06/23 65.3 kg (144 lb)     /70 (BP Location: Left arm, Patient Position: Sitting, BP Method: Medium (Manual))   Pulse 100   Ht 6' (1.829 m)   Wt 56.2 kg (123 lb 12.8 oz)   SpO2 96%   BMI 16.79 kg/m²    274}  Laboratory: I have reviewed old labs below:    274}    Lab Results  "  Component Value Date    WBC 14.87 (H) 05/20/2024    HGB 12.2 (L) 05/20/2024    HCT 35.7 (L) 05/20/2024    MCV 86 05/20/2024     05/20/2024     (L) 05/20/2024    K 4.6 05/20/2024    CL 91 (L) 05/20/2024    CALCIUM 10.3 05/20/2024    PHOS 2.7 11/19/2023    CO2 20 (L) 05/20/2024    GLU 93 05/20/2024    BUN 22 05/20/2024    CREATININE 1.6 (H) 05/20/2024    EGFRNORACEVR 47.8 (A) 05/20/2024    ANIONGAP 17 (H) 05/20/2024    PROT 9.3 (H) 05/20/2024    ALBUMIN 2.8 (L) 05/20/2024    BILITOT 0.7 05/20/2024    ALKPHOS 59 05/20/2024    ALT 13 05/20/2024    AST 19 05/20/2024    INR 1.0 10/12/2023    TSH 2.269 11/15/2023    PSA 1.6 12/01/2023    HGBA1C 5.6 01/24/2020      Lab reviewed by me: Particular labs of significance that I will monitor, workup, or treat to improve are mentioned below in diagnostic impression remarks.    Imaging/EKG: I have reviewed the pertinent results and my findings are noted in remarks.  274}    CC:   Chief Complaint   Patient presents with    Follow-up     UTI, FLUID ON HIS LUNGS AND PHEUMONIA        274}    Assessment/Plan  Jesus Mccabe is a 63 y.o. male who presents to clinic with:  1. Loss of appetite    2. Encounter for screening for malignant neoplasm of colon    3. Hip arthritis    4. Nicotine dependence, cigarettes, with other nicotine-induced disorders    5. Encounter for lipid screening for cardiovascular disease    6. Excessive drinking alcohol    7. Bronchitis    8. Debility       274}  Diagnostic Impression Remarks + HPI     Documentation entered by me for this encounter may have been done in part using speech-recognition technology. Although I have made an effort to ensure accuracy, "sound like" errors may exist and should be interpreted in context.      Patient presents for ED follow up.   Patient was not at baseline prior to presenting to the ED.   Found to have UTI and pneumonia and was given levofloxacin   Symptoms has improved since last visit. Patient has no " recollection of the event  Sister noted that patient has low appetite and has lack of motivation  Will start remeron and start HH PT d/t hip arthritis and hopefully improve patient's mood  Patient reported by excessive etoh use. Agreed to start naltrexone and advised to avoid pain medications like tramadol and opioids  Bronchitis: will send albuterol. Possible copd    Additional workup planned: see labs ordered below.    See below for labs and meds ordered with associated diagnosis      1. Loss of appetite  - mirtazapine (REMERON) 15 MG tablet; Take 1 tablet (15 mg total) by mouth every evening.  Dispense: 30 tablet; Refill: 11    2. Encounter for screening for malignant neoplasm of colon  - Ambulatory referral/consult to Gastroenterology; Future    3. Hip arthritis  - Ambulatory referral/consult to Home Health; Future    4. Nicotine dependence, cigarettes, with other nicotine-induced disorders  - Ambulatory referral/consult to Smoking Cessation Program; Future    5. Encounter for lipid screening for cardiovascular disease  - Lipid Panel; Future    6. Excessive drinking alcohol  - naltrexone (DEPADE) 50 mg tablet; Take 1 tablet (50 mg total) by mouth once daily.  Dispense: 90 tablet; Refill: 3    7. Bronchitis  - albuterol (VENTOLIN HFA) 90 mcg/actuation inhaler; Inhale 2 puffs into the lungs every 6 (six) hours as needed for Wheezing. Rescue  Dispense: 18 g; Refill: 0  - benzonatate (TESSALON) 200 MG capsule; Take 1 capsule (200 mg total) by mouth 3 (three) times daily as needed for Cough.  Dispense: 30 capsule; Refill: 0    8. Debility  - Ambulatory referral/consult to Home Health; Future      Richa Velarde MD   274}    If you are due for any health screening(s) below please notify me so we can arrange them to be ordered and scheduled. Most healthy patients at your age complete them, but you are free to accept or refuse.     If you can't do it, I'll definitely understand. If you can, I'd certainly appreciate it!    Tests to Keep You Healthy    Colon Cancer Screening: DUE  Last Blood Pressure <= 139/89 (5/28/2024): NO

## 2024-05-29 ENCOUNTER — TELEPHONE (OUTPATIENT)
Dept: FAMILY MEDICINE | Facility: CLINIC | Age: 64
End: 2024-05-29
Payer: MEDICARE

## 2024-05-29 RX ORDER — LACTOSE-REDUCED FOOD
1896 POWDER (GRAM) ORAL 4 TIMES DAILY
Qty: 30 EACH | Refills: 4 | Status: SHIPPED | OUTPATIENT
Start: 2024-05-29

## 2024-05-29 NOTE — TELEPHONE ENCOUNTER
----- Message from Lyudmila Crawford sent at 5/29/2024  9:38 AM CDT -----  Contact: Pt Sister - Sada Thomas  Type: Needs Medical Advice         Who Called:  Pt Sister Marisa Thomas  Best Call Back Number:[phone]  Additional Information: Requesting a call back regarding pt  received a call from the office but was unable to hear who it was. She returned the call asking for the office to call her back.    Please Advise- Thank you

## 2024-05-29 NOTE — TELEPHONE ENCOUNTER
Notified sister the supplemental drink was sent to [facility], informed her that his insurance will probably not pay for it and it may be less expensive if just buys OTC, also suggested checking with various food pantries, sister voiced understanding

## 2024-05-29 NOTE — TELEPHONE ENCOUNTER
----- Message from Iglesia Willams sent at 5/29/2024 10:00 AM CDT -----  Regarding: Returning call  Contact: sister  Type:  Patient Returning Call    Who Called:pts sister Sada    Who Left Message for Patient:Nikki    Does the patient know what this is regarding?:medication    Would the patient rather a call back or a response via [facility]? Call back    Best Call Back Number:[phone]      Additional Information: Sts she needs to talk to Nikki again about the medication the pt needs.    Please advise -- Thank you

## 2024-05-29 NOTE — TELEPHONE ENCOUNTER
Contacted patient in regards of lab results, verified . Patient verbalizes understanding and denies any further questions or concerns.

## 2024-06-04 ENCOUNTER — TELEPHONE (OUTPATIENT)
Dept: FAMILY MEDICINE | Facility: CLINIC | Age: 64
End: 2024-06-04
Payer: MEDICARE

## 2024-06-04 NOTE — TELEPHONE ENCOUNTER
----- Message from Marlyn Olmedo sent at 6/4/2024  8:36 AM CDT -----  Contact: Home Health nurse  Type: Needs Medical Advice    Who Called:  Patient's home health nurse  What is this regarding?:  He is out of all of his Rxs.  [facility] [address] [place] [address] - [address] [place] [id number]  Phone: [phone] Fax: [phone]  Best Call Back Number:  Ms. Merrill at [phone]  Additional Information:  Please call back at the phone number listed above to advise. Thank you!

## 2024-06-04 NOTE — TELEPHONE ENCOUNTER
[facility] called to clarify supplement order,  4546 ml is total per day, which equals I can 4 times daily

## 2024-06-04 NOTE — TELEPHONE ENCOUNTER
Attempted to contact pt home health nurse Garfield at [phone], wrong number.    Spoke with sister, informed sister RX sent into [facility]. Voiced understanding.

## 2024-06-17 ENCOUNTER — EXTERNAL HOME HEALTH (OUTPATIENT)
Dept: HOME HEALTH SERVICES | Facility: HOSPITAL | Age: 64
End: 2024-06-17
Payer: MEDICARE

## 2024-07-06 ENCOUNTER — DOCUMENT SCAN (OUTPATIENT)
Dept: HOME HEALTH SERVICES | Facility: HOSPITAL | Age: 64
End: 2024-07-06
Payer: MEDICARE

## 2025-02-24 DIAGNOSIS — Z00.00 ENCOUNTER FOR MEDICARE ANNUAL WELLNESS EXAM: ICD-10-CM

## 2025-05-27 NOTE — CARE UPDATE
Had discussion with patient regarding nutrition options this afternoon. Nursing present for discussion. Used 2 sign language interpreters to ensure patient had good understanding. I explained speech therapy recommendations to patient in detail. I explained they are recommending that he pursue alternative forms of nutrition going forward because in their professional opinion patient is at risk for aspiration. I explained to patient that in this situation we usually have g-tube placed and give tube feeds. I made sure multiple times with the  that patient had good understanding during our conversation. He does not want to pursue g-tube placement at this time. He understands the risk of aspiration including pneumonia and possibly death. We will restart a full liquid diet for patient going forward. Speech therapy will continue to see patient while he is admitted.   [facility] - Telemetry (Hospital)  Initial Discharge Assessment       Primary Care Provider: Catie, Primary Doctor    Admission Diagnosis: Shortness of breath [R06.02]  CHF exacerbation [I50.9]  Hypertension, unspecified type [I10]  Acute on chronic congestive heart failure, unspecified heart failure type [I50.9]    Admission Date: 5/27/2025  Expected Discharge Date: 5/28/2025    Transition of Care Barriers: Unisured    Payor: MEDICAID / Plan: PENDING MEDICAID / Product Type: Government /     Extended Emergency Contact Information  Primary Emergency Contact: Mackenzie Wilkinson  Mobile Phone: [phone]  Relation: Sister  Preferred language: English   needed? No    Discharge Plan A: Home       No Pharmacies Listed    Initial Assessment (most recent)       Adult Discharge Assessment - 05/27/25 1607          Discharge Assessment    Assessment Type Discharge Planning Assessment     Confirmed/corrected address, phone number and insurance Yes     Confirmed Demographics Correct on Facesheet     Source of Information patient     Communicated BENJAMIN with patient/caregiver Date not available/Unable to determine     People in Home friend(s)     Facility Arrived From: Home     Do you expect to return to your current living situation? Yes     Prior to hospitilization cognitive status: Alert/Oriented     Current cognitive status: Alert/Oriented     Walking or Climbing Stairs Difficulty no     Dressing/Bathing Difficulty no     Equipment Currently Used at Home none     Readmission within 30 days? No     Patient currently being followed by outpatient case management? No     Do you currently have service(s) that help you manage your care at home? No     Do you take prescription medications? Yes     Do you have prescription coverage? Yes     Do you have any problems affording any of your prescribed medications? No     Is the patient taking medications as prescribed? yes     Who is going to help you get home at discharge? Pt will  arrange     How do you get to doctors appointments? car, drives self     Are you on dialysis? No     Do you take coumadin? No     Discharge Plan A Home     DME Needed Upon Discharge  none     Discharge Plan discussed with: Patient     Transition of Care Barriers Unisured                   SW met with patient at bedside to complete assessment. Pt declined need for interpretation. Pt reports living at home with roommate. Pt normally independent with ADLs and drives.     Pt does not have insurance. Pt screened by [facility] - application submitted but no decision on Medicaid application      Pt's whiteboard updated with CM contact and anticipated discharge disposition. SW to remain available as needed.

## 2025-05-28 DIAGNOSIS — I10 HTN (HYPERTENSION): ICD-10-CM

## 2025-06-17 ENCOUNTER — PATIENT OUTREACH (OUTPATIENT)
Dept: ADMINISTRATIVE | Facility: HOSPITAL | Age: 65
End: 2025-06-17
Payer: MEDICARE

## 2025-06-17 ENCOUNTER — HOSPITAL ENCOUNTER (EMERGENCY)
Facility: HOSPITAL | Age: 65
Discharge: HOME | End: 2025-06-17
Attending: EMERGENCY MEDICINE
Payer: MEDICARE

## 2025-06-17 VITALS
BODY MASS INDEX: 18.61 KG/M2 | OXYGEN SATURATION: 96 % | TEMPERATURE: 99 F | WEIGHT: 130 LBS | RESPIRATION RATE: 19 BRPM | HEART RATE: 67 BPM | HEIGHT: 70 IN | SYSTOLIC BLOOD PRESSURE: 159 MMHG | DIASTOLIC BLOOD PRESSURE: 87 MMHG

## 2025-06-17 DIAGNOSIS — Z00.00 GENERAL MEDICAL EXAM: ICD-10-CM

## 2025-06-17 DIAGNOSIS — R32 URINARY INCONTINENCE, UNSPECIFIED TYPE: Primary | ICD-10-CM

## 2025-06-17 LAB
ABSOLUTE EOSINOPHIL (OHS): 0.04 K/UL
ABSOLUTE MONOCYTE (OHS): 0.61 K/UL (ref 0.3–1)
ABSOLUTE NEUTROPHIL COUNT (OHS): 2.38 K/UL (ref 1.8–7.7)
ALBUMIN SERPL BCP-MCNC: 3.5 G/DL (ref 3.5–5.2)
ALP SERPL-CCNC: 64 UNIT/L (ref 40–150)
ALT SERPL W/O P-5'-P-CCNC: 13 UNIT/L (ref 10–44)
ANION GAP (OHS): 14 MMOL/L (ref 8–16)
AST SERPL-CCNC: 23 UNIT/L (ref 11–45)
BASOPHILS # BLD AUTO: 0.05 K/UL
BASOPHILS NFR BLD AUTO: 0.9 %
BILIRUB SERPL-MCNC: 0.3 MG/DL (ref 0.1–1)
BILIRUB UR QL STRIP.AUTO: NEGATIVE
BUN SERPL-MCNC: 4 MG/DL (ref 8–23)
CALCIUM SERPL-MCNC: 9.3 MG/DL (ref 8.7–10.5)
CHLORIDE SERPL-SCNC: 102 MMOL/L (ref 95–110)
CLARITY UR: CLEAR
CO2 SERPL-SCNC: 22 MMOL/L (ref 23–29)
COLOR UR AUTO: YELLOW
CREAT SERPL-MCNC: 0.8 MG/DL (ref 0.5–1.4)
ERYTHROCYTE [DISTWIDTH] IN BLOOD BY AUTOMATED COUNT: 19.4 % (ref 11.5–14.5)
ETHANOL SERPL-MCNC: 242 MG/DL
GFR SERPLBLD CREATININE-BSD FMLA CKD-EPI: >60 ML/MIN/1.73/M2
GLUCOSE SERPL-MCNC: 89 MG/DL (ref 70–110)
GLUCOSE UR QL STRIP: NEGATIVE
HCT VFR BLD AUTO: 34.6 % (ref 40–54)
HCV AB SERPL QL IA: REACTIVE
HGB BLD-MCNC: 11.3 GM/DL (ref 14–18)
HGB UR QL STRIP: NEGATIVE
HIV 1+2 AB+HIV1 P24 AG SERPL QL IA: NORMAL
HOLD SPECIMEN: NORMAL
IMM GRANULOCYTES # BLD AUTO: 0.02 K/UL (ref 0–0.04)
IMM GRANULOCYTES NFR BLD AUTO: 0.3 % (ref 0–0.5)
KETONES UR QL STRIP: NEGATIVE
LEUKOCYTE ESTERASE UR QL STRIP: NEGATIVE
LYMPHOCYTES # BLD AUTO: 2.77 K/UL (ref 1–4.8)
MAGNESIUM SERPL-MCNC: 1.7 MG/DL (ref 1.6–2.6)
MCH RBC QN AUTO: 29.3 PG (ref 27–31)
MCHC RBC AUTO-ENTMCNC: 32.7 G/DL (ref 32–36)
MCV RBC AUTO: 90 FL (ref 82–98)
NITRITE UR QL STRIP: NEGATIVE
NUCLEATED RBC (/100WBC) (OHS): 0 /100 WBC
PH UR STRIP: 5 [PH]
PLATELET # BLD AUTO: 172 K/UL (ref 150–450)
PMV BLD AUTO: 9.2 FL (ref 9.2–12.9)
POTASSIUM SERPL-SCNC: 3.9 MMOL/L (ref 3.5–5.1)
PROT SERPL-MCNC: 8.6 GM/DL (ref 6–8.4)
PROT UR QL STRIP: NEGATIVE
RBC # BLD AUTO: 3.86 M/UL (ref 4.6–6.2)
RELATIVE EOSINOPHIL (OHS): 0.7 %
RELATIVE LYMPHOCYTE (OHS): 47.2 % (ref 18–48)
RELATIVE MONOCYTE (OHS): 10.4 % (ref 4–15)
RELATIVE NEUTROPHIL (OHS): 40.5 % (ref 38–73)
SODIUM SERPL-SCNC: 138 MMOL/L (ref 136–145)
SP GR UR STRIP: <=1.005
UROBILINOGEN UR STRIP-ACNC: NEGATIVE EU/DL
WBC # BLD AUTO: 5.87 K/UL (ref 3.9–12.7)

## 2025-06-17 PROCEDURE — 87389 HIV-1 AG W/HIV-1&-2 AB AG IA: CPT | Performed by: EMERGENCY MEDICINE

## 2025-06-17 PROCEDURE — 83735 ASSAY OF MAGNESIUM: CPT | Performed by: NURSE PRACTITIONER

## 2025-06-17 PROCEDURE — 82077 ASSAY SPEC XCP UR&BREATH IA: CPT | Performed by: NURSE PRACTITIONER

## 2025-06-17 PROCEDURE — 86803 HEPATITIS C AB TEST: CPT | Performed by: EMERGENCY MEDICINE

## 2025-06-17 PROCEDURE — 99283 EMERGENCY DEPT VISIT LOW MDM: CPT

## 2025-06-17 PROCEDURE — 81003 URINALYSIS AUTO W/O SCOPE: CPT | Performed by: NURSE PRACTITIONER

## 2025-06-17 PROCEDURE — 84075 ASSAY ALKALINE PHOSPHATASE: CPT | Performed by: NURSE PRACTITIONER

## 2025-06-17 PROCEDURE — 85025 COMPLETE CBC W/AUTO DIFF WBC: CPT | Performed by: NURSE PRACTITIONER

## 2025-06-17 NOTE — ED PROVIDER NOTES
"Encounter Date: 6/17/2025       History     Chief Complaint   Patient presents with    Urinary Incontinence     Pt.'s family states the pt. "Is pissing on himself. He drinks too much. The [facility] told me to bring him to the hospital for labs to see what's wrong with him.".      History is obtained from patient's family member who brought him to the ED, patient is deaf.  this family members eager to leave but reports he needs him "checked out" and "observed".  Tells me [facility] is about to establish with patient and they "want him checked out and labs".  Reports patient does not see a PCP only utilizes ED for medical care.  When I inquire about with the chief complaint is he tells me he is"pissing a lot, he is pissing all over himself and needs his kidneys and liver checked out".  Family member who identifies himself as a caregiver stay tells me this is not acute and this is a chronic issue and he "does this all the time".  Tells me he "drinks alcohol daily".  Denies knowledge of kidney or liver problems just tells me that he wants to leave and wants his family member checked out.  Gives me a number of needs to call and event patient is discharge.      Reviewed prior charts/labs as available.  Do note in May patient had creatinine 1.6 with prior labs in 2023 showing normal function.  Given this finding we will go ahead and proceed with lab workup to rule out any acute findings or changes.  No way to trend if he has CKD or if this was an ANDREWS.  Does not appear to be routinely followed up in outpatient setting.    The history is provided by a relative and a caregiver.     Review of patient's allergies indicates:  No Known Allergies  Past Medical History:   Diagnosis Date    Advanced liver fibrosis     FibroScan 1/2020 - F3    Coronary artery disease     Gout     History of hepatitis C, treated / cured (SVR12 - 8/2020)     Language barrier     MI (myocardial infarction)     Ulcer      Past " Surgical History:   Procedure Laterality Date    ABDOMINAL SURGERY      CARDIAC SURGERY      COLONOSCOPY  09/02/2013    ESOPHAGOGASTRODUOD[facility]  [facility]31[facility]    EXCISION OF MASS OF HAND Left 10/16/2023    Procedure: EXCISION, MASS, HAND;  Surgeon: Lakhwinder Frank DO;  Location: [facility] OR;  Service: Orthopedics;  Laterality: Left;  dorsal tendon mass    EXTENSOR TENDON OF FOREARM / WRIST REPAIR      INCISION AND DRAINAGE OF HAND Left 11/16/2023    Procedure: INCISION AND DRAINAGE, HAND;  Surgeon: Lakhwinder Frank DO;  Location: [facility] OR;  Service: Orthopedics;  Laterality: Left;    INTRAMEDULLARY RODDING OF HUMERUS Left 10/20/2019    Procedure: INSERTION, INTRAMEDULLARY SISSY, HUMERUS;  Surgeon: Jose Alejandro Funes MD;  Location: [facility] OR;  Service: Orthopedics;  Laterality: Left;    IRRIGATION AND DEBRIDEMENT OF UPPER EXTREMITY Left 11/20/2023    Procedure: IRRIGATION AND DEBRIDEMENT, UPPER EXTREMITY;  Surgeon: Lakhwinder Frank DO;  Location: [facility] OR;  Service: Orthopedics;  Laterality: Left;  2nd look irrigation debridement with possible closure left hand    OPEN REDUCTION AND INTERNAL FIXATION (ORIF) OF FRACTURE OF PROXIMAL HUMERUS Right 6/15/2023    Procedure: Reduction and Internal Fixation Right Proximal Humerus;  Surgeon: Lakhwinder Frank DO;  Location: [facility] OR;  Service: Orthopedics;  Laterality: Right;    TENODESIS, FINGER EXTENSOR, AT WRIST Left 10/16/2023    Procedure: TENODESIS, FINGER EXTENSOR, AT WRIST;  Surgeon: Lakhwinder Frank DO;  Location: [facility] OR;  Service: Orthopedics;  Laterality: Left;  left small finger     No family history on file.  Social History[1]  Review of Systems  Nursing staff interviewed using a  service as well.  Negative ROS.  Patient without acute complaints.    Physical Exam     Initial Vitals [06/17/25 1238]   BP Pulse Resp Temp SpO2   (!) 157/82 71 16 98.5 °F (36.9 °C) 98 %      MAP       --         Physical  Exam  .Nursing Notes and Vital Signs reviewed.    Constitutional: Nontoxic appearing patient, thin and unkempt  Head: Normocephalic. Atraumatic.   Eyes:  Conjunctivae are not pale. No scleral icterus.   Cardiovascular: Regular rate and rhythm.   Pulmonary: Breath sounds clear thought  No respiratory distress.   Abdominal: Soft, thin, nondistended, nontender to palpation.  Present bowel sounds  Musculoskeletal: Moves all extremities. No obvious deformities.  Ambulates with a walker unsteady gait.  Skin: Warm and dry.  Limited exam.  Unkempt male.  Neurological:  Alert, awake, deaf/no speech   Psychiatric:  Calm  ED Course   Procedures  Labs Reviewed   COMPREHENSIVE METABOLIC PANEL - Abnormal       Result Value    Sodium 138      Potassium 3.9      Chloride 102      CO2 22 (*)     Glucose 89      BUN 4 (*)     Creatinine 0.8      Calcium 9.3      Protein Total 8.6 (*)     Albumin 3.5      Bilirubin Total 0.3      ALP 64      AST 23      ALT 13      Anion Gap 14      eGFR >60     URINALYSIS, REFLEX TO URINE CULTURE - Abnormal    Color, UA Yellow      Appearance, UA Clear      pH, UA 5.0      Spec Grav UA <=1.005 (*)     Protein, UA Negative      Glucose, UA Negative      Ketones, UA Negative      Bilirubin, UA Negative      Blood, UA Negative      Nitrites, UA Negative      Urobilinogen, UA Negative      Leukocyte Esterase, UA Negative     CBC WITH DIFFERENTIAL - Abnormal    WBC 5.87      RBC 3.86 (*)     HGB 11.3 (*)     HCT 34.6 (*)     MCV 90      MCH 29.3      MCHC 32.7      RDW 19.4 (*)     Platelet Count 172      MPV 9.2      Nucleated RBC 0      Neut % 40.5      Lymph % 47.2      Mono % 10.4      Eos % 0.7      Basophil % 0.9      Imm Grans % 0.3      Neut # 2.38      Lymph # 2.77      Mono # 0.61      Eos # 0.04      Baso # 0.05      Imm Grans # 0.02     ALCOHOL,MEDICAL (ETHANOL) - Abnormal    Alcohol, Serum 242 (*)    MAGNESIUM - Normal    Magnesium  1.7     CBC W/ AUTO DIFFERENTIAL    Narrative:     The  following orders were created for panel order CBC auto differential.  Procedure                               Abnormality         Status                     ---------                               -----------         ------                     CBC with Differential[[id number]]       Abnormal            Final result                 Please view results for these tests on the individual orders.   HEPATITIS C ANTIBODY   HIV 1 / 2 ANTIBODY   GREY TOP URINE HOLD          Imaging Results    None          Medications - No data to display  Medical Decision Making  Amount and/or Complexity of Data Reviewed  Labs: ordered.    Differential diagnosis UTI, ANDREWS versus CKD, NSAID drug    I need a blood transfer proceeded with lab workup based on lab trends.  Creatinine May 1 0.6 with prior creatinine normal in 2023.  Does not seem to seek routine health maintenance.  Nonspecific complaints and complains that are presented or chronic blood such as daily alcohol use and urinating on himself which is also longstanding and chronic and not an acute finding.  UA is unremarkable.  Serum alcohol consistent with the patient's reported history of alcohol use.  No signs and symptoms of DTs.  Surprisingly electrolytes are within normal limits.  Renal function is normal, May labs indicative of ANDREWS that has resolved.  Patient is medically stable for discharge.  Outpatient follow up with PCP.  He is without acute complaints other than wishing to be discharged.           ED Course as of 06/17/25 1500   Tue Jun 17, 2025   1358 Reviewed results as available/CBC.  We will continue to follow [AT]      ED Course User Index  [AT] Azalea Young, HARRIET                           Clinical Impression:  Final diagnoses:  [R32] Urinary incontinence, unspecified type (Primary)  [Z00.00] General medical exam       Chronic urinary incontinence.  See HPI.   ED Disposition Condition    Discharge Stable          ED Prescriptions    None       Follow-up  Information       Follow up With Specialties Details Why Contact Info    Richa Velarde MD Family Medicine Schedule an appointment as soon as possible for a visit   [address] [facility] [place] [address] [phone]                     [1]   Social History  Tobacco Use    Smoking status: Every Day     Current packs/day: 1.00     Types: Cigarettes    Smokeless tobacco: Never   Substance Use Topics    Alcohol use: Yes     Alcohol/week: 6.0 standard drinks of alcohol     Types: 6 Cans of beer per week    Drug use: Yes     Types: Marijuana        Azalea Young NP  06/17/25 1500

## 2025-06-17 NOTE — PROGRESS NOTES
Spoke to patient's niece Talita. She stated that they are trying to get patient set up with Home Health. Home health advised them to take him to the ER for a complete work up due to incontinence. Reminder set for 2 day to see if pt is admitted, if not schedule sooner PCP appointment. PCP appt scheduled on Tuesday 06.24.25 with Dr Brody Corley due to no availability with Dr Velarde.

## 2025-06-18 ENCOUNTER — DOCUMENTATION ONLY (OUTPATIENT)
Dept: EMERGENCY MEDICINE | Facility: HOSPITAL | Age: 65
End: 2025-06-18
Payer: MEDICARE

## 2025-06-18 NOTE — PROGRESS NOTES
Patient was seen in the emergency department on 6/17/2025. The patient's Hepatitis C Antibody was positive. Patient is deaf and uses ALS. Certified Letter sent to the patient's address on file.

## 2025-06-24 ENCOUNTER — OFFICE VISIT (OUTPATIENT)
Dept: FAMILY MEDICINE | Facility: CLINIC | Age: 65
End: 2025-06-24
Payer: MEDICARE

## 2025-06-24 VITALS
BODY MASS INDEX: 13.6 KG/M2 | DIASTOLIC BLOOD PRESSURE: 70 MMHG | HEIGHT: 70 IN | SYSTOLIC BLOOD PRESSURE: 130 MMHG | WEIGHT: 95 LBS | OXYGEN SATURATION: 95 % | HEART RATE: 81 BPM

## 2025-06-24 DIAGNOSIS — H91.93 BILATERAL DEAFNESS: ICD-10-CM

## 2025-06-24 DIAGNOSIS — R53.81 DEBILITY: ICD-10-CM

## 2025-06-24 DIAGNOSIS — R47.01 MUTE: ICD-10-CM

## 2025-06-24 DIAGNOSIS — F10.10 EXCESSIVE DRINKING ALCOHOL: Primary | ICD-10-CM

## 2025-06-24 PROCEDURE — 99213 OFFICE O/P EST LOW 20 MIN: CPT | Mod: PBBFAC | Performed by: FAMILY MEDICINE

## 2025-06-24 PROCEDURE — 99999 PR PBB SHADOW E&M-EST. PATIENT-LVL III: CPT | Mod: PBBFAC,,, | Performed by: FAMILY MEDICINE

## 2025-06-24 NOTE — ASSESSMENT & PLAN NOTE
The niece reports the patient was born deaf and mute and has never spoken in his very hard of hearing.

## 2025-06-24 NOTE — PROGRESS NOTES
[facility]      Subjective         Chief Complaint   Patient presents with    Follow-up     Looking for care assistance. Home health aide       64-year-old male presents to the clinic with family: Brother, niece.  Requests referral for placement into skilled nursing facility.  Patient is deaf/mute, previously taking care of by his mother who passed away.  The family can not care for the patient.  The patient requires 24/7 assistance.  The patient currently resides with the niece.  ER visit/documentation 06/17/2025 reviewed.  The caregiver reports no change in status.    Past Medical History:   Diagnosis Date    Advanced liver fibrosis     FibroScan 1/2020 - F3    Coronary artery disease     Gout     History of hepatitis C, treated / cured (SVR12 - 8/2020)     Language barrier     MI (myocardial infarction)     Ulcer         Past Surgical History:   Procedure Laterality Date    ABDOMINAL SURGERY      CARDIAC SURGERY      COLONOSCOPY  09/02/2013    ESOPHAGOGASTRODUODENOSCOPY  08/31/2013    Dr Anjelica [facility]    EXCISION OF MASS OF HAND Left 10/16/2023    Procedure: EXCISION, MASS, HAND;  Surgeon: Lakhwinder Frank DO;  Location: [facility] OR;  Service: Orthopedics;  Laterality: Left;  dorsal tendon mass    EXTENSOR TENDON OF FOREARM / WRIST REPAIR      INCISION AND DRAINAGE OF HAND Left 11/16/2023    Procedure: INCISION AND DRAINAGE, HAND;  Surgeon: Lakhwinder Frank DO;  Location: [facility] OR;  Service: Orthopedics;  Laterality: Left;    INTRAMEDULLARY RODDING OF HUMERUS Left 10/20/2019    Procedure: INSERTION, INTRAMEDULLARY SISSY, HUMERUS;  Surgeon: Jose Alejandro Funes MD;  Location: [facility] OR;  Service: Orthopedics;  Laterality: Left;    IRRIGATION AND DEBRIDEMENT OF UPPER EXTREMITY Left 11/20/2023    Procedure: IRRIGATION AND DEBRIDEMENT, UPPER EXTREMITY;  Surgeon: Lakhwinder Frank DO;  Location: [facility] OR;  Service: Orthopedics;  Laterality: Left;  2nd look irrigation  "debridement with possible closure left hand    OPEN REDUCTION AND INTERNAL FIXATION (ORIF) OF FRACTURE OF PROXIMAL HUMERUS Right 6/15/2023    Procedure: Reduction and Internal Fixation Right Proximal Humerus;  Surgeon: Lakhwinder Frank DO;  Location: [facility] OR;  Service: Orthopedics;  Laterality: Right;    TENODESIS, FINGER EXTENSOR, AT WRIST Left 10/16/2023    Procedure: TENODESIS, FINGER EXTENSOR, AT WRIST;  Surgeon: Lakhwinder Frank DO;  Location: [facility] OR;  Service: Orthopedics;  Laterality: Left;  left small finger        Review of patient's allergies indicates:  No Known Allergies     Review of Systems   Constitutional: Negative.    HENT: Negative.     Eyes: Negative.    Respiratory: Negative.     Cardiovascular: Negative.    Gastrointestinal: Negative.    Genitourinary: Negative.    Musculoskeletal: Negative.    Skin: Negative.    Neurological: Negative.    Endo/Heme/Allergies: Negative.    Psychiatric/Behavioral: Negative.                 Objective      Vitals:    06/24/25 1316   BP: 130/70   BP Location: Left arm   Patient Position: Sitting   Pulse: 81   SpO2: 95%   Weight: 43.1 kg (95 lb)   Height: 5' 10" (1.778 m)        Physical Exam  Vitals reviewed.   HENT:      Head: Normocephalic and atraumatic.      Mouth/Throat:      Mouth: Mucous membranes are dry.   Eyes:      Extraocular Movements: Extraocular movements intact.   Cardiovascular:      Rate and Rhythm: Normal rate.   Neurological:      Mental Status: He is alert.         Lab Results   Component Value Date    TSH 2.269 11/15/2023    TSH 4.186 11/06/2019        Lab Results   Component Value Date    HGBA1C 5.6 01/24/2020      Lab Results   Component Value Date    HGBA1C 5.6 01/24/2020     CMP  Sodium   Date Value Ref Range Status   06/17/2025 138 136 - 145 mmol/L Final   05/20/2024 128 (L) 136 - 145 mmol/L Final   11/21/2023 137 136 - 145 mmol/L Final   11/19/2023 135 (L) 136 - 145 mmol/L Final     Potassium   Date Value Ref Range Status   06/17/2025 " 3.9 3.5 - 5.1 mmol/L Final   05/20/2024 4.6 3.5 - 5.1 mmol/L Final   11/21/2023 3.7 3.5 - 5.1 mmol/L Final   11/19/2023 3.5 3.5 - 5.1 mmol/L Final     Chloride   Date Value Ref Range Status   06/17/2025 102 95 - 110 mmol/L Final   05/20/2024 91 (L) 95 - 110 mmol/L Final   11/21/2023 100 95 - 110 mmol/L Final   11/19/2023 102 95 - 110 mmol/L Final     CO2   Date Value Ref Range Status   06/17/2025 22 (L) 23 - 29 mmol/L Final   05/20/2024 20 (L) 23 - 29 mmol/L Final   11/21/2023 25 23 - 29 mmol/L Final   11/19/2023 23 23 - 29 mmol/L Final     Glucose   Date Value Ref Range Status   06/17/2025 89 70 - 110 mg/dL Final   05/20/2024 93 70 - 110 mg/dL Final   11/21/2023 149 (H) 70 - 110 mg/dL Final   11/19/2023 132 (H) 70 - 110 mg/dL Final     BUN   Date Value Ref Range Status   06/17/2025 4 (L) 8 - 23 mg/dL Final   05/20/2024 22 8 - 23 mg/dL Final   11/21/2023 12 8 - 23 mg/dL Final   11/19/2023 8 8 - 23 mg/dL Final     Creatinine   Date Value Ref Range Status   06/17/2025 0.8 0.5 - 1.4 mg/dL Final   05/20/2024 1.6 (H) 0.5 - 1.4 mg/dL Final   11/21/2023 0.9 0.5 - 1.4 mg/dL Final   11/19/2023 1.0 0.5 - 1.4 mg/dL Final     Calcium   Date Value Ref Range Status   06/17/2025 9.3 8.7 - 10.5 mg/dL Final   05/20/2024 10.3 8.7 - 10.5 mg/dL Final   11/21/2023 8.9 8.7 - 10.5 mg/dL Final   11/19/2023 8.5 (L) 8.7 - 10.5 mg/dL Final     Protein Total   Date Value Ref Range Status   06/17/2025 8.6 (H) 6.0 - 8.4 gm/dL Final     Total Protein   Date Value Ref Range Status   05/20/2024 9.3 (H) 6.0 - 8.4 g/dL Final   11/18/2023 6.8 6.0 - 8.4 g/dL Final   11/17/2023 6.5 6.0 - 8.4 g/dL Final     Albumin   Date Value Ref Range Status   06/17/2025 3.5 3.5 - 5.2 g/dL Final   05/20/2024 2.8 (L) 3.5 - 5.2 g/dL Final   11/19/2023 1.9 (L) 3.5 - 5.2 g/dL Final   11/18/2023 2.1 (L) 3.5 - 5.2 g/dL Final     Total Bilirubin   Date Value Ref Range Status   05/20/2024 0.7 0.1 - 1.0 mg/dL Final     Comment:     For infants and newborns, interpretation of  results should be based  on gestational age, weight and in agreement with clinical  observations.    Premature Infant recommended reference ranges:  Up to 24 hours.............<8.0 mg/dL  Up to 48 hours............<12.0 mg/dL  3-5 days..................<15.0 mg/dL  6-29 days.................<15.0 mg/dL     11/18/2023 0.4 0.1 - 1.0 mg/dL Final     Comment:     For infants and newborns, interpretation of results should be based  on gestational age, weight and in agreement with clinical  observations.    Premature Infant recommended reference ranges:  Up to 24 hours.............<8.0 mg/dL  Up to 48 hours............<12.0 mg/dL  3-5 days..................<15.0 mg/dL  6-29 days.................<15.0 mg/dL     11/17/2023 0.5 0.1 - 1.0 mg/dL Final     Comment:     For infants and newborns, interpretation of results should be based  on gestational age, weight and in agreement with clinical  observations.    Premature Infant recommended reference ranges:  Up to 24 hours.............<8.0 mg/dL  Up to 48 hours............<12.0 mg/dL  3-5 days..................<15.0 mg/dL  6-29 days.................<15.0 mg/dL       Bilirubin Total   Date Value Ref Range Status   06/17/2025 0.3 0.1 - 1.0 mg/dL Final     Comment:     For infants and newborns, interpretation of results should be based   on gestational age, weight and in agreement with clinical   observations.    Premature Infant recommended reference ranges:   0-24 hours:  <8.0 mg/dL   24-48 hours: <12.0 mg/dL   3-5 days:    <15.0 mg/dL   6-29 days:   <15.0 mg/dL     Alkaline Phosphatase   Date Value Ref Range Status   05/20/2024 59 55 - 135 U/L Final   11/18/2023 49 (L) 55 - 135 U/L Final   11/17/2023 49 (L) 55 - 135 U/L Final     ALP   Date Value Ref Range Status   06/17/2025 64 40 - 150 unit/L Final     AST   Date Value Ref Range Status   06/17/2025 23 11 - 45 unit/L Final   05/20/2024 19 10 - 40 U/L Final   11/18/2023 36 10 - 40 U/L Final   11/17/2023 20 10 - 40 U/L Final      ALT   Date Value Ref Range Status   06/17/2025 13 10 - 44 unit/L Final   05/20/2024 13 10 - 44 U/L Final   11/18/2023 11 10 - 44 U/L Final   11/17/2023 10 10 - 44 U/L Final     Anion Gap   Date Value Ref Range Status   06/17/2025 14 8 - 16 mmol/L Final   05/20/2024 17 (H) 8 - 16 mmol/L Final   11/21/2023 12 8 - 16 mmol/L Final   11/19/2023 10 8 - 16 mmol/L Final     eGFR if    Date Value Ref Range Status   07/02/2021 >60.0 >60 mL/min/1.73 m^2 Final   07/01/2021 43.0 (A) >60 mL/min/1.73 m^2 Final   07/01/2021 38.1 (A) >60 mL/min/1.73 m^2 Final     eGFR if non    Date Value Ref Range Status   07/02/2021 >60.0 >60 mL/min/1.73 m^2 Final     Comment:     Calculation used to obtain the estimated glomerular filtration  rate (eGFR) is the CKD-EPI equation.      07/01/2021 37.2 (A) >60 mL/min/1.73 m^2 Final     Comment:     Calculation used to obtain the estimated glomerular filtration  rate (eGFR) is the CKD-EPI equation.      07/01/2021 33.0 (A) >60 mL/min/1.73 m^2 Final     Comment:     Calculation used to obtain the estimated glomerular filtration  rate (eGFR) is the CKD-EPI equation.         Lab Results   Component Value Date    CHOL 130 05/28/2024     Lab Results   Component Value Date    HDL 21 (L) 05/28/2024     Lab Results   Component Value Date    LDLCALC 88.6 05/28/2024     Lab Results   Component Value Date    TRIG 102 05/28/2024     Lab Results   Component Value Date    CHOLHDL 16.2 (L) 05/28/2024          Medication List with Changes/Refills   Current Medications    ALBUTEROL (VENTOLIN HFA) 90 MCG/ACTUATION INHALER    Inhale 2 puffs into the lungs every 6 (six) hours as needed for Wheezing. Rescue    FOOD SUPPLEMT, LACTOSE-REDUCED (ENSURE MAX PROTEIN) LIQD    Take 1,896 mLs by mouth 4 (four) times daily.    LISINOPRIL (PRINIVIL,ZESTRIL) 40 MG TABLET    Take 1 tablet (40 mg total) by mouth once daily.    MELOXICAM (MOBIC) 15 MG TABLET    Take 1 tablet (15 mg total) by mouth  once daily.    MIRTAZAPINE (REMERON) 15 MG TABLET    Take 1 tablet (15 mg total) by mouth every evening.    NALTREXONE (DEPADE) 50 MG TABLET    Take 1 tablet (50 mg total) by mouth once daily.           Assessment & Plan     Assessment & Plan  Debility  The patient is in need of new placement services likely skilled nursing facility.  Referral case management.  Case management should contact myesha Watkins to arrange visitation and/or placement services.  The family we will continue to care for Mr. Rasheed until placement.  The family does not have financial resource to hire an independent private caregiver.  Orders:    Ambulatory referral/consult to Outpatient Case Management    Excessive drinking alcohol  Advised minimizing and preferably cessation of alcohol.  The patient is unable to obtain a alcohol by himself and must be assisted by caregivers therefore family does have some control over the access that the patient may have.    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         Bilateral deafness  The niece reports the patient was born deaf and mute and has never spoken in his very hard of hearing.               Brody Corley MD  [facility] 2nd [facility] Family Medicine  [address] [place] [address],[place] [id number]  [phone]

## 2025-07-07 ENCOUNTER — TELEPHONE (OUTPATIENT)
Dept: FAMILY MEDICINE | Facility: CLINIC | Age: 65
End: 2025-07-07
Payer: MEDICARE

## 2025-07-07 NOTE — TELEPHONE ENCOUNTER
Copied from CRM #[id number]. Topic: General Inquiry - Patient Advice  >> Jul 7, 2025 12:36 PM Checo wrote:  Pt's family is calling to get an update on getting the pt into an nursing home. The pt is not eating, drinking and only weighs 85 lbs. They need to get him into a skilled nursing facility. The pt has not been bathing and needs to get into a facility asap. They have been waiting for 2 weeks to hear back. Please call back to advise. Thanks!  Phone: [phone]

## 2025-07-07 NOTE — TELEPHONE ENCOUNTER
Skilled nursing referral faxed to [facility].     Talita notified and voiced understanding.

## 2025-07-08 ENCOUNTER — TELEPHONE (OUTPATIENT)
Dept: FAMILY MEDICINE | Facility: CLINIC | Age: 65
End: 2025-07-08
Payer: MEDICARE

## 2025-07-08 NOTE — TELEPHONE ENCOUNTER
Copied from CRM #[id number]. Topic: General Inquiry - Return Call  >> Jul 8, 2025  1:06 PM Mary wrote:  Type:  Patient Returning Call    MULTIPLE CALLS about this    Who Called: diamante - relative     Who Left Message for Patient: gianna    Does the patient know what this is regarding?: devyn barlow paperwork    Would the patient rather a call back or a response via [facility]?  Call back    Best Call Back Number: [phone]    Additional Information:  was told yesterday that it would be faxed over, but they still do not have it    Please re-fax and call to advise    Thanks

## 2025-07-08 NOTE — TELEPHONE ENCOUNTER
My Clinic Care Coordination Wellness Plan    [facility]  [address] [place]  [address]  [phone]    My Preferred Method of Contact:  Phone: [phone]    My Primary/Preferred Language:  English    Preferred Learning Style:  Reading information, Face to face discussion and Pictures/Diagrams    Emergency Contact: Extended Emergency Contact Information  Primary Emergency Contact: Leticia Castellanos   [place]  Work Phone: [phone]  Mobile Phone: [phone]  Relation: Child  Secondary Emergency Contact: Anthony Camara   [facility]  Home Phone: [phone]  Mobile Phone: [phone]  Relation: Child     PCP:  Danni Ortiz MD  Specialists:    Care Team            Danni Ortiz MD PCP - General, Family Medicine    [phone]     [facility]    Gumaro Da Silva MD Physician, Internal Medicine    [phone]     Te Burgos MD Physician, General Surgery    [phone]     Ronald Cui MD Physician, Geriatric Medicine    [phone]     [facility] Care Coordination Care Guide, Clinic Care Coordination    P: [phone] F: [phone]    Parag Beebe (HectorAlberto Forrest MD Physician, Cardiology    [phone]     KENNEDY AdamsM Physician, Podiatry    [phone]         Goals        Patient Stated      I would like to keep my blood pressure under good control.  (pt-stated)            Action steps to achieve this goal  -  I will continue to check my blood pressure three times a day and log them.  -  If I see >1 day of BPs that are consistently >160/90 or >1 day of BPs that are <90/60 AND I feel hypotensive (NEW dizziness, lightheadedness) I will call the clinic.  -  I will continue to watch my sodium intake and not add additional salt to things.  -  I will continue to eat healthy snacks, ex fruits and veggies, bananas, apples, grapes.   -  I will follow up with Zhou, the clinic  Referral sent 7/7/25 and again 7/8/25 to [facility]  Notified Talita Sanon voiced understanding.    pharmacist, as needed.                   Advanced Directive/Living Will: On file with the clinic    Clinical Emergency Plan  Emergency Plan  Depression  Everyone feels down at times. The blues are a natural part of life. But an unhappy period that s intense or lasts for more than a couple of weeks can be a sign of depression. Depression is a serious illness. It can disrupt the lives of family and friends. If you know someone you think may be depressed, find out what you can do to help.     Know the serious signals  Warning signals for suicide include:    Threats or talk of suicide    Statements such as  I won t be a problem much longer  or  Nothing matters     Giving away possessions or making a will or  arrangements    Buying a gun or other weapon    Sudden, unexplained cheerfulness or calm after a period of depression  If you notice any of these signs, get help right away. Call a health care professional, mental health clinic, or suicide hotline and ask what action to take. In an emergency, don t hesitate to call the police.     [facility] Mental Health Crisis Lines:  [facility] [phone]  [facility] [phone]  [facility] [phone]  [facility] [phone]  [facility], Adults [phone]  [facility], Children [phone]  [facility], Adults [phone]  [facility], Children [phone]     Preventing Falls    Having a health problem can make you more likely to fall. Taking certain kinds of medicines may also increase your risk of falls. So, improving your health can help you avoid a fall. Work with your healthcare provider to manage health problems and to review your medicines. If you have your health under control, your risk of falling is lessened.     When to call your healthcare provider  Be sure to call your healthcare provider if you fall. Also call if you have any of these signs or symptoms (someone else may need to point them out to you):    Feeling  lightheaded or dizzy more than once a day    Losing your balance often or feeling unsteady on your feet    Feeling numbness in your legs or feet, or noticing a change in the way you walk    Having a steady decline in your memory or mental sharpness     High Blood Pressure (Hypertension)  You have been diagnosed with high blood pressure (also called hypertension). This means the force of blood against your artery walls is too strong. It also means your heart is working hard to move blood. High blood pressure usually has no symptoms, but over time, it can damage your heart, blood vessels, eyes, kidneys, and other organs. With help from your doctor, you can manage your blood pressure and protect your health.  Taking medications    Learn to take your own blood pressure. Keep a record of your results. Ask your doctor which readings mean that you need medical attention.    Take your blood pressure medication exactly as directed. Don t skip doses. Missing doses can cause your blood pressure to get out of control.    Avoid medications that contain heart stimulants, including over-the-counter drugs. Check for warnings about high blood pressure on the label.    Check with your doctor before taking a decongestant. Some decongestants can worsen high blood pressure.  Lifestyle changes    Maintain a healthy weight. Get help to lose any extra pounds.    Cut back on salt.    Limit canned, dried, packaged, and fast foods.    Don t add salt to your food at the table.    Season foods with herbs instead of salt when you cook.    Follow the DASH (Dietary Approaches to Stop Hypertension) eating plan. This plan recommends vegetables, fruits, whole gains, and other heart healthy foods.    Begin an exercise program. Ask your doctor how to get started. The American Heart Association recommends aerobic exercise 3 to 4 times a week for an average of 40 minutes at a time, with your doctor's approval. Simple activities like walking or gardening  can help.    Break the smoking habit. Enroll in a stop-smoking program to improve your chances of success. Ask your health care provider about programs and medications to help you stop smoking.    Limit drinks that contain caffeine (coffee, black or green tea, cola) to 2 per day.    Never take stimulants such as amphetamines or cocaine; these drugs can be deadly for someone with high blood pressure.    Control your stress. Learn stress-management techniques.    Limit alcohol to no more than 1 drink a day for women and 2 drinks a day for men.  Follow-up care  Make a follow-up appointment as directed by our staff.     When to seek medical care  Call your doctor immediately if you have any of the following:    Chest pain or shortness of breath (call 911)    Moderate to severe headache    Weakness in the muscles of your face, arms, or legs    Trouble speaking    Extreme drowsiness    Confusion    Fainting or dizziness    Pulsating or rushing sound in your ears    Unexplained nosebleed    Weakness, tingling, or numbness of your face, arms, or legs    Change in vision    Blood pressure measured at home that is greater than 180/110         All [facility] clinic patients have access to a Nurse 24 hours a day, 7 days a week.  If you have questions or want advice from a Nurse, please know [facility] is here for you.  You can call your clinic and they will connect you or you can call [facility] at [phone].  [facility] also has Walk In Care clinics in multiple locations.  Call the number listed above for more information about our Walk In Care clinics or visit the [facility] website at [email or web address].

## 2025-07-15 ENCOUNTER — OUTPATIENT CASE MANAGEMENT (OUTPATIENT)
Dept: ADMINISTRATIVE | Facility: OTHER | Age: 65
End: 2025-07-15
Payer: MEDICARE

## 2025-07-15 NOTE — PROGRESS NOTES
Outpatient Care Management   - Patient Assessment    Patient: Jesus Mccabe  MRN:  [id number]  Date of Service:  7/15/2025  Completed by:  Abena Myers LMSW  Referral Date: 07/03/2025    Reason for Visit   Patient presents with    OPCM Enrollment Call    Social Work Assessment    Plan Of Care       Brief Summary:  received a referral from outpatient provider for the following Low/Mod SW psychosocial needs NH Placement .  The patient also requests assistance with In home support . Care plan was created in collaboration with patient/caregiver input.  SW completed assessment with patient niece . Niece reports moving back to [place] from [place] to temporary help with patient  care since the passing of her mother. Niece reports patient requires supervision with ADL;s  and IADL's. Niece providing transportation. .

## 2025-07-16 ENCOUNTER — TELEPHONE (OUTPATIENT)
Dept: FAMILY MEDICINE | Facility: CLINIC | Age: 65
End: 2025-07-16
Payer: MEDICARE

## 2025-07-16 DIAGNOSIS — R26.81 UNSTEADY GAIT: Primary | ICD-10-CM

## 2025-07-16 DIAGNOSIS — R32 URINARY INCONTINENCE, UNSPECIFIED TYPE: ICD-10-CM

## 2025-07-16 NOTE — TELEPHONE ENCOUNTER
----- Message from Abena Myers sent at 7/16/2025  3:11 PM CDT -----  Greetings, Dr. Velarde and or Staff     SW received a referral on the above patient to assist with social needs. While speaking with patient josé antonio Sanon . Talita reports patient is incontinent and wondering if it would be appropriate for patient to see a urologist to further access. Cammychencho also reports patient has difficulties with ambulating . Cammychencho is requesting a referral to PT to assist with gait.      Please place orders if an agreement with request.         Thank you,     Abena Myers LMSW

## 2025-07-17 ENCOUNTER — OUTPATIENT CASE MANAGEMENT (OUTPATIENT)
Dept: ADMINISTRATIVE | Facility: OTHER | Age: 65
End: 2025-07-17
Payer: MEDICARE

## 2025-07-17 NOTE — PROGRESS NOTES
Outpatient Care Management   - Care Plan Follow Up    Patient: Jesus Mccabe  MRN:  [id number]  Date of Service:  7/17/2025  Completed by:  Abena Myers LMSW  Referral Date: 07/03/2025    Reason for Visit   Patient presents with    OPCM SW Follow Up Call       Brief Summary: FRANCISCO followed up with patient Thomas regarding resources to assist with more in home support. FRANCISCO explained to Thomas per Ann with [facility] to [facility] . Patient applied for waiver in April . Thomas advised FRANCISCO she wasn't aware patient applied for assistance as she didn't assist with  obtaining waiver. FRANCISCO also discussed referral to [facility] to assist with  and MOW. Thomas advised FRANCISCO PT and urology department reached out to her to assist with scheduling appointments.     Complex Care Plan     Care plan was discussed and completed today with input from patient and/or caregiver.    Patient Instructions     No follow-ups on file.

## 2025-07-24 ENCOUNTER — OUTPATIENT CASE MANAGEMENT (OUTPATIENT)
Dept: ADMINISTRATIVE | Facility: OTHER | Age: 65
End: 2025-07-24
Payer: MEDICARE

## 2025-07-24 NOTE — PROGRESS NOTES
Outpatient Care Management   - Care Plan Follow Up    Patient: Jesus Mccabe  MRN:  [id number]  Date of Service:  7/24/2025  Completed by:  Abena Myers LMSW  Referral Date: 07/03/2025    Reason for Visit   Patient presents with    OPCM SW Follow Up Call    Case Closure       Brief Summary:FRANCISCO followed up with patient josé antonio Sanon. FRANCISCO explained to [facility] patient [facility] on wait list as of April 23, 2025, for [facility] [place]. FRANCISCO explained to [facility] agency not able to provide an exact day when patient will receive services but he's on wait list for assistance . Talita also aware patient on [facility] with [facility]. FRANCISCO discussed all upcoming appointments with josé antonio. FRANCISCO will close case as resources are in place.     FRANCISCO spoke with Ms. Bucio with [place] [facility] [place] ( Waiver Program). Ms Bucio advised SW patient currently on wait list and has been on wait list since 04/23/2025  Complex Care Plan     Care plan was discussed and completed today with input from patient and/or caregiver.    Patient Instructions     No follow-ups on file.

## 2025-08-06 ENCOUNTER — OUTPATIENT CASE MANAGEMENT (OUTPATIENT)
Dept: ADMINISTRATIVE | Facility: OTHER | Age: 65
End: 2025-08-06
Payer: MEDICARE

## 2025-08-06 NOTE — PROGRESS NOTES
Patient case closed on 07/24/2025 resources are in place or on wait list. No new referrals. SW will close case .

## (undated) DEVICE — DRAPE ORTH SPLIT 77X108IN

## (undated) DEVICE — STRAP OR TABLE 5IN X 72IN

## (undated) DEVICE — GAUZE SPONGE 4X4 12PLY

## (undated) DEVICE — DRAPE STERI U-SHAPED 47X51IN

## (undated) DEVICE — BANDAGE ESMARK ELASTIC ST 4X9

## (undated) DEVICE — GLOVE SENSICARE PI SURG 8

## (undated) DEVICE — CANISTER SUCTION RIGID 3000CC

## (undated) DEVICE — SLEEVE SCD EXPRESS CALF MEDIUM

## (undated) DEVICE — BANDAGE ROLL COTTN 4.5INX4.1YD

## (undated) DEVICE — ELECTRODE REM PLYHSV RETURN 9

## (undated) DEVICE — SLING SHLDR IMMOBILIZER MED

## (undated) DEVICE — GUIDE WIRE SMOOTH TIP 22X800MM
Type: IMPLANTABLE DEVICE | Site: HUMERUS | Status: NON-FUNCTIONAL
Removed: 2019-10-20

## (undated) DEVICE — TRAY SKIN SCRUB WET PREMIUM

## (undated) DEVICE — SUT ETHILON 3-0 PS2 18 BLK

## (undated) DEVICE — SUT 2-0 VICRYL / SH (J417)

## (undated) DEVICE — TUBING SUCTION SCALLOP 3/16X10

## (undated) DEVICE — DRAPE STERI INSTRUMENT 1018

## (undated) DEVICE — APPLICATOR CHLORAPREP ORN 26ML

## (undated) DEVICE — GLOVE SURG ULTRA TOUCH 7

## (undated) DEVICE — SPONGE LAP 18X18 PREWASHED

## (undated) DEVICE — BANDAGE MATRIX HK LOOP 2IN 5YD

## (undated) DEVICE — DRESSING N ADH OIL EMUL 3X3

## (undated) DEVICE — TOWEL OR DISP STRL BLUE 4/PK

## (undated) DEVICE — DRESSING TRANS 4X4 3/4

## (undated) DEVICE — BLADE SURG #15 CARBON STEEL

## (undated) DEVICE — SEE MEDLINE ITEM 152622

## (undated) DEVICE — SUT X424H ETHIBOND 0-0

## (undated) DEVICE — SPONGE GAUZE 16PLY 4X4

## (undated) DEVICE — SOL 9P NACL IRR PIC IL

## (undated) DEVICE — CANISTER SUCTION JUMBO 12L

## (undated) DEVICE — Device

## (undated) DEVICE — SPONGE/BRUSH SCRUB SURG PCMX

## (undated) DEVICE — GOWN POLY REINF BRTH SLV LG

## (undated) DEVICE — PADDING CAST 4IN SPECIALIST

## (undated) DEVICE — DRAPE STERI-DRAPE 1000 17X11IN

## (undated) DEVICE — DRESSING TRANS 6X8 TEGADERM

## (undated) DEVICE — SEE MEDLINE ITEM 153151

## (undated) DEVICE — GLOVE SURG ULTRA TOUCH 8

## (undated) DEVICE — SUT STRATAFIX SPIRAL VIOLET

## (undated) DEVICE — STOCKINETTE IMPERV INTRM 9X44

## (undated) DEVICE — DRAPE THREE-QUARTER 53X77IN

## (undated) DEVICE — GUIDEWIRE POLARUS 3 2MMX20IN S
Type: IMPLANTABLE DEVICE | Site: SHOULDER | Status: NON-FUNCTIONAL
Removed: 2023-06-15

## (undated) DEVICE — PAD SUREFIT GRND ELECTRD 10FT

## (undated) DEVICE — PAD ABDOMINAL STERILE 8X10IN

## (undated) DEVICE — UNDERGLOVES BIOGEL PI SIZE 7.5

## (undated) DEVICE — GLOVE SURG ULTRA TOUCH 7.5

## (undated) DEVICE — DRAPE INCISE IOBAN 2 23X17IN

## (undated) DEVICE — PACK BASIC

## (undated) DEVICE — DRAPE U STD FILM LG 60X84IN

## (undated) DEVICE — STAPLER SKIN ROTATING HEAD

## (undated) DEVICE — NDL ELECTRODE E-Z CLEAN 2.75IN

## (undated) DEVICE — ADHESIVE MASTISOL VIAL 48/BX

## (undated) DEVICE — SUT ETHILON 5-0 P3 18IN BLK

## (undated) DEVICE — GLOVE SENSICARE PI SURG 6.5

## (undated) DEVICE — ALCOHOL 70% ISOP RUBBING 4OZ

## (undated) DEVICE — SEE MEDLINE ITEM 146231

## (undated) DEVICE — SPLINT FIBERGLASS PAD 4X15

## (undated) DEVICE — DRAPE C ARM 42 X 120 10/BX

## (undated) DEVICE — BNDG COFLEX FOAM LF2 ST 4X5YD

## (undated) DEVICE — SEE MEDLINE ITEM 157166

## (undated) DEVICE — SUT CTD VICRYL 0 UND BR

## (undated) DEVICE — PAD ABD 8X10 STERILE

## (undated) DEVICE — GLOVE SURG ULTRA TOUCH 9

## (undated) DEVICE — TOURNIQUET 1 PORT RED 18X4IN

## (undated) DEVICE — INTERPULSE SET

## (undated) DEVICE — LABEL BLANK SURG 10 PER SHEET

## (undated) DEVICE — SEE MEDLINE ITEM 157131

## (undated) DEVICE — SUT VICRYL 2-0 CT-2 VCP269H

## (undated) DEVICE — SPONGE SUPER KERLIX 6X6.75IN

## (undated) DEVICE — SEE MEDLINE ITEM 146292

## (undated) DEVICE — SLING ARM COMFT NAVY BLU LG

## (undated) DEVICE — SEE MEDLINE ITEM 154981

## (undated) DEVICE — GLOVE SENSICARE PI SURG 7

## (undated) DEVICE — BANDAGE MATRIX HK LOOP 4IN 5YD

## (undated) DEVICE — PACK SHOULDER

## (undated) DEVICE — GLOVE SURG ULTRA TOUCH 8.5

## (undated) DEVICE — BIT DRILL AO 3.5X230MM STERILE

## (undated) DEVICE — GLOVE GAMMEX SURG LF PI SZ 8

## (undated) DEVICE — DRAPE U SPLIT SHEET 54X76IN

## (undated) DEVICE — GLOVE SURGEONS ULTRA TOUCH 6.5

## (undated) DEVICE — NDL SURE-SNAP HYPO SAF 18GX1.5

## (undated) DEVICE — GLOVE SENSICARE PI SURG 6

## (undated) DEVICE — GLOVE SENSICARE NEOPRENE 8

## (undated) DEVICE — STRIP MEDI WND CLSR 1/2X4IN

## (undated) DEVICE — BIT DRILL BN GAMMA 3 4.2MM DIA

## (undated) DEVICE — DRESSING N ADH OIL EMUL 3X8 3S

## (undated) DEVICE — SYR 10CC LUER LOCK

## (undated) DEVICE — SOL IRR NACL .9% 3000ML

## (undated) DEVICE — SUT STRATAFIX SPRL PS-2 3-0

## (undated) DEVICE — NDL BONE MAR JAMSHIDI 11G 4CM

## (undated) DEVICE — DRAPE HAND STERILE

## (undated) DEVICE — SEE MEDLINE ITEM 152530

## (undated) DEVICE — SUT MONOCRYL 4-0 PS-2

## (undated) DEVICE — WIRE KIRSCHNER T2 3X285MM SS
Type: IMPLANTABLE DEVICE | Site: HUMERUS | Status: NON-FUNCTIONAL
Removed: 2019-10-20

## (undated) DEVICE — SEE MEDLINE ITEM 146271

## (undated) DEVICE — TAPE MEDIPORE 4IN X 2YDS

## (undated) DEVICE — SPONGE DERMACEA GAUZE 4X4

## (undated) DEVICE — BLADE #15 STERILE CARBON

## (undated) DEVICE — GLOVE SENSICARE PI GRN 7

## (undated) DEVICE — PACK CUSTOM UNIV BASIN SLI

## (undated) DEVICE — SUT ETHILON 4-0 PS2 18 BLK

## (undated) DEVICE — SPONGE GAUZE 4X4 12 PLY STRL

## (undated) DEVICE — SUT CTD VICRYL 4-0 BR PS-2

## (undated) DEVICE — PAD CAST SPECIALIST STRL 4